# Patient Record
Sex: FEMALE | Race: WHITE | Employment: OTHER | URBAN - METROPOLITAN AREA
[De-identification: names, ages, dates, MRNs, and addresses within clinical notes are randomized per-mention and may not be internally consistent; named-entity substitution may affect disease eponyms.]

---

## 2017-04-21 ENCOUNTER — HOSPITAL ENCOUNTER (INPATIENT)
Age: 77
LOS: 2 days | Discharge: HOME | DRG: 390 | End: 2017-04-23
Attending: STUDENT IN AN ORGANIZED HEALTH CARE EDUCATION/TRAINING PROGRAM | Admitting: COLON & RECTAL SURGERY
Payer: MEDICARE

## 2017-04-21 ENCOUNTER — APPOINTMENT (OUTPATIENT)
Dept: CT IMAGING | Age: 77
DRG: 390 | End: 2017-04-21
Attending: STUDENT IN AN ORGANIZED HEALTH CARE EDUCATION/TRAINING PROGRAM
Payer: MEDICARE

## 2017-04-21 DIAGNOSIS — K56.609 SBO (SMALL BOWEL OBSTRUCTION) (HCC): Primary | ICD-10-CM

## 2017-04-21 LAB
ALBUMIN SERPL BCP-MCNC: 3.7 G/DL (ref 3.5–5)
ALBUMIN/GLOB SERPL: 1 {RATIO} (ref 1.1–2.2)
ALP SERPL-CCNC: 64 U/L (ref 45–117)
ALT SERPL-CCNC: 22 U/L (ref 12–78)
ANION GAP BLD CALC-SCNC: 11 MMOL/L (ref 5–15)
AST SERPL W P-5'-P-CCNC: 12 U/L (ref 15–37)
BASOPHILS # BLD AUTO: 0 K/UL (ref 0–0.1)
BASOPHILS # BLD: 0 % (ref 0–1)
BILIRUB SERPL-MCNC: 0.9 MG/DL (ref 0.2–1)
BUN SERPL-MCNC: 32 MG/DL (ref 6–20)
BUN/CREAT SERPL: 22 (ref 12–20)
CALCIUM SERPL-MCNC: 10.2 MG/DL (ref 8.5–10.1)
CHLORIDE SERPL-SCNC: 106 MMOL/L (ref 97–108)
CO2 SERPL-SCNC: 26 MMOL/L (ref 21–32)
CREAT SERPL-MCNC: 1.43 MG/DL (ref 0.55–1.02)
DIFFERENTIAL METHOD BLD: ABNORMAL
EOSINOPHIL # BLD: 0 K/UL (ref 0–0.4)
EOSINOPHIL NFR BLD: 0 % (ref 0–7)
ERYTHROCYTE [DISTWIDTH] IN BLOOD BY AUTOMATED COUNT: 13.3 % (ref 11.5–14.5)
GLOBULIN SER CALC-MCNC: 3.8 G/DL (ref 2–4)
GLUCOSE SERPL-MCNC: 135 MG/DL (ref 65–100)
HCT VFR BLD AUTO: 42.9 % (ref 35–47)
HGB BLD-MCNC: 14.1 G/DL (ref 11.5–16)
LACTATE SERPL-SCNC: 1.1 MMOL/L (ref 0.4–2)
LIPASE SERPL-CCNC: 129 U/L (ref 73–393)
LYMPHOCYTES # BLD AUTO: 8 % (ref 12–49)
LYMPHOCYTES # BLD: 0.7 K/UL (ref 0.8–3.5)
MCH RBC QN AUTO: 33.7 PG (ref 26–34)
MCHC RBC AUTO-ENTMCNC: 32.9 G/DL (ref 30–36.5)
MCV RBC AUTO: 102.6 FL (ref 80–99)
MONOCYTES # BLD: 1.1 K/UL (ref 0–1)
MONOCYTES NFR BLD AUTO: 12 % (ref 5–13)
NEUTS BAND NFR BLD MANUAL: 6 %
NEUTS SEG # BLD: 7.2 K/UL (ref 1.8–8)
NEUTS SEG NFR BLD AUTO: 74 % (ref 32–75)
PLATELET # BLD AUTO: 173 K/UL (ref 150–400)
PLATELET COMMENTS,PCOM: ABNORMAL
POTASSIUM SERPL-SCNC: 4.4 MMOL/L (ref 3.5–5.1)
PROT SERPL-MCNC: 7.5 G/DL (ref 6.4–8.2)
RBC # BLD AUTO: 4.18 M/UL (ref 3.8–5.2)
RBC MORPH BLD: ABNORMAL
RBC MORPH BLD: ABNORMAL
SODIUM SERPL-SCNC: 143 MMOL/L (ref 136–145)
WBC # BLD AUTO: 9 K/UL (ref 3.6–11)

## 2017-04-21 PROCEDURE — 65270000032 HC RM SEMIPRIVATE

## 2017-04-21 PROCEDURE — 36415 COLL VENOUS BLD VENIPUNCTURE: CPT | Performed by: EMERGENCY MEDICINE

## 2017-04-21 PROCEDURE — 96375 TX/PRO/DX INJ NEW DRUG ADDON: CPT

## 2017-04-21 PROCEDURE — 85025 COMPLETE CBC W/AUTO DIFF WBC: CPT | Performed by: EMERGENCY MEDICINE

## 2017-04-21 PROCEDURE — 74011250636 HC RX REV CODE- 250/636: Performed by: STUDENT IN AN ORGANIZED HEALTH CARE EDUCATION/TRAINING PROGRAM

## 2017-04-21 PROCEDURE — 96374 THER/PROPH/DIAG INJ IV PUSH: CPT

## 2017-04-21 PROCEDURE — 80053 COMPREHEN METABOLIC PANEL: CPT | Performed by: EMERGENCY MEDICINE

## 2017-04-21 PROCEDURE — 74176 CT ABD & PELVIS W/O CONTRAST: CPT

## 2017-04-21 PROCEDURE — 83605 ASSAY OF LACTIC ACID: CPT | Performed by: STUDENT IN AN ORGANIZED HEALTH CARE EDUCATION/TRAINING PROGRAM

## 2017-04-21 PROCEDURE — 74011250636 HC RX REV CODE- 250/636: Performed by: COLON & RECTAL SURGERY

## 2017-04-21 PROCEDURE — 96376 TX/PRO/DX INJ SAME DRUG ADON: CPT

## 2017-04-21 PROCEDURE — 83690 ASSAY OF LIPASE: CPT | Performed by: STUDENT IN AN ORGANIZED HEALTH CARE EDUCATION/TRAINING PROGRAM

## 2017-04-21 PROCEDURE — 74011636320 HC RX REV CODE- 636/320: Performed by: STUDENT IN AN ORGANIZED HEALTH CARE EDUCATION/TRAINING PROGRAM

## 2017-04-21 PROCEDURE — 99285 EMERGENCY DEPT VISIT HI MDM: CPT

## 2017-04-21 RX ORDER — ONDANSETRON 2 MG/ML
4 INJECTION INTRAMUSCULAR; INTRAVENOUS
Status: COMPLETED | OUTPATIENT
Start: 2017-04-21 | End: 2017-04-21

## 2017-04-21 RX ORDER — ONDANSETRON 2 MG/ML
4 INJECTION INTRAMUSCULAR; INTRAVENOUS
Status: DISCONTINUED | OUTPATIENT
Start: 2017-04-21 | End: 2017-04-23 | Stop reason: HOSPADM

## 2017-04-21 RX ORDER — SODIUM CHLORIDE, SODIUM LACTATE, POTASSIUM CHLORIDE, CALCIUM CHLORIDE 600; 310; 30; 20 MG/100ML; MG/100ML; MG/100ML; MG/100ML
150 INJECTION, SOLUTION INTRAVENOUS CONTINUOUS
Status: DISCONTINUED | OUTPATIENT
Start: 2017-04-21 | End: 2017-04-22

## 2017-04-21 RX ORDER — ACETAMINOPHEN 325 MG/1
650 TABLET ORAL
Status: DISCONTINUED | OUTPATIENT
Start: 2017-04-21 | End: 2017-04-23 | Stop reason: HOSPADM

## 2017-04-21 RX ORDER — ENOXAPARIN SODIUM 100 MG/ML
40 INJECTION SUBCUTANEOUS EVERY 24 HOURS
Status: DISCONTINUED | OUTPATIENT
Start: 2017-04-21 | End: 2017-04-23 | Stop reason: HOSPADM

## 2017-04-21 RX ORDER — MORPHINE SULFATE 2 MG/ML
4 INJECTION, SOLUTION INTRAMUSCULAR; INTRAVENOUS ONCE
Status: COMPLETED | OUTPATIENT
Start: 2017-04-21 | End: 2017-04-21

## 2017-04-21 RX ORDER — CALCIUM POLYCARBOPHIL 625 MG
1875 TABLET ORAL DAILY
COMMUNITY
End: 2018-01-25

## 2017-04-21 RX ORDER — SODIUM CHLORIDE 0.9 % (FLUSH) 0.9 %
5-10 SYRINGE (ML) INJECTION EVERY 8 HOURS
Status: DISCONTINUED | OUTPATIENT
Start: 2017-04-21 | End: 2017-04-23 | Stop reason: HOSPADM

## 2017-04-21 RX ORDER — CYANOCOBALAMIN 1000 UG/ML
1000 INJECTION, SOLUTION INTRAMUSCULAR; SUBCUTANEOUS
COMMUNITY
End: 2017-04-24 | Stop reason: SDUPTHER

## 2017-04-21 RX ORDER — SODIUM CHLORIDE 0.9 % (FLUSH) 0.9 %
5-10 SYRINGE (ML) INJECTION AS NEEDED
Status: DISCONTINUED | OUTPATIENT
Start: 2017-04-21 | End: 2017-04-23 | Stop reason: HOSPADM

## 2017-04-21 RX ORDER — VERAPAMIL HYDROCHLORIDE 240 MG/1
240 TABLET, FILM COATED, EXTENDED RELEASE ORAL
Status: DISCONTINUED | OUTPATIENT
Start: 2017-04-22 | End: 2017-04-23 | Stop reason: HOSPADM

## 2017-04-21 RX ORDER — MORPHINE SULFATE 4 MG/ML
3 INJECTION, SOLUTION INTRAMUSCULAR; INTRAVENOUS
Status: DISCONTINUED | OUTPATIENT
Start: 2017-04-21 | End: 2017-04-23 | Stop reason: HOSPADM

## 2017-04-21 RX ORDER — HYDROMORPHONE HYDROCHLORIDE 1 MG/ML
0.2 INJECTION, SOLUTION INTRAMUSCULAR; INTRAVENOUS; SUBCUTANEOUS
Status: DISCONTINUED | OUTPATIENT
Start: 2017-04-21 | End: 2017-04-21

## 2017-04-21 RX ADMIN — ONDANSETRON 4 MG: 2 INJECTION INTRAMUSCULAR; INTRAVENOUS at 08:33

## 2017-04-21 RX ADMIN — IOHEXOL 50 ML: 240 INJECTION, SOLUTION INTRATHECAL; INTRAVASCULAR; INTRAVENOUS; ORAL at 10:47

## 2017-04-21 RX ADMIN — Medication 4 MG: at 13:55

## 2017-04-21 RX ADMIN — ONDANSETRON 4 MG: 2 INJECTION INTRAMUSCULAR; INTRAVENOUS at 13:55

## 2017-04-21 RX ADMIN — Medication 4 MG: at 08:33

## 2017-04-21 RX ADMIN — ENOXAPARIN SODIUM 40 MG: 40 INJECTION SUBCUTANEOUS at 18:13

## 2017-04-21 RX ADMIN — Medication 10 ML: at 22:00

## 2017-04-21 RX ADMIN — SODIUM CHLORIDE 1000 ML: 900 INJECTION, SOLUTION INTRAVENOUS at 08:33

## 2017-04-21 RX ADMIN — SODIUM CHLORIDE, SODIUM LACTATE, POTASSIUM CHLORIDE, AND CALCIUM CHLORIDE 150 ML/HR: 600; 310; 30; 20 INJECTION, SOLUTION INTRAVENOUS at 18:12

## 2017-04-21 NOTE — IP AVS SNAPSHOT
Current Discharge Medication List  
  
CONTINUE these medications which have NOT CHANGED Dose & Instructions Dispensing Information Comments Morning Noon Evening Bedtime  
 allopurinol 100 mg tablet Commonly known as:  Ritta Popper Your last dose was: Your next dose is: TAKE 1 TABLET EVERY DAY Quantity:  90 Tab Refills:  3 BENADRYL ALLERGY 25 mg tablet Generic drug:  diphenhydrAMINE Your last dose was: Your next dose is:    
   
   
 Dose:  25 mg Take 25 mg by mouth nightly as needed for Sleep. Refills:  0  
     
   
   
   
  
 calcium polycarbophil 625 mg tablet Commonly known as:  Gerline Oka Your last dose was: Your next dose is:    
   
   
 Dose:  1875 mg Take 1,875 mg by mouth daily. Refills:  0  
     
   
   
   
  
 calcium-vitamin D 500 mg(1,250mg) -200 unit per tablet Commonly known as:  OYSTER SHELL Your last dose was: Your next dose is:    
   
   
 Dose:  1 Tab Take 1 Tab by mouth daily. Refills:  0 CENTRUM SILVER WOMEN 8 mg iron-400 mcg-300 mcg Tab Generic drug:  multivit-min-iron-FA-lutein Your last dose was: Your next dose is:    
   
   
 Dose:  1 Tab Take 1 Tab by mouth daily. Refills:  0  
     
   
   
   
  
 FISH OIL 1,000 mg Cap Generic drug:  omega-3 fatty acids-vitamin e Your last dose was: Your next dose is:    
   
   
 Dose:  1 Cap Take 1 Cap by mouth daily. Refills:  0  
     
   
   
   
  
 lovastatin 20 mg tablet Commonly known as:  MEVACOR Your last dose was: Your next dose is: TAKE 1 TABLET AT BEDTIME Quantity:  90 Tab Refills:  3  
     
   
   
   
  
 verapamil  mg ER capsule Commonly known as:  Leidonavan Enzo Your last dose was: Your next dose is:    
   
   
 Dose:  240 mg Take 1 Cap by mouth daily. Quantity:  90 Cap Refills:  3 VITAMIN B-12 1,000 mcg/mL injection Generic drug:  cyanocobalamin Your last dose was: Your next dose is:    
   
   
 Dose:  1000 mcg  
1,000 mcg by IntraMUSCular route every three (3) months. Refills:  0  
     
   
   
   
  
 VITAMIN D3 1,000 unit tablet Generic drug:  cholecalciferol Your last dose was: Your next dose is:    
   
   
 Dose:  1000 Units Take 1,000 Units by mouth daily. Refills:  0

## 2017-04-21 NOTE — H&P
[facility]on [facility] Rectal [facility] H and P      Date of Admission:4/21/2017    Subjective:     Chief Complaint  abd pain    History of Present Illness  63 yo F w h/o multiple abd surg including subtotal colectomy for GI bleed - presents with 2 day h/o abd pain. Pt has h/o previous SBOs and states that pain feels similar. No Bowel movements in last 2 days - normally has 4-5 per day. Has had difficulty keeping any liquids or solids down.      Past Medical History:   Diagnosis Date    Abscess 3/22/2014    Lumbar area - MRSA    Arthritis     Gout     Hypercholesterolemia     Hypertension     LBP (low back pain)     Other ill-defined conditions     LEFT SHOULDER PAINFUL, NEEDS REPLACEMENT    Thromboembolus ([facility] [address] 12/26/13    left leg     Past Surgical History:   Procedure Laterality Date    ABDOMEN SURGERY PROC UNLISTED      hernia repair    BREAST SURGERY PROCEDURE UNLISTED      EXCISION OF BREAST CYST    ENDOSCOPY, COLON, DIAGNOSTIC      ostomy reversal    HX BREAST BIOPSY Right 1970    Benign    HX COLOSTOMY      HX GI      COLOSTOMY REVERSAL    HX HEMORRHOIDECTOMY      HX ORTHOPAEDIC      right knee  bilateral knee replacement    HX ORTHOPAEDIC      ORIF RIGHT ANKLE    HX ORTHOPAEDIC      KNEE ARTHROSCOPY     Allergies   Allergen Reactions    Shellfish Derived Angioedema     Family History   Problem Relation Age of Onset    Hypertension Father     Anesth Problems Neg Hx      Social History     Social History    Marital status:      Spouse name: N/A    Number of children: N/A    Years of education: N/A     Social History Main Topics    Smoking status: Former Smoker     Packs/day: 1.00     Years: 20.00     Quit date: 5/1/1972    Smokeless tobacco: Never Used    Alcohol use 1.0 oz/week     2 Shots of liquor per week      Comment: 2 DAY    Drug use: No    Sexual activity: Not Asked     Other Topics Concern    None     Social History Narrative       Review of Systems 10 point ROS negative except as stated in the HPI    Physical Exam   Visit Vitals    /73    Pulse 88    Temp 98.8 °F (37.1 °C)    Resp 18    Ht 5' 7\" (1.702 m)    Wt 77.1 kg (170 lb)    SpO2 95%    BMI 26.63 kg/m2       General Appearance - alert and oriented x 3, in no acute distress  Lungs - clear to auscultation b/l   Cardiovascular - regular rate and rhythm  Abdomen - abd soft, ND, NT - multiple surgical scars  Extremities - nml, atraumatic, no edema     Radiology Results  Pertinent images reviewed    Assessment/Plan  65 yo F w SBO - likely secondary to adhesions from previous surgery     PLAN  - npo   - ivf  - conservative management of SBO - NGT decompression if she has further episodes of vomiting    Kolby Barnes MD  [facility]  [phone]    [address] [facility], [address] [facility], [address]    [address] [facility], [facility], [facility] [address] [address]    [address] [facility].   [place], [address] [place] [address]

## 2017-04-21 NOTE — ED NOTES
TRANSFER - OUT REPORT:    Verbal report given to Kolby Castellanos RN(name) on [employer]  being transferred to (unit) for routine progression of care       Report consisted of patients Situation, Background, Assessment and   Recommendations(SBAR). Information from the following report(s) SBAR, ED Summary, [facility] and Recent Results was reviewed with the receiving nurse. Lines:   Peripheral IV 04/21/17 Right Antecubital (Active)   Site Assessment Clean, dry, & intact 4/21/2017  8:08 AM   Phlebitis Assessment 0 4/21/2017  8:08 AM   Infiltration Assessment 0 4/21/2017  8:08 AM   Dressing Status Clean, dry, & intact 4/21/2017  8:08 AM        Opportunity for questions and clarification was provided.

## 2017-04-21 NOTE — IP AVS SNAPSHOT
[address] 
[phone] Patient: Berta Peguero MRN: [id number] MWR:6/8/1833 You are allergic to the following Allergen Reactions Shellfish Derived Angioedema Recent Documentation Height Weight BMI OB Status Smoking Status 1.702 m 77.1 kg 26.63 kg/m2 Postmenopausal Former Smoker Emergency Contacts Name Discharge Info Relation Home Work Mobile Matthew Corrigan DISCHARGE CAREGIVER [3] Spouse [3] [phone] About your hospitalization You were admitted on:  April 21, 2017 You last received care in the:  [facility] [id number] You were discharged on:  April 23, 2017 Unit phone number:  [phone] Why you were hospitalized Your primary diagnosis was:  Not on File Your diagnoses also included:  Obstruction Of Bowel (Hcc), Small Bowel Obstruction (Hcc) Providers Seen During Your Hospitalizations Provider Role Specialty Primary office phone Hanane Ren MD Attending Provider Emergency Medicine [phone] Ivonne Bahena MD Attending Provider Colon and Rectal Surgery [phone] Your Primary Care Physician (PCP) Primary Care Physician Office Phone Office Fax Chasity Dipesh [phone] Follow-up Information Follow up With Details Comments [place]   [address] [address] 
[phone] Current Discharge Medication List  
  
CONTINUE these medications which have NOT CHANGED Dose & Instructions Dispensing Information Comments Morning Noon Evening Bedtime  
 allopurinol 100 mg tablet Commonly known as:  Americo Boyd Your last dose was: Your next dose is: TAKE 1 TABLET EVERY DAY Quantity:  90 Tab Refills:  3 BENADRYL ALLERGY 25 mg tablet Generic drug:  diphenhydrAMINE Your last dose was: Your next dose is:    
   
   
 Dose:  25 mg Take 25 mg by mouth nightly as needed for Sleep. Refills:  0  
     
   
   
   
  
 calcium polycarbophil 625 mg tablet Commonly known as:  Richrd Fu Your last dose was: Your next dose is:    
   
   
 Dose:  1875 mg Take 1,875 mg by mouth daily. Refills:  0  
     
   
   
   
  
 calcium-vitamin D 500 mg(1,250mg) -200 unit per tablet Commonly known as:  OYSTER SHELL Your last dose was: Your next dose is:    
   
   
 Dose:  1 Tab Take 1 Tab by mouth daily. Refills:  0 CENTRUM SILVER WOMEN 8 mg iron-400 mcg-300 mcg Tab Generic drug:  multivit-min-iron-FA-lutein Your last dose was: Your next dose is:    
   
   
 Dose:  1 Tab Take 1 Tab by mouth daily. Refills:  0  
     
   
   
   
  
 FISH OIL 1,000 mg Cap Generic drug:  omega-3 fatty acids-vitamin e Your last dose was: Your next dose is:    
   
   
 Dose:  1 Cap Take 1 Cap by mouth daily. Refills:  0  
     
   
   
   
  
 lovastatin 20 mg tablet Commonly known as:  MEVACOR Your last dose was: Your next dose is: TAKE 1 TABLET AT BEDTIME Quantity:  90 Tab Refills:  3  
     
   
   
   
  
 verapamil  mg ER capsule Commonly known as:  Ladell Slider Your last dose was: Your next dose is:    
   
   
 Dose:  240 mg Take 1 Cap by mouth daily. Quantity:  90 Cap Refills:  3 VITAMIN B-12 1,000 mcg/mL injection Generic drug:  cyanocobalamin Your last dose was: Your next dose is:    
   
   
 Dose:  1000 mcg  
1,000 mcg by IntraMUSCular route every three (3) months. Refills:  0  
     
   
   
   
  
 VITAMIN D3 1,000 unit tablet Generic drug:  cholecalciferol Your last dose was: Your next dose is:    
   
   
 Dose:  1000 Units Take 1,000 Units by mouth daily. Refills:  0 Discharge Instructions None Discharge Orders None Introducing hospitals & HEALTH SERVICES! Dear Florence Cristel: Thank you for requesting a [employer] account. Our records indicate that you already have an active [employer] account. You can access your account anytime at [email or web address] Did you know that you can access your hospital and ER discharge instructions at any time in [employer]? You can also review all of your test results from your hospital stay or ER visit. Additional Information If you have questions, please visit the Frequently Asked Questions section of the [employer] website at [email or web address] Remember, [employer] is NOT to be used for urgent needs. For medical emergencies, dial 911. Now available from your iPhone and Android! General Information Please provide this summary of care documentation to your next provider. Patient Signature:  ____________________________________________________________ Date:  ____________________________________________________________  
  
Juanito Justice Provider Signature:  ____________________________________________________________ Date:  ____________________________________________________________

## 2017-04-21 NOTE — Clinical Note
Status[de-identified] Inpatient [101] Type of Bed: Surgical [18] Inpatient Hospitalization Certified Necessary for the Following Reasons: 3. Patient receiving treatment that can only be provided in an inpatient setting (further clarification in H&P documentation) Admitting Diagnosis: Obstruction of bowel ([facility] [address] [[id number]] Admitting Physician: Raulito Back [[id number]] Attending Physician: Raulito Back [[id number]] Estimated Length of Stay: > or = to 2 Midnights Discharge Plan[de-identified] Other (Specify)

## 2017-04-21 NOTE — ED PROVIDER NOTES
HPI Comments: 64 y.o. female with past medical history significant for hypercholesterolemia, hypertension, DVT, arthritis, gout, lower back pain, abscess, hemorrhoidectomy and colostomy who presents from home with chief complaint of abdominal pain. Patient has a history of multiple SBOs for which she has undergone a colectomy done by Dr. Sandoval Orellana. She arrives today complaining of similar diffuse abdominal pain, nausea and vomiting that she has had with past SBOs. She reports onset of abdominal pain at 1800 yesterday (13.5 hours PTA). She states her pain is constant and occasionally becomes severe. She adds that since 1800 yesterday she has been vomiting approximately once her hour and states that whenever she tries to drink water it feels like it comes back out. Patient reports passing gas. There are no other acute medical concerns at this time. PCP: Gela Starr MD  GI: Jailene Lira MD  Note written by sony May, as dictated by Kanu Hays MD 8:41 AM      The history is provided by the patient.         Past Medical History:   Diagnosis Date    Abscess 3/22/2014    Lumbar area - MRSA    Arthritis     Gout     Hypercholesterolemia     Hypertension     LBP (low back pain)     Other ill-defined conditions     LEFT SHOULDER PAINFUL, NEEDS REPLACEMENT    Thromboembolus ([address] 12/26/13    left leg       Past Surgical History:   Procedure Laterality Date    ABDOMEN SURGERY PROC UNLISTED      hernia repair    BREAST SURGERY PROCEDURE UNLISTED      EXCISION OF BREAST CYST    ENDOSCOPY, COLON, DIAGNOSTIC      ostomy reversal    HX BREAST BIOPSY Right 1970    Benign    HX COLOSTOMY      HX GI      COLOSTOMY REVERSAL    HX HEMORRHOIDECTOMY      HX ORTHOPAEDIC      right knee  bilateral knee replacement    HX ORTHOPAEDIC      ORIF RIGHT ANKLE    HX ORTHOPAEDIC      KNEE ARTHROSCOPY         Family History:   Problem Relation Age of Onset    Hypertension Father     Anesth Problems Neg Hx        Social History     Social History    Marital status:      Spouse name: N/A    Number of children: N/A    Years of education: N/A     Occupational History    Not on file. Social History Main Topics    Smoking status: Former Smoker     Packs/day: 1.00     Years: 20.00     Quit date: 5/1/1972    Smokeless tobacco: Never Used    Alcohol use 1.0 oz/week     2 Shots of liquor per week      Comment: 2 DAY    Drug use: No    Sexual activity: Not on file     Other Topics Concern    Not on file     Social History Narrative         ALLERGIES: Shellfish derived    Review of Systems   Gastrointestinal: Positive for abdominal pain, constipation, nausea and vomiting. All other systems reviewed and are negative. Vitals:    04/21/17 0755   BP: (!) 70/51   Pulse: 68   Resp: 16   Temp: 98.8 °F (37.1 °C)   SpO2: 99%   Weight: 77.1 kg (170 lb)   Height: 5' 7\" (1.702 m)            Physical Exam   Constitutional: She is oriented to person, place, and time. She appears well-developed and well-nourished. HENT:   Head: Normocephalic and atraumatic. Eyes: Conjunctivae and EOM are normal. Pupils are equal, round, and reactive to light. Neck: Normal range of motion. Neck supple. Cardiovascular: Normal rate, regular rhythm and normal heart sounds. No murmur heard. Pulmonary/Chest: Effort normal and breath sounds normal. No respiratory distress. Abdominal: Soft. Bowel sounds are normal. She exhibits no distension. There is tenderness. There is no rebound. Mild diffuse tenderness to palpation   Musculoskeletal: Normal range of motion. She exhibits no edema. Neurological: She is alert and oriented to person, place, and time. No cranial nerve deficit. She exhibits normal muscle tone. Coordination normal.   Skin: Skin is warm and dry. No rash noted. Psychiatric: She has a normal mood and affect. Her behavior is normal.   Nursing note and vitals reviewed.   Note written by sony Sanchez, as dictated by Harriet Sherwood MD 8:41 AM      [facility]  ED Course       Procedures  CONSULT NOTE:  12:51 PM Harriet Sherwood MD spoke with Dr. Nick Hartman, Consult for Colorectal surgery. Discussed available diagnostic tests and clinical findings. He agrees to admit the patient.

## 2017-04-21 NOTE — PROGRESS NOTES
Admission Medication Reconciliation:    Information obtained from: patient     Significant PMH/Disease States:   Past Medical History:   Diagnosis Date    Abscess 3/22/2014    Lumbar area - MRSA    Arthritis     Gout     Hypercholesterolemia     Hypertension     LBP (low back pain)     Other ill-defined conditions     LEFT SHOULDER PAINFUL, NEEDS REPLACEMENT    Thromboembolus ([address] 13    left leg       Chief Complaint for this Admission:  SBO     Allergies:  Shellfish derived    Prior to Admission Medications:   Prior to Admission Medications   Prescriptions Last Dose Informant Patient Reported? Taking?   allopurinol (ZYLOPRIM) 100 mg tablet 2017 at am  No Yes   Sig: TAKE 1 TABLET EVERY DAY   calcium polycarbophil (FIBERCON) 625 mg tablet 2017 at am  Yes Yes   Sig: Take 1,875 mg by mouth daily. calcium-vitamin D (OYSTER SHELL) 500 mg(1,250mg) -200 unit per tablet 2017 at am  Yes Yes   Sig: Take 1 Tab by mouth daily. cholecalciferol (VITAMIN D3) 1,000 unit tablet 2017 at am  Yes Yes   Sig: Take 1,000 Units by mouth daily. cyanocobalamin (VITAMIN B-12) 1,000 mcg/mL injection   Yes Yes   Si,000 mcg by IntraMUSCular route every three (3) months. diphenhydrAMINE (BENADRYL ALLERGY) 25 mg tablet   Yes Yes   Sig: Take 25 mg by mouth nightly as needed for Sleep.   lovastatin (MEVACOR) 20 mg tablet 2017 at hs  No Yes   Sig: TAKE 1 TABLET AT BEDTIME   multivit-min-iron-FA-lutein (CENTRUM SILVER WOMEN) 8 mg iron-400 mcg-300 mcg tab 2017 at am  Yes Yes   Sig: Take 1 Tab by mouth daily. omega-3 fatty acids-vitamin e (FISH OIL) 1,000 mg cap 2017 at am  Yes Yes   Sig: Take 1 Cap by mouth daily. verapamil ER (VERELAN) 240 mg ER capsule 2017 at am  No Yes   Sig: Take 1 Cap by mouth daily. Facility-Administered Medications: None         Comments/Recommendations: This medication history was obtained from the patient; (s)he appears to be an accurate historian. An RX Query is available. She does not know when her last dose of cyanocobalamin IM was but thinks \"it's just about due soon. \"  Inpatient orders were reviewed and no changes are needed. Medications added: none  Medications deleted: niacin, moxifloxacin and difluprednate eye drops (taken post-cataract surgery)    Thank you for allowing me to participate in the care of this patient. Please contact the pharmacy () or the medication reconciliation pharmacy () with any questions. Mick Seymour, Pharm. D., BCPS, BCPPS

## 2017-04-21 NOTE — ED TRIAGE NOTES
Pt report she feels like she has a bowel blockage. Pt states she ha had one in the past.  Pt does not have a colon but states she has normal frequent bowel movements. Pt also repots severe abd pain an weakness. BP in triage 70/51.

## 2017-04-22 PROCEDURE — 74011250637 HC RX REV CODE- 250/637: Performed by: COLON & RECTAL SURGERY

## 2017-04-22 PROCEDURE — 65270000032 HC RM SEMIPRIVATE

## 2017-04-22 PROCEDURE — 74011250636 HC RX REV CODE- 250/636: Performed by: COLON & RECTAL SURGERY

## 2017-04-22 RX ORDER — DIPHENHYDRAMINE HCL 25 MG
25 CAPSULE ORAL
Status: DISCONTINUED | OUTPATIENT
Start: 2017-04-22 | End: 2017-04-23 | Stop reason: HOSPADM

## 2017-04-22 RX ADMIN — ENOXAPARIN SODIUM 40 MG: 40 INJECTION SUBCUTANEOUS at 17:17

## 2017-04-22 RX ADMIN — DIPHENHYDRAMINE HYDROCHLORIDE 25 MG: 25 CAPSULE ORAL at 03:26

## 2017-04-22 RX ADMIN — Medication 10 ML: at 06:16

## 2017-04-22 NOTE — PROGRESS NOTES
TRANSFER - IN REPORT:    Verbal report received from LISSETTE Hutchins(name) on [employer] [facility]  being received from ED (unit) for routine progression of care      Report consisted of patients Situation, Background, Assessment and   Recommendations(SBAR). Information from the following report(s) SBAR, Kardex, ED Summary, Intake/Output, MAR and Recent Results was reviewed with the receiving nurse. Opportunity for questions and clarification was provided. Assessment completed upon patients arrival to unit and care assumed.

## 2017-04-22 NOTE — PROGRESS NOTES
Patient reports to this nurse that she takes Benedryl 25mg at home at bedtime to help her sleep. MD on call has been paged x2. No returned called at this time. Will page again.     On call MD Gardenia Perez gave orders for [facility]

## 2017-04-22 NOTE — REHAB NOTE
Pt starting to pass stool now  No major issues  Abd: soft  + BS active  Labs ok  A/p will heplock iv and increase diet  Home soon

## 2017-04-22 NOTE — PROGRESS NOTES
Bedside shift change report given to Nery MCLAUGHLIN (oncoming nurse) by Ezequiel Guy RN (offgoing nurse). Report included the following information SBAR, Kardex, Intake/Output, MAR and Accordion.

## 2017-04-23 VITALS
SYSTOLIC BLOOD PRESSURE: 129 MMHG | DIASTOLIC BLOOD PRESSURE: 72 MMHG | WEIGHT: 170 LBS | TEMPERATURE: 98.9 F | OXYGEN SATURATION: 94 % | BODY MASS INDEX: 26.68 KG/M2 | HEART RATE: 53 BPM | RESPIRATION RATE: 16 BRPM | HEIGHT: 67 IN

## 2017-04-23 PROCEDURE — 74011250637 HC RX REV CODE- 250/637: Performed by: COLON & RECTAL SURGERY

## 2017-04-23 RX ADMIN — DIPHENHYDRAMINE HYDROCHLORIDE 25 MG: 25 CAPSULE ORAL at 00:41

## 2017-04-23 RX ADMIN — VERAPAMIL HYDROCHLORIDE 240 MG: 240 TABLET, FILM COATED, EXTENDED RELEASE ORAL at 07:03

## 2017-04-23 RX ADMIN — Medication 10 ML: at 07:03

## 2017-04-23 NOTE — PROGRESS NOTES
Bedside and Verbal shift change report given to LISSETTE Darden (oncoming nurse) by Becky Pappas RN (offgoing nurse). Report included the following information SBAR, Kardex, Intake/Output, MAR, Accordion, Recent Results and Med Rec Status.

## 2017-04-23 NOTE — ROUTINE PROCESS
Bedside shift change report given to [address] [place] [address] (oncoming nurse) by Caleb MCLAUGHLIN (offgoing nurse). Report included the following information SBAR, Kardex, MAR and Accordion.

## 2017-05-02 PROBLEM — I47.1 PAROXYSMAL SVT (SUPRAVENTRICULAR TACHYCARDIA) (HCC): Status: ACTIVE | Noted: 2017-05-02

## 2017-05-25 NOTE — DISCHARGE SUMMARY
Physician Discharge Summary     Patient ID:  Darcy Mcmahan  [id number]  47 y.o.  1940    Admit Date: 4/21/2017    Discharge Date: 5/24/2017    * Admission Diagnoses: Obstruction of bowel ([facility]  Small bowel obstruction [facility])    * Discharge Diagnoses:    Hospital Problems as of 4/23/2017  Date Reviewed: 12/27/2016          Codes Class Noted - Resolved POA    Obstruction of bowel ([facility] ICD-10-CM: K56.60  ICD-9-CM: 560.9  4/21/2017 - Present Unknown        [facility] ([facility] ICD-10-CM: K56.69  ICD-9-CM: 560.9  4/21/2017 - Present Unknown               Admission Condition: Stable    * Discharge Condition: improved    * Procedures: * No surgery found [facility] Course:   Admitted for small bowel obstruction - resolved with fluids and bowel rest.    Consults: None    Significant Diagnostic Studies: none    * Disposition: Home    Discharge Medications:   Discharge Medication List as of 4/23/2017 12:56 PM      CONTINUE these medications which have NOT CHANGED    Details   calcium polycarbophil (FIBERCON) 625 mg tablet Take 1,875 mg by mouth daily. , Historical Med      cyanocobalamin (VITAMIN B-12) 1,000 mcg/mL injection 1,000 mcg by IntraMUSCular route every three (3) months., Historical Med      verapamil ER (VERELAN) 240 mg ER capsule Take 1 Cap by mouth daily. , Normal, Disp-90 Cap, R-3      lovastatin (MEVACOR) 20 mg tablet TAKE 1 TABLET AT BEDTIME, Normal, Disp-90 Tab, R-3      allopurinol (ZYLOPRIM) 100 mg tablet TAKE 1 TABLET EVERY DAY, Normal, Disp-90 Tab, R-3      calcium-vitamin D (OYSTER SHELL) 500 mg(1,250mg) -200 unit per tablet Take 1 Tab by mouth daily. , Historical Med      multivit-min-iron-FA-lutein (CENTRUM SILVER WOMEN) 8 mg iron-400 mcg-300 mcg tab Take 1 Tab by mouth daily. , Historical Med      cholecalciferol (VITAMIN D3) 1,000 unit tablet Take 1,000 Units by mouth daily. , Historical Med      omega-3 fatty acids-vitamin e (FISH OIL) 1,000 mg cap Take 1 Cap by mouth daily. , Historical Med      diphenhydrAMINE (BENADRYL ALLERGY) 25 mg tablet Take 25 mg by mouth nightly as needed for Sleep., Historical Med             * Follow-up Care/Patient Instructions:   Activity: Activity as tolerated  Diet: Regular Diet  Wound Care: Keep wound clean and dry    Follow-up Information     Follow up With Details Comments [address]  [phone]          Follow-up tests/labs none    Signed:  Dixie Higgins MD  5/24/2017  8:58 PM

## 2017-12-12 ENCOUNTER — HOSPITAL ENCOUNTER (OUTPATIENT)
Dept: MAMMOGRAPHY | Age: 77
Discharge: HOME | End: 2017-12-12
Attending: FAMILY MEDICINE
Payer: MEDICARE

## 2017-12-12 DIAGNOSIS — Z12.31 VISIT FOR SCREENING MAMMOGRAM: ICD-10-CM

## 2017-12-12 PROCEDURE — 77067 SCR MAMMO BI INCL CAD: CPT

## 2018-01-25 ENCOUNTER — HOSPITAL ENCOUNTER (OUTPATIENT)
Age: 78
Setting detail: OBSERVATION
Discharge: HOME | End: 2018-01-26
Attending: EMERGENCY MEDICINE | Admitting: FAMILY MEDICINE
Payer: MEDICARE

## 2018-01-25 ENCOUNTER — APPOINTMENT (OUTPATIENT)
Dept: GENERAL RADIOLOGY | Age: 78
End: 2018-01-25
Attending: EMERGENCY MEDICINE
Payer: MEDICARE

## 2018-01-25 DIAGNOSIS — R06.02 SOB (SHORTNESS OF BREATH): Primary | ICD-10-CM

## 2018-01-25 DIAGNOSIS — E86.0 DEHYDRATION: ICD-10-CM

## 2018-01-25 DIAGNOSIS — R79.89 AZOTEMIA: ICD-10-CM

## 2018-01-25 DIAGNOSIS — R06.2 WHEEZING: ICD-10-CM

## 2018-01-25 PROBLEM — N17.9 ACUTE RENAL FAILURE SUPERIMPOSED ON STAGE 4 CHRONIC KIDNEY DISEASE (HCC): Status: ACTIVE | Noted: 2018-01-25

## 2018-01-25 PROBLEM — N18.4 ACUTE RENAL FAILURE SUPERIMPOSED ON STAGE 4 CHRONIC KIDNEY DISEASE (HCC): Status: ACTIVE | Noted: 2018-01-25

## 2018-01-25 LAB
ALBUMIN SERPL-MCNC: 3.3 G/DL (ref 3.5–5)
ALBUMIN/GLOB SERPL: 0.9 {RATIO} (ref 1.1–2.2)
ALP SERPL-CCNC: 58 U/L (ref 45–117)
ALT SERPL-CCNC: 21 U/L (ref 12–78)
ANION GAP SERPL CALC-SCNC: 8 MMOL/L (ref 5–15)
AST SERPL-CCNC: 21 U/L (ref 15–37)
BASOPHILS # BLD: 0 K/UL (ref 0–0.1)
BASOPHILS NFR BLD: 0 % (ref 0–1)
BILIRUB SERPL-MCNC: 0.2 MG/DL (ref 0.2–1)
BUN SERPL-MCNC: 60 MG/DL (ref 6–20)
BUN/CREAT SERPL: 31 (ref 12–20)
CALCIUM SERPL-MCNC: 9.2 MG/DL (ref 8.5–10.1)
CHLORIDE SERPL-SCNC: 108 MMOL/L (ref 97–108)
CK MB CFR SERPL CALC: 3.1 % (ref 0–2.5)
CK MB SERPL-MCNC: 1.6 NG/ML (ref 5–25)
CK SERPL-CCNC: 51 U/L (ref 26–192)
CO2 SERPL-SCNC: 24 MMOL/L (ref 21–32)
CREAT SERPL-MCNC: 1.93 MG/DL (ref 0.55–1.02)
DIFFERENTIAL METHOD BLD: ABNORMAL
EOSINOPHIL # BLD: 0.1 K/UL (ref 0–0.4)
EOSINOPHIL NFR BLD: 1 % (ref 0–7)
ERYTHROCYTE [DISTWIDTH] IN BLOOD BY AUTOMATED COUNT: 13.2 % (ref 11.5–14.5)
GLOBULIN SER CALC-MCNC: 3.8 G/DL (ref 2–4)
GLUCOSE SERPL-MCNC: 93 MG/DL (ref 65–100)
HCT VFR BLD AUTO: 39.6 % (ref 35–47)
HGB BLD-MCNC: 13.1 G/DL (ref 11.5–16)
IMM GRANULOCYTES # BLD: 0 K/UL (ref 0–0.04)
IMM GRANULOCYTES NFR BLD AUTO: 1 % (ref 0–0.5)
LYMPHOCYTES # BLD: 1.4 K/UL (ref 0.8–3.5)
LYMPHOCYTES NFR BLD: 33 % (ref 12–49)
MCH RBC QN AUTO: 35 PG (ref 26–34)
MCHC RBC AUTO-ENTMCNC: 33.1 G/DL (ref 30–36.5)
MCV RBC AUTO: 105.9 FL (ref 80–99)
MONOCYTES # BLD: 0.4 K/UL (ref 0–1)
MONOCYTES NFR BLD: 10 % (ref 5–13)
NEUTS SEG # BLD: 2.4 K/UL (ref 1.8–8)
NEUTS SEG NFR BLD: 55 % (ref 32–75)
NRBC # BLD: 0 K/UL (ref 0–0.01)
NRBC BLD-RTO: 0 PER 100 WBC
PLATELET # BLD AUTO: 107 K/UL (ref 150–400)
PMV BLD AUTO: 12.5 FL (ref 8.9–12.9)
POTASSIUM SERPL-SCNC: 4.5 MMOL/L (ref 3.5–5.1)
PROT SERPL-MCNC: 7.1 G/DL (ref 6.4–8.2)
RBC # BLD AUTO: 3.74 M/UL (ref 3.8–5.2)
SODIUM SERPL-SCNC: 140 MMOL/L (ref 136–145)
TROPONIN I SERPL-MCNC: <0.04 NG/ML
WBC # BLD AUTO: 4.4 K/UL (ref 3.6–11)

## 2018-01-25 PROCEDURE — 74011636637 HC RX REV CODE- 636/637: Performed by: EMERGENCY MEDICINE

## 2018-01-25 PROCEDURE — 80053 COMPREHEN METABOLIC PANEL: CPT | Performed by: EMERGENCY MEDICINE

## 2018-01-25 PROCEDURE — 96366 THER/PROPH/DIAG IV INF ADDON: CPT

## 2018-01-25 PROCEDURE — 65270000029 HC RM PRIVATE

## 2018-01-25 PROCEDURE — 65390000012 HC CONDITION CODE 44 OBSERVATION

## 2018-01-25 PROCEDURE — 85025 COMPLETE CBC W/AUTO DIFF WBC: CPT | Performed by: EMERGENCY MEDICINE

## 2018-01-25 PROCEDURE — 96365 THER/PROPH/DIAG IV INF INIT: CPT

## 2018-01-25 PROCEDURE — 77030029684 HC NEB SM VOL KT MONA -A

## 2018-01-25 PROCEDURE — 74011000250 HC RX REV CODE- 250: Performed by: EMERGENCY MEDICINE

## 2018-01-25 PROCEDURE — 96361 HYDRATE IV INFUSION ADD-ON: CPT

## 2018-01-25 PROCEDURE — 99283 EMERGENCY DEPT VISIT LOW MDM: CPT

## 2018-01-25 PROCEDURE — 84484 ASSAY OF TROPONIN QUANT: CPT | Performed by: INTERNAL MEDICINE

## 2018-01-25 PROCEDURE — 36415 COLL VENOUS BLD VENIPUNCTURE: CPT | Performed by: INTERNAL MEDICINE

## 2018-01-25 PROCEDURE — 96360 HYDRATION IV INFUSION INIT: CPT

## 2018-01-25 PROCEDURE — 82550 ASSAY OF CK (CPK): CPT | Performed by: INTERNAL MEDICINE

## 2018-01-25 PROCEDURE — 74011250636 HC RX REV CODE- 250/636: Performed by: EMERGENCY MEDICINE

## 2018-01-25 PROCEDURE — 94640 AIRWAY INHALATION TREATMENT: CPT

## 2018-01-25 PROCEDURE — 71046 X-RAY EXAM CHEST 2 VIEWS: CPT

## 2018-01-25 RX ORDER — PREDNISONE 20 MG/1
20 TABLET ORAL ONCE
Status: COMPLETED | OUTPATIENT
Start: 2018-01-25 | End: 2018-01-25

## 2018-01-25 RX ORDER — ALLOPURINOL 100 MG/1
100 TABLET ORAL DAILY
Status: DISCONTINUED | OUTPATIENT
Start: 2018-01-26 | End: 2018-01-26 | Stop reason: HOSPADM

## 2018-01-25 RX ORDER — FERROUS SULFATE, DRIED 160(50) MG
1 TABLET, EXTENDED RELEASE ORAL DAILY
Status: DISCONTINUED | OUTPATIENT
Start: 2018-01-26 | End: 2018-01-26 | Stop reason: HOSPADM

## 2018-01-25 RX ORDER — CYANOCOBALAMIN 1000 UG/ML
1000 INJECTION, SOLUTION INTRAMUSCULAR; SUBCUTANEOUS
Status: DISCONTINUED | OUTPATIENT
Start: 2018-01-26 | End: 2018-01-26 | Stop reason: CLARIF

## 2018-01-25 RX ORDER — DOCUSATE SODIUM 100 MG/1
100 CAPSULE, LIQUID FILLED ORAL 2 TIMES DAILY
Status: DISCONTINUED | OUTPATIENT
Start: 2018-01-26 | End: 2018-01-26

## 2018-01-25 RX ORDER — CEFUROXIME AXETIL 250 MG/1
250 TABLET ORAL DAILY
Status: DISCONTINUED | OUTPATIENT
Start: 2018-01-26 | End: 2018-01-26 | Stop reason: HOSPADM

## 2018-01-25 RX ORDER — HEPARIN SODIUM 5000 [USP'U]/ML
5000 INJECTION, SOLUTION INTRAVENOUS; SUBCUTANEOUS EVERY 8 HOURS
Status: DISCONTINUED | OUTPATIENT
Start: 2018-01-26 | End: 2018-01-26 | Stop reason: HOSPADM

## 2018-01-25 RX ORDER — SODIUM CHLORIDE 0.9 % (FLUSH) 0.9 %
5-10 SYRINGE (ML) INJECTION AS NEEDED
Status: DISCONTINUED | OUTPATIENT
Start: 2018-01-25 | End: 2018-01-26 | Stop reason: HOSPADM

## 2018-01-25 RX ORDER — DEXAMETHASONE 0.5 MG/5ML
ELIXIR ORAL 4 TIMES DAILY
COMMUNITY
End: 2019-12-14

## 2018-01-25 RX ORDER — MELATONIN
1000 DAILY
Status: DISCONTINUED | OUTPATIENT
Start: 2018-01-26 | End: 2018-01-26 | Stop reason: HOSPADM

## 2018-01-25 RX ORDER — LOVASTATIN 20 MG/1
20 TABLET ORAL
Status: DISCONTINUED | OUTPATIENT
Start: 2018-01-26 | End: 2018-01-26 | Stop reason: HOSPADM

## 2018-01-25 RX ORDER — DEXAMETHASONE 0.5 MG/5ML
0.5 SOLUTION ORAL 4 TIMES DAILY
Status: DISCONTINUED | OUTPATIENT
Start: 2018-01-26 | End: 2018-01-26 | Stop reason: HOSPADM

## 2018-01-25 RX ORDER — SODIUM CHLORIDE 9 MG/ML
75 INJECTION, SOLUTION INTRAVENOUS CONTINUOUS
Status: DISCONTINUED | OUTPATIENT
Start: 2018-01-26 | End: 2018-01-26 | Stop reason: HOSPADM

## 2018-01-25 RX ORDER — DIPHENHYDRAMINE HCL 25 MG
25 CAPSULE ORAL
Status: DISCONTINUED | OUTPATIENT
Start: 2018-01-26 | End: 2018-01-26 | Stop reason: HOSPADM

## 2018-01-25 RX ORDER — ALBUTEROL SULFATE 0.83 MG/ML
2.5 SOLUTION RESPIRATORY (INHALATION)
Status: COMPLETED | OUTPATIENT
Start: 2018-01-25 | End: 2018-01-25

## 2018-01-25 RX ORDER — ONDANSETRON 2 MG/ML
4 INJECTION INTRAMUSCULAR; INTRAVENOUS
Status: DISCONTINUED | OUTPATIENT
Start: 2018-01-25 | End: 2018-01-26 | Stop reason: HOSPADM

## 2018-01-25 RX ORDER — GUAIFENESIN/DEXTROMETHORPHAN 100-10MG/5
5 SYRUP ORAL EVERY 4 HOURS
Status: DISCONTINUED | OUTPATIENT
Start: 2018-01-26 | End: 2018-01-26 | Stop reason: HOSPADM

## 2018-01-25 RX ORDER — VERAPAMIL HYDROCHLORIDE 240 MG/1
240 TABLET, FILM COATED, EXTENDED RELEASE ORAL DAILY
Status: DISCONTINUED | OUTPATIENT
Start: 2018-01-26 | End: 2018-01-26 | Stop reason: HOSPADM

## 2018-01-25 RX ORDER — SODIUM CHLORIDE 0.9 % (FLUSH) 0.9 %
5-10 SYRINGE (ML) INJECTION EVERY 8 HOURS
Status: DISCONTINUED | OUTPATIENT
Start: 2018-01-26 | End: 2018-01-26 | Stop reason: HOSPADM

## 2018-01-25 RX ORDER — IPRATROPIUM BROMIDE AND ALBUTEROL SULFATE 2.5; .5 MG/3ML; MG/3ML
3 SOLUTION RESPIRATORY (INHALATION)
Status: DISCONTINUED | OUTPATIENT
Start: 2018-01-25 | End: 2018-01-26 | Stop reason: HOSPADM

## 2018-01-25 RX ORDER — ACETAMINOPHEN 325 MG/1
650 TABLET ORAL
Status: DISCONTINUED | OUTPATIENT
Start: 2018-01-25 | End: 2018-01-26 | Stop reason: HOSPADM

## 2018-01-25 RX ORDER — THERA TABS 400 MCG
1 TAB ORAL DAILY
Status: DISCONTINUED | OUTPATIENT
Start: 2018-01-26 | End: 2018-01-26 | Stop reason: HOSPADM

## 2018-01-25 RX ADMIN — SODIUM CHLORIDE 1000 ML: 900 INJECTION, SOLUTION INTRAVENOUS at 17:57

## 2018-01-25 RX ADMIN — PREDNISONE 20 MG: 20 TABLET ORAL at 17:57

## 2018-01-25 RX ADMIN — ALBUTEROL SULFATE 2.5 MG: 2.5 SOLUTION RESPIRATORY (INHALATION) at 17:57

## 2018-01-25 NOTE — ED PROVIDER NOTES
HPI Comments: 76 y.o. female with past medical history significant for hypercholesterolemia, HTN, thromboembolus, arthritis, gout, LBP, abscess and paroxysmal SVT who presents from home with chief complaint of cough. Per pt, she has been experiencing an ongoing productive cough since onset on Monday (1/22/2018). The pt reports that she was seen by her PCP on Tuesday for evaluation where she informed that her cough was likely due to bronchiolitis. She was started on 250 mg Ceftin following the visit, however since starting the medication has not noticed improvement in her cough. Since onset of her cough, the pt reports that she has also felt increasingly weak especially while ambulating. Per pt, she has felt mildly SOB while ambulating, however associates this with her ongoing cough. The pt notes she is unsure if her weakness is related to dehydration as Dr. Presley Saavedra informed her that this may be the case. Despite her moderate LE weakness, the pt makes it known that she has not experienced any recent GLF. In addition to the Ceftin, the pt notes that she is also taking Dexamethasone elixir for an ulcer to her tongue. Per pt, she is not currently on prednisone. The pt states that she has hx of wheezing in the past, however does not appear to wheeze per usual. Currently, the pt is noted to be ambulating via a quad cane which she has been using for the past 4-5 years. The pt denies fever, chills, N/V/D, CP, SOB, abd pain, dizziness, headache and urinary symptoms. There are no other acute medical concerns at this time. Social hx: Former smoker, Current ETOH consumption    PCP: Kolby Birmingham MD    Note written by Bill Britton, as dictated by Betti Ganser, MD 5:39 PM           The history is provided by the patient. No  was used.         Past Medical History:   Diagnosis Date    Abscess 3/22/2014    Lumbar area - MRSA    Arthritis     Gout     Hypercholesterolemia     Hypertension     LBP (low back pain)     Other ill-defined conditions(799.89)     LEFT SHOULDER PAINFUL, NEEDS REPLACEMENT    Paroxysmal SVT (supraventricular tachycardia) ([facility] 5/2/2017    Thromboembolus ([address] 12/26/13    left leg       Past Surgical History:   Procedure Laterality Date    ABDOMEN SURGERY PROC UNLISTED      hernia repair    BREAST SURGERY PROCEDURE UNLISTED      EXCISION OF BREAST CYST    ENDOSCOPY, COLON, DIAGNOSTIC      ostomy reversal    HX BREAST BIOPSY Right 1970    Benign    HX COLOSTOMY      HX GI      COLOSTOMY REVERSAL    HX HEMORRHOIDECTOMY      HX ORTHOPAEDIC      right knee  bilateral knee replacement    HX ORTHOPAEDIC      ORIF RIGHT ANKLE    HX ORTHOPAEDIC      KNEE ARTHROSCOPY         Family History:   Problem Relation Age of Onset    Hypertension Father     Anesth Problems Neg Hx        Social History     Social History    Marital status:      Spouse name: N/A    Number of children: N/A    Years of education: N/A     Occupational History    Not on file. Social History Main Topics    Smoking status: Former Smoker     Packs/day: 1.00     Years: 20.00     Quit date: 5/1/1972    Smokeless tobacco: Never Used    Alcohol use 1.0 oz/week     2 Shots of liquor per week      Comment: 2 DAY    Drug use: No    Sexual activity: Not on file     Other Topics Concern    Not on file     Social History Narrative         ALLERGIES: Shellfish derived    Review of Systems   Constitutional: Negative for appetite change, chills and fever. HENT: Negative for rhinorrhea, sore throat and trouble swallowing. Eyes: Negative for photophobia. Respiratory: Positive for cough and wheezing. Negative for shortness of breath. Cardiovascular: Negative for chest pain and palpitations. Gastrointestinal: Negative for abdominal pain, nausea and vomiting. Genitourinary: Negative for dysuria, frequency and hematuria. Musculoskeletal: Negative for arthralgias.    Neurological: Positive for weakness. Negative for dizziness and syncope. Psychiatric/Behavioral: Negative for behavioral problems. The patient is not nervous/anxious. All other systems reviewed and are negative. Vitals:    01/25/18 1704   BP: 113/87   Pulse: 100   Resp: 20   Temp: 98.6 °F (37 °C)   SpO2: 91%   Weight: 81.3 kg (179 lb 3.7 oz)   Height: 5' 6\" (1.676 m)            Physical Exam   Constitutional: She appears well-developed and well-nourished. HENT:   Head: Normocephalic and atraumatic. Mouth/Throat: Oropharynx is clear and moist.   Eyes: EOM are normal. Pupils are equal, round, and reactive to light. Neck: Normal range of motion. Neck supple. Cardiovascular: Normal rate, regular rhythm, normal heart sounds and intact distal pulses. Exam reveals no gallop and no friction rub. No murmur heard. Pulmonary/Chest: Effort normal. No respiratory distress. She has no wheezes. She has no rales. Abdominal: Soft. There is no tenderness. There is no rebound. Musculoskeletal: Normal range of motion. She exhibits no tenderness. Neurological: She is alert. No cranial nerve deficit. Motor; symmetric   Skin: No erythema. Psychiatric: She has a normal mood and affect. Her behavior is normal.   Nursing note and vitals reviewed. [facility]  ED Course       Procedures    CONSULT NOTE:  8:05 PM Cristiano Lucero MD spoke with Dr. Mckay Marx, Consult for Hospitalist.  Discussed available diagnostic tests and clinical findings. He is in agreement with care plans as outlined. Dr. Mckay Marx will see and admit the pt.

## 2018-01-25 NOTE — IP AVS SNAPSHOT
[address] [address] 
[phone] Patient: Tilford Cogan MRN: [id number] SHELL:[birth year] You are allergic to the following Allergen Reactions Shellfish Derived Angioedema Recent Documentation Height Weight BMI OB Status Smoking Status 1.676 m 81.3 kg 28.93 kg/m2 Postmenopausal Former Smoker Unresulted Labs-Please follow up with your PCP about these lab tests Order Current Status CT CHEST WO CONT In process CULTURE, MRSA In process DUPLEX CAROTID BILATERAL Preliminary result Emergency Contacts  (Rel.) Home Phone Work Phone Mobile Phone Matthew Corrigan [phone] -- [phone] About your hospitalization You were admitted on:  January 25, 2018 You last received care in the:  [facility] You were discharged on:  January 26, 2018 Why you were hospitalized Your primary diagnosis was:  Acute Renal Failure Superimposed On Stage 4 Chronic Kidney Disease (Hcc) Providers Seen During Your Hospitalization Provider Specialty Primary office phone Ryan Fernandes MD Emergency Medicine [phone] Connie Castro MD Internal Medicine [phone] Theresa Mays MD Family Practice [phone] Your Primary Care Physician (PCP) Primary Care Physician Office Phone Office Fax Adriana Valdovinos [phone] Follow-up Information Follow up With Details Comments Contact Info Theresa Mays MD   [address] [address] 
[phone] My Medications TAKE these medications as instructed Instructions Each Dose to Equal  
 Morning Noon Evening Bedtime  
 albuterol 90 mcg/actuation inhaler Commonly known as:  PROVENTIL HFA, VENTOLIN HFA, PROAIR HFA Your last dose was: Your next dose is: Take 2 Puffs by inhalation every four (4) hours as needed for Wheezing. 2 Puff  
    
   
   
   
  
 allopurinol 100 mg tablet Commonly known as:  Zoey Alvarenga Your last dose was: Your next dose is: TAKE 1 TABLET EVERY DAY  
     
   
   
   
  
 BD LUER-JOSE SYRINGE 3 mL 21 gauge x 1\" Syrg Generic drug:  Syringe with Needle (Disp) Your last dose was: Your next dose is:    
   
   
 inject EVERY 2 MONTHS BENADRYL ALLERGY 25 mg tablet Generic drug:  diphenhydrAMINE Your last dose was: Your next dose is: Take 25 mg by mouth nightly as needed for Sleep. 25 mg  
    
   
   
   
  
 calcium-vitamin D 500 mg(1,250mg) -200 unit per tablet Commonly known as:  OYSTER SHELL Your last dose was: Your next dose is: Take 1 Tab by mouth daily. 1 Tab  
    
   
   
   
  
 cefUROXime 250 mg tablet Commonly known as:  CEFTIN Your last dose was: Your next dose is: Take 1 Tab by mouth two (2) times a day. 250 mg CENTRUM SILVER WOMEN 8 mg iron-400 mcg-300 mcg Tab Generic drug:  multivit-min-iron-FA-lutein Your last dose was: Your next dose is: Take 1 Tab by mouth daily. 1 Tab  
    
   
   
   
  
 cyanocobalamin 1,000 mcg/mL injection Commonly known as:  VITAMIN B-12 Your last dose was: Your next dose is:    
   
   
 1 mL by IntraMUSCular route every three (3) months. 1000 mcg  
    
   
   
   
  
 dexamethasone 0.5 mg/5 mL elixir Commonly known as:  DECADRON Your last dose was: Your next dose is: Take  by mouth four (4) times daily. Swish and spit 4 times a day for ulcer  
     
   
   
   
  
 dextromethorphan-guaiFENesin  mg/5 mL Liqd Your last dose was: Your next dose is: Take 5 mL by mouth six (6) times daily. 5 mL FISH OIL 1,000 mg Cap Generic drug:  omega-3 fatty acids-vitamin e Your last dose was: Your next dose is: Take 1 Cap by mouth daily. 1 Cap  
    
   
   
   
  
 lovastatin 20 mg tablet Commonly known as:  MEVACOR Your last dose was: Your next dose is: TAKE 1 TABLET AT BEDTIME  
     
   
   
   
  
 RISAQUAD 8 billion cell Cap cap Generic drug:  L. acidoph & paracasei- S therm- Bifido Your last dose was: Your next dose is: Take 1 Cap by mouth daily. 1 Cap  
    
   
   
   
  
 verapamil  mg ER capsule Commonly known as:  Leonjessie Amna Your last dose was: Your next dose is: TAKE 1 CAPSULE EVERY DAY  
     
   
   
   
  
 VITAMIN D3 1,000 unit tablet Generic drug:  cholecalciferol Your last dose was: Your next dose is: Take 1,000 Units by mouth daily. 1000 Units Where to Get Your Medications These medications were sent to [address] [place] [id number] Phone:  [phone]  
  albuterol 90 mcg/actuation inhaler Discharge Instructions Dehydration: Care Instructions Your Care Instructions Dehydration happens when your body loses too much fluid. This might happen when you do not drink enough water or you lose large amounts of fluids from your body because of diarrhea, vomiting, or sweating. Severe dehydration can be life-threatening. Water and minerals called electrolytes help put your body fluids back in balance. Learn the early signs of fluid loss, and drink more fluids to prevent dehydration. Follow-up care is a key part of your treatment and safety. Be sure to make and go to all appointments, and call your doctor if you are having problems.  It's also a good idea to know your test results and keep a list of the medicines you take. How can you care for yourself at home? · To prevent dehydration, drink plenty of fluids, enough so that your urine is light yellow or clear like water. Choose water and other caffeine-free clear liquids until you feel better. If you have kidney, heart, or liver disease and have to limit fluids, talk with your doctor before you increase the amount of fluids you drink. · If you do not feel like eating or drinking, try taking small sips of water, sports drinks, or other rehydration drinks. · Get plenty of rest. 
To prevent dehydration · Add more fluids to your diet and daily routine, unless your doctor has told you not to. · During hot weather, drink more fluids. Drink even more fluids if you exercise a lot. Stay away from drinks with alcohol or caffeine. · Watch for the symptoms of dehydration. These include: ¨ A dry, sticky mouth. ¨ Dark yellow urine, and not much of it. ¨ Dry and sunken eyes. ¨ Feeling very tired. · Learn what problems can lead to dehydration. These include: ¨ Diarrhea, fever, and vomiting. ¨ Any illness with a fever, such as pneumonia or the flu. ¨ Activities that cause heavy sweating, such as endurance races and heavy outdoor work in hot or humid weather. ¨ Alcohol or drug abuse or withdrawal. 
¨ Certain medicines, such as cold and allergy pills (antihistamines), diet pills (diuretics), and laxatives. ¨ Certain diseases, such as diabetes, cancer, and heart or kidney disease. When should you call for help? Call 911 anytime you think you may need emergency care. For example, call if: 
? · You passed out (lost consciousness). ?Call your doctor now or seek immediate medical care if: 
? · You are confused and cannot think clearly. ? · You are dizzy or lightheaded, or you feel like you may faint. ? · You have signs of needing more fluids. You have sunken eyes and a dry mouth, and you pass only a little dark urine. ? · You cannot keep fluids down. ?Watch closely for changes in your health, and be sure to contact your doctor if: 
? · You are not making tears. ? · Your skin is very dry and sags slowly back into place after you pinch it. ? · Your mouth and eyes are very dry. Where can you learn more? Go to http://stevo-anthony.info/. Enter F957 in the search box to learn more about \"Dehydration: Care Instructions. \" Current as of: 2017 Content Version: 11.4 © 3785-3747 [employer]. Care instructions adapted under license by [employer] (which disclaims liability or warranty for this information). If you have questions about a medical condition or this instruction, always ask your healthcare professional. [address] any warranty or liability for your use of this information. [employer] Activation Thank you for requesting access to [employer]. Please follow the instructions below to securely access and download your online medical record. [employer] allows you to send messages to your doctor, view your test results, renew your prescriptions, schedule appointments, and more. How Do I Sign Up? 1. In your internet browser, go to [email or web address].[employer] 
2. Click on the First Time User? Click Here link in the Sign In box. You will be redirect to the New Member Sign Up page. 3. Enter your [employer] Access Code exactly as it appears below. You will not need to use this code after youve completed the sign-up process. If you do not sign up before the expiration date, you must request a new code. [employer] Access Code: Activation code not generated Current [employer] Status: Active (This is the date your [employer] access code will ) 4. Enter the last four digits of your Social Security Number (xxxx) and Date of Birth (mm/dd/yyyy) as indicated and click Submit. You will be taken to the next sign-up page. 5. Create a [employer] ID.  This will be your [employer] login ID and cannot be changed, so think of one that is secure and easy to remember. 6. Create a [employer] password. You can change your password at any time. 7. Enter your Password Reset Question and Answer. This can be used at a later time if you forget your password. 8. Enter your e-mail address. You will receive e-mail notification when new information is available in [address] 9. Click Sign Up. You can now view and download portions of your medical record. 10. Click the Download Summary menu link to download a portable copy of your medical information. Additional Information If you have questions, please visit the Frequently Asked Questions section of the [employer] website at [email or web address] Remember, [employer] is NOT to be used for urgent needs. For medical emergencies, dial 911. I have reviewed discharge instructions with the {PATIENT PARENT GUARDIAN:69839}. The {PATIENT PARENT GUARDIAN:89049} verbalized understanding. Discharge Orders None [facility]! Dear José Winters: Thank you for requesting a We[employer] account. Our records indicate that you already have an active We[employer] account. You can access your account anytime at [email or web address] Did you know that you can access your hospital and ER discharge instructions at any time in [employer]? You can also review all of your test results from your hospital stay or ER visit. Additional Information If you have questions, please visit the Frequently Asked Questions section of the [employer] website at [email or web address] Remember, [employer] is NOT to be used for urgent needs. For medical emergencies, dial 911. Now available from your iPhone and Android! General Information Please provide this summary of care documentation to your next provider. Patient Signature:  ____________________________________________________________ Date:  ____________________________________________________________  
  
[facility] Provider Signature:  ____________________________________________________________ Date:  ____________________________________________________________

## 2018-01-25 NOTE — ED TRIAGE NOTES
Pt arrives from home c/o productive cough that started Monday. Pt reports she was seen by here PCP on Tuesday and was given Ceftin 250mg. Pt reports since that time her cough has continued and she's feeling increasingly weak.

## 2018-01-26 ENCOUNTER — APPOINTMENT (OUTPATIENT)
Dept: CT IMAGING | Age: 78
End: 2018-01-26
Attending: INTERNAL MEDICINE
Payer: MEDICARE

## 2018-01-26 VITALS
DIASTOLIC BLOOD PRESSURE: 84 MMHG | BODY MASS INDEX: 28.81 KG/M2 | TEMPERATURE: 99.2 F | HEART RATE: 75 BPM | SYSTOLIC BLOOD PRESSURE: 137 MMHG | WEIGHT: 179.23 LBS | OXYGEN SATURATION: 92 % | RESPIRATION RATE: 16 BRPM | HEIGHT: 66 IN

## 2018-01-26 LAB
ALBUMIN SERPL-MCNC: 2.9 G/DL (ref 3.5–5)
ALBUMIN/GLOB SERPL: 0.9 {RATIO} (ref 1.1–2.2)
ALP SERPL-CCNC: 51 U/L (ref 45–117)
ALT SERPL-CCNC: 17 U/L (ref 12–78)
ANION GAP SERPL CALC-SCNC: 10 MMOL/L (ref 5–15)
APPEARANCE UR: CLEAR
AST SERPL-CCNC: 18 U/L (ref 15–37)
ATRIAL RATE: 87 BPM
BACTERIA URNS QL MICRO: NEGATIVE /HPF
BASOPHILS # BLD: 0 K/UL (ref 0–0.1)
BASOPHILS NFR BLD: 0 % (ref 0–1)
BILIRUB SERPL-MCNC: 0.2 MG/DL (ref 0.2–1)
BILIRUB UR QL: NEGATIVE
BUN SERPL-MCNC: 44 MG/DL (ref 6–20)
BUN/CREAT SERPL: 32 (ref 12–20)
CALCIUM SERPL-MCNC: 8.4 MG/DL (ref 8.5–10.1)
CALCULATED P AXIS, ECG09: 60 DEGREES
CALCULATED R AXIS, ECG10: 11 DEGREES
CALCULATED T AXIS, ECG11: 41 DEGREES
CHLORIDE SERPL-SCNC: 113 MMOL/L (ref 97–108)
CHOLEST SERPL-MCNC: 161 MG/DL
CK MB CFR SERPL CALC: 2.3 % (ref 0–2.5)
CK MB SERPL-MCNC: 1.2 NG/ML (ref 5–25)
CK SERPL-CCNC: 53 U/L (ref 26–192)
CO2 SERPL-SCNC: 21 MMOL/L (ref 21–32)
COLOR UR: ABNORMAL
CREAT SERPL-MCNC: 1.37 MG/DL (ref 0.55–1.02)
DIAGNOSIS, 93000: NORMAL
DIFFERENTIAL METHOD BLD: ABNORMAL
EOSINOPHIL # BLD: 0 K/UL (ref 0–0.4)
EOSINOPHIL NFR BLD: 0 % (ref 0–7)
EPITH CASTS URNS QL MICRO: ABNORMAL /LPF
ERYTHROCYTE [DISTWIDTH] IN BLOOD BY AUTOMATED COUNT: 13.2 % (ref 11.5–14.5)
GLOBULIN SER CALC-MCNC: 3.2 G/DL (ref 2–4)
GLUCOSE SERPL-MCNC: 118 MG/DL (ref 65–100)
GLUCOSE UR STRIP.AUTO-MCNC: NEGATIVE MG/DL
HCT VFR BLD AUTO: 35.2 % (ref 35–47)
HDLC SERPL-MCNC: 69 MG/DL
HDLC SERPL: 2.3 {RATIO} (ref 0–5)
HGB BLD-MCNC: 11.5 G/DL (ref 11.5–16)
HGB UR QL STRIP: NEGATIVE
HYALINE CASTS URNS QL MICRO: ABNORMAL /LPF (ref 0–5)
IMM GRANULOCYTES # BLD: 0 K/UL (ref 0–0.04)
IMM GRANULOCYTES NFR BLD AUTO: 0 % (ref 0–0.5)
KETONES UR QL STRIP.AUTO: NEGATIVE MG/DL
LDLC SERPL CALC-MCNC: 75.2 MG/DL (ref 0–100)
LEUKOCYTE ESTERASE UR QL STRIP.AUTO: NEGATIVE
LIPID PROFILE,FLP: NORMAL
LYMPHOCYTES # BLD: 0.6 K/UL (ref 0.8–3.5)
LYMPHOCYTES NFR BLD: 24 % (ref 12–49)
MAGNESIUM SERPL-MCNC: 1.5 MG/DL (ref 1.6–2.4)
MCH RBC QN AUTO: 34.3 PG (ref 26–34)
MCHC RBC AUTO-ENTMCNC: 32.7 G/DL (ref 30–36.5)
MCV RBC AUTO: 105.1 FL (ref 80–99)
MONOCYTES # BLD: 0.1 K/UL (ref 0–1)
MONOCYTES NFR BLD: 6 % (ref 5–13)
NEUTS SEG # BLD: 1.7 K/UL (ref 1.8–8)
NEUTS SEG NFR BLD: 70 % (ref 32–75)
NITRITE UR QL STRIP.AUTO: NEGATIVE
NRBC # BLD: 0 K/UL (ref 0–0.01)
NRBC BLD-RTO: 0 PER 100 WBC
P-R INTERVAL, ECG05: 192 MS
PH UR STRIP: 5.5 [PH] (ref 5–8)
PHOSPHATE SERPL-MCNC: 3.3 MG/DL (ref 2.6–4.7)
PLATELET # BLD AUTO: 91 K/UL (ref 150–400)
PMV BLD AUTO: 12.2 FL (ref 8.9–12.9)
POTASSIUM SERPL-SCNC: 4.4 MMOL/L (ref 3.5–5.1)
PROT SERPL-MCNC: 6.1 G/DL (ref 6.4–8.2)
PROT UR STRIP-MCNC: 30 MG/DL
Q-T INTERVAL, ECG07: 348 MS
QRS DURATION, ECG06: 94 MS
QTC CALCULATION (BEZET), ECG08: 418 MS
RBC # BLD AUTO: 3.35 M/UL (ref 3.8–5.2)
RBC #/AREA URNS HPF: ABNORMAL /HPF (ref 0–5)
SODIUM SERPL-SCNC: 144 MMOL/L (ref 136–145)
SP GR UR REFRACTOMETRY: 1.02 (ref 1–1.03)
TRIGL SERPL-MCNC: 84 MG/DL (ref ?–150)
TROPONIN I SERPL-MCNC: <0.04 NG/ML
TSH SERPL DL<=0.05 MIU/L-ACNC: 0.91 UIU/ML (ref 0.36–3.74)
UROBILINOGEN UR QL STRIP.AUTO: 0.2 EU/DL (ref 0.2–1)
VENTRICULAR RATE, ECG03: 87 BPM
VLDLC SERPL CALC-MCNC: 16.8 MG/DL
WBC # BLD AUTO: 2.5 K/UL (ref 3.6–11)
WBC URNS QL MICRO: ABNORMAL /HPF (ref 0–4)

## 2018-01-26 PROCEDURE — 94640 AIRWAY INHALATION TREATMENT: CPT

## 2018-01-26 PROCEDURE — 80061 LIPID PANEL: CPT | Performed by: INTERNAL MEDICINE

## 2018-01-26 PROCEDURE — 84484 ASSAY OF TROPONIN QUANT: CPT | Performed by: INTERNAL MEDICINE

## 2018-01-26 PROCEDURE — 80053 COMPREHEN METABOLIC PANEL: CPT | Performed by: INTERNAL MEDICINE

## 2018-01-26 PROCEDURE — 71250 CT THORAX DX C-: CPT

## 2018-01-26 PROCEDURE — 96361 HYDRATE IV INFUSION ADD-ON: CPT

## 2018-01-26 PROCEDURE — 65390000012 HC CONDITION CODE 44 OBSERVATION

## 2018-01-26 PROCEDURE — 82550 ASSAY OF CK (CPK): CPT | Performed by: INTERNAL MEDICINE

## 2018-01-26 PROCEDURE — 84443 ASSAY THYROID STIM HORMONE: CPT | Performed by: INTERNAL MEDICINE

## 2018-01-26 PROCEDURE — 99218 HC RM OBSERVATION: CPT

## 2018-01-26 PROCEDURE — 74011250637 HC RX REV CODE- 250/637: Performed by: INTERNAL MEDICINE

## 2018-01-26 PROCEDURE — 93005 ELECTROCARDIOGRAM TRACING: CPT

## 2018-01-26 PROCEDURE — 83735 ASSAY OF MAGNESIUM: CPT | Performed by: INTERNAL MEDICINE

## 2018-01-26 PROCEDURE — 96372 THER/PROPH/DIAG INJ SC/IM: CPT

## 2018-01-26 PROCEDURE — 84100 ASSAY OF PHOSPHORUS: CPT | Performed by: INTERNAL MEDICINE

## 2018-01-26 PROCEDURE — 93880 EXTRACRANIAL BILAT STUDY: CPT

## 2018-01-26 PROCEDURE — 81001 URINALYSIS AUTO W/SCOPE: CPT | Performed by: INTERNAL MEDICINE

## 2018-01-26 PROCEDURE — 74011250636 HC RX REV CODE- 250/636: Performed by: INTERNAL MEDICINE

## 2018-01-26 PROCEDURE — 85025 COMPLETE CBC W/AUTO DIFF WBC: CPT | Performed by: INTERNAL MEDICINE

## 2018-01-26 PROCEDURE — 74011000250 HC RX REV CODE- 250: Performed by: INTERNAL MEDICINE

## 2018-01-26 PROCEDURE — 36415 COLL VENOUS BLD VENIPUNCTURE: CPT | Performed by: INTERNAL MEDICINE

## 2018-01-26 RX ORDER — CYANOCOBALAMIN 1000 UG/ML
1000 INJECTION, SOLUTION INTRAMUSCULAR; SUBCUTANEOUS
Status: DISCONTINUED | OUTPATIENT
Start: 2018-01-26 | End: 2018-01-26 | Stop reason: HOSPADM

## 2018-01-26 RX ORDER — ALBUTEROL SULFATE 90 UG/1
2 AEROSOL, METERED RESPIRATORY (INHALATION)
Qty: 1 INHALER | Refills: 2 | Status: SHIPPED | OUTPATIENT
Start: 2018-01-26

## 2018-01-26 RX ORDER — ALBUTEROL SULFATE 0.83 MG/ML
2.5 SOLUTION RESPIRATORY (INHALATION)
Status: DISCONTINUED | OUTPATIENT
Start: 2018-01-26 | End: 2018-01-26 | Stop reason: HOSPADM

## 2018-01-26 RX ORDER — ALBUTEROL SULFATE 90 UG/1
2 AEROSOL, METERED RESPIRATORY (INHALATION)
Status: DISCONTINUED | OUTPATIENT
Start: 2018-01-26 | End: 2018-01-26

## 2018-01-26 RX ADMIN — HEPARIN SODIUM 5000 UNITS: 5000 INJECTION, SOLUTION INTRAVENOUS; SUBCUTANEOUS at 00:46

## 2018-01-26 RX ADMIN — HEPARIN SODIUM 5000 UNITS: 5000 INJECTION, SOLUTION INTRAVENOUS; SUBCUTANEOUS at 07:17

## 2018-01-26 RX ADMIN — SODIUM CHLORIDE 125 ML/HR: 900 INJECTION, SOLUTION INTRAVENOUS at 09:39

## 2018-01-26 RX ADMIN — GUAIFENESIN AND DEXTROMETHORPHAN 5 ML: 100; 10 SYRUP ORAL at 13:15

## 2018-01-26 RX ADMIN — LOVASTATIN 20 MG: 20 TABLET ORAL at 00:46

## 2018-01-26 RX ADMIN — DEXAMETHASONE 0.5 MG: 0.5 SOLUTION ORAL at 13:16

## 2018-01-26 RX ADMIN — Medication 10 ML: at 07:18

## 2018-01-26 RX ADMIN — GUAIFENESIN AND DEXTROMETHORPHAN 5 ML: 100; 10 SYRUP ORAL at 07:17

## 2018-01-26 RX ADMIN — SODIUM CHLORIDE 125 ML/HR: 900 INJECTION, SOLUTION INTRAVENOUS at 00:48

## 2018-01-26 RX ADMIN — ALLOPURINOL 100 MG: 100 TABLET ORAL at 09:55

## 2018-01-26 RX ADMIN — CALCIUM CARBONATE-VITAMIN D TAB 500 MG-200 UNIT 1 TABLET: 500-200 TAB at 09:55

## 2018-01-26 RX ADMIN — Medication 1 CAPSULE: at 09:55

## 2018-01-26 RX ADMIN — THERA TABS 1 TABLET: TAB at 09:55

## 2018-01-26 RX ADMIN — VERAPAMIL HYDROCHLORIDE 240 MG: 240 TABLET, FILM COATED, EXTENDED RELEASE ORAL at 10:05

## 2018-01-26 RX ADMIN — CYANOCOBALAMIN 1000 MCG: 1000 INJECTION, SOLUTION INTRAMUSCULAR at 09:56

## 2018-01-26 RX ADMIN — Medication 10 ML: at 00:48

## 2018-01-26 RX ADMIN — IPRATROPIUM BROMIDE AND ALBUTEROL SULFATE 3 ML: .5; 2.5 SOLUTION RESPIRATORY (INHALATION) at 10:21

## 2018-01-26 RX ADMIN — GUAIFENESIN AND DEXTROMETHORPHAN 5 ML: 100; 10 SYRUP ORAL at 04:23

## 2018-01-26 RX ADMIN — CEFUROXIME AXETIL 250 MG: 250 TABLET ORAL at 10:05

## 2018-01-26 RX ADMIN — VITAMIN D, TAB 1000IU (100/BT) 1000 UNITS: 25 TAB at 09:55

## 2018-01-26 RX ADMIN — DEXAMETHASONE 0.5 MG: 0.5 SOLUTION ORAL at 09:54

## 2018-01-26 NOTE — PROGRESS NOTES
Primary Nurse Licha Martinez RN and Clarence Funez RN performed a dual skin assessment on this patient No impairment noted  Domingo score is 23    Healed bilateral knee scars  Healed midline incision to back.

## 2018-01-26 NOTE — CONSULTS
NEPHROLOGY CONSULT NOTE     Patient: De Mac MRN: [id number]  PCP: Alondra Cartwright MD   :     1940  Age:   76 y.o. Sex:  female      Referring physician: Alondra Cartwright MD  Reason for consultation: 76 y.o. female with Acute renal failure superimposed on stage 4 chronic kidney disease ([facility]  Acute renal failure superimposed on stage 4 chronic kidney disease ([facility] complicated by JOSE   Admission Date: 2018  5:15 PM  LOS: 1 day      ASSESSMENT and PLAN :   JOSE:  - likely from volume depletion  - improved on IVF  - no further w/u  - Cr appears at baseline  - we can see her in the office in 4 weeks post d/c to establish nephrology care    CKD III:  - from chronic HTN  - Cr at baseline    Hypovolemia:  - improving  - off IVF    HTN:  - BP stable    Anemia:  - hgb stable    Gout:  - on allopurinol    Bronchitis:  - per hospitalist     Active Problems / Assessment AAActive  :   Principal Problem:    Acute renal failure superimposed on stage 4 chronic kidney disease ([facility] (2018)         Subjective:   HPI: De Mac is a 76 y.o.  female who has been admitted to the hospital for cough. She has a hx of HTN, HLD, prior DVT, PSVT who was admitted with the above complaints. CXR neg for PNA. She had baseline CKD with Cr around 1.3 to 1.4. She does not follow with nephrology. She denied nausea, vomiting, diarrhea. Her po intake was less over the past few days. Her Cr on admission was 1.9 and she has received IVF. Her Cr is down to 1.37 today. She feels better, no cp, sob, n/v/d reported.       Past Medical Hx:   Past Medical History:   Diagnosis Date    Abscess 3/22/2014    Lumbar area - MRSA    Arthritis     Gout     Hypercholesterolemia     Hypertension     LBP (low back pain)     Other ill-defined conditions(799.89)     LEFT SHOULDER PAINFUL, NEEDS REPLACEMENT    Paroxysmal SVT (supraventricular tachycardia) (HCC) 2017    Thromboembolus ([facility] 13    left leg Past Surgical Hx:     Past Surgical History:   Procedure Laterality Date    ABDOMEN SURGERY PROC UNLISTED      hernia repair    BREAST SURGERY PROCEDURE UNLISTED      EXCISION OF BREAST CYST    ENDOSCOPY, COLON, DIAGNOSTIC      ostomy reversal    HX BREAST BIOPSY Right 1970    Benign    HX COLOSTOMY      HX GI      COLOSTOMY REVERSAL    HX HEMORRHOIDECTOMY      HX ORTHOPAEDIC      right knee  bilateral knee replacement    HX ORTHOPAEDIC      ORIF RIGHT ANKLE    HX ORTHOPAEDIC      KNEE ARTHROSCOPY       Medications:  Prior to Admission medications    Medication Sig Start Date End Date Taking? Authorizing Provider   albuterol (PROVENTIL HFA, VENTOLIN HFA, PROAIR HFA) 90 mcg/actuation inhaler Take 2 Puffs by inhalation every four (4) hours as needed for Wheezing. 1/26/18  Yes Jana Cook MD   dexamethasone (DECADRON) 0.5 mg/5 mL elixir Take  by mouth four (4) times daily. Swish and spit 4 times a day for ulcer   Yes Historical Provider   GLENNY acidoph & paracaseiTami S therm- Bifido (RISAQUAD) 8 billion cell cap cap Take 1 Cap by mouth daily. Yes Historical Provider   dextromethorphan-guaiFENesin  mg/5 mL liqd Take 5 mL by mouth six (6) times daily. Yes Historical Provider   cefUROXime (CEFTIN) 250 mg tablet Take 1 Tab by mouth two (2) times a day. 1/22/18  Yes Jana Cook MD   verapamil ER (VERELAN) 240 mg ER capsule TAKE 1 CAPSULE EVERY DAY 9/25/17  Yes Jana Cook MD   allopurinol (ZYLOPRIM) 100 mg tablet TAKE 1 TABLET EVERY DAY 7/7/17  Yes Jana Cook MD   lovastatin (MEVACOR) 20 mg tablet TAKE 1 TABLET AT BEDTIME 6/12/17  Yes Jana Cook MD   cyanocobalamin (VITAMIN B-12) 1,000 mcg/mL injection 1 mL by IntraMUSCular route every three (3) months. 4/24/17  Yes Jana Cook MD   calcium-vitamin D (OYSTER SHELL) 500 mg(1,250mg) -200 unit per tablet Take 1 Tab by mouth daily.    Yes Historical Provider   multivit-min-iron-FA-lutein (CENTRUM SILVER WOMEN) 8 mg iron-400 mcg-300 mcg tab Take 1 Tab by mouth daily. Yes Historical Provider   cholecalciferol (VITAMIN D3) 1,000 unit tablet Take 1,000 Units by mouth daily. Yes Historical Provider   omega-3 fatty acids-vitamin e (FISH OIL) 1,000 mg cap Take 1 Cap by mouth daily. Yes Historical Provider   diphenhydrAMINE (BENADRYL ALLERGY) 25 mg tablet Take 25 mg by mouth nightly as needed for Sleep. Yes Historical Provider   BD LUER-JOSE SYRINGE 3 mL 21 gauge x 1\" syrg inject EVERY 2 MONTHS 9/28/17   Kerwin Du MD       Allergies   Allergen Reactions    Shellfish Derived Angioedema       Social Hx:  reports that she quit smoking about 45 years ago. She has a 20.00 pack-year smoking history. She has never used smokeless tobacco. She reports that she drinks about 1.0 oz of alcohol per week  She reports that she does not use illicit drugs. Family History   Problem Relation Age of Onset    Hypertension Father     Anesth Problems Neg Hx        Review of Systems:  A twelve point review of system was performed today. Pertinent positives and negatives are mentioned in the HPI. The reminder of the ROS is negative and noncontributory. Objective:    Vitals:    Vitals:    01/25/18 2300 01/25/18 2351 01/26/18 0424 01/26/18 1527   BP: 126/70 128/74 133/76 137/84   Pulse: 68 94 89 75   Resp: 16 18 18 16   Temp: 98.5 °F (36.9 °C) 98.3 °F (36.8 °C) 97.5 °F (36.4 °C) 99.2 °F (37.3 °C)   SpO2: 91% 93% 92% 92%   Weight:       Height:         I&O's:     Visit Vitals    /84 (BP 1 Location: Right arm, BP Patient Position: At rest)    Pulse 75    Temp 99.2 °F (37.3 °C)    Resp 16    Ht 5' 6\" (1.676 m)    Wt 81.3 kg (179 lb 3.7 oz)    SpO2 92%    BMI 28.93 kg/m2       Physical Exam:  General:Alert, No distress,   HEENT: Eyes are PERRL. Conjunctiva without pallor ,erythema. The sclerae without icterus.  .   Neck:Supple,no mass palpable  Lungs : Clears to auscultation Bilaterally, Normal respiratory effort  CVS: RRR, S1 S2 normal, No rub, trace LE edema  Abdomen: Soft, Non tender, No hepatosplenomegaly, bowel sounds present  Extremities: No cyanosis, No clubbing  Skin: No rash or lesions. Lymph nodes: No palpable nodes  MS: No joint swelling, erythema, warmth  Neurologic: non focal, AAO x 3  Psych: normal affect    Laboratory Results:    Lab Results   Component Value Date    BUN 44 (H) 01/26/2018     01/26/2018    K 4.4 01/26/2018     (H) 01/26/2018    CO2 21 01/26/2018       Lab Results   Component Value Date    BUN 44 (H) 01/26/2018    BUN 60 (H) 01/25/2018    BUN 32 (H) 04/21/2017    BUN 22 (H) 08/19/2016    BUN 32 (H) 08/18/2016    K 4.4 01/26/2018    K 4.5 01/25/2018    K 4.4 04/21/2017    K 3.8 08/19/2016    K 4.3 08/18/2016       Lab Results   Component Value Date    WBC 2.5 (L) 01/26/2018    RBC 3.35 (L) 01/26/2018    HGB 11.5 01/26/2018    HCT 35.2 01/26/2018    .1 (H) 01/26/2018    MCH 34.3 (H) 01/26/2018    RDW 13.2 01/26/2018    PLT 91 (L) 01/26/2018       Lab Results   Component Value Date    PHOS 3.3 01/26/2018       Urine dipstick:   Lab Results   Component Value Date/Time    Color YELLOW/STRAW 01/26/2018 07:22 AM    Appearance CLEAR 01/26/2018 07:22 AM    Specific gravity 1.018 01/26/2018 07:22 AM    pH (UA) 5.5 01/26/2018 07:22 AM    Protein 30 01/26/2018 07:22 AM    Glucose NEGATIVE  01/26/2018 07:22 AM    Ketone NEGATIVE  01/26/2018 07:22 AM    Bilirubin NEGATIVE  01/26/2018 07:22 AM    Urobilinogen 0.2 01/26/2018 07:22 AM    Nitrites NEGATIVE  01/26/2018 07:22 AM    Leukocyte Esterase NEGATIVE  01/26/2018 07:22 AM    Epithelial cells FEW 01/26/2018 07:22 AM    Bacteria NEGATIVE  01/26/2018 07:22 AM    WBC 0-4 01/26/2018 07:22 AM    RBC 0-5 01/26/2018 07:22 AM       I have reviewed the following: All pertinent labs, microbiology data, radiology imaging for my assessment         Thank you for allowing us to participate in the care of this patient. We will follow patient.  Please dont hesitate to call with any questions    Daniel Wright MD  1/26/2018    [facility]

## 2018-01-26 NOTE — H&P
[address]  ACUTE CARE HISTORY AND PHYSICAL    Vicky SUMMERS  MR#: [id number]  : 1940  ACCOUNT #: [de-identified]   DATE OF SERVICE: 2018    PRIMARY CARE PHYSICIAN:  Benny Zarate M.D.    SOURCE OF INFORMATION:  The patient. CHIEF COMPLAINT:  Cough. HISTORY OF PRESENT ILLNESS:  This is a 75-year-old woman with a past medical history significant for hypertension, dyslipidemia, thromboembolism, gout, paroxysmal SVT was in her usual state of health until this past Monday when the patient developed a cough. The cough is progressive, nonproductive, not associated with fever, rigor, or chills. The patient was seen by her primary care physician the following day. She was started on Ceftin for suspected bronchitis. Despite being on antibiotics, the patient's cough did not improve, became associated with shortness of breath. The shortness of breath is with little or no activity. The shortness of breath is interfering with the patient's activities of daily living, not associated with chest pain. Patient was also recently started on oral solution of prednisone for a tongue ulcer. She was advised to come to the emergency room today by her primary care physician because lab work showed evidence of dehydration. When the patient arrived in the emergency room, chest x-ray was obtained. The chest x-ray did not show any evidence of pneumonia. Lab work shows a BUN of 60, creatinine of 1.93, compared with the lab work done in 2017. The creatinine at that time was 1.43. Patient was referred to the hospitalist service for evaluation for admission for dehydration and also for shortness of breath. She was last admitted here from 2017 to 2017. The patient was admitted to the surgical service. She was treated conservatively for small bowel obstruction. PAST MEDICAL HISTORY:  Hypertension, dyslipidemia, gout, thromboembolism.     ALLERGIES:  PATIENT IS ALLERGIC TO SHELLFISH. MEDICATIONS:  Allopurinol 100 mg daily, Ceftin 250 mg twice daily, Decadron 5 mL swish and spit 4 times daily, Benadryl 25 mg daily at bedtime as needed for sleep, Mevacor 20 mg daily, Verapamil 40 mg daily. FAMILY HISTORY:  This was reviewed. Father had hypertension. PAST SURGICAL HISTORY:  This is significant for abdominal hernia repair, hemorrhoidectomy, colostomy reversal, ORIF of the right ankle. SOCIAL HISTORY:  The patient is a former smoker, quit tobacco abuse in 05/1972. Admits to social consumption of alcohol. REVIEW OF SYSTEMS:  HEENT:  No headaches, no dizziness, no blurring of vision. No photophobia. RESPIRATORY:  Positive for shortness of breath and cough. No hemoptysis. CARDIOVASCULAR:  No chest pain, orthopnea. No palpitations. GASTROINTESTINAL:  No nausea and vomiting, no diarrhea, no constipation. GENITOURINARY:  No dysuria. MUSCULOSKELETAL:  No urgency and no frequency. All other systems are reviewed and they are negative. PHYSICAL EXAMINATION:  GENERAL:  Patient appeared ill, in moderate distress. VITAL SIGNS:  On arrival to the emergency room, temperature 98.6, pulse 100, respiratory rate 20, blood pressure 113/87, oxygen saturation of 91% on room air. HEAD:  Normocephalic, atraumatic. EYES:  Normal eye movement. No redness, no drainage, no discharge. EARS:  Normal external ears with no obvious drainage. NOSE:  No deformity and no drainage. MOUTH AND THROAT:  Ulcer left lateral side of the tongue noted. Dry oral mucosa. NECK:  Supple, no JVD, no thyromegaly. CHEST:  Few expiratory wheezing, no crackles. HEART:  Normal S1 and S2, regular. No clinically appreciable murmur. ABDOMEN:  Soft, nontender, normal bowel sounds. CENTRAL NERVOUS SYSTEM:  Alert, oriented x3, no gross focal neurological deficit. EXTREMITIES:  Edema, pulses 2+ bilaterally. MUSCULOSKELETAL:  No obvious joint deformity or swelling.   SKIN:  Bilateral lower extremity folliculitis noted present on admission. PSYCHIATRIC:  Normal mood and affect. LYMPHATIC SYSTEM:  No cervical lymphadenopathy. DIAGNOSTIC DATA:  Chest x-ray, no acute pathology. LABORATORY DATA:  Hematology:  WBC 4.4, hemoglobin 13.1, hematocrit 39.6, platelet 281. Chemistry:  Sodium 140, potassium 4.5, chloride 108, CO2 of 24, glucose 93, BUN 60, serum creatinine 1.93, calcium 9.2, bilirubin total 0.2, ALT 21, AST 21, alkaline phosphatase 58, total protein 7.1, albumin level 3.3, globulin at 3.8. ASSESSMENT:  1. Acute on chronic kidney disease. 2.  Tongue ulcer. 3.  Hypertension. 4.  Dyslipidemia. 5.  Suspected bronchitis. 6.  Thrombocytopenia. 7.  Leg swelling. PLAN:  1. Acute on chronic kidney disease, stage IV. We will admit the patient for further evaluation and treatment. Will carry out hydration with normal saline. Nephrology consult will be requested to assist in evaluation and treatment. This is most likely due to volume depletion. We will monitor renal function while awaiting further recommendation from the nephrologist.  2.  Tongue ulcer. We will continue with prednisone oral solution. ENT consult will be requested for further evaluation. The patient may require a biopsy of the tongue ulcer. 3.  Hypertension. We will resume her home medication. Monitor the patient's blood pressure closely. 4.  Dyslipidemia. We will continue with preadmission medication. 5.  Suspected bronchitis. We will continue with Ceftin. Will obtain a CT scan of the chest to evaluate the patient for pneumonia and also for mass lesion. We will check cardiac markers to evaluate the patient for other possible causes of shortness of breath. If the patient's shortness of breath did not improve after treatment for the bronchitis, the patient may require further evaluation for thromboembolism by obtaining a VQ scan. 6.  Thrombocytopenia. This is mild.   We will monitor the patient's platelet count. The patient is asymptomatic. 7.  Leg swelling. We will check ultrasound of the lower extremity for DVT, especially in this patient who has a history of thromboemboli. OTHER ISSUES:    CODE STATUS:  The patient is a FULL CODE. We will place the patient on heparin for DVT prophylaxis. If there is further drop in the patient's platelet count, would discontinue heparin and request SCD for DVT prophylaxis.       Quin Price MD       RE / PATTI  D: 01/25/2018 23:12     T: 01/26/2018 04:23  JOB #: [id number]  CC: Rosi Alvarado MD

## 2018-01-26 NOTE — PROGRESS NOTES
Care Management Note    **Received call from , patient downgraded from inpatient to observation, code 40, [address] letter, and state observation letter completed, placed into hard chart for scanning**    Vanesa Garcia is a 76 y.o. female who presents from home with chief complaint of cough. Per pt, she has been experiencing an ongoing productive cough. Hospitalist consulted, Dr. Ira France in to see patient this am.  Verified face sheet and demographics.     PCP: Theresa Mays MD    Payor:  [facility] [employer]    Care Management Interventions  PCP Verified by CM: Yes (Dr. Ira France)  Transition of Care Consult (CM Consult): Discharge Planning (Discharging to home with follow up with PCP one week)  Current Support Network: Lives with Spouse (Lives with spouse independent, alert and oriented.  )  Plan discussed with Pt/Family/Caregiver: Yes (Met with patient, she is agreeable to discharge plan.)  Discharge Location  Discharge Placement: Home    Discharging to home.     Angi Coronel, CRM

## 2018-01-26 NOTE — PROGRESS NOTES
Admission Medication Reconciliation:    Information obtained from: pt/ Rx Query    Significant PMH/Disease States:   Past Medical History:   Diagnosis Date    Abscess 3/22/2014    Lumbar area - MRSA    Arthritis     Gout     Hypercholesterolemia     Hypertension     LBP (low back pain)     Other ill-defined conditions(799.89)     LEFT SHOULDER PAINFUL, NEEDS REPLACEMENT    Paroxysmal SVT (supraventricular tachycardia) ([facility] 2017    Thromboembolus ([facility] 13    left leg       Chief Complaint for this Admission:  cough    Allergies:  Shellfish derived    Prior to Admission Medications:   Prior to Admission Medications   Prescriptions Last Dose Informant Patient Reported? Taking? BD LUER-JOSE SYRINGE 3 mL 21 gauge x 1\" syrg   No No   Sig: inject EVERY 2 MONTHS   L. acidoph & paracasei- S therm- Bifido (RISAQUAD) 8 billion cell cap cap 2018 at am  Yes Yes   Sig: Take 1 Cap by mouth daily. allopurinol (ZYLOPRIM) 100 mg tablet 2018 at am  No Yes   Sig: TAKE 1 TABLET EVERY DAY   calcium-vitamin D (OYSTER SHELL) 500 mg(1,250mg) -200 unit per tablet 2018 at am  Yes Yes   Sig: Take 1 Tab by mouth daily. cefUROXime (CEFTIN) 250 mg tablet 2018 at am  No Yes   Sig: Take 1 Tab by mouth two (2) times a day. cholecalciferol (VITAMIN D3) 1,000 unit tablet 2018 at am  Yes Yes   Sig: Take 1,000 Units by mouth daily. cyanocobalamin (VITAMIN B-12) 1,000 mcg/mL injection   No Yes   Si mL by IntraMUSCular route every three (3) months. dexamethasone (DECADRON) 0.5 mg/5 mL elixir 2018 at am  Yes Yes   Sig: Take  by mouth four (4) times daily. Swish and spit 4 times a day for ulcer   dextromethorphan-guaiFENesin  mg/5 mL liqd 2018 at am  Yes Yes   Sig: Take 5 mL by mouth six (6) times daily.    diphenhydrAMINE (BENADRYL ALLERGY) 25 mg tablet 2018 at pm  Yes Yes   Sig: Take 25 mg by mouth nightly as needed for Sleep.   lovastatin (MEVACOR) 20 mg tablet 2018 at pm  No Yes   Sig: TAKE 1 TABLET AT BEDTIME   multivit-min-iron-FA-lutein (CENTRUM SILVER WOMEN) 8 mg iron-400 mcg-300 mcg tab 1/25/2018 at am  Yes Yes   Sig: Take 1 Tab by mouth daily. omega-3 fatty acids-vitamin e (FISH OIL) 1,000 mg cap 1/25/2018 at am  Yes Yes   Sig: Take 1 Cap by mouth daily. verapamil ER (VERELAN) 240 mg ER capsule 1/25/2018 at am  No Yes   Sig: TAKE 1 CAPSULE EVERY DAY      Facility-Administered Medications: None         Comments/Recommendations: Reviewed medications w/ RX query and pt who presented medication bottles from home. (1) Add: dexamethasone elixir, dextromethorphan-guaifenesin, probiotics  (2) Remove: calcium polycarbophil, bactrim  (3) change: n/a    Pt states that she has taken all morning meds today. Took probitocs with cefuroxime. Last dose of vitamin B12 injection was around 6 weeks ago and expects to receive the next dose in 2-3 weeks. Pt is using a steroid mouthwash 4 times day for her ulcer, last dose was this afternoon. Pt is taking generic Mucinex DM recently 6 times a day as directed by the instruction to relive cough. Was concerned about any potential drug interactions between the OTC and her prescriptions.      Doreen Cronin PharmD candidate

## 2018-01-26 NOTE — PROGRESS NOTES
Bedside shift change report given to Linda Orellana RN (oncoming nurse) by Herminio Rogel RN (offgoing nurse). Report included the following information SBAR.

## 2018-01-26 NOTE — DISCHARGE INSTRUCTIONS
Dehydration: Care Instructions  Your Care Instructions  Dehydration happens when your body loses too much fluid. This might happen when you do not drink enough water or you lose large amounts of fluids from your body because of diarrhea, vomiting, or sweating. Severe dehydration can be life-threatening. Water and minerals called electrolytes help put your body fluids back in balance. Learn the early signs of fluid loss, and drink more fluids to prevent dehydration. Follow-up care is a key part of your treatment and safety. Be sure to make and go to all appointments, and call your doctor if you are having problems. It's also a good idea to know your test results and keep a list of the medicines you take. How can you care for yourself at home? · To prevent dehydration, drink plenty of fluids, enough so that your urine is light yellow or clear like water. Choose water and other caffeine-free clear liquids until you feel better. If you have kidney, heart, or liver disease and have to limit fluids, talk with your doctor before you increase the amount of fluids you drink. · If you do not feel like eating or drinking, try taking small sips of water, sports drinks, or other rehydration drinks. · Get plenty of rest.  To prevent dehydration  · Add more fluids to your diet and daily routine, unless your doctor has told you not to. · During hot weather, drink more fluids. Drink even more fluids if you exercise a lot. Stay away from drinks with alcohol or caffeine. · Watch for the symptoms of dehydration. These include:  ¨ A dry, sticky mouth. ¨ Dark yellow urine, and not much of it. ¨ Dry and sunken eyes. ¨ Feeling very tired. · Learn what problems can lead to dehydration. These include:  ¨ Diarrhea, fever, and vomiting. ¨ Any illness with a fever, such as pneumonia or the flu. ¨ Activities that cause heavy sweating, such as endurance races and heavy outdoor work in hot or humid weather.   ¨ Alcohol or drug abuse or withdrawal.  ¨ Certain medicines, such as cold and allergy pills (antihistamines), diet pills (diuretics), and laxatives. ¨ Certain diseases, such as diabetes, cancer, and heart or kidney disease. When should you call for help? Call 911 anytime you think you may need emergency care. For example, call if:  ? · You passed out (lost consciousness). ?Call your doctor now or seek immediate medical care if:  ? · You are confused and cannot think clearly. ? · You are dizzy or lightheaded, or you feel like you may faint. ? · You have signs of needing more fluids. You have sunken eyes and a dry mouth, and you pass only a little dark urine. ? · You cannot keep fluids down. ? Watch closely for changes in your health, and be sure to contact your doctor if:  ? · You are not making tears. ? · Your skin is very dry and sags slowly back into place after you pinch it. ? · Your mouth and eyes are very dry. Where can you learn more? Go to http://stevo-anthony.info/. Enter W512 in the search box to learn more about \"Dehydration: Care Instructions. \"  Current as of: March 20, 2017  Content Version: 11.4  © 3115-2840 [employer]. Care instructions adapted under license by [employer] (which disclaims liability or warranty for this information). If you have questions about a medical condition or this instruction, always ask your healthcare professional. [place] [address] any warranty or liability for your use of this information. [employer] Activation    Thank you for requesting access to [employer]. Please follow the instructions below to securely access and download your online medical record. [employer] allows you to send messages to your doctor, view your test results, renew your prescriptions, schedule appointments, and more. How Do I Sign Up? 1. In your internet browser, go to [email or web address]  2. Click on the First Time User?  Click Here link in the Sign In box. You will be redirect to the New Member Sign Up page. 3. Enter your PlaceFirstt Access Code exactly as it appears below. You will not need to use this code after youve completed the sign-up process. If you do not sign up before the expiration date, you must request a new code. [employer] MedSystemshart Access Code: Activation code not generated  Current [employer] Status: Active (This is the date your PlaceFirstt access code will )    4. Enter the last four digits of your Social Security Number (xxxx) and Date of Birth (mm/dd/yyyy) as indicated and click Submit. You will be taken to the next sign-up page. 5. Create a PlaceFirstt ID. This will be your Zooppa login ID and cannot be changed, so think of one that is secure and easy to remember. 6. Create a PlaceFirstt password. You can change your password at any time. 7. Enter your Password Reset Question and Answer. This can be used at a later time if you forget your password. 8. Enter your e-mail address. You will receive e-mail notification when new information is available in [address] 9. Click Sign Up. You can now view and download portions of your medical record. 10. Click the Download Summary menu link to download a portable copy of your medical information. Additional Information    If you have questions, please visit the Frequently Asked Questions section of the Zooppa website at [email or web address] Remember, Zooppa is NOT to be used for urgent needs. For medical emergencies, dial 911. I have reviewed discharge instructions with the patient. The patient verbalized understanding.

## 2018-01-26 NOTE — ROUTINE PROCESS
TRANSFER - OUT REPORT:    Verbal report given to LISSETTE Briceño (name) on Danae Vaz  being transferred to [address] ([facility]) for routine progression of care       Report consisted of patients Situation, Background, Assessment and   Recommendations(SBAR). Information from the following report(s) SBAR, ED Summary, [facility] and Recent Results was reviewed with the receiving nurse. Lines:   Peripheral IV 01/25/18 Left Antecubital (Active)   Site Assessment Clean, dry, & intact 1/25/2018  5:58 PM   Phlebitis Assessment 0 1/25/2018  5:58 PM   Infiltration Assessment 0 1/25/2018  5:58 PM   Dressing Status Clean, dry, & intact 1/25/2018  5:58 PM   Hub Color/Line Status Pink 1/25/2018  5:58 PM        Opportunity for questions and clarification was provided.       Patient transported with:   Transport

## 2018-01-26 NOTE — ED NOTES
Verbal order received from Dr. Leti Moore for patient to take her personal antibiotic (Ceftin) and probiotic.

## 2018-01-26 NOTE — PROCEDURES
[facility]  *** FINAL REPORT ***    Name: Kat Conte  MRN: [id number]    Inpatient  : 04 Sep 1940  HIS Order #: [id number]  [address] [facility] Visit #: [id number]  Date: 2018    TYPE OF TEST: Cerebrovascular Duplex    REASON FOR TEST  Transient ischemic attacks    Right Carotid:-             Proximal               Mid                 Distal  cm/s  Systolic  Diastolic  Systolic  Diastolic  Systolic  Diastolic  CCA:     86.6      17.0                            61.0      17.0  Bulb:  ECA:     71.0  ICA:     58.0      18.0                            54.0      21.0  ICA/CCA:  1.0       1.1    ICA Stenosis: Unknown    Right Vertebral:-  Finding: Antegrade  Sys:       40.0  Rosa:    Right Subclavian:    Left Carotid:-            Proximal                Mid                 Distal  cm/s  Systolic  Diastolic  Systolic  Diastolic  Systolic  Diastolic  CCA:     42.7      17.0                            77.0      19.0  Bulb:  ECA:    121.0  ICA:     72.0      20.0                            68.0      17.0  ICA/CCA:  0.9       1.1    ICA Stenosis:    Left Vertebral:-  Finding: Antegrade  Sys:       63.0  Rosa:    Left Subclavian:    INTERPRETATION/FINDINGS  PROCEDURE:  Color duplex ultrasound imaging of extracranial  cerebrovascular arteries. FINDINGS:       Right:  Internal carotid velocity is within normal limits. There   is narrowing of the internal carotid flow channel on color Doppler  imaging and calcific plaque on B-mode imaging, consistent with less  than 50 percent stenosis. The common and external carotid arteries  are patent and without evidence of hemodynamically significant  stenosis. Left:  Internal carotid velocity is within normal limits. There  is narrowing of the internal carotid flow channel on color Doppler  imaging and calcific plaque on B-mode imaging, consistent with less  than 50 percent stenosis.   The common and external carotid arteries  are patent and without evidence of hemodynamically significant  stenosis. IMPRESSION:  Consistent with less than 50% stenosis of the internal  carotids. Vertebrals are patent with antegrade flow. ADDITIONAL COMMENTS    I have personally reviewed the data relevant to the interpretation of  this  study.     TECHNOLOGIST: Ricardo Hall RVT  Signed: 01/26/2018 09:36 AM    PHYSICIAN: Romeo Vallejo MD  Signed: 01/27/2018 07:57 AM

## 2018-01-27 LAB
BACTERIA SPEC CULT: NORMAL
BACTERIA SPEC CULT: NORMAL
SERVICE CMNT-IMP: NORMAL

## 2018-01-29 PROBLEM — J40 BRONCHITIS: Status: ACTIVE | Noted: 2018-01-29

## 2018-01-29 PROBLEM — E86.0 DEHYDRATION: Status: ACTIVE | Noted: 2018-01-29

## 2019-02-11 ENCOUNTER — HOSPITAL ENCOUNTER (OUTPATIENT)
Dept: MAMMOGRAPHY | Age: 79
Discharge: HOME | End: 2019-02-11
Attending: FAMILY MEDICINE
Payer: MEDICARE

## 2019-02-11 DIAGNOSIS — Z12.39 SCREENING BREAST EXAMINATION: ICD-10-CM

## 2019-02-11 PROCEDURE — 77067 SCR MAMMO BI INCL CAD: CPT

## 2019-12-13 ENCOUNTER — HOSPITAL ENCOUNTER (EMERGENCY)
Age: 79
Discharge: HOME | DRG: 417 | End: 2019-12-13
Attending: STUDENT IN AN ORGANIZED HEALTH CARE EDUCATION/TRAINING PROGRAM | Admitting: STUDENT IN AN ORGANIZED HEALTH CARE EDUCATION/TRAINING PROGRAM
Payer: MEDICARE

## 2019-12-13 ENCOUNTER — APPOINTMENT (OUTPATIENT)
Dept: CT IMAGING | Age: 79
DRG: 417 | End: 2019-12-13
Attending: STUDENT IN AN ORGANIZED HEALTH CARE EDUCATION/TRAINING PROGRAM
Payer: MEDICARE

## 2019-12-13 VITALS
HEIGHT: 66 IN | WEIGHT: 180 LBS | SYSTOLIC BLOOD PRESSURE: 100 MMHG | BODY MASS INDEX: 28.93 KG/M2 | HEART RATE: 60 BPM | OXYGEN SATURATION: 95 % | RESPIRATION RATE: 16 BRPM | TEMPERATURE: 98.5 F | DIASTOLIC BLOOD PRESSURE: 71 MMHG

## 2019-12-13 DIAGNOSIS — K80.81 BILIARY CALCULUS OF OTHER SITE WITH OBSTRUCTION: Primary | ICD-10-CM

## 2019-12-13 DIAGNOSIS — R10.84 ABDOMINAL PAIN, GENERALIZED: ICD-10-CM

## 2019-12-13 LAB
ALBUMIN SERPL-MCNC: 3.9 G/DL (ref 3.5–5)
ALBUMIN/GLOB SERPL: 1.1 {RATIO} (ref 1.1–2.2)
ALP SERPL-CCNC: 64 U/L (ref 45–117)
ALT SERPL-CCNC: 21 U/L (ref 12–78)
ANION GAP SERPL CALC-SCNC: 3 MMOL/L (ref 5–15)
APPEARANCE UR: ABNORMAL
AST SERPL-CCNC: 13 U/L (ref 15–37)
BACTERIA URNS QL MICRO: ABNORMAL /HPF
BASOPHILS # BLD: 0 K/UL (ref 0–0.1)
BASOPHILS NFR BLD: 0 % (ref 0–1)
BILIRUB SERPL-MCNC: 0.7 MG/DL (ref 0.2–1)
BILIRUB UR QL CFM: NEGATIVE
BUN SERPL-MCNC: 27 MG/DL (ref 6–20)
BUN/CREAT SERPL: 18 (ref 12–20)
CALCIUM SERPL-MCNC: 10.5 MG/DL (ref 8.5–10.1)
CHLORIDE SERPL-SCNC: 105 MMOL/L (ref 97–108)
CO2 SERPL-SCNC: 32 MMOL/L (ref 21–32)
COLOR UR: ABNORMAL
COMMENT, HOLDF: NORMAL
CREAT SERPL-MCNC: 1.52 MG/DL (ref 0.55–1.02)
DIFFERENTIAL METHOD BLD: ABNORMAL
EOSINOPHIL # BLD: 0 K/UL (ref 0–0.4)
EOSINOPHIL NFR BLD: 0 % (ref 0–7)
EPITH CASTS URNS QL MICRO: ABNORMAL /LPF
ERYTHROCYTE [DISTWIDTH] IN BLOOD BY AUTOMATED COUNT: 13.1 % (ref 11.5–14.5)
GLOBULIN SER CALC-MCNC: 3.4 G/DL (ref 2–4)
GLUCOSE SERPL-MCNC: 141 MG/DL (ref 65–100)
GLUCOSE UR STRIP.AUTO-MCNC: NEGATIVE MG/DL
GRAN CASTS URNS QL MICRO: ABNORMAL /LPF
HCT VFR BLD AUTO: 43.6 % (ref 35–47)
HGB BLD-MCNC: 13.9 G/DL (ref 11.5–16)
HGB UR QL STRIP: NEGATIVE
IMM GRANULOCYTES # BLD AUTO: 0 K/UL (ref 0–0.04)
IMM GRANULOCYTES NFR BLD AUTO: 0 % (ref 0–0.5)
KETONES UR QL STRIP.AUTO: ABNORMAL MG/DL
LACTATE SERPL-SCNC: 1.4 MMOL/L (ref 0.4–2)
LEUKOCYTE ESTERASE UR QL STRIP.AUTO: NEGATIVE
LIPASE SERPL-CCNC: 161 U/L (ref 73–393)
LYMPHOCYTES # BLD: 0.9 K/UL (ref 0.8–3.5)
LYMPHOCYTES NFR BLD: 7 % (ref 12–49)
MCH RBC QN AUTO: 33.8 PG (ref 26–34)
MCHC RBC AUTO-ENTMCNC: 31.9 G/DL (ref 30–36.5)
MCV RBC AUTO: 106.1 FL (ref 80–99)
MONOCYTES # BLD: 0.7 K/UL (ref 0–1)
MONOCYTES NFR BLD: 6 % (ref 5–13)
MUCOUS THREADS URNS QL MICRO: ABNORMAL /LPF
NEUTS SEG # BLD: 10.6 K/UL (ref 1.8–8)
NEUTS SEG NFR BLD: 87 % (ref 32–75)
NITRITE UR QL STRIP.AUTO: NEGATIVE
NRBC # BLD: 0 K/UL (ref 0–0.01)
NRBC BLD-RTO: 0 PER 100 WBC
PH UR STRIP: 5.5 [PH] (ref 5–8)
PLATELET # BLD AUTO: 157 K/UL (ref 150–400)
PLATELET COMMENTS,PCOM: ABNORMAL
PMV BLD AUTO: 11.9 FL (ref 8.9–12.9)
POTASSIUM SERPL-SCNC: 4.1 MMOL/L (ref 3.5–5.1)
PROT SERPL-MCNC: 7.3 G/DL (ref 6.4–8.2)
PROT UR STRIP-MCNC: 300 MG/DL
RBC # BLD AUTO: 4.11 M/UL (ref 3.8–5.2)
RBC #/AREA URNS HPF: ABNORMAL /HPF (ref 0–5)
RBC MORPH BLD: ABNORMAL
SAMPLES BEING HELD,HOLD: NORMAL
SODIUM SERPL-SCNC: 140 MMOL/L (ref 136–145)
SP GR UR REFRACTOMETRY: 1.02 (ref 1–1.03)
UR CULT HOLD, URHOLD: NORMAL
UROBILINOGEN UR QL STRIP.AUTO: 0.2 EU/DL (ref 0.2–1)
WBC # BLD AUTO: 12.2 K/UL (ref 3.6–11)
WBC URNS QL MICRO: ABNORMAL /HPF (ref 0–4)

## 2019-12-13 PROCEDURE — 74176 CT ABD & PELVIS W/O CONTRAST: CPT

## 2019-12-13 PROCEDURE — 74011250636 HC RX REV CODE- 250/636: Performed by: STUDENT IN AN ORGANIZED HEALTH CARE EDUCATION/TRAINING PROGRAM

## 2019-12-13 PROCEDURE — 36415 COLL VENOUS BLD VENIPUNCTURE: CPT

## 2019-12-13 PROCEDURE — 74011250637 HC RX REV CODE- 250/637: Performed by: STUDENT IN AN ORGANIZED HEALTH CARE EDUCATION/TRAINING PROGRAM

## 2019-12-13 PROCEDURE — 96374 THER/PROPH/DIAG INJ IV PUSH: CPT

## 2019-12-13 PROCEDURE — 85025 COMPLETE CBC W/AUTO DIFF WBC: CPT

## 2019-12-13 PROCEDURE — 83690 ASSAY OF LIPASE: CPT

## 2019-12-13 PROCEDURE — 80053 COMPREHEN METABOLIC PANEL: CPT

## 2019-12-13 PROCEDURE — 93005 ELECTROCARDIOGRAM TRACING: CPT

## 2019-12-13 PROCEDURE — 99283 EMERGENCY DEPT VISIT LOW MDM: CPT

## 2019-12-13 PROCEDURE — 83605 ASSAY OF LACTIC ACID: CPT

## 2019-12-13 PROCEDURE — 81001 URINALYSIS AUTO W/SCOPE: CPT

## 2019-12-13 RX ORDER — FAMOTIDINE 20 MG/1
20 TABLET, FILM COATED ORAL
Status: COMPLETED | OUTPATIENT
Start: 2019-12-13 | End: 2019-12-13

## 2019-12-13 RX ORDER — ONDANSETRON 4 MG/1
4 TABLET, ORALLY DISINTEGRATING ORAL
Qty: 9 TAB | Refills: 0 | Status: SHIPPED | OUTPATIENT
Start: 2019-12-13 | End: 2020-01-15

## 2019-12-13 RX ORDER — DICYCLOMINE HYDROCHLORIDE 20 MG/1
20 TABLET ORAL EVERY 6 HOURS
Qty: 12 TAB | Refills: 0 | Status: SHIPPED | OUTPATIENT
Start: 2019-12-13 | End: 2019-12-16

## 2019-12-13 RX ORDER — ONDANSETRON 2 MG/ML
4 INJECTION INTRAMUSCULAR; INTRAVENOUS
Status: COMPLETED | OUTPATIENT
Start: 2019-12-13 | End: 2019-12-13

## 2019-12-13 RX ADMIN — SODIUM CHLORIDE 1000 ML: 900 INJECTION, SOLUTION INTRAVENOUS at 08:14

## 2019-12-13 RX ADMIN — ONDANSETRON 4 MG: 2 INJECTION INTRAMUSCULAR; INTRAVENOUS at 08:14

## 2019-12-13 RX ADMIN — FAMOTIDINE 20 MG: 20 TABLET ORAL at 09:06

## 2019-12-13 NOTE — ED NOTES
Patient has received discharge instructions from ER MD, verbalizes understanding. Discharged via wheelchair with .

## 2019-12-13 NOTE — ED TRIAGE NOTES
Pt c/o onset of abdominal pain since 4:30 PM x 1 day ago with brown emesis x 5 episodes last night. Denies constipation, diarrhea, or urinary symptoms.

## 2019-12-13 NOTE — ED NOTES
Pt given discharge instructions, patient education, prescriptions, and follow up information by Dr. Sandi Patel. Pt states understanding. All questions answered. Pt discharged to home in private vehicle, ambulatory. Pt A/Ox4, RA, pain controlled.

## 2019-12-13 NOTE — ED PROVIDER NOTES
78 y.o. female with past medical history significant for HLD, HTN, thromboembolus, arthritis, gout, and SVT who presents via POV with chief complaint of vomiting. Pt c/o central, non radiating abdominal pain that began yesterday at 1630 and intermittently worsens, accompanied by vomiting x5 episodes last night that she describes as acidic. Pt also vomited this AM and has not been able to eat or drink anything without vomiting. She endorses some diarrhea as well, but has hx of colon resection due to diverticulitis, so that is not abnormal for her. She denies urinary sxs, and chest pain. Her  has recently blair sick with a cold so she has been exposed to sick contacts. She has not taken any medication yet to treat her pain. She took her regular medications last night, but were likely not absorbed due to vomiting, and she did not take her medications this AM including bp medication and gout medication. She denies hx of cholecystectomy. There are no other acute medical concerns at this time. Social hx: former tobacco smoker, endorse EtOH use, denies illicit drug use    PCP: Gabby Masters MD      Note written by Bill Wooten, as dictated by Leslie Ardon MD 8:17 AM      The history is provided by the patient and the spouse. No  was used.         Past Medical History:   Diagnosis Date    Abscess 3/22/2014    Lumbar area - MRSA    Arthritis     Gout     Hypercholesterolemia     Hypertension     LBP (low back pain)     Other ill-defined conditions(799.89)     LEFT SHOULDER PAINFUL, NEEDS REPLACEMENT    Paroxysmal SVT (supraventricular tachycardia) (HCC) 5/2/2017    Thromboembolus ([facility] [address] 12/26/13    left leg       Past Surgical History:   Procedure Laterality Date    ABDOMEN SURGERY PROC UNLISTED      hernia repair    BREAST SURGERY PROCEDURE UNLISTED      EXCISION OF BREAST CYST    ENDOSCOPY, COLON, DIAGNOSTIC      ostomy reversal    HX BREAST BIOPSY Right 1970 Benign    HX COLOSTOMY      HX GI      COLOSTOMY REVERSAL    HX HEMORRHOIDECTOMY      HX ORTHOPAEDIC      right knee  bilateral knee replacement    HX ORTHOPAEDIC      ORIF RIGHT ANKLE    HX ORTHOPAEDIC      KNEE ARTHROSCOPY         Family History:   Problem Relation Age of Onset    Hypertension Father    Aundria Dadds Anesth Problems Neg Hx        Social History     Socioeconomic History    Marital status:      Spouse name: Not on file    Number of children: Not on file    Years of education: Not on file    Highest education level: Not on file   Occupational History    Not on file   Social Needs    Financial resource strain: Not on file    Food insecurity:     Worry: Not on file     Inability: Not on file    Transportation needs:     Medical: Not on file     Non-medical: Not on file   Tobacco Use    Smoking status: Former Smoker     Packs/day: 1.00     Years: 20.00     Pack years: 20.00     Last attempt to quit: 1972     Years since quittin.6    Smokeless tobacco: Never Used   Substance and Sexual Activity    Alcohol use:  Yes     Alcohol/week: 1.7 standard drinks     Types: 2 Shots of liquor per week     Comment: 2 DAY    Drug use: No    Sexual activity: Not on file   Lifestyle    Physical activity:     Days per week: Not on file     Minutes per session: Not on file    Stress: Not on file   Relationships    Social connections:     Talks on phone: Not on file     Gets together: Not on file     Attends Holiness service: Not on file     Active member of club or organization: Not on file     Attends meetings of clubs or organizations: Not on file     Relationship status: Not on file    Intimate partner violence:     Fear of current or ex partner: Not on file     Emotionally abused: Not on file     Physically abused: Not on file     Forced sexual activity: Not on file   Other Topics Concern    Not on file   Social History Narrative    Not on file         ALLERGIES: Shellfish derived    Review of Systems   Constitutional: Negative for chills and fever. Respiratory: Negative for cough and shortness of breath. Cardiovascular: Negative for chest pain. Gastrointestinal: Positive for abdominal pain, diarrhea, nausea and vomiting. Genitourinary: Negative for difficulty urinating, dysuria and hematuria. Musculoskeletal: Negative for back pain. Skin: Negative for rash. Neurological: Negative for syncope and headaches. All other systems reviewed and are negative. Vitals:    12/13/19 0758   BP: 100/71   Pulse: 60   Resp: 16   Temp: 98.5 °F (36.9 °C)   SpO2: 95%   Weight: 81.6 kg (180 lb)   Height: 5' 6\" (1.676 m)            Physical Exam  Vitals signs and nursing note reviewed. Constitutional:       General: She is not in acute distress. Appearance: She is well-developed. HENT:      Head: Normocephalic and atraumatic. Eyes:      Conjunctiva/sclera: Conjunctivae normal.   Neck:      Musculoskeletal: Normal range of motion and neck supple. Cardiovascular:      Rate and Rhythm: Normal rate and regular rhythm. Heart sounds: Normal heart sounds. Pulmonary:      Effort: Pulmonary effort is normal. No respiratory distress. Breath sounds: Normal breath sounds. Abdominal:      Palpations: Abdomen is soft. Tenderness: There is generalized tenderness (minimal). There is no right CVA tenderness, left CVA tenderness or guarding. Musculoskeletal: Normal range of motion. Skin:     General: Skin is warm and dry. Neurological:      Mental Status: She is alert and oriented to person, place, and time. Motor: No abnormal muscle tone. Note written by Bill Kelsey, as dictated by Tommy Roy MD 8:30 AM      [facility]       Procedures        EKG interpretation: 8:19  Rhythm: sinus rhythm with occ PACs; and regular .  Rate (approx.): 96; Axis: normal; Intervals: normal ; ST/T wave: normal, no STEMI; EKG documented and interpreted by Minerva Mitchell Gui Craig MD, ED MD.    Assessment and plan: This 47-year-old female with 16 hours of colicky abdominal pain, associated with vomiting. Wide differential diagnosis at this point. She will require emergent labs, urinalysis, and CT imaging to rule out an [facility]. Disposition depends on results and clinical course. 10:24 AM  The patient is resting comfortably and feels better, is alert and in no distress. The repeat examination is unremarkable and benign; in particular, there is no pulsatile mass. The history, exam, diagnostic testing, and current condition do not suggest acute appendicitis, bowel obstruction, acute cholecystitis, bowel perforation, major gastrointestinal bleeding, severe diverticulitis, abdominal aortic aneurysm, mesenteric ischemia, volvulus, sepsis, or other significant pathology to warrant further testing, continued ED treatment, admission, or surgical evaluation at this point. The vital signs have been stable. The patient does not have uncontrollable pain, intractable vomiting, or other significant symptoms. The patient's condition is stable and appropriate for discharge from the emergency department. The patient will pursue further outpatient evaluation with the primary care physician or other designated or consulting physician as indicated in the discharge instructions.

## 2019-12-13 NOTE — DISCHARGE INSTRUCTIONS
Patient Education        Abdominal Pain: Care Instructions  Your Care Instructions    Abdominal pain has many possible causes. Some aren't serious and get better on their own in a few days. Others need more testing and treatment. If your pain continues or gets worse, you need to be rechecked and may need more tests to find out what is wrong. You may need surgery to correct the problem. Don't ignore new symptoms, such as fever, nausea and vomiting, urination problems, pain that gets worse, and dizziness. These may be signs of a more serious problem. Your doctor may have recommended a follow-up visit in the next 8 to 12 hours. If you are not getting better, you may need more tests or treatment. The doctor has checked you carefully, but problems can develop later. If you notice any problems or new symptoms, get medical treatment right away. Follow-up care is a key part of your treatment and safety. Be sure to make and go to all appointments, and call your doctor if you are having problems. It's also a good idea to know your test results and keep a list of the medicines you take. How can you care for yourself at home? · Rest until you feel better. · To prevent dehydration, drink plenty of fluids, enough so that your urine is light yellow or clear like water. Choose water and other caffeine-free clear liquids until you feel better. If you have kidney, heart, or liver disease and have to limit fluids, talk with your doctor before you increase the amount of fluids you drink. · If your stomach is upset, eat mild foods, such as rice, dry toast or crackers, bananas, and applesauce. Try eating several small meals instead of two or three large ones. · Wait until 48 hours after all symptoms have gone away before you have spicy foods, alcohol, and drinks that contain caffeine. · Do not eat foods that are high in fat. · Avoid anti-inflammatory medicines such as aspirin, ibuprofen (Advil, Motrin), and naproxen (Aleve). These can cause stomach upset. Talk to your doctor if you take daily aspirin for another health problem. When should you call for help? Call 911 anytime you think you may need emergency care. For example, call if:    · You passed out (lost consciousness).     · You pass maroon or very bloody stools.     · You vomit blood or what looks like coffee grounds.     · You have new, severe belly pain.    Call your doctor now or seek immediate medical care if:    · Your pain gets worse, especially if it becomes focused in one area of your belly.     · You have a new or higher fever.     · Your stools are black and look like tar, or they have streaks of blood.     · You have unexpected vaginal bleeding.     · You have symptoms of a urinary tract infection. These may include:  ? Pain when you urinate. ? Urinating more often than usual.  ? Blood in your urine.     · You are dizzy or lightheaded, or you feel like you may faint.    Watch closely for changes in your health, and be sure to contact your doctor if:    · You are not getting better after 1 day (24 hours). Where can you learn more? Go to http://stevoLDR Holdinganthony.info/. Enter K819 in the search box to learn more about \"Abdominal Pain: Care Instructions. \"  Current as of: June 26, 2019  Content Version: 12.2  © 2254-1303 [employer]. Care instructions adapted under license by [employer] (which disclaims liability or warranty for this information). If you have questions about a medical condition or this instruction, always ask your healthcare professional. [place] [address] any warranty or liability for your use of this information. Patient Education        Learning About Gallstones  What are gallstones? Gallstones are stones that form in the gallbladder. The gallbladder is a small sac located just under the liver. It stores bile released by the liver. Bile helps you digest fats.  Gallstones can be smaller than a grain of sand or as large as a golf ball. Gallstones form when cholesterol and other things found in bile make stones. They can also form if the gallbladder doesn't empty as it should. Gallstones can also form in the common bile duct or cystic duct. These tubes carry bile from the gallbladder and the liver to the small intestine. What happens when you have gallstones? Gallstones can cause many different problems, such as:  · A blockage in the common bile duct. · Inflammation or infection of the gallbladder (acute cholecystitis). This can happen when a gallstone blocks the cystic duct. · Inflammation or infection of the common bile duct (cholangitis). This can happen when gallstones get stuck in the common bile duct. In rare cases, this can damage the liver or spread infection. · Pancreatitis, an inflammation of the pancreas. What are the symptoms? · Most people who have gallstones don't have symptoms. · When symptoms occur, they can include:  ? Pain in the pit of your stomach or in the upper right part of your belly. It may spread to your right upper back or shoulder blade area. ? Pain that may come and go or be steady. It may get worse when you eat. ? Fever and chills, if a gallstone is blocking a bile duct and causing an infection. ? Yellowing of your skin and the whites of your eyes. How can you prevent gallstones? There is no sure way to prevent gallstones. But you can reduce your risk of forming gallstones that can cause symptoms. · Stay at a healthy weight. If you need to lose weight, do so slowly and sensibly. · Eat regular, balanced meals. · Be active, and exercise regularly. How are gallstones treated? · If you don't have symptoms, you probably don't need treatment. · For mild symptoms, your doctor may have you take pain medicine and wait to see if the pain goes away.   · For severe pain or infection, or if you have more than one gallstone attack, your doctor may suggest surgery to have your gallbladder removed. The body works fine without a gallbladder. Follow-up care is a key part of your treatment and safety. Be sure to make and go to all appointments, and call your doctor if you are having problems. It's also a good idea to know your test results and keep a list of the medicines you take. Where can you learn more? Go to http://stevo-anthony.info/. Enter  in the search box to learn more about \"Learning About Gallstones. \"  Current as of: November 7, 2018  Content Version: 12.2  © 5353-8764 [employer]. Care instructions adapted under license by [employer] (which disclaims liability or warranty for this information). If you have questions about a medical condition or this instruction, always ask your healthcare professional. [address] any warranty or liability for your use of this information.

## 2019-12-14 ENCOUNTER — HOSPITAL ENCOUNTER (INPATIENT)
Age: 79
LOS: 8 days | Discharge: HOME | DRG: 417 | End: 2019-12-22
Attending: EMERGENCY MEDICINE | Admitting: INTERNAL MEDICINE
Payer: MEDICARE

## 2019-12-14 ENCOUNTER — APPOINTMENT (OUTPATIENT)
Dept: ULTRASOUND IMAGING | Age: 79
DRG: 417 | End: 2019-12-14
Attending: EMERGENCY MEDICINE
Payer: MEDICARE

## 2019-12-14 ENCOUNTER — APPOINTMENT (OUTPATIENT)
Dept: GENERAL RADIOLOGY | Age: 79
DRG: 417 | End: 2019-12-14
Attending: HOSPITALIST
Payer: MEDICARE

## 2019-12-14 ENCOUNTER — APPOINTMENT (OUTPATIENT)
Dept: MRI IMAGING | Age: 79
DRG: 417 | End: 2019-12-14
Attending: EMERGENCY MEDICINE
Payer: MEDICARE

## 2019-12-14 DIAGNOSIS — N17.9 AKI (ACUTE KIDNEY INJURY) (HCC): ICD-10-CM

## 2019-12-14 DIAGNOSIS — R11.2 NAUSEA AND VOMITING, INTRACTABILITY OF VOMITING NOT SPECIFIED, UNSPECIFIED VOMITING TYPE: ICD-10-CM

## 2019-12-14 DIAGNOSIS — K83.1 COMMON BILE DUCT (CBD) OBSTRUCTION: Primary | ICD-10-CM

## 2019-12-14 PROBLEM — R10.9 ABDOMINAL PAIN: Status: ACTIVE | Noted: 2019-12-14

## 2019-12-14 PROBLEM — K80.50 COMMON BILE DUCT CALCULUS: Status: ACTIVE | Noted: 2019-12-14

## 2019-12-14 PROBLEM — K80.20 GALLSTONE: Status: ACTIVE | Noted: 2019-12-14

## 2019-12-14 LAB
ALBUMIN SERPL-MCNC: 3.8 G/DL (ref 3.5–5)
ALBUMIN/GLOB SERPL: 1 {RATIO} (ref 1.1–2.2)
ALP SERPL-CCNC: 54 U/L (ref 45–117)
ALT SERPL-CCNC: 19 U/L (ref 12–78)
ANION GAP SERPL CALC-SCNC: 6 MMOL/L (ref 5–15)
AST SERPL-CCNC: 14 U/L (ref 15–37)
ATRIAL RATE: 96 BPM
BASOPHILS # BLD: 0 K/UL (ref 0–0.1)
BASOPHILS NFR BLD: 0 % (ref 0–1)
BILIRUB SERPL-MCNC: 1 MG/DL (ref 0.2–1)
BUN SERPL-MCNC: 37 MG/DL (ref 6–20)
BUN/CREAT SERPL: 16 (ref 12–20)
CALCIUM SERPL-MCNC: 10.9 MG/DL (ref 8.5–10.1)
CALCULATED P AXIS, ECG09: 30 DEGREES
CALCULATED R AXIS, ECG10: -4 DEGREES
CALCULATED T AXIS, ECG11: 38 DEGREES
CHLORIDE SERPL-SCNC: 100 MMOL/L (ref 97–108)
CO2 SERPL-SCNC: 32 MMOL/L (ref 21–32)
COMMENT, HOLDF: NORMAL
CREAT SERPL-MCNC: 2.31 MG/DL (ref 0.55–1.02)
DIAGNOSIS, 93000: NORMAL
DIFFERENTIAL METHOD BLD: ABNORMAL
EOSINOPHIL # BLD: 0.1 K/UL (ref 0–0.4)
EOSINOPHIL NFR BLD: 2 % (ref 0–7)
ERYTHROCYTE [DISTWIDTH] IN BLOOD BY AUTOMATED COUNT: 13.3 % (ref 11.5–14.5)
GLOBULIN SER CALC-MCNC: 3.8 G/DL (ref 2–4)
GLUCOSE SERPL-MCNC: 121 MG/DL (ref 65–100)
HCT VFR BLD AUTO: 43.4 % (ref 35–47)
HGB BLD-MCNC: 13.5 G/DL (ref 11.5–16)
IMM GRANULOCYTES # BLD AUTO: 0 K/UL
IMM GRANULOCYTES NFR BLD AUTO: 0 %
LIPASE SERPL-CCNC: 73 U/L (ref 73–393)
LYMPHOCYTES # BLD: 1.5 K/UL (ref 0.8–3.5)
LYMPHOCYTES NFR BLD: 24 % (ref 12–49)
MCH RBC QN AUTO: 33.3 PG (ref 26–34)
MCHC RBC AUTO-ENTMCNC: 31.1 G/DL (ref 30–36.5)
MCV RBC AUTO: 106.9 FL (ref 80–99)
MONOCYTES # BLD: 0.8 K/UL (ref 0–1)
MONOCYTES NFR BLD: 13 % (ref 5–13)
MYELOCYTES NFR BLD MANUAL: 1 %
NEUTS BAND NFR BLD MANUAL: 13 % (ref 0–6)
NEUTS SEG # BLD: 3.8 K/UL (ref 1.8–8)
NEUTS SEG NFR BLD: 47 % (ref 32–75)
NRBC # BLD: 0 K/UL (ref 0–0.01)
NRBC BLD-RTO: 0 PER 100 WBC
P-R INTERVAL, ECG05: 152 MS
PLATELET # BLD AUTO: 164 K/UL (ref 150–400)
PLATELET COMMENTS,PCOM: ABNORMAL
PMV BLD AUTO: 11.8 FL (ref 8.9–12.9)
POTASSIUM SERPL-SCNC: 4.3 MMOL/L (ref 3.5–5.1)
PROT SERPL-MCNC: 7.6 G/DL (ref 6.4–8.2)
Q-T INTERVAL, ECG07: 338 MS
QRS DURATION, ECG06: 84 MS
QTC CALCULATION (BEZET), ECG08: 427 MS
RBC # BLD AUTO: 4.06 M/UL (ref 3.8–5.2)
RBC MORPH BLD: ABNORMAL
SAMPLES BEING HELD,HOLD: NORMAL
SODIUM SERPL-SCNC: 138 MMOL/L (ref 136–145)
VENTRICULAR RATE, ECG03: 96 BPM
WBC # BLD AUTO: 6.4 K/UL (ref 3.6–11)
WBC MORPH BLD: ABNORMAL

## 2019-12-14 PROCEDURE — 74011000258 HC RX REV CODE- 258: Performed by: INTERNAL MEDICINE

## 2019-12-14 PROCEDURE — 96375 TX/PRO/DX INJ NEW DRUG ADDON: CPT

## 2019-12-14 PROCEDURE — 74011000250 HC RX REV CODE- 250: Performed by: EMERGENCY MEDICINE

## 2019-12-14 PROCEDURE — 76705 ECHO EXAM OF ABDOMEN: CPT

## 2019-12-14 PROCEDURE — 74181 MRI ABDOMEN W/O CONTRAST: CPT

## 2019-12-14 PROCEDURE — 74019 RADEX ABDOMEN 2 VIEWS: CPT

## 2019-12-14 PROCEDURE — 85025 COMPLETE CBC W/AUTO DIFF WBC: CPT

## 2019-12-14 PROCEDURE — 83690 ASSAY OF LIPASE: CPT

## 2019-12-14 PROCEDURE — 99285 EMERGENCY DEPT VISIT HI MDM: CPT

## 2019-12-14 PROCEDURE — 80053 COMPREHEN METABOLIC PANEL: CPT

## 2019-12-14 PROCEDURE — 96374 THER/PROPH/DIAG INJ IV PUSH: CPT

## 2019-12-14 PROCEDURE — 74011250636 HC RX REV CODE- 250/636: Performed by: EMERGENCY MEDICINE

## 2019-12-14 PROCEDURE — 36415 COLL VENOUS BLD VENIPUNCTURE: CPT

## 2019-12-14 PROCEDURE — 65660000000 HC RM CCU STEPDOWN

## 2019-12-14 PROCEDURE — 74011250637 HC RX REV CODE- 250/637: Performed by: INTERNAL MEDICINE

## 2019-12-14 PROCEDURE — 74011250636 HC RX REV CODE- 250/636: Performed by: INTERNAL MEDICINE

## 2019-12-14 RX ORDER — ONDANSETRON 2 MG/ML
4 INJECTION INTRAMUSCULAR; INTRAVENOUS
Status: DISCONTINUED | OUTPATIENT
Start: 2019-12-14 | End: 2019-12-22 | Stop reason: HOSPADM

## 2019-12-14 RX ORDER — VERAPAMIL HYDROCHLORIDE 120 MG/1
240 TABLET, FILM COATED, EXTENDED RELEASE ORAL
Status: DISCONTINUED | OUTPATIENT
Start: 2019-12-15 | End: 2019-12-14

## 2019-12-14 RX ORDER — DILTIAZEM HYDROCHLORIDE 240 MG/1
240 CAPSULE, EXTENDED RELEASE ORAL DAILY
COMMUNITY
End: 2020-01-24

## 2019-12-14 RX ORDER — LOVASTATIN 20 MG/1
20 TABLET ORAL
Status: DISCONTINUED | OUTPATIENT
Start: 2019-12-14 | End: 2019-12-22 | Stop reason: HOSPADM

## 2019-12-14 RX ORDER — ONDANSETRON 2 MG/ML
4 INJECTION INTRAMUSCULAR; INTRAVENOUS ONCE
Status: COMPLETED | OUTPATIENT
Start: 2019-12-14 | End: 2019-12-14

## 2019-12-14 RX ORDER — ALBUTEROL SULFATE 90 UG/1
2 AEROSOL, METERED RESPIRATORY (INHALATION)
Status: DISCONTINUED | OUTPATIENT
Start: 2019-12-14 | End: 2019-12-14 | Stop reason: CLARIF

## 2019-12-14 RX ORDER — SODIUM CHLORIDE 0.9 % (FLUSH) 0.9 %
5-40 SYRINGE (ML) INJECTION AS NEEDED
Status: DISCONTINUED | OUTPATIENT
Start: 2019-12-14 | End: 2019-12-22 | Stop reason: HOSPADM

## 2019-12-14 RX ORDER — SODIUM CHLORIDE 0.9 % (FLUSH) 0.9 %
5-40 SYRINGE (ML) INJECTION EVERY 8 HOURS
Status: DISCONTINUED | OUTPATIENT
Start: 2019-12-14 | End: 2019-12-22 | Stop reason: HOSPADM

## 2019-12-14 RX ORDER — SODIUM CHLORIDE 9 MG/ML
50 INJECTION, SOLUTION INTRAVENOUS CONTINUOUS
Status: DISCONTINUED | OUTPATIENT
Start: 2019-12-14 | End: 2019-12-18

## 2019-12-14 RX ORDER — ACETAMINOPHEN 325 MG/1
650 TABLET ORAL
Status: DISCONTINUED | OUTPATIENT
Start: 2019-12-14 | End: 2019-12-22 | Stop reason: HOSPADM

## 2019-12-14 RX ORDER — DILTIAZEM HYDROCHLORIDE 240 MG/1
240 CAPSULE, COATED, EXTENDED RELEASE ORAL DAILY
Status: DISCONTINUED | OUTPATIENT
Start: 2019-12-15 | End: 2019-12-22 | Stop reason: HOSPADM

## 2019-12-14 RX ORDER — MORPHINE SULFATE 2 MG/ML
2 INJECTION, SOLUTION INTRAMUSCULAR; INTRAVENOUS
Status: DISCONTINUED | OUTPATIENT
Start: 2019-12-14 | End: 2019-12-22 | Stop reason: HOSPADM

## 2019-12-14 RX ORDER — ALBUTEROL SULFATE 0.83 MG/ML
2.5 SOLUTION RESPIRATORY (INHALATION)
Status: DISCONTINUED | OUTPATIENT
Start: 2019-12-14 | End: 2019-12-22 | Stop reason: HOSPADM

## 2019-12-14 RX ORDER — ALLOPURINOL 100 MG/1
100 TABLET ORAL DAILY
Status: DISCONTINUED | OUTPATIENT
Start: 2019-12-15 | End: 2019-12-22 | Stop reason: HOSPADM

## 2019-12-14 RX ADMIN — Medication 10 ML: at 21:34

## 2019-12-14 RX ADMIN — SODIUM CHLORIDE 1000 ML: 900 INJECTION, SOLUTION INTRAVENOUS at 13:34

## 2019-12-14 RX ADMIN — ONDANSETRON 4 MG: 2 INJECTION INTRAMUSCULAR; INTRAVENOUS at 13:34

## 2019-12-14 RX ADMIN — FAMOTIDINE 20 MG: 10 INJECTION, SOLUTION INTRAVENOUS at 13:34

## 2019-12-14 RX ADMIN — PIPERACILLIN AND TAZOBACTAM 3.38 G: 3; .375 INJECTION, POWDER, FOR SOLUTION INTRAVENOUS at 19:24

## 2019-12-14 RX ADMIN — LOVASTATIN 20 MG: 20 TABLET ORAL at 21:32

## 2019-12-14 RX ADMIN — SODIUM CHLORIDE 125 ML/HR: 900 INJECTION, SOLUTION INTRAVENOUS at 18:15

## 2019-12-14 NOTE — H&P
H and P  Cross cover for Dr. Jarod Guerrero     1. Suspected Biliary infection with 2 d upper abd pain , bilious /dark emesis , Gallstones and CBD stone. \"Bandemia\" on CBC    2. ARF on CKD 4 w/ dehydration 2/2 to #1 above     3. HTN    4. Lipid d/o     5. PSVT in past .     P: full inpt admission, remote tele, IVF. Urinalysis,  F/u labs   IV Zosyn. GI c/s .      I spoke w/ Dr. Nazario Shaikh and pt 's

## 2019-12-14 NOTE — ED NOTES
1145 Bedside shift change report given to Sofia (oncoming nurse) by Gregorio Almanza (offgoing nurse). Report included the following information SBAR.     1218 Ultrasound at bedside. 1345 POX 90% RA. Placed [atient on 2L NC 97%. Hospitalist at [facility]. 1512 Escorted to [facility].    1600 Arrived from MRI. [address] patient on bedpan    1640 Removed patient from bed pan. Total bed change. BM green with brown formed stool per diagnosis. [id number] patient on bed pan.

## 2019-12-14 NOTE — ED NOTES
Patient left department with transportation for transportation to inpatient bed. Patient's VS at the time of transfer were /79, 99% on RA, denies pain at this time, 97.9 orally, HR 85 rhythm on the monitor. Patient was alert and oriented x 4 and denies further needs at time of transfer. TRANSFER - OUT REPORT:    Verbal report given to Yun Valdovinos RN(name) on [employer]  being transferred to [facility](unit) for routine progression of care       Report consisted of patients Situation, Background, Assessment and   Recommendations(SBAR). Information from the following report(s) SBAR, Kardex, ED Summary, [facility] and Recent Results was reviewed with the receiving nurse. Opportunity for questions and clarification was provided.

## 2019-12-14 NOTE — ED TRIAGE NOTES
Patient arrives c/o abdominal pain with nausea and vomiting. Patient was seen here yesterday and was diagnosed with gallstones. Patient states she is just feeling worse.

## 2019-12-14 NOTE — PROGRESS NOTES
Admission Medication Reconciliation:    Information obtained from:  Patient  RxQuery data available¹:  YES    Comments/Recommendations: Updated PTA meds/reviewed patient's allergies. 1)  Patient reports that her verapamil has been changed to diltiazem recently. Hospitalist updated. Patient denies any current antibiotics taken at home. 2)  Medication changes (since last review): Added  - Diltiazem 240mg ER 1cap po daily    Removed  - Verapamil 240mg ER 1cap daily  - Valacyclovir 500mg 1tab po TID  - Bactrim DS 1tab po BID  - Cefuroxime 250mg 1tab po BID  - Dexamethasone 0.5mg/5mL po 4 times daily swish and spit for ulcer  - Dextromethorphan-guaifenesin 20-400mg/5mL po 6 times daily  - Risaquad 1cap po daily     ¹RxQuery pharmacy benefit data reflects medications filled and processed through the patient's insurance, however   this data does NOT capture whether the medication was picked up or is currently being taken by the patient. Allergies:  Shellfish derived    Significant PMH/Disease States:   Past Medical History:   Diagnosis Date    Abscess 3/22/2014    Lumbar area - MRSA    Arthritis     Common bile duct calculus 12/14/2019    Gout     Hypercholesterolemia     Hypertension     LBP (low back pain)     Other ill-defined conditions(799.89)     LEFT SHOULDER PAINFUL, NEEDS REPLACEMENT    Paroxysmal SVT (supraventricular tachycardia) ([facility] 5/2/2017    Thromboembolus ([facility] 12/26/13    left leg     Chief Complaint for this Admission:    Chief Complaint   Patient presents with    Vomiting     Prior to Admission Medications:   Prior to Admission Medications   Prescriptions Last Dose Informant Patient Reported? Taking? albuterol (PROVENTIL HFA, VENTOLIN HFA, PROAIR HFA) 90 mcg/actuation inhaler   No Yes   Sig: Take 2 Puffs by inhalation every four (4) hours as needed for Wheezing.    allopurinol (ZYLOPRIM) 100 mg tablet   No Yes   Sig: TAKE 1 TABLET BY MOUTH EVERY DAY   calcium-vitamin D (OYSTER SHELL) 500 mg(1,250mg) -200 unit per tablet   Yes Yes   Sig: Take 1 Tab by mouth daily. cholecalciferol (VITAMIN D3) 1,000 unit tablet   Yes Yes   Sig: Take 1,000 Units by mouth daily. cyanocobalamin (VITAMIN B12) 1,000 mcg/mL injection   No Yes   Sig: inject 1 milliliter subcutaneously EVERY 2 MONTHS   dicyclomine (BENTYL) 20 mg tablet   No Yes   Sig: Take 1 Tab by mouth every six (6) hours for 12 doses. dilTIAZem ER (CARDIZEM LA) 240 mg Tb24 tablet   Yes Yes   Sig: Take 240 mg by mouth daily. diphenhydrAMINE (BENADRYL ALLERGY) 25 mg tablet   Yes Yes   Sig: Take 25 mg by mouth nightly as needed for Sleep.   lovastatin (MEVACOR) 20 mg tablet   No Yes   Sig: TAKE 1 TABLET BY MOUTH AT BEDTIME   multivit-min-iron-FA-lutein (CENTRUM SILVER WOMEN) 8 mg iron-400 mcg-300 mcg tab   Yes Yes   Sig: Take 1 Tab by mouth daily. omega-3 fatty acids-vitamin e (FISH OIL) 1,000 mg cap   Yes Yes   Sig: Take 1 Cap by mouth daily. ondansetron (ZOFRAN ODT) 4 mg disintegrating tablet   No Yes   Sig: Take 1 Tab by mouth every eight (8) hours as needed for Nausea. Facility-Administered Medications: None       Please contact the main inpatient pharmacy with any questions or concerns at [phone] and we will direct you to the clinical pharmacist covering this patient's care while in-house.    Madelyn Alcocer, AMYD

## 2019-12-14 NOTE — ED PROVIDER NOTES
58-year-old female who presents from home via private vehicle with a chief complaint of abdominal pain and vomiting. Past medical history is significant for gout, hypertension, paroxysmal SVT, thromboembolism, colectomy. Patient was seen here yesterday with complaints of abdominal pain and vomiting. She had blood work that was largely unremarkable and a CT scan that showed gallstones but no other acute findings. Patient was discharged with prescription for nausea medicine and pain medicine. Patient continued to have vomiting and abdominal pain last night. She reported little improvement with her medications. She is not had anything to eat or drink in the past couple of days. She denies any fever, chest pain, shortness of breath. She has chronic loose stool and reports no change in her stool output. She denies any urinary symptoms.            Past Medical History:   Diagnosis Date    Abscess 3/22/2014    Lumbar area - MRSA    Arthritis     Gout     Hypercholesterolemia     Hypertension     LBP (low back pain)     Other ill-defined conditions(799.89)     LEFT SHOULDER PAINFUL, NEEDS REPLACEMENT    Paroxysmal [facility] 5/2/2017    Thromboembolus ([address] 12/26/13    left leg       Past Surgical History:   Procedure Laterality Date    ABDOMEN SURGERY PROC UNLISTED      hernia repair    BREAST SURGERY PROCEDURE UNLISTED      EXCISION OF BREAST CYST    ENDOSCOPY, COLON, DIAGNOSTIC      ostomy reversal    HX BREAST BIOPSY Right 1970    Benign    HX COLOSTOMY      HX GI      COLOSTOMY REVERSAL    HX HEMORRHOIDECTOMY      HX ORTHOPAEDIC      right knee  bilateral knee replacement    HX ORTHOPAEDIC      ORIF RIGHT ANKLE    HX ORTHOPAEDIC      KNEE ARTHROSCOPY         Family History:   Problem Relation Age of Onset    Hypertension Father     Anesth Problems Neg Hx        Social History     Socioeconomic History    Marital status:      Spouse name: Not on file  Number of children: Not on file    Years of education: Not on file    Highest education level: Not on file   Occupational History    Not on file   Social Needs    Financial resource strain: Not on file    Food insecurity:     Worry: Not on file     Inability: Not on file    Transportation needs:     Medical: Not on file     Non-medical: Not on file   Tobacco Use    Smoking status: Former Smoker     Packs/day: 1.00     Years: 20.00     Pack years: 20.00     Last attempt to quit: 1972     Years since quittin.6    Smokeless tobacco: Never Used   Substance and Sexual Activity    Alcohol use: Yes     Alcohol/week: 1.7 standard drinks     Types: 2 Shots of liquor per week     Comment: 2 DAY    Drug use: No    Sexual activity: Not on file   Lifestyle    Physical activity:     Days per week: Not on file     Minutes per session: Not on file    Stress: Not on file   Relationships    Social connections:     Talks on phone: Not on file     Gets together: Not on file     Attends Taoism service: Not on file     Active member of club or organization: Not on file     Attends meetings of clubs or organizations: Not on file     Relationship status: Not on file    Intimate partner violence:     Fear of current or ex partner: Not on file     Emotionally abused: Not on file     Physically abused: Not on file     Forced sexual activity: Not on file   Other Topics Concern    Not on file   Social History Narrative    Not on file         ALLERGIES: Shellfish derived    Review of Systems   Constitutional: Negative for fever. HENT: Negative for facial swelling. Eyes: Negative for visual disturbance. Respiratory: Negative for chest tightness. Cardiovascular: Negative for chest pain. Gastrointestinal: Positive for abdominal pain and vomiting. Genitourinary: Negative for difficulty urinating and dysuria. Musculoskeletal: Negative for arthralgias. Skin: Negative for rash.    Neurological: Negative for headaches. Hematological: Negative for adenopathy. Psychiatric/Behavioral: Negative for suicidal ideas. Vitals:    12/14/19 1112   Resp: 16   Temp: 98.6 °F (37 °C)   Weight: 81.6 kg (180 lb)   Height: 5' 6\" (1.676 m)            Physical Exam  Vitals signs and nursing note reviewed. Constitutional:       General: She is not in acute distress. Appearance: She is well-developed. She is not diaphoretic. HENT:      Head: Normocephalic and atraumatic. Eyes:      General: No scleral icterus. Pupils: Pupils are equal, round, and reactive to light. Neck:      Musculoskeletal: Normal range of motion and neck supple. Thyroid: No thyromegaly. Cardiovascular:      Rate and Rhythm: Normal rate and regular rhythm. Heart sounds: Normal heart sounds. No murmur. Pulmonary:      Effort: Pulmonary effort is normal. No respiratory distress. Breath sounds: Normal breath sounds. Abdominal:      General: Bowel sounds are normal. There is no distension. Palpations: Abdomen is soft. Tenderness: There is no tenderness. Musculoskeletal: Normal range of motion. Skin:     General: Skin is warm and dry. Findings: No rash. Neurological:      Mental Status: She is alert and oriented to person, place, and time. MDM  Number of Diagnoses or Management Options  JOSE (acute kidney injury) [facility]):   Common bile duct (CBD) obstruction:   Nausea and vomiting, intractability of vomiting not specified, unspecified vomiting type:        Hospitalist Perfect Serve for Admission  1:33 PM    ED Room Number: ER15/15  Patient Name and age:  Renan Chavis 78 y.o.  female  Working Diagnosis:   1. Common bile duct (CBD) obstruction    2. Nausea and vomiting, intractability of vomiting not specified, unspecified vomiting type    3.  JOSE (acute kidney injury) ([facility] [address]      Readmission: no  Isolation Requirements:  no  Recommended Level of Care:  med/surg  Code Status:  Full Code  Department:[facility] Adult ED - [phone]  Other:  Obstructive biliary stone. Increased creatinine. GI consulted.     Procedures

## 2019-12-14 NOTE — CONSULTS
[address], [address] [place] [address]       GASTROENTEROLOGY CONSULTATION NOTE        NAME:  Darren Brown   :   1940   MRN:   [id number]           Consult Date: 2019 3:20 PM      History of Present Illness:  Patient is a 78 y.o. who is seen in consultation at the request of Dr. Jere Haddad for abdominal pain. She c/o for days of epigastric and RUQ pain with nausea and vomiting, came to ER yesterday, CT scan without contrast= gallstones, came back today for worsening pain, US= gallstone with dilated CBD, LFT normal, elevated WBC. She c/o nausea and pain, no fever, admitted by Dr. Nicola Hardin, seen in the past by Dr. Dee Gallagher, h/o colectomy in the past for diverticular bleed. PMH:  Past Medical History:   Diagnosis Date    Abscess 3/22/2014    Lumbar area - MRSA    Arthritis     Common bile duct calculus 2019    Gout     Hypercholesterolemia     Hypertension     LBP (low back pain)     Other ill-defined conditions(799.89)     LEFT SHOULDER PAINFUL, NEEDS REPLACEMENT    Paroxysmal SVT (supraventricular tachycardia) ([address] 2017    Thromboembolus ([address] 13    left leg       PSH:  Past Surgical History:   Procedure Laterality Date    ABDOMEN SURGERY PROC UNLISTED      hernia repair    BREAST SURGERY PROCEDURE UNLISTED      EXCISION OF BREAST CYST    ENDOSCOPY, COLON, DIAGNOSTIC      ostomy reversal    HX BREAST BIOPSY Right 1970    Benign    HX COLOSTOMY      HX GI      COLOSTOMY REVERSAL    HX HEMORRHOIDECTOMY      HX ORTHOPAEDIC      right knee  bilateral knee replacement    HX ORTHOPAEDIC      ORIF RIGHT ANKLE    HX ORTHOPAEDIC      KNEE ARTHROSCOPY       Allergies: Allergies   Allergen Reactions    Shellfish Derived Angioedema       Home Medications:  Prior to Admission Medications   Prescriptions Last Dose Informant Patient Reported? Taking?    BD LUER-JOSE SYRINGE 3 mL 21 gauge x 1\" syrg   No No   Sig: inject EVERY 2 MONTHS   L. acidoph & paracasei- S therm- Bifido (RISAQUAD) 8 billion cell cap cap   Yes No   Sig: Take 1 Cap by mouth daily. albuterol (PROVENTIL HFA, VENTOLIN HFA, PROAIR HFA) 90 mcg/actuation inhaler   No No   Sig: Take 2 Puffs by inhalation every four (4) hours as needed for Wheezing. allopurinol (ZYLOPRIM) 100 mg tablet   No No   Sig: TAKE 1 TABLET BY MOUTH EVERY DAY   calcium-vitamin D (OYSTER SHELL) 500 mg(1,250mg) -200 unit per tablet   Yes No   Sig: Take 1 Tab by mouth daily. cefUROXime (CEFTIN) 250 mg tablet   No No   Sig: Take 1 Tab by mouth two (2) times a day. cholecalciferol (VITAMIN D3) 1,000 unit tablet   Yes No   Sig: Take 1,000 Units by mouth daily. cyanocobalamin (VITAMIN B12) 1,000 mcg/mL injection   No No   Sig: inject 1 milliliter subcutaneously EVERY 2 MONTHS   dexamethasone (DECADRON) 0.5 mg/5 mL elixir   Yes No   Sig: Take  by mouth four (4) times daily. Swish and spit 4 times a day for ulcer   dextromethorphan-guaiFENesin  mg/5 mL liqd   Yes No   Sig: Take 5 mL by mouth six (6) times daily. dicyclomine (BENTYL) 20 mg tablet   No No   Sig: Take 1 Tab by mouth every six (6) hours for 12 doses. diphenhydrAMINE (BENADRYL ALLERGY) 25 mg tablet   Yes No   Sig: Take 25 mg by mouth nightly as needed for Sleep.   lovastatin (MEVACOR) 20 mg tablet   No No   Sig: TAKE 1 TABLET BY MOUTH AT BEDTIME   multivit-min-iron-FA-lutein (CENTRUM SILVER WOMEN) 8 mg iron-400 mcg-300 mcg tab   Yes No   Sig: Take 1 Tab by mouth daily. omega-3 fatty acids-vitamin e (FISH OIL) 1,000 mg cap   Yes No   Sig: Take 1 Cap by mouth daily. ondansetron (ZOFRAN ODT) 4 mg disintegrating tablet   No No   Sig: Take 1 Tab by mouth every eight (8) hours as needed for Nausea. trimethoprim-sulfamethoxazole (BACTRIM DS, SEPTRA DS) 160-800 mg per tablet   No No   Sig: Take 1 Tab by mouth two (2) times a day. valACYclovir (VALTREX) 500 mg tablet   No No   Sig: Take 1 Tab by mouth three (3) times daily.    verapamil ER (VERELAN) 240 mg ER capsule   No No   Sig: TAKE 1 CAPSULE EVERY DAY      Facility-Administered Medications: None       Hospital Medications:  No current facility-administered medications for this encounter. Current Outpatient Medications   Medication Sig    ondansetron (ZOFRAN ODT) 4 mg disintegrating tablet Take 1 Tab by mouth every eight (8) hours as needed for Nausea.  dicyclomine (BENTYL) 20 mg tablet Take 1 Tab by mouth every six (6) hours for 12 doses.  lovastatin (MEVACOR) 20 mg tablet TAKE 1 TABLET BY MOUTH AT BEDTIME    cyanocobalamin (VITAMIN B12) 1,000 mcg/mL injection inject 1 milliliter subcutaneously EVERY 2 MONTHS    allopurinol (ZYLOPRIM) 100 mg tablet TAKE 1 TABLET BY MOUTH EVERY DAY    BD LUER-JOSE SYRINGE 3 mL 21 gauge x 1\" syrg inject EVERY 2 MONTHS    valACYclovir (VALTREX) 500 mg tablet Take 1 Tab by mouth three (3) times daily.  verapamil ER (VERELAN) 240 mg ER capsule TAKE 1 CAPSULE EVERY DAY    trimethoprim-sulfamethoxazole (BACTRIM DS, SEPTRA DS) 160-800 mg per tablet Take 1 Tab by mouth two (2) times a day.  albuterol (PROVENTIL HFA, VENTOLIN HFA, PROAIR HFA) 90 mcg/actuation inhaler Take 2 Puffs by inhalation every four (4) hours as needed for Wheezing.  dexamethasone (DECADRON) 0.5 mg/5 mL elixir Take  by mouth four (4) times daily. Swish and spit 4 times a day for ulcer    L. acidoph & paracasei- S therm- Bifido (RISAQUAD) 8 billion cell cap cap Take 1 Cap by mouth daily.  dextromethorphan-guaiFENesin  mg/5 mL liqd Take 5 mL by mouth six (6) times daily.  cefUROXime (CEFTIN) 250 mg tablet Take 1 Tab by mouth two (2) times a day.  calcium-vitamin D (OYSTER SHELL) 500 mg(1,250mg) -200 unit per tablet Take 1 Tab by mouth daily.  multivit-min-iron-FA-lutein (CENTRUM SILVER WOMEN) 8 mg iron-400 mcg-300 mcg tab Take 1 Tab by mouth daily.  cholecalciferol (VITAMIN D3) 1,000 unit tablet Take 1,000 Units by mouth daily.     omega-3 fatty acids-vitamin e (FISH OIL) 1,000 mg cap Take 1 Cap by mouth daily.  diphenhydrAMINE (BENADRYL ALLERGY) 25 mg tablet Take 25 mg by mouth nightly as needed for Sleep. Social History:  Social History     Tobacco Use    Smoking status: Former Smoker     Packs/day: 1.00     Years: 20.00     Pack years: 20.00     Last attempt to quit: 1972     Years since quittin.6    Smokeless tobacco: Never Used   Substance Use Topics    Alcohol use: Yes     Alcohol/week: 1.7 standard drinks     Types: 2 Shots of liquor per week     Comment: 2 DAY       Family History:  Family History   Problem Relation Age of Onset    Hypertension Father     Anesth Problems Neg Hx        Review of Systems:  Constitutional: negative fever, negative chills, negative weight loss  Eyes:   negative visual changes  ENT:   negative sore throat, tongue or lip swelling  Respiratory:  negative cough, negative dyspnea  Cards:  negative for chest pain, palpitations, lower extremity edema  GI:   See HPI  :  negative for frequency, dysuria  Integument:  negative for rash and pruritus  Heme:  negative for easy bruising and gum/nose bleeding  Musculoskel: negative for myalgias,  back pain and muscle weakness  Neuro: negative for headaches, dizziness, vertigo  Psych:  negative for feelings of anxiety, depression     Objective:     Patient Vitals for the past 8 hrs:   BP Temp Pulse Resp SpO2 Height Weight   19 1400 157/70  83 18 94 %     19 1343 128/79  93 17 94 %     19 1300 128/79  90 11 94 %     19 1200 116/68 98.2 °F (36.8 °C) 95 14 94 %     19 1142 94/62         19 1112  98.6 °F (37 °C)  16  5' 6\" (1.676 m) 81.6 kg (180 lb)     No intake/output data recorded. No intake/output data recorded.     EXAM:     NEURO-a&o   HEENT-wnl   LUNGS-clear    COR-regular rate and rhythym     ABD-soft , epigastric  tenderness, no rebound, good bs     EXT-no edema     Data Review     Recent Labs 12/14/19  1128 12/13/19  0812   WBC 6.4 12.2*   HGB 13.5 13.9   HCT 43.4 43.6    157     Recent Labs     12/14/19  1128 12/13/19  0812    140   K 4.3 4.1    105   CO2 32 32   BUN 37* 27*   CREA 2.31* 1.52*   * 141*   CA 10.9* 10.5*     Recent Labs     12/14/19  1128 12/13/19  0812   SGOT 14* 13*   AP 54 64   TP 7.6 7.3   ALB 3.8 3.9   GLOB 3.8 3.4   LPSE 73 161     No results for input(s): INR, PTP, APTT, INREXT in the last 72 hours.        Assessment:   · Epigastric/RUQ pain, CT and US showed gallstones with CBD dilatation  · Nausea and vomiting     Patient Active Problem List   Diagnosis Code    Hypertension I10    Hypercholesterolemia E78.00    Wound dehiscence T81.30XA    Cerebral thrombosis with cerebral infarction (HCC) I63.30    Abscess L02.91    Anemia D64.9    GI bleed K92.2    Tachycardia R00.0    Acute post-hemorrhagic anemia D62    Postoperative hypovolemic shock T81.19XA    SBO (small bowel obstruction) (HCC) K56.609    Obstruction of bowel (HCC) K56.609    Small bowel obstruction (HCC) K56.609    Paroxysmal SVT (supraventricular tachycardia) (HCC) I47.1    Acute renal failure superimposed on stage 4 chronic kidney disease (HCC) N17.9, N18.4    Dehydration E86.0    Bronchitis J40    Gallstone K80.20    Common bile duct calculus K80.50    Abdominal pain R10.9     Plan:   · Agree with admission  · IV zosyn  · Monitor LFT and CBC  · MRCP to look for CBD stones and if positive then needs ERCP  · Surgical consult in am for gallstones  · Discussed status with pt's , Dr. Kesha Garnett and Dr. Drake Britt  · Will follow     Signed By: Georgina Byrne MD     12/14/2019  3:20 PM

## 2019-12-14 NOTE — PROGRESS NOTES
Renal Dosing/Monitoring  Medication: ZOSYN   Current regimen:  2.25 every 12 hr (OVER 30 MINTES)  Recent Labs     12/14/19  1128 12/13/19  0812   CREA 2.31* 1.52*   BUN 37* 27*     Estimated CrCl:  15-25 ml/min  Plan: Change to ZOSYN 3.375 GRAMS IV Q 8 HOURS (OVER 240 MINUTES) PER P&T RENAL DOSE ADJUSTMENT PROTOCOL      Thank you,  Delmy Cortes, PHARMD

## 2019-12-15 LAB
ALBUMIN SERPL-MCNC: 2.8 G/DL (ref 3.5–5)
ALBUMIN/GLOB SERPL: 0.8 {RATIO} (ref 1.1–2.2)
ALP SERPL-CCNC: 42 U/L (ref 45–117)
ALT SERPL-CCNC: 20 U/L (ref 12–78)
ANION GAP SERPL CALC-SCNC: 6 MMOL/L (ref 5–15)
APPEARANCE UR: ABNORMAL
AST SERPL-CCNC: 28 U/L (ref 15–37)
BACTERIA URNS QL MICRO: NEGATIVE /HPF
BASOPHILS # BLD: 0 K/UL (ref 0–0.1)
BASOPHILS NFR BLD: 0 % (ref 0–1)
BILIRUB SERPL-MCNC: 0.6 MG/DL (ref 0.2–1)
BILIRUB UR QL CFM: NEGATIVE
BUN SERPL-MCNC: 40 MG/DL (ref 6–20)
BUN/CREAT SERPL: 18 (ref 12–20)
CALCIUM SERPL-MCNC: 9.1 MG/DL (ref 8.5–10.1)
CHLORIDE SERPL-SCNC: 109 MMOL/L (ref 97–108)
CO2 SERPL-SCNC: 25 MMOL/L (ref 21–32)
COLOR UR: ABNORMAL
COMMENT, HOLDF: NORMAL
CREAT SERPL-MCNC: 2.18 MG/DL (ref 0.55–1.02)
DIFFERENTIAL METHOD BLD: ABNORMAL
EOSINOPHIL # BLD: 0.1 K/UL (ref 0–0.4)
EOSINOPHIL NFR BLD: 2 % (ref 0–7)
EPITH CASTS URNS QL MICRO: ABNORMAL /LPF
ERYTHROCYTE [DISTWIDTH] IN BLOOD BY AUTOMATED COUNT: 13.3 % (ref 11.5–14.5)
GLOBULIN SER CALC-MCNC: 3.3 G/DL (ref 2–4)
GLUCOSE SERPL-MCNC: 75 MG/DL (ref 65–100)
GLUCOSE UR STRIP.AUTO-MCNC: NEGATIVE MG/DL
HCT VFR BLD AUTO: 36.8 % (ref 35–47)
HGB BLD-MCNC: 11.5 G/DL (ref 11.5–16)
HGB UR QL STRIP: NEGATIVE
IMM GRANULOCYTES # BLD AUTO: 0 K/UL
IMM GRANULOCYTES NFR BLD AUTO: 0 %
KETONES UR QL STRIP.AUTO: NEGATIVE MG/DL
LEUKOCYTE ESTERASE UR QL STRIP.AUTO: ABNORMAL
LYMPHOCYTES # BLD: 1.4 K/UL (ref 0.8–3.5)
LYMPHOCYTES NFR BLD: 24 % (ref 12–49)
MCH RBC QN AUTO: 34 PG (ref 26–34)
MCHC RBC AUTO-ENTMCNC: 31.3 G/DL (ref 30–36.5)
MCV RBC AUTO: 108.9 FL (ref 80–99)
MONOCYTES # BLD: 0.8 K/UL (ref 0–1)
MONOCYTES NFR BLD: 14 % (ref 5–13)
MUCOUS THREADS URNS QL MICRO: ABNORMAL /LPF
NEUTS BAND NFR BLD MANUAL: 14 % (ref 0–6)
NEUTS SEG # BLD: 3.5 K/UL (ref 1.8–8)
NEUTS SEG NFR BLD: 46 % (ref 32–75)
NITRITE UR QL STRIP.AUTO: NEGATIVE
NRBC # BLD: 0.02 K/UL (ref 0–0.01)
NRBC BLD-RTO: 0.3 PER 100 WBC
PH UR STRIP: 5.5 [PH] (ref 5–8)
PLATELET # BLD AUTO: 113 K/UL (ref 150–400)
PMV BLD AUTO: 12.7 FL (ref 8.9–12.9)
POTASSIUM SERPL-SCNC: 4.7 MMOL/L (ref 3.5–5.1)
PROT SERPL-MCNC: 6.1 G/DL (ref 6.4–8.2)
PROT UR STRIP-MCNC: 100 MG/DL
RBC # BLD AUTO: 3.38 M/UL (ref 3.8–5.2)
RBC #/AREA URNS HPF: ABNORMAL /HPF (ref 0–5)
RBC MORPH BLD: ABNORMAL
SAMPLES BEING HELD,HOLD: NORMAL
SODIUM SERPL-SCNC: 140 MMOL/L (ref 136–145)
SP GR UR REFRACTOMETRY: 1.01 (ref 1–1.03)
UROBILINOGEN UR QL STRIP.AUTO: 0.2 EU/DL (ref 0.2–1)
WBC # BLD AUTO: 5.8 K/UL (ref 3.6–11)
WBC URNS QL MICRO: ABNORMAL /HPF (ref 0–4)

## 2019-12-15 PROCEDURE — 80053 COMPREHEN METABOLIC PANEL: CPT

## 2019-12-15 PROCEDURE — 74011000258 HC RX REV CODE- 258: Performed by: INTERNAL MEDICINE

## 2019-12-15 PROCEDURE — 36415 COLL VENOUS BLD VENIPUNCTURE: CPT

## 2019-12-15 PROCEDURE — 65660000000 HC RM CCU STEPDOWN

## 2019-12-15 PROCEDURE — 85025 COMPLETE CBC W/AUTO DIFF WBC: CPT

## 2019-12-15 PROCEDURE — 81001 URINALYSIS AUTO W/SCOPE: CPT

## 2019-12-15 PROCEDURE — 74011250637 HC RX REV CODE- 250/637: Performed by: INTERNAL MEDICINE

## 2019-12-15 PROCEDURE — 74011250636 HC RX REV CODE- 250/636: Performed by: INTERNAL MEDICINE

## 2019-12-15 RX ADMIN — SODIUM CHLORIDE 125 ML/HR: 900 INJECTION, SOLUTION INTRAVENOUS at 10:16

## 2019-12-15 RX ADMIN — PSYLLIUM HUSK 1 PACKET: 3.4 POWDER ORAL at 13:26

## 2019-12-15 RX ADMIN — SODIUM CHLORIDE 125 ML/HR: 900 INJECTION, SOLUTION INTRAVENOUS at 04:27

## 2019-12-15 RX ADMIN — PIPERACILLIN AND TAZOBACTAM 3.38 G: 3; .375 INJECTION, POWDER, FOR SOLUTION INTRAVENOUS at 10:06

## 2019-12-15 RX ADMIN — Medication 10 ML: at 04:26

## 2019-12-15 RX ADMIN — Medication 10 ML: at 21:51

## 2019-12-15 RX ADMIN — LOVASTATIN 20 MG: 20 TABLET ORAL at 21:51

## 2019-12-15 RX ADMIN — PSYLLIUM HUSK 1 PACKET: 3.4 POWDER ORAL at 18:05

## 2019-12-15 RX ADMIN — Medication 1 CAPSULE: at 10:06

## 2019-12-15 RX ADMIN — PIPERACILLIN AND TAZOBACTAM 3.38 G: 3; .375 INJECTION, POWDER, FOR SOLUTION INTRAVENOUS at 04:26

## 2019-12-15 RX ADMIN — ALLOPURINOL 100 MG: 100 TABLET ORAL at 10:06

## 2019-12-15 RX ADMIN — PIPERACILLIN AND TAZOBACTAM 3.38 G: 3; .375 INJECTION, POWDER, FOR SOLUTION INTRAVENOUS at 18:01

## 2019-12-15 RX ADMIN — DILTIAZEM HYDROCHLORIDE 240 MG: 240 CAPSULE, COATED, EXTENDED RELEASE ORAL at 10:09

## 2019-12-15 NOTE — H&P
[address]   HISTORY AND PHYSICAL    Name:  Zee Malik  MR#:  [id number]  :  1940  ACCOUNT #:  [de-identified]  ADMIT DATE:  2019      CHIEF COMPLAINT:  An 78-year-old white female admitted with abdominal pain and bilious emesis. HISTORY OF PRESENT ILLNESS:  Two days ago she began with upper abdominal discomfort and repeated episodes of dark emesis. She came to the ER yesterday where CT scan revealed gallstones. She was given IV fluids and made improvement, was sent home on dicyclomine p.r.n. and Zofran ODT p.r.n. Subsequently, however, she has had repeated episodes of bilious dark emesis and abdominal pain, and came back to the ER. She is admitted now for further evaluation and treatment. PAST MEDICAL HISTORY:   left leg DVT, PSVT, left shoulder arthritis, low back pain, hypertension, hypercholesterolemia, gout, lumbar area abscess. , CKD stage 4    PAST SURGICAL HISTORY:  Colostomy formation and ultimately colostomy reversal by Dr. Saba May, partial colectomy, hemorrhoidectomy, bilateral knee replacement, right ankle ORIF, knee arthroscopy, herniorrhaphy, excision of breast cysts. ADVERSE DRUG REACTIONS:  SHELLFISH (ANGIOEDEMA). MEDS AT HOME:  B12 1000 mcg every other month, Mevacor 20 mg at bedtime, Zofran ODT 4 mg dissolve on tongue q.8 h. p.r.n. nausea, allopurinol 100 mg daily, Diltiazem  mg daily, albuterol inhaler 2 puffs q.4 h. p.r.n. wheezing, multivitamin one daily, fish oil 1000 mg daily, Benadryl 25 mg at bedtime p.r.n. insomnia, vitamin D3 1000 units daily, calcium 500 and  Vitamin D 200 units 1 tablet daily. FAMILY HISTORY:  Mother , father , father had hypertension. SOCIAL HISTORY:  . Quit smoking 40+ years ago, 2 vodka drinks  nightly, requests full code status. REVIEW OF SYSTEMS:  Denies headache, feels weak diffusely with dizziness.   Denies syncope, chest pain, shortness of breath, last bowel movement unclear, may have been last night and was dark according to her . The patient has chronically loose bowel status post colon surgery. She denies  disease. Last urinated approximately 5 hours ago. Denies acute focal or neurologic complaints. PHYSICAL EXAMINATION:  VITAL SIGNS:  Temperature 98.6, pulse 95, normal sinus rhythm, BP 94/62, RR 16, room air sat 94%. GENERAL:  Elderly, white female, pale-appearing, mildly ill. HEENT:  Sclerae anicteric. Throat clear. No neck nodes palpable. LUNGS:  Clear. HEART:  Regular without murmur, gallop or rub. ABDOMEN:  BS positive, soft epigastrium, left upper quadrant, slight tenderness. RECTAL:  Brown stool. No rectal mass felt, not impacted, stool ordered for Hemoccult testing by the lab. EXTREMITIES:  Trace pedal edema. 2+ DP pulses. She is awake, alert and oriented to Doren Busman and to person. NEUROLOGIC:  Otherwise, neuro exam is nonfocal.    IMAGING:  Ultrasound abdomen, common bile dilatation, mass or noncalcified stone suspected in the distal duct. Right kidney, questionable lower pole calculi. Additional testing results, WBC 6,400, 47 neutrophils, elevated band forms 13%, 24% lymphocytes, 13% monocytes, 1% myelocytes. Hemoglobin 13.5,  Lipase normal 73, glucose 121, BUN 37, creatinine 2.31 and calcium 10.9, AST low 14. Remainder of LFT is normal, calcium elevated 10.9, albumin 3.8. EKG yesterday, NSR, PACs. PACs were new compared to 01/26/2018 EKG. Urinalysis is pending. The patient produced some dark green emesis here in the ER. IMPRESSION:  1. Suspected biliary infection/Cholangitis  with her history of upper abdominal pain, bilious emesis, gallstones, common bile duct stones, and bandemia and CBD stone vs mass. Plan : full inpt admission,IV fluids, IV Zosyn, GI consult. 2.  Acute renal failure superimposed on chronic kidney disease. 3.  She has dehydration due to suspected biliary infection. 4.  Hypertension controlled.   5. Lipid disorder under treatment. 6.  Paroxysmal supraventricular tachycardia in the past.    PLAN:  Full inpatient admission, remote telemetry, IV FLUIDS. Urinalysis, follow up labs, GI consult has been discussed with Dr. Yaya Etienne who will see the patient, IV Zosyn. The patient is admitted for Dr. Eliane Erickson for whom I am on-call.  I spoke w/ her , here      Lexie Bush MD      [id number]  D:  12/14/2019 14:59  T:  12/14/2019 21:07  JOB #:  [id number]

## 2019-12-15 NOTE — PROGRESS NOTES
Medical Progress Note      NAME: Coco Suresh   :  1940  MRM:  [id number]    Date/Time: 12/15/2019           Problem List:     Active Problems:    Hypertension ()      SBO (small bowel obstruction) ([facility] (2016)      Acute renal failure superimposed on stage 4 chronic kidney disease ([facility] (2018)      Gallstone (2019)      Common bile duct calculus (2019)      Abdominal pain (2019)             Subjective:     Denies abd pain or nausea. MRCP no CBD stone or mass. Had bm yesterday. Past Medical History:   Diagnosis Date    Abscess 3/22/2014    Lumbar area - MRSA    Arthritis     Common bile duct calculus 2019    Gout     Hypercholesterolemia     Hypertension     LBP (low back pain)     Other ill-defined conditions(799.89)     LEFT SHOULDER PAINFUL, NEEDS REPLACEMENT    Paroxysmal SVT (supraventricular tachycardia) ([facility] 2017    Thromboembolus ([facility] 13    left leg            Objective:         Vitals:      Last 24hrs VS reviewed since prior progress note. Most recent are:    Visit Vitals  /66   Pulse 86   Temp 98.6 °F (37 °C)   Resp 18   Ht 5' 6\" (1.676 m)   Wt 180 lb (81.6 kg)   SpO2 90%   BMI 29.05 kg/m²     SpO2 Readings from Last 6 Encounters:   12/15/19 90%   19 95%   18 92%   17 94%   16 100%   09/10/15 93%    O2 Flow Rate (L/min): 2 l/min   No intake or output data in the 24 hours ending 12/15/19 1020               Exam:      General:  Alert, cooperative, no distress, appears stated age. Lungs:   Clear to auscultation bilaterally. Heart:  Regular rate and rhythm, S1, S2 normal, no murmur, click, rub or gallop. Abdomen:   Soft, non-tender. Bowel sounds normal. No masses,  No organomegaly. Extremities: No pedal  edema.      Lab Data Reviewed: (see below)  Recent Results (from the past 24 hour(s))   SAMPLES BEING HELD    Collection Time: 19 11:28 AM   Result Value Ref Range    SAMPLES BEING HELD 1BLUE 1RED     COMMENT        Add-on orders for these samples will be processed based on acceptable specimen integrity and analyte stability, which may vary by analyte. CBC WITH AUTOMATED DIFF    Collection Time: 12/14/19 11:28 AM   Result Value Ref Range    WBC 6.4 3.6 - 11.0 K/uL    RBC 4.06 3.80 - 5.20 M/uL    HGB 13.5 11.5 - 16.0 g/dL    HCT 43.4 35.0 - 47.0 %    .9 (H) 80.0 - 99.0 FL    MCH 33.3 26.0 - 34.0 PG    MCHC 31.1 30.0 - 36.5 g/dL    RDW 13.3 11.5 - 14.5 %    PLATELET 272 429 - 439 K/uL    MPV 11.8 8.9 - 12.9 FL    NRBC 0.0 0  WBC    ABSOLUTE NRBC 0.00 0.00 - 0.01 K/uL    NEUTROPHILS 47 32 - 75 %    BAND NEUTROPHILS 13 (H) 0 - 6 %    LYMPHOCYTES 24 12 - 49 %    MONOCYTES 13 5 - 13 %    EOSINOPHILS 2 0 - 7 %    BASOPHILS 0 0 - 1 %    MYELOCYTES 1 (H) 0 %    IMMATURE GRANULOCYTES 0 %    ABS. NEUTROPHILS 3.8 1.8 - 8.0 K/UL    ABS. LYMPHOCYTES 1.5 0.8 - 3.5 K/UL    ABS. MONOCYTES 0.8 0.0 - 1.0 K/UL    ABS. EOSINOPHILS 0.1 0.0 - 0.4 K/UL    ABS. BASOPHILS 0.0 0.0 - 0.1 K/UL    ABS. IMM. GRANS. 0.0 K/UL    DF MANUAL      PLATELET COMMENTS Large Platelets      RBC COMMENTS MACROCYTOSIS  1+        WBC COMMENTS REACTIVE LYMPHS     METABOLIC PANEL, COMPREHENSIVE    Collection Time: 12/14/19 11:28 AM   Result Value Ref Range    Sodium 138 136 - 145 mmol/L    Potassium 4.3 3.5 - 5.1 mmol/L    Chloride 100 97 - 108 mmol/L    CO2 32 21 - 32 mmol/L    Anion gap 6 5 - 15 mmol/L    Glucose 121 (H) 65 - 100 mg/dL    BUN 37 (H) 6 - 20 MG/DL    Creatinine 2.31 (H) 0.55 - 1.02 MG/DL    BUN/Creatinine ratio 16 12 - 20      GFR est AA 25 (L) >60 ml/min/1.73m2    GFR est non-AA 20 (L) >60 ml/min/1.73m2    Calcium 10.9 (H) 8.5 - 10.1 MG/DL    Bilirubin, total 1.0 0.2 - 1.0 MG/DL    ALT (SGPT) 19 12 - 78 U/L    AST (SGOT) 14 (L) 15 - 37 U/L    Alk.  phosphatase 54 45 - 117 U/L    Protein, total 7.6 6.4 - 8.2 g/dL    Albumin 3.8 3.5 - 5.0 g/dL    Globulin 3.8 2.0 - 4.0 g/dL    A-G Ratio 1.0 (L) 1.1 - 2.2     LIPASE Collection Time: 12/14/19 11:28 AM   Result Value Ref Range    Lipase 73 73 - 393 U/L   URINALYSIS W/MICROSCOPIC    Collection Time: 12/15/19  4:45 AM   Result Value Ref Range    Color YELLOW/STRAW      Appearance CLOUDY (A) CLEAR      Specific gravity 1.012 1.003 - 1.030      pH (UA) 5.5 5.0 - 8.0      Protein 100 (A) NEG mg/dL    Glucose NEGATIVE  NEG mg/dL    Ketone NEGATIVE  NEG mg/dL    Blood NEGATIVE  NEG      Urobilinogen 0.2 0.2 - 1.0 EU/dL    Nitrites NEGATIVE  NEG      Leukocyte Esterase TRACE (A) NEG      WBC 0-4 0 - 4 /hpf    RBC 0-5 0 - 5 /hpf    Epithelial cells FEW FEW /lpf    Bacteria NEGATIVE  NEG /hpf    Mucus TRACE (A) NEG /lpf   METABOLIC PANEL, COMPREHENSIVE    Collection Time: 12/15/19  4:45 AM   Result Value Ref Range    Sodium 140 136 - 145 mmol/L    Potassium 4.7 3.5 - 5.1 mmol/L    Chloride 109 (H) 97 - 108 mmol/L    CO2 25 21 - 32 mmol/L    Anion gap 6 5 - 15 mmol/L    Glucose 75 65 - 100 mg/dL    BUN 40 (H) 6 - 20 MG/DL    Creatinine 2.18 (H) 0.55 - 1.02 MG/DL    BUN/Creatinine ratio 18 12 - 20      GFR est AA 26 (L) >60 ml/min/1.73m2    GFR est non-AA 22 (L) >60 ml/min/1.73m2    Calcium 9.1 8.5 - 10.1 MG/DL    Bilirubin, total 0.6 0.2 - 1.0 MG/DL    ALT (SGPT) 20 12 - 78 U/L    AST (SGOT) 28 15 - 37 U/L    Alk.  phosphatase 42 (L) 45 - 117 U/L    Protein, total 6.1 (L) 6.4 - 8.2 g/dL    Albumin 2.8 (L) 3.5 - 5.0 g/dL    Globulin 3.3 2.0 - 4.0 g/dL    A-G Ratio 0.8 (L) 1.1 - 2.2     CBC WITH AUTOMATED DIFF    Collection Time: 12/15/19  4:45 AM   Result Value Ref Range    WBC 5.8 3.6 - 11.0 K/uL    RBC 3.38 (L) 3.80 - 5.20 M/uL    HGB 11.5 11.5 - 16.0 g/dL    HCT 36.8 35.0 - 47.0 %    .9 (H) 80.0 - 99.0 FL    MCH 34.0 26.0 - 34.0 PG    MCHC 31.3 30.0 - 36.5 g/dL    RDW 13.3 11.5 - 14.5 %    PLATELET 968 (L) 978 - 400 K/uL    MPV 12.7 8.9 - 12.9 FL    NRBC 0.3 (H) 0  WBC    ABSOLUTE NRBC 0.02 (H) 0.00 - 0.01 K/uL    NEUTROPHILS 46 32 - 75 %    BAND NEUTROPHILS 14 (H) 0 - 6 % LYMPHOCYTES 24 12 - 49 %    MONOCYTES 14 (H) 5 - 13 %    EOSINOPHILS 2 0 - 7 %    BASOPHILS 0 0 - 1 %    IMMATURE GRANULOCYTES 0 %    ABS. NEUTROPHILS 3.5 1.8 - 8.0 K/UL    ABS. LYMPHOCYTES 1.4 0.8 - 3.5 K/UL    ABS. MONOCYTES 0.8 0.0 - 1.0 K/UL    ABS. EOSINOPHILS 0.1 0.0 - 0.4 K/UL    ABS. BASOPHILS 0.0 0.0 - 0.1 K/UL    ABS. IMM. GRANS. 0.0 K/UL    DF MANUAL      RBC COMMENTS MACROCYTOSIS  1+       BILIRUBIN, CONFIRM    Collection Time: 12/15/19  4:45 AM   Result Value Ref Range    Bilirubin UA, confirm NEGATIVE  NEG     SAMPLES BEING HELD    Collection Time: 12/15/19  4:45 AM   Result Value Ref Range    SAMPLES BEING HELD UC HOLD     COMMENT        Add-on orders for these samples will be processed based on acceptable specimen integrity and analyte stability, which may vary by analyte.        Medications Reviewed: (see below)    ______________________________________________________________________    Medications:     Current Facility-Administered Medications   Medication Dose Route Frequency    psyllium husk-aspartame (METAMUCIL FIBER) packet 1 Packet  1 Packet Oral BID    allopurinol (ZYLOPRIM) tablet 100 mg  100 mg Oral DAILY    lovastatin (MEVACOR) tablet 20 mg  20 mg Oral QHS    sodium chloride (NS) flush 5-40 mL  5-40 mL IntraVENous Q8H    sodium chloride (NS) flush 5-40 mL  5-40 mL IntraVENous PRN    0.9% sodium chloride infusion  125 mL/hr IntraVENous CONTINUOUS    acetaminophen (TYLENOL) tablet 650 mg  650 mg Oral Q4H PRN    morphine injection 2 mg  2 mg IntraVENous Q4H PRN    ondansetron (ZOFRAN) injection 4 mg  4 mg IntraVENous Q4H PRN    piperacillin-tazobactam (ZOSYN) 3.375 g in 0.9% sodium chloride (MBP/ADV) 100 mL  3.375 g IntraVENous Q8H    albuterol (PROVENTIL VENTOLIN) nebulizer solution 2.5 mg  2.5 mg Nebulization Q4H PRN    dilTIAZem CD (CARDIZEM CD) capsule 240 mg  240 mg Oral DAILY    lactobac ac& pc-s.therm-b.anim (JOSETTE Q/RISAQUAD)  1 Cap Oral DAILY Assessment:     Suspect Acute Cholecystitis POA with dehydration, now clinically improved  P: Cl liq diet; Gen sgy c/s . Dr. Pavithra Francisco and I spoke earlier this am.     JOSE on CKD ,likely 2/2 to dehydration; improving. HTN controlled off medication in the setting of Dehydration. Hold Diltiazem for hypotension.        Patient Active Problem List   Diagnosis Code    Hypertension I10    Hypercholesterolemia E78.00    Wound dehiscence T81.30XA    Cerebral thrombosis with cerebral infarction (HCC) I63.30    Abscess L02.91    Anemia D64.9    GI bleed K92.2    Tachycardia R00.0    Acute post-hemorrhagic anemia D62    Postoperative hypovolemic shock T81.19XA    SBO (small bowel obstruction) ([facility]) K56.609    Obstruction of bowel (HCC) K56.609    Small bowel obstruction (HCC) K56.609    Paroxysmal SVT (supraventricular tachycardia) ([facility]) I47.1    Acute renal failure superimposed on stage 4 chronic kidney disease (HCC) N17.9, N18.4    Dehydration E86.0    Bronchitis J40    Gallstone K80.20    Common bile duct calculus K80.50    Abdominal pain R10.9          Plan:     F/u labs in am.                                                        ___________________________________________________      Attending Physician: Gricelda Puga MD

## 2019-12-15 NOTE — PROGRESS NOTES
[address]  [address] [facility], [address] [place] [address]    GI PROGRESS NOTE    NAME: Trell Graves   :  1940   MRN:  [id number]       Subjective:     Feels better  Review of Systems    Constitutional: negative fever, negative chills, negative weight loss  Eyes:   negative visual changes  ENT:   negative sore throat, tongue or lip swelling  Respiratory:  negative cough, negative dyspnea  Cards:  negative for chest pain, palpitations, lower extremity edema  GI:   See HPI  :  negative for frequency, dysuria  Integument:  negative for rash and pruritus  Heme:  negative for easy bruising and gum/nose bleeding  Musculoskel: negative for myalgias,  back pain and muscle weakness  Neuro: negative for headaches, dizziness, vertigo  Psych:  negative for feelings of anxiety, depression         Objective:     VITALS:   Last 24hrs VS reviewed since prior progress note. Most recent are:  Visit Vitals  /66   Pulse 86   Temp 98.6 °F (37 °C)   Resp 18   Ht 5' 6\" (1.676 m)   Wt 81.6 kg (180 lb)   SpO2 90%   BMI 29.05 kg/m²     No intake or output data in the 24 hours ending 12/15/19 1339  PHYSICAL EXAM:  General: WD, WN. Alert, cooperative, no acute distress    HEENT: NC, Atraumatic. PERRLA, EOMI. Anicteric sclerae. Lungs:  CTA Bilaterally. No Wheezing/Rhonchi/Rales. Heart:  Regular  rhythm,  No murmur (), No Rubs, No Gallops  Abdomen: Soft, Non distended, Non tender.  +Bowel sounds, no HSM  Extremities: No c/c/e  Neurologic:  CN 2-12 gi, Alert and oriented X 3. No acute neurological distress   Psych:   Good insight. Not anxious nor agitated.     Lab Data Reviewed:   Recent Labs     12/15/19  0445 19  1128   WBC 5.8 6.4   HGB 11.5 13.5   HCT 36.8 43.4   * 164     Recent Labs     12/15/19  0445 19  1128    138   K 4.7 4.3   * 100   CO2 25 32   BUN 40* 37*   CREA 2.18* 2.31*   GLU 75 121*   CA 9.1 10.9*     Recent Labs     12/15/19  0445 19  1128 19  5723 SGOT 28 14* 13*   AP 42* 54 64   TP 6.1* 7.6 7.3   ALB 2.8* 3.8 3.9   GLOB 3.3 3.8 3.4   LPSE  --  73 161       ________________________________________________________________________       Assessment:   · Epigastric /RUQ pain with elevated WBC, suspect from gallstones/cholecystitis, better on IV abx, MRCP was negative for CBD stones and normal LFT     Patient Active Problem List   Diagnosis Code    Hypertension I10    Hypercholesterolemia E78.00    Wound dehiscence T81.30XA    Cerebral thrombosis with cerebral infarction (HCC) I63.30    Abscess L02.91    Anemia D64.9    GI bleed K92.2    Tachycardia R00.0    Acute post-hemorrhagic anemia D62    Postoperative hypovolemic shock T81.19XA    SBO (small bowel obstruction) (HCC) K56.609    Obstruction of bowel (HCC) K56.609    Small bowel obstruction (HCC) K56.609    Paroxysmal SVT (supraventricular tachycardia) (HCC) I47.1    Acute renal failure superimposed on stage 4 chronic kidney disease (HCC) N17.9, N18.4    Dehydration E86.0    Bronchitis J40    Gallstone K80.20    Common bile duct calculus K80.50    Abdominal pain R10.9     Plan:   · CONTINUE ON IV ABX  · Awaiting surgical consult for lap chol  · Discussed with Dr. Elpidio Modi  · Dr. Reymundo Recinos will follow     Signed By: Jhonatan Brush MD     12/15/2019  1:39 PM

## 2019-12-15 NOTE — PROGRESS NOTES
Bedside and Verbal shift change report given to Liana Rain RN (oncoming nurse) by [facility], RN (offgoing nurse). Report included the following information SBAR, Kardex, ED Summary, Intake/Output, MAR and Recent Results.

## 2019-12-16 LAB
ALBUMIN SERPL-MCNC: 3 G/DL (ref 3.5–5)
ALBUMIN/GLOB SERPL: 1 {RATIO} (ref 1.1–2.2)
ALP SERPL-CCNC: 44 U/L (ref 45–117)
ALT SERPL-CCNC: 18 U/L (ref 12–78)
ANION GAP SERPL CALC-SCNC: 5 MMOL/L (ref 5–15)
AST SERPL-CCNC: 11 U/L (ref 15–37)
BACTERIA SPEC CULT: NORMAL
BACTERIA SPEC CULT: NORMAL
BASOPHILS # BLD: 0 K/UL (ref 0–0.1)
BASOPHILS NFR BLD: 0 % (ref 0–1)
BILIRUB SERPL-MCNC: 0.4 MG/DL (ref 0.2–1)
BUN SERPL-MCNC: 32 MG/DL (ref 6–20)
BUN/CREAT SERPL: 18 (ref 12–20)
CALCIUM SERPL-MCNC: 8.8 MG/DL (ref 8.5–10.1)
CHLORIDE SERPL-SCNC: 112 MMOL/L (ref 97–108)
CO2 SERPL-SCNC: 25 MMOL/L (ref 21–32)
CREAT SERPL-MCNC: 1.79 MG/DL (ref 0.55–1.02)
DIFFERENTIAL METHOD BLD: ABNORMAL
EOSINOPHIL # BLD: 0.2 K/UL (ref 0–0.4)
EOSINOPHIL NFR BLD: 4 % (ref 0–7)
ERYTHROCYTE [DISTWIDTH] IN BLOOD BY AUTOMATED COUNT: 13 % (ref 11.5–14.5)
GLOBULIN SER CALC-MCNC: 3.1 G/DL (ref 2–4)
GLUCOSE SERPL-MCNC: 95 MG/DL (ref 65–100)
HCT VFR BLD AUTO: 36.8 % (ref 35–47)
HGB BLD-MCNC: 11.4 G/DL (ref 11.5–16)
IMM GRANULOCYTES # BLD AUTO: 0.1 K/UL (ref 0–0.04)
IMM GRANULOCYTES NFR BLD AUTO: 1 % (ref 0–0.5)
LYMPHOCYTES # BLD: 1.8 K/UL (ref 0.8–3.5)
LYMPHOCYTES NFR BLD: 30 % (ref 12–49)
MCH RBC QN AUTO: 33.7 PG (ref 26–34)
MCHC RBC AUTO-ENTMCNC: 31 G/DL (ref 30–36.5)
MCV RBC AUTO: 108.9 FL (ref 80–99)
MONOCYTES # BLD: 0.8 K/UL (ref 0–1)
MONOCYTES NFR BLD: 14 % (ref 5–13)
NEUTS SEG # BLD: 3.1 K/UL (ref 1.8–8)
NEUTS SEG NFR BLD: 51 % (ref 32–75)
NRBC # BLD: 0 K/UL (ref 0–0.01)
NRBC BLD-RTO: 0 PER 100 WBC
PLATELET # BLD AUTO: 118 K/UL (ref 150–400)
PMV BLD AUTO: 12.5 FL (ref 8.9–12.9)
POTASSIUM SERPL-SCNC: 3.7 MMOL/L (ref 3.5–5.1)
PROT SERPL-MCNC: 6.1 G/DL (ref 6.4–8.2)
RBC # BLD AUTO: 3.38 M/UL (ref 3.8–5.2)
RBC MORPH BLD: ABNORMAL
SERVICE CMNT-IMP: NORMAL
SODIUM SERPL-SCNC: 142 MMOL/L (ref 136–145)
WBC # BLD AUTO: 6 K/UL (ref 3.6–11)

## 2019-12-16 PROCEDURE — 74011250636 HC RX REV CODE- 250/636: Performed by: INTERNAL MEDICINE

## 2019-12-16 PROCEDURE — 74011250637 HC RX REV CODE- 250/637: Performed by: INTERNAL MEDICINE

## 2019-12-16 PROCEDURE — 80053 COMPREHEN METABOLIC PANEL: CPT

## 2019-12-16 PROCEDURE — 65660000000 HC RM CCU STEPDOWN

## 2019-12-16 PROCEDURE — 74011250636 HC RX REV CODE- 250/636: Performed by: NURSE PRACTITIONER

## 2019-12-16 PROCEDURE — 85025 COMPLETE CBC W/AUTO DIFF WBC: CPT

## 2019-12-16 PROCEDURE — 74011000258 HC RX REV CODE- 258: Performed by: INTERNAL MEDICINE

## 2019-12-16 PROCEDURE — 36415 COLL VENOUS BLD VENIPUNCTURE: CPT

## 2019-12-16 RX ADMIN — Medication 10 ML: at 22:03

## 2019-12-16 RX ADMIN — DILTIAZEM HYDROCHLORIDE 240 MG: 240 CAPSULE, COATED, EXTENDED RELEASE ORAL at 11:01

## 2019-12-16 RX ADMIN — PSYLLIUM HUSK 1 PACKET: 3.4 POWDER ORAL at 17:35

## 2019-12-16 RX ADMIN — SODIUM CHLORIDE 100 ML/HR: 900 INJECTION, SOLUTION INTRAVENOUS at 20:36

## 2019-12-16 RX ADMIN — PIPERACILLIN AND TAZOBACTAM 3.38 G: 3; .375 INJECTION, POWDER, FOR SOLUTION INTRAVENOUS at 20:32

## 2019-12-16 RX ADMIN — PIPERACILLIN AND TAZOBACTAM 3.38 G: 3; .375 INJECTION, POWDER, FOR SOLUTION INTRAVENOUS at 11:08

## 2019-12-16 RX ADMIN — Medication 1 CAPSULE: at 11:01

## 2019-12-16 RX ADMIN — PSYLLIUM HUSK 1 PACKET: 3.4 POWDER ORAL at 11:19

## 2019-12-16 RX ADMIN — Medication 10 ML: at 04:17

## 2019-12-16 RX ADMIN — PIPERACILLIN AND TAZOBACTAM 3.38 G: 3; .375 INJECTION, POWDER, FOR SOLUTION INTRAVENOUS at 04:17

## 2019-12-16 RX ADMIN — LOVASTATIN 20 MG: 20 TABLET ORAL at 22:03

## 2019-12-16 RX ADMIN — ALLOPURINOL 100 MG: 100 TABLET ORAL at 11:01

## 2019-12-16 RX ADMIN — SODIUM CHLORIDE 125 ML/HR: 900 INJECTION, SOLUTION INTRAVENOUS at 11:12

## 2019-12-16 NOTE — PROGRESS NOTES
Bedside and Verbal shift change report given to Adithya Khan RN (oncoming nurse) by Alejandro Lucas RN (offgoing nurse). Report included the following information SBAR, Kardex, ED Summary, Intake/Output, MAR and Recent Results.

## 2019-12-16 NOTE — PROGRESS NOTES
[address] [place] [address], [address] [place] [address]    GI PROGRESS NOTE    NAME: Ronny Warren   :  1940   MRN:  [id number]       Subjective:     Feels better, mild epigastric tenderness. Objective:     VITALS:   Last 24hrs VS reviewed since prior progress note. Most recent are:  Visit Vitals  /86   Pulse 83   Temp 98.3 °F (36.8 °C)   Resp 16   Ht 5' 6\" (1.676 m)   Wt 81.6 kg (180 lb)   SpO2 95%   BMI 29.05 kg/m²       Intake/Output Summary (Last 24 hours) at 2019 1221  Last data filed at 12/15/2019 1230  Gross per 24 hour   Intake 120 ml   Output    Net 120 ml     PHYSICAL EXAM:  General: WD, WN. Alert, cooperative, no acute distress    Lungs:  CTA Bilaterally. No Wheezing/Rhonchi/Rales. Heart:  Regular  rhythm,  No murmur (), No Rubs, No Gallops  Abdomen: Soft, Non distended, mild epigastric tender.  +Bowel sounds, no HSM  Psych:   Good insight. Not anxious nor agitated.     Lab Data Reviewed:   Recent Labs     19  0035 12/15/19  0445   WBC 6.0 5.8   HGB 11.4* 11.5   HCT 36.8 36.8   * 113*     Recent Labs     19  0035 12/15/19  0445    140   K 3.7 4.7   * 109*   CO2 25 25   BUN 32* 40*   CREA 1.79* 2.18*   GLU 95 75   CA 8.8 9.1     Recent Labs     19  0035 12/15/19  0445 19  1128   SGOT 11* 28 14*   AP 44* 42* 54   TP 6.1* 6.1* 7.6   ALB 3.0* 2.8* 3.8   GLOB 3.1 3.3 3.8   LPSE  --   --  73       ________________________________________________________________________       Assessment:   · Epigastric /RUQ pain with elevated WBC, suspect from gallstones/cholecystitis, better on IV abx, MRCP was negative for CBD stones and normal LFT     Patient Active Problem List   Diagnosis Code    Hypertension I10    Hypercholesterolemia E78.00    Wound dehiscence T81.30XA    Cerebral thrombosis with cerebral infarction ([facility] I63.30    Abscess L02.91    Anemia D64.9    GI bleed K92.2    Tachycardia R00.0    Acute post-hemorrhagic anemia D62    Postoperative hypovolemic shock T81.19XA    SBO (small bowel obstruction) (HCC) K56.609    Obstruction of bowel (HCC) K56.609    Small bowel obstruction (HCC) K56.609    Paroxysmal SVT (supraventricular tachycardia) (HCC) I47.1    Acute renal failure superimposed on stage 4 chronic kidney disease (HCC) N17.9, N18.4    Dehydration E86.0    Bronchitis J40    Gallstone K80.20    Common bile duct calculus K80.50    Abdominal pain R10.9     Plan:   · CONTINUE ON IV ABX  · Plan for CCY with IOC.  Dr. Jonathan Huitron discussed with Dr. Mary De Los Santos  · Dr. Zan Celis to follow     Signed By: Rosina Rojas MD     12/16/2019  1:39 PM

## 2019-12-16 NOTE — PROGRESS NOTES
[facility]      Subjective     No acute events overnight. Abdominal pain improved but still with some mild discomfort of right mid abdomen. No n/v. No new complaints. Afebrile. Objective     Patient Vitals for the past 24 hrs:   Temp Pulse Resp BP SpO2   12/16/19 1101  83  142/86    12/16/19 0805 98.3 °F (36.8 °C) 64 16 160/78 95 %   12/16/19 0536 98.3 °F (36.8 °C) 72 16 159/68 91 %   12/16/19 0009 98.3 °F (36.8 °C) 70 16 157/79 92 %   12/15/19 2106 98.3 °F (36.8 °C) 63 16 120/68 93 %   12/15/19 1539 98.6 °F (37 °C) 84 18 108/66 98 %       Date 12/15/19 0700 - 12/16/19 0659 12/16/19 0700 - 12/17/19 0659   Shift 4010-4818 4949-5924 24 Hour Total 7492-1028 6127-7628 24 Hour Total   INTAKE   P.O. 360  360        P. O. 360  360      Shift Total(mL/kg) 360(4.4)  360(4.4)      OUTPUT   Urine(mL/kg/hr) 450(0.5)  450(0.2)        Urine Voided 450  450        Urine Occurrence(s) 7 x  7 x      Stool           Stool Occurrence(s) 2 x  2 x      Shift Total(mL/kg) 450(5.5)  450(5.5)      NET -90  -90      Weight (kg) 81.6 81.6 81.6 81.6 81.6 81.6       PE  GEN - Awake, alert, communicating appropriately. NAD  Abd - soft, NT, ND. No palpable masses. Ext - warm, well perfused.       Labs  Recent Results (from the past 24 hour(s))   CBC WITH AUTOMATED DIFF    Collection Time: 12/16/19 12:35 AM   Result Value Ref Range    WBC 6.0 3.6 - 11.0 K/uL    RBC 3.38 (L) 3.80 - 5.20 M/uL    HGB 11.4 (L) 11.5 - 16.0 g/dL    HCT 36.8 35.0 - 47.0 %    .9 (H) 80.0 - 99.0 FL    MCH 33.7 26.0 - 34.0 PG    MCHC 31.0 30.0 - 36.5 g/dL    RDW 13.0 11.5 - 14.5 %    PLATELET 288 (L) 888 - 400 K/uL    MPV 12.5 8.9 - 12.9 FL    NRBC 0.0 0  WBC    ABSOLUTE NRBC 0.00 0.00 - 0.01 K/uL    NEUTROPHILS 51 32 - 75 %    LYMPHOCYTES 30 12 - 49 %    MONOCYTES 14 (H) 5 - 13 %    EOSINOPHILS 4 0 - 7 %    BASOPHILS 0 0 - 1 %    IMMATURE GRANULOCYTES 1 (H) 0.0 - 0.5 % ABS. NEUTROPHILS 3.1 1.8 - 8.0 K/UL    ABS. LYMPHOCYTES 1.8 0.8 - 3.5 K/UL    ABS. MONOCYTES 0.8 0.0 - 1.0 K/UL    ABS. EOSINOPHILS 0.2 0.0 - 0.4 K/UL    ABS. BASOPHILS 0.0 0.0 - 0.1 K/UL    ABS. IMM. GRANS. 0.1 (H) 0.00 - 0.04 K/UL    DF SMEAR SCANNED      RBC COMMENTS MACROCYTOSIS  1+       METABOLIC PANEL, COMPREHENSIVE    Collection Time: 12/16/19 12:35 AM   Result Value Ref Range    Sodium 142 136 - 145 mmol/L    Potassium 3.7 3.5 - 5.1 mmol/L    Chloride 112 (H) 97 - 108 mmol/L    CO2 25 21 - 32 mmol/L    Anion gap 5 5 - 15 mmol/L    Glucose 95 65 - 100 mg/dL    BUN 32 (H) 6 - 20 MG/DL    Creatinine 1.79 (H) 0.55 - 1.02 MG/DL    BUN/Creatinine ratio 18 12 - 20      GFR est AA 33 (L) >60 ml/min/1.73m2    GFR est non-AA 27 (L) >60 ml/min/1.73m2    Calcium 8.8 8.5 - 10.1 MG/DL    Bilirubin, total 0.4 0.2 - 1.0 MG/DL    ALT (SGPT) 18 12 - 78 U/L    AST (SGOT) 11 (L) 15 - 37 U/L    Alk. phosphatase 44 (L) 45 - 117 U/L    Protein, total 6.1 (L) 6.4 - 8.2 g/dL    Albumin 3.0 (L) 3.5 - 5.0 g/dL    Globulin 3.1 2.0 - 4.0 g/dL    A-G Ratio 1.0 (L) 1.1 - 2.2         Assessment     Aimee Harman is a 78 y. o.yr old female with symptomatic cholelithiasis, possible choledocholithiasis. Chronic jejunal obstruction. She is not symptomatic for an obstruction. Plan     Her LFT's have been normal and MRCP does not clearly show a stone but there is some dilation of her ducts which appears chronic. Spoke with Dr. Rin Lee who plans for laparoscopic cholecystectomy with cholangiogram tomorrow. NPO after midnight.       Nazia Baez MD  12/16/2019  11:54 AM

## 2019-12-16 NOTE — PHYSICIAN ADVISORY
[facility] Physician Advisor Recommendation The information in this document is a recommendation to be used for utilization review and utilization management purposes only. This recommendation is not an order. The recommendation is made based on the information reviewed at the time of the referral, is pursuant to the [facility] Conditions of Participation (42 CFR Part 482), and is neither a judgment nor an assessment with regard to the appropriateness or quality of clinical care. Nothing in this document may be used to limit, alter, or affect clinical services provided to the patient named below. The provider of services is ultimately responsible for the submission of a claim that has met all requirements for correct coding, billing, and reimbursement. Letter of Status Determination: Current Status INPATIENT is Appropriate Pt Name:  Linda Castanon MR#  [id number] [facility]#   [id number] Room and 6 So [place]  @ [facility]  
Hospitalization date  12/14/2019 11:28 AM  
Current Attending Physician  Ashley Narvaez MD  
Principal diagnosis  <principal problem not specified> Clinicals  78 y.o. female hospitalized with severe abdominal pain and bilious vomiting IMPRESSION: 
1. Suspected biliary infection/Cholangitis  with her history of upper abdominal pain, bilious emesis, gallstones, common bile duct stones, and bandemia and CBD stone vs mass. Plan : full inpt admission,IV fluids, IV Zosyn, GI consult. 2.  Acute renal failure superimposed on chronic kidney disease. 3.  She has dehydration due to suspected biliary infection. 4.  Hypertension controlled. 5.  Lipid disorder under treatment. 6.  Paroxysmal supraventricular tachycardia in the past. 
  
PLAN:  Full inpatient admission, remote telemetry, IV FLUIDS.   Urinalysis, follow up labs, GI consult has been discussed with Dr. Jovita Mo who will see the patient, IV Zosyn. 
  
  
   
 STATUS DETERMINATION  On the basis of clinical data, available documentation, we believe that the current status of this patient as INPATIENT is Appropriate On the basis of above clinical data, this patient's care in acute hospital care setting is expected to exceed two midnights. Additional comments Insurance  Payor: [place] MEDICARE / Plan: [place] MEDICARE PART A & B / Product Type: Medicare /   
 
No current facility-administered medications on file prior to encounter. Current Outpatient Medications on File Prior to Encounter Medication Sig Dispense Refill  dilTIAZem XR (DILACOR XR) 240 mg XR capsule Take 240 mg by mouth daily.  ondansetron (ZOFRAN ODT) 4 mg disintegrating tablet Take 1 Tab by mouth every eight (8) hours as needed for Nausea. 9 Tab 0  
 dicyclomine (BENTYL) 20 mg tablet Take 1 Tab by mouth every six (6) hours for 12 doses. 12 Tab 0  
 lovastatin (MEVACOR) 20 mg tablet TAKE 1 TABLET BY MOUTH AT BEDTIME 90 Tab 3  cyanocobalamin (VITAMIN B12) 1,000 mcg/mL injection inject 1 milliliter subcutaneously EVERY 2 MONTHS 10 mL 5  
 allopurinol (ZYLOPRIM) 100 mg tablet TAKE 1 TABLET BY MOUTH EVERY DAY 90 Tab 0  
 albuterol (PROVENTIL HFA, VENTOLIN HFA, PROAIR HFA) 90 mcg/actuation inhaler Take 2 Puffs by inhalation every four (4) hours as needed for Wheezing. 1 Inhaler 2  
 calcium-vitamin D (OYSTER SHELL) 500 mg(1,250mg) -200 unit per tablet Take 1 Tab by mouth daily.  multivit-min-iron-FA-lutein (CENTRUM SILVER WOMEN) 8 mg iron-400 mcg-300 mcg tab Take 1 Tab by mouth daily.  cholecalciferol (VITAMIN D3) 1,000 unit tablet Take 1,000 Units by mouth daily.  omega-3 fatty acids-vitamin e (FISH OIL) 1,000 mg cap Take 1 Cap by mouth daily.  diphenhydrAMINE (BENADRYL ALLERGY) 25 mg tablet Take 25 mg by mouth nightly as needed for Sleep.    
  
7:30 PM 12/15/2019 Dr. Nicholas Fofana SIMBA Physician [address] [place] [address] [place] [address] [address] C: [phone] 
[email or web address]

## 2019-12-16 NOTE — CONSULTS
Chief Complaint:  Cholelithiasis    HPI:  77 yo woman with hx DVT, PSVT consulted for cholelithiasis. Pt was admitted for nausea, vomiting and bilious emesis. Pt had RUQ US and CT scan performed which showed cholelithiasis and possible distal CBD calculus. MRCP was performed which confirmed distal CBD calculus. She does not have elevated transaminases however. Pt is feeling better since admission. No nausea or vomiting. Tolerating clears. No leukocytosis. Past Medical History:   Diagnosis Date    Abscess 3/22/2014    Lumbar area - MRSA    Arthritis     Common bile duct calculus 12/14/2019    Gout     Hypercholesterolemia     Hypertension     LBP (low back pain)     Other ill-defined conditions(799.89)     LEFT SHOULDER PAINFUL, NEEDS REPLACEMENT    Paroxysmal SVT (supraventricular tachycardia) ([address] 5/2/2017    Thromboembolus ([facility] [address] 12/26/13    left leg       Past Surgical History:   Procedure Laterality Date    ABDOMEN SURGERY PROC UNLISTED      hernia repair    BREAST SURGERY PROCEDURE UNLISTED      EXCISION OF BREAST CYST    ENDOSCOPY, COLON, DIAGNOSTIC      ostomy reversal    HX BREAST BIOPSY Right 1970    Benign    HX COLOSTOMY      HX GI      COLOSTOMY REVERSAL    HX HEMORRHOIDECTOMY      HX ORTHOPAEDIC      right knee  bilateral knee replacement    HX ORTHOPAEDIC      ORIF RIGHT ANKLE    HX ORTHOPAEDIC      KNEE ARTHROSCOPY       No current facility-administered medications on file prior to encounter. Current Outpatient Medications on File Prior to Encounter   Medication Sig Dispense Refill    dilTIAZem XR (DILACOR XR) 240 mg XR capsule Take 240 mg by mouth daily.  ondansetron (ZOFRAN ODT) 4 mg disintegrating tablet Take 1 Tab by mouth every eight (8) hours as needed for Nausea. 9 Tab 0    dicyclomine (BENTYL) 20 mg tablet Take 1 Tab by mouth every six (6) hours for 12 doses.  12 Tab 0    lovastatin (MEVACOR) 20 mg tablet TAKE 1 TABLET BY MOUTH AT BEDTIME 90 Tab 3  cyanocobalamin (VITAMIN B12) 1,000 mcg/mL injection inject 1 milliliter subcutaneously EVERY 2 MONTHS 10 mL 5    allopurinol (ZYLOPRIM) 100 mg tablet TAKE 1 TABLET BY MOUTH EVERY DAY 90 Tab 0    albuterol (PROVENTIL HFA, VENTOLIN HFA, PROAIR HFA) 90 mcg/actuation inhaler Take 2 Puffs by inhalation every four (4) hours as needed for Wheezing. 1 Inhaler 2    calcium-vitamin D (OYSTER SHELL) 500 mg(1,250mg) -200 unit per tablet Take 1 Tab by mouth daily.  multivit-min-iron-FA-lutein (CENTRUM SILVER WOMEN) 8 mg iron-400 mcg-300 mcg tab Take 1 Tab by mouth daily.  cholecalciferol (VITAMIN D3) 1,000 unit tablet Take 1,000 Units by mouth daily.  omega-3 fatty acids-vitamin e (FISH OIL) 1,000 mg cap Take 1 Cap by mouth daily.  diphenhydrAMINE (BENADRYL ALLERGY) 25 mg tablet Take 25 mg by mouth nightly as needed for Sleep. Allergies   Allergen Reactions    Shellfish Derived Angioedema       Review of Systems - General ROS: negative  Psychological ROS: negative  Respiratory ROS: negative  Cardiovascular ROS: negative  Gastrointestinal ROS: positive for - abdominal pain and nausea/vomiting    Visit Vitals  /66   Pulse 84   Temp 98.6 °F (37 °C)   Resp 18   Ht 5' 6\" (1.676 m)   Wt 180 lb (81.6 kg)   SpO2 98%   BMI 29.05 kg/m²         Physical Exam:    Gen:  NAD  Pulm:  Unlabored  Abd:  S/ND/mild TTP at umbilicus.   No tenderness in epigastric or RUQ    Recent Results (from the past 24 hour(s))   CULTURE, MRSA    Collection Time: 12/14/19  8:35 PM   Result Value Ref Range    Special Requests: NO SPECIAL REQUESTS      Culture result: MRSA NOT PRESENT AT 18 HOURS     URINALYSIS W/MICROSCOPIC    Collection Time: 12/15/19  4:45 AM   Result Value Ref Range    Color YELLOW/STRAW      Appearance CLOUDY (A) CLEAR      Specific gravity 1.012 1.003 - 1.030      pH (UA) 5.5 5.0 - 8.0      Protein 100 (A) NEG mg/dL    Glucose NEGATIVE  NEG mg/dL    Ketone NEGATIVE  NEG mg/dL    Blood NEGATIVE  NEG Urobilinogen 0.2 0.2 - 1.0 EU/dL    Nitrites NEGATIVE  NEG      Leukocyte Esterase TRACE (A) NEG      WBC 0-4 0 - 4 /hpf    RBC 0-5 0 - 5 /hpf    Epithelial cells FEW FEW /lpf    Bacteria NEGATIVE  NEG /hpf    Mucus TRACE (A) NEG /lpf   METABOLIC PANEL, COMPREHENSIVE    Collection Time: 12/15/19  4:45 AM   Result Value Ref [place]    Sodium 140 136 - 145 mmol/L    Potassium 4.7 3.5 - 5.1 mmol/L    Chloride 109 (H) 97 - 108 mmol/L    CO2 25 21 - 32 mmol/L    Anion gap 6 5 - 15 mmol/L    Glucose 75 65 - 100 mg/dL    BUN 40 (H) 6 - 20 MG/DL    Creatinine 2.18 (H) 0.55 - 1.02 MG/DL    BUN/Creatinine ratio 18 12 - 20      GFR est AA 26 (L) >60 ml/min/1.73m2    GFR est non-AA 22 (L) >60 ml/min/1.73m2    Calcium 9.1 8.5 - 10.1 MG/DL    Bilirubin, total 0.6 0.2 - 1.0 MG/DL    ALT (SGPT) 20 12 - 78 U/L    AST (SGOT) 28 15 - 37 U/L    Alk. phosphatase 42 (L) 45 - 117 U/L    Protein, total 6.1 (L) 6.4 - 8.2 g/dL    Albumin 2.8 (L) 3.5 - 5.0 g/dL    Globulin 3.3 2.0 - 4.0 g/dL    A-G Ratio 0.8 (L) 1.1 - 2.2     CBC WITH AUTOMATED DIFF    Collection Time: 12/15/19  4:45 AM   Result Value Ref [place]    WBC 5.8 3.6 - 11.0 K/uL    RBC 3.38 (L) 3.80 - 5.20 M/uL    HGB 11.5 11.5 - 16.0 g/dL    HCT 36.8 35.0 - 47.0 %    .9 (H) 80.0 - 99.0 FL    MCH 34.0 26.0 - 34.0 PG    MCHC 31.3 30.0 - 36.5 g/dL    RDW 13.3 11.5 - 14.5 %    PLATELET 640 (L) 091 - 400 K/uL    MPV 12.7 8.9 - 12.9 FL    NRBC 0.3 (H) 0  WBC    ABSOLUTE NRBC 0.02 (H) 0.00 - 0.01 K/uL    NEUTROPHILS 46 32 - 75 %    BAND NEUTROPHILS 14 (H) 0 - 6 %    LYMPHOCYTES 24 12 - 49 %    MONOCYTES 14 (H) 5 - 13 %    EOSINOPHILS 2 0 - 7 %    BASOPHILS 0 0 - 1 %    IMMATURE GRANULOCYTES 0 %    ABS. NEUTROPHILS 3.5 1.8 - 8.0 K/UL    ABS. LYMPHOCYTES 1.4 0.8 - 3.5 K/UL    ABS. MONOCYTES 0.8 0.0 - 1.0 K/UL    ABS. EOSINOPHILS 0.1 0.0 - 0.4 K/UL    ABS. BASOPHILS 0.0 0.0 - 0.1 K/UL    ABS. IMM.  GRANS. 0.0 K/UL    DF MANUAL      RBC COMMENTS MACROCYTOSIS  1+       BILIRUBIN, CONFIRM    Collection Time: 12/15/19  4:45 AM   Result Value Ref [place]    Bilirubin UA, confirm NEGATIVE  NEG     SAMPLES BEING HELD    Collection Time: 12/15/19  4:45 AM   Result Value Ref Range    SAMPLES BEING HELD UC HOLD     COMMENT        Add-on orders for these samples will be processed based on acceptable specimen integrity and analyte stability, which may vary by analyte. AP:  79 yo woman consulted for cholelithiasis    - Cholelithiasis:  No acute indication for cholecystectomy. MRCP was reviewed which suggested a distal CBD stone.   Would recommend reassessing CBD stone and ERCP prior to cholecystectomy  - Continue antibiotic therapy  - Plan laparoscopic cholecystectomy prior to discharge

## 2019-12-16 NOTE — CDMP QUERY
Documentation of Acute Renal Jinancy Wheeler is noted in the progress notes. Currently the patient does not meet RIFLE criteria (BSV approved) to support this diagnosis. If you are using another criteria to support this diagnosis, please document this in your progress note. Otherwise, please document in the progress notes the clinical indicators that support this diagnosis or state that the diagnosis has been ruled out. RIFLE  (BSV Approved) RISK:  Increased SCr x 1.5 or GFR decrease > 25% (within 7 days) INJURY:  Increased SCr x 2.0 or GFR decreased > 50% FAILURE:  Increased SCr x 3.0 or GFR decrease > 75% or SCr >4.0 mg/dL or acute increase >0.5 mg/dL LOSS:  Persistent acute renal failure = complete loss of kidney function > 4 weeks END STAGE:  End stage of kidney disease > 3 months AKIN 
 
STAGE  1:  Increase in SCr >/= 0.3 mg/dL or >/= 150% to 200% (1.5 to 2-fold) from baseline (within 48 hours) STAGE  2:  Increase in SCr to more than 200% to 300% (>2-3 fold) from baseline STAGE  3:  Increase in SCr to more than 300% (>3-fold) from baseline or SCr >/= 4.0 mg/dL with an acute increase of at least 0.5 mg/dL or initiation of renal replacement therapy KDIGO 
 
STAGE  1:  Increase in SCr by >/= 0.3 mg/dL within 48 hours or increase in SCr 1.5 to 1.9 times baseline which is known or presumed to have occurred within the prior 7 days STAGE  2:  Increase in SCr to 2.0 to 2.9 times baseline STAGE  3:  Increase in SCr to 3.0 times baseline or increase in SCr to >/= baseline or increase in SCr to >/= 4.0 mg/dL or initiation of renal replacement therapy Please clarify and document your clinical opinion in the progress notes and discharge summary including the definitive and/or presumptive diagnosis, (suspected or probable), related to the above clinical findings. Please include clinical findings supporting your diagnosis. Thank you, Mandy FRIEDMAN, RN 
CDI  
[phone]

## 2019-12-16 NOTE — PROGRESS NOTES
Samantha Lucio, Nikos Payne, and Laura Wade Date: 12/14/2019      Subjective:     Patient feeling OK today. Occasional diarrhea. ..       Current Facility-Administered Medications   Medication Dose Route Frequency    psyllium husk-aspartame (METAMUCIL FIBER) packet 1 Packet  1 Packet Oral BID    allopurinol (ZYLOPRIM) tablet 100 mg  100 mg Oral DAILY    lovastatin (MEVACOR) tablet 20 mg  20 mg Oral QHS    sodium chloride (NS) flush 5-40 mL  5-40 mL IntraVENous Q8H    sodium chloride (NS) flush 5-40 mL  5-40 mL IntraVENous PRN    0.9% sodium chloride infusion  125 mL/hr IntraVENous CONTINUOUS    acetaminophen (TYLENOL) tablet 650 mg  650 mg Oral Q4H PRN    morphine injection 2 mg  2 mg IntraVENous Q4H PRN    ondansetron (ZOFRAN) injection 4 mg  4 mg IntraVENous Q4H PRN    piperacillin-tazobactam (ZOSYN) 3.375 g in 0.9% sodium chloride (MBP/ADV) 100 mL  3.375 g IntraVENous Q8H    albuterol (PROVENTIL VENTOLIN) nebulizer solution 2.5 mg  2.5 mg Nebulization Q4H PRN    dilTIAZem CD (CARDIZEM CD) capsule 240 mg  240 mg Oral DAILY    lactobac ac& pc-s.therm-b.anim (OJSETTE Q/RISAQUAD)  1 Cap Oral DAILY          Objective:     Patient Vitals for the past 8 hrs:   BP Temp Pulse Resp SpO2   12/16/19 0536 159/68 98.3 °F (36.8 °C) 72 16 91 %   12/16/19 0009 157/79 98.3 °F (36.8 °C) 70 16 92 %     No intake/output data recorded. 12/14 1901 - 12/16 0700  In: 360 [P.O.:360]  Out: 450 [Urine:450]    Physical Exam: Lungs: clear to auscultation bilaterally  Heart: regular rate and rhythm, S1, S2 normal, no murmur, click, rub or gallop  Abdomen: soft, non-tender.  Bowel sounds normal. No masses,  no organomegaly        Data Review   Recent Results (from the past 24 hour(s))   CBC WITH AUTOMATED DIFF    Collection Time: 12/16/19 12:35 AM   Result Value Ref Range    WBC 6.0 3.6 - 11.0 K/uL    RBC 3.38 (L) 3.80 - 5.20 M/uL    HGB 11.4 (L) 11.5 - 16.0 g/dL    HCT 36.8 35.0 - 47.0 %    .9 (H) 80.0 - 99.0 FL    MCH 33.7 26.0 - 34.0 PG    MCHC 31.0 30.0 - 36.5 g/dL    RDW 13.0 11.5 - 14.5 %    PLATELET 416 (L) 323 - 400 K/uL    MPV 12.5 8.9 - 12.9 FL    NRBC 0.0 0  WBC    ABSOLUTE NRBC 0.00 0.00 - 0.01 K/uL    NEUTROPHILS 51 32 - 75 %    LYMPHOCYTES 30 12 - 49 %    MONOCYTES 14 (H) 5 - 13 %    EOSINOPHILS 4 0 - 7 %    BASOPHILS 0 0 - 1 %    IMMATURE GRANULOCYTES 1 (H) 0.0 - 0.5 %    ABS. NEUTROPHILS 3.1 1.8 - 8.0 K/UL    ABS. LYMPHOCYTES 1.8 0.8 - 3.5 K/UL    ABS. MONOCYTES 0.8 0.0 - 1.0 K/UL    ABS. EOSINOPHILS 0.2 0.0 - 0.4 K/UL    ABS. BASOPHILS 0.0 0.0 - 0.1 K/UL    ABS. IMM. GRANS. 0.1 (H) 0.00 - 0.04 K/UL    DF SMEAR SCANNED      RBC COMMENTS MACROCYTOSIS  1+       METABOLIC PANEL, COMPREHENSIVE    Collection Time: 12/16/19 12:35 AM   Result Value Ref Range    Sodium 142 136 - 145 mmol/L    Potassium 3.7 3.5 - 5.1 mmol/L    Chloride 112 (H) 97 - 108 mmol/L    CO2 25 21 - 32 mmol/L    Anion gap 5 5 - 15 mmol/L    Glucose 95 65 - 100 mg/dL    BUN 32 (H) 6 - 20 MG/DL    Creatinine 1.79 (H) 0.55 - 1.02 MG/DL    BUN/Creatinine ratio 18 12 - 20      GFR est AA 33 (L) >60 ml/min/1.73m2    GFR est non-AA 27 (L) >60 ml/min/1.73m2    Calcium 8.8 8.5 - 10.1 MG/DL    Bilirubin, total 0.4 0.2 - 1.0 MG/DL    ALT (SGPT) 18 12 - 78 U/L    AST (SGOT) 11 (L) 15 - 37 U/L    Alk. phosphatase 44 (L) 45 - 117 U/L    Protein, total 6.1 (L) 6.4 - 8.2 g/dL    Albumin 3.0 (L) 3.5 - 5.0 g/dL    Globulin 3.1 2.0 - 4.0 g/dL    A-G Ratio 1.0 (L) 1.1 - 2.2             Assessment:     Active Problems:    Hypertension ()      SBO (small bowel obstruction) ([facility] [address] (8/18/2016)      Acute renal failure superimposed on stage 4 chronic kidney disease ([facility] [address] (1/25/2018)      Gallstone (12/14/2019)      Common bile duct calculus (12/14/2019)      Abdominal pain (12/14/2019)        Plan:     1) ?  Distal CBD stone  2) cont Abx

## 2019-12-17 ENCOUNTER — ANESTHESIA (OUTPATIENT)
Dept: SURGERY | Age: 79
DRG: 417 | End: 2019-12-17
Payer: MEDICARE

## 2019-12-17 ENCOUNTER — APPOINTMENT (OUTPATIENT)
Dept: GENERAL RADIOLOGY | Age: 79
DRG: 417 | End: 2019-12-17
Attending: SURGERY
Payer: MEDICARE

## 2019-12-17 ENCOUNTER — ANESTHESIA EVENT (OUTPATIENT)
Dept: SURGERY | Age: 79
DRG: 417 | End: 2019-12-17
Payer: MEDICARE

## 2019-12-17 PROCEDURE — 74011000250 HC RX REV CODE- 250: Performed by: NURSE ANESTHETIST, CERTIFIED REGISTERED

## 2019-12-17 PROCEDURE — 77030010513 HC APPL CLP LIG J&J -C: Performed by: SURGERY

## 2019-12-17 PROCEDURE — 77030002996 HC SUT SLK J&J -A: Performed by: SURGERY

## 2019-12-17 PROCEDURE — 74011250636 HC RX REV CODE- 250/636: Performed by: INTERNAL MEDICINE

## 2019-12-17 PROCEDURE — 74011250636 HC RX REV CODE- 250/636: Performed by: NURSE ANESTHETIST, CERTIFIED REGISTERED

## 2019-12-17 PROCEDURE — 74011250636 HC RX REV CODE- 250/636: Performed by: SURGERY

## 2019-12-17 PROCEDURE — 74011000258 HC RX REV CODE- 258: Performed by: NURSE ANESTHETIST, CERTIFIED REGISTERED

## 2019-12-17 PROCEDURE — 74011250636 HC RX REV CODE- 250/636: Performed by: ANESTHESIOLOGY

## 2019-12-17 PROCEDURE — 74011636320 HC RX REV CODE- 636/320: Performed by: SURGERY

## 2019-12-17 PROCEDURE — 77030002933 HC SUT MCRYL J&J -A: Performed by: SURGERY

## 2019-12-17 PROCEDURE — 74011000258 HC RX REV CODE- 258: Performed by: INTERNAL MEDICINE

## 2019-12-17 PROCEDURE — 76060000037 HC ANESTHESIA 3 TO 3.5 HR: Performed by: SURGERY

## 2019-12-17 PROCEDURE — 0DN80ZZ RELEASE SMALL INTESTINE, OPEN APPROACH: ICD-10-PCS | Performed by: SURGERY

## 2019-12-17 PROCEDURE — 77030020263 HC SOL INJ SOD CL0.9% LFCR 1000ML: Performed by: SURGERY

## 2019-12-17 PROCEDURE — 74011000272 HC RX REV CODE- 272: Performed by: SURGERY

## 2019-12-17 PROCEDURE — 77030020747 HC TU INSUF ENDOSC TELE -A: Performed by: SURGERY

## 2019-12-17 PROCEDURE — 77030008608 HC TRCR ENDOSC SMTH AMR -B: Performed by: SURGERY

## 2019-12-17 PROCEDURE — 77030007955 HC PCH ENDOSC SPEC J&J -B: Performed by: SURGERY

## 2019-12-17 PROCEDURE — 77030009403 HC ELECTRD ENDO MEGA -B: Performed by: SURGERY

## 2019-12-17 PROCEDURE — 77030018315 HC SEAL FBRN TISSL10ML BAXT -F: Performed by: SURGERY

## 2019-12-17 PROCEDURE — 74011250637 HC RX REV CODE- 250/637: Performed by: INTERNAL MEDICINE

## 2019-12-17 PROCEDURE — 77030008756 HC TU IRR SUC STRY -B: Performed by: SURGERY

## 2019-12-17 PROCEDURE — 77030020829: Performed by: SURGERY

## 2019-12-17 PROCEDURE — BF131ZZ FLUOROSCOPY OF GALLBLADDER AND BILE DUCTS USING LOW OSMOLAR CONTRAST: ICD-10-PCS | Performed by: SURGERY

## 2019-12-17 PROCEDURE — 74011000250 HC RX REV CODE- 250: Performed by: SURGERY

## 2019-12-17 PROCEDURE — 76010000132 HC OR TIME 2.5 TO 3 HR: Performed by: SURGERY

## 2019-12-17 PROCEDURE — 88304 TISSUE EXAM BY PATHOLOGIST: CPT

## 2019-12-17 PROCEDURE — 77030040506 HC DRN WND MDII -A: Performed by: SURGERY

## 2019-12-17 PROCEDURE — 65270000029 HC RM PRIVATE

## 2019-12-17 PROCEDURE — 77030004813 HC CATH CHOLGM TELE -B: Performed by: SURGERY

## 2019-12-17 PROCEDURE — 77030040361 HC SLV COMPR DVT MDII -B: Performed by: SURGERY

## 2019-12-17 PROCEDURE — 74300 X-RAY BILE DUCTS/PANCREAS: CPT

## 2019-12-17 PROCEDURE — 77030031139 HC SUT VCRL2 J&J -A: Performed by: SURGERY

## 2019-12-17 PROCEDURE — 77030008684 HC TU ET CUF COVD -B: Performed by: ANESTHESIOLOGY

## 2019-12-17 PROCEDURE — 77030026438 HC STYL ET INTUB CARD -A: Performed by: ANESTHESIOLOGY

## 2019-12-17 PROCEDURE — 77030013533 HC SEAL FBRN TISSL RDYTUSE 10ML BAXT -E: Performed by: SURGERY

## 2019-12-17 PROCEDURE — 77030010507 HC ADH SKN DERMBND J&J -B: Performed by: SURGERY

## 2019-12-17 PROCEDURE — 74011250637 HC RX REV CODE- 250/637: Performed by: SURGERY

## 2019-12-17 PROCEDURE — 76210000063 HC OR PH I REC FIRST 0.5 HR: Performed by: SURGERY

## 2019-12-17 PROCEDURE — 77030017006 HC DISECTR CRV1 J&J -B: Performed by: SURGERY

## 2019-12-17 PROCEDURE — 0FT44ZZ RESECTION OF GALLBLADDER, PERCUTANEOUS ENDOSCOPIC APPROACH: ICD-10-PCS | Performed by: SURGERY

## 2019-12-17 PROCEDURE — 77030002966 HC SUT PDS J&J -A: Performed by: SURGERY

## 2019-12-17 PROCEDURE — 77030003580 HC NDL INSUF VERES J&J -B: Performed by: SURGERY

## 2019-12-17 PROCEDURE — 74011000258 HC RX REV CODE- 258: Performed by: SURGERY

## 2019-12-17 PROCEDURE — 77030018836 HC SOL IRR NACL ICUM -A: Performed by: SURGERY

## 2019-12-17 PROCEDURE — 77030015933 HC FIBRIJET DUPLO BAXT -B: Performed by: SURGERY

## 2019-12-17 PROCEDURE — 77030011640 HC PAD GRND REM COVD -A: Performed by: SURGERY

## 2019-12-17 PROCEDURE — 77030019908 HC STETH ESOPH SIMS -A: Performed by: ANESTHESIOLOGY

## 2019-12-17 PROCEDURE — 77030012770 HC TRCR OPT FX AMR -B: Performed by: SURGERY

## 2019-12-17 PROCEDURE — 77030040922 HC BLNKT HYPOTHRM STRY -A

## 2019-12-17 RX ORDER — ONDANSETRON 2 MG/ML
4 INJECTION INTRAMUSCULAR; INTRAVENOUS AS NEEDED
Status: DISCONTINUED | OUTPATIENT
Start: 2019-12-17 | End: 2019-12-17 | Stop reason: HOSPADM

## 2019-12-17 RX ORDER — FENTANYL CITRATE 50 UG/ML
INJECTION, SOLUTION INTRAMUSCULAR; INTRAVENOUS AS NEEDED
Status: DISCONTINUED | OUTPATIENT
Start: 2019-12-17 | End: 2019-12-17 | Stop reason: HOSPADM

## 2019-12-17 RX ORDER — ONDANSETRON 2 MG/ML
INJECTION INTRAMUSCULAR; INTRAVENOUS AS NEEDED
Status: DISCONTINUED | OUTPATIENT
Start: 2019-12-17 | End: 2019-12-17 | Stop reason: HOSPADM

## 2019-12-17 RX ORDER — SODIUM CHLORIDE 0.9 % (FLUSH) 0.9 %
5-40 SYRINGE (ML) INJECTION AS NEEDED
Status: DISCONTINUED | OUTPATIENT
Start: 2019-12-17 | End: 2019-12-17 | Stop reason: HOSPADM

## 2019-12-17 RX ORDER — PHENYLEPHRINE HCL IN 0.9% NACL 0.4MG/10ML
SYRINGE (ML) INTRAVENOUS AS NEEDED
Status: DISCONTINUED | OUTPATIENT
Start: 2019-12-17 | End: 2019-12-17 | Stop reason: HOSPADM

## 2019-12-17 RX ORDER — MORPHINE SULFATE 10 MG/ML
2 INJECTION, SOLUTION INTRAMUSCULAR; INTRAVENOUS
Status: DISCONTINUED | OUTPATIENT
Start: 2019-12-17 | End: 2019-12-17 | Stop reason: HOSPADM

## 2019-12-17 RX ORDER — SODIUM CHLORIDE 0.9 % (FLUSH) 0.9 %
5-40 SYRINGE (ML) INJECTION EVERY 8 HOURS
Status: DISCONTINUED | OUTPATIENT
Start: 2019-12-17 | End: 2019-12-17 | Stop reason: HOSPADM

## 2019-12-17 RX ORDER — BUPIVACAINE HYDROCHLORIDE 2.5 MG/ML
INJECTION, SOLUTION EPIDURAL; INFILTRATION; INTRACAUDAL AS NEEDED
Status: DISCONTINUED | OUTPATIENT
Start: 2019-12-17 | End: 2019-12-17 | Stop reason: HOSPADM

## 2019-12-17 RX ORDER — DIPHENHYDRAMINE HYDROCHLORIDE 50 MG/ML
12.5 INJECTION, SOLUTION INTRAMUSCULAR; INTRAVENOUS AS NEEDED
Status: DISCONTINUED | OUTPATIENT
Start: 2019-12-17 | End: 2019-12-17 | Stop reason: HOSPADM

## 2019-12-17 RX ORDER — MIDAZOLAM HYDROCHLORIDE 1 MG/ML
1 INJECTION, SOLUTION INTRAMUSCULAR; INTRAVENOUS AS NEEDED
Status: DISCONTINUED | OUTPATIENT
Start: 2019-12-17 | End: 2019-12-17 | Stop reason: HOSPADM

## 2019-12-17 RX ORDER — DEXTROSE, SODIUM CHLORIDE, SODIUM LACTATE, POTASSIUM CHLORIDE, AND CALCIUM CHLORIDE 5; .6; .31; .03; .02 G/100ML; G/100ML; G/100ML; G/100ML; G/100ML
125 INJECTION, SOLUTION INTRAVENOUS CONTINUOUS
Status: DISCONTINUED | OUTPATIENT
Start: 2019-12-17 | End: 2019-12-17 | Stop reason: HOSPADM

## 2019-12-17 RX ORDER — FENTANYL CITRATE 50 UG/ML
25 INJECTION, SOLUTION INTRAMUSCULAR; INTRAVENOUS
Status: DISCONTINUED | OUTPATIENT
Start: 2019-12-17 | End: 2019-12-17 | Stop reason: HOSPADM

## 2019-12-17 RX ORDER — ROCURONIUM BROMIDE 10 MG/ML
INJECTION, SOLUTION INTRAVENOUS AS NEEDED
Status: DISCONTINUED | OUTPATIENT
Start: 2019-12-17 | End: 2019-12-17 | Stop reason: HOSPADM

## 2019-12-17 RX ORDER — SODIUM CHLORIDE, SODIUM LACTATE, POTASSIUM CHLORIDE, CALCIUM CHLORIDE 600; 310; 30; 20 MG/100ML; MG/100ML; MG/100ML; MG/100ML
125 INJECTION, SOLUTION INTRAVENOUS CONTINUOUS
Status: DISCONTINUED | OUTPATIENT
Start: 2019-12-17 | End: 2019-12-17 | Stop reason: HOSPADM

## 2019-12-17 RX ORDER — LIDOCAINE HYDROCHLORIDE 10 MG/ML
0.5 INJECTION, SOLUTION EPIDURAL; INFILTRATION; INTRACAUDAL; PERINEURAL AS NEEDED
Status: DISCONTINUED | OUTPATIENT
Start: 2019-12-17 | End: 2019-12-17 | Stop reason: HOSPADM

## 2019-12-17 RX ORDER — BUPIVACAINE HYDROCHLORIDE 2.5 MG/ML
50 INJECTION, SOLUTION EPIDURAL; INFILTRATION; INTRACAUDAL ONCE
Status: DISCONTINUED | OUTPATIENT
Start: 2019-12-18 | End: 2019-12-17 | Stop reason: HOSPADM

## 2019-12-17 RX ORDER — SODIUM CHLORIDE, SODIUM LACTATE, POTASSIUM CHLORIDE, CALCIUM CHLORIDE 600; 310; 30; 20 MG/100ML; MG/100ML; MG/100ML; MG/100ML
INJECTION, SOLUTION INTRAVENOUS
Status: DISCONTINUED | OUTPATIENT
Start: 2019-12-17 | End: 2019-12-17 | Stop reason: HOSPADM

## 2019-12-17 RX ORDER — ACETAMINOPHEN 325 MG/1
650 TABLET ORAL ONCE
Status: DISCONTINUED | OUTPATIENT
Start: 2019-12-17 | End: 2019-12-17 | Stop reason: HOSPADM

## 2019-12-17 RX ORDER — SODIUM CHLORIDE, SODIUM LACTATE, POTASSIUM CHLORIDE, CALCIUM CHLORIDE 600; 310; 30; 20 MG/100ML; MG/100ML; MG/100ML; MG/100ML
INJECTION, SOLUTION INTRAVENOUS
Status: DISCONTINUED | OUTPATIENT
Start: 2019-12-17 | End: 2019-12-17

## 2019-12-17 RX ORDER — FENTANYL CITRATE 50 UG/ML
50 INJECTION, SOLUTION INTRAMUSCULAR; INTRAVENOUS AS NEEDED
Status: DISCONTINUED | OUTPATIENT
Start: 2019-12-17 | End: 2019-12-17 | Stop reason: HOSPADM

## 2019-12-17 RX ORDER — SODIUM CHLORIDE 9 MG/ML
INJECTION, SOLUTION INTRAVENOUS
Status: DISCONTINUED | OUTPATIENT
Start: 2019-12-17 | End: 2019-12-17 | Stop reason: HOSPADM

## 2019-12-17 RX ORDER — LIDOCAINE HYDROCHLORIDE 20 MG/ML
INJECTION, SOLUTION EPIDURAL; INFILTRATION; INTRACAUDAL; PERINEURAL AS NEEDED
Status: DISCONTINUED | OUTPATIENT
Start: 2019-12-17 | End: 2019-12-17 | Stop reason: HOSPADM

## 2019-12-17 RX ORDER — OXYCODONE HYDROCHLORIDE 5 MG/1
5 TABLET ORAL AS NEEDED
Status: DISCONTINUED | OUTPATIENT
Start: 2019-12-17 | End: 2019-12-17 | Stop reason: HOSPADM

## 2019-12-17 RX ORDER — MIDAZOLAM HYDROCHLORIDE 1 MG/ML
1 INJECTION, SOLUTION INTRAMUSCULAR; INTRAVENOUS
Status: DISCONTINUED | OUTPATIENT
Start: 2019-12-17 | End: 2019-12-17 | Stop reason: HOSPADM

## 2019-12-17 RX ORDER — PROPOFOL 10 MG/ML
INJECTION, EMULSION INTRAVENOUS AS NEEDED
Status: DISCONTINUED | OUTPATIENT
Start: 2019-12-17 | End: 2019-12-17 | Stop reason: HOSPADM

## 2019-12-17 RX ADMIN — SODIUM CHLORIDE 50 ML/HR: 900 INJECTION, SOLUTION INTRAVENOUS at 19:52

## 2019-12-17 RX ADMIN — Medication 100 MCG: at 14:58

## 2019-12-17 RX ADMIN — Medication 10 ML: at 19:55

## 2019-12-17 RX ADMIN — ONDANSETRON HYDROCHLORIDE 4 MG: 2 INJECTION, SOLUTION INTRAMUSCULAR; INTRAVENOUS at 14:45

## 2019-12-17 RX ADMIN — SODIUM CHLORIDE, POTASSIUM CHLORIDE, SODIUM LACTATE AND CALCIUM CHLORIDE: 600; 310; 30; 20 INJECTION, SOLUTION INTRAVENOUS at 15:18

## 2019-12-17 RX ADMIN — PSYLLIUM HUSK 1 PACKET: 3.4 POWDER ORAL at 09:22

## 2019-12-17 RX ADMIN — MORPHINE SULFATE 2 MG: 2 INJECTION, SOLUTION INTRAMUSCULAR; INTRAVENOUS at 21:11

## 2019-12-17 RX ADMIN — LIDOCAINE HYDROCHLORIDE 60 MG: 20 INJECTION, SOLUTION EPIDURAL; INFILTRATION; INTRACAUDAL; PERINEURAL at 14:20

## 2019-12-17 RX ADMIN — Medication 80 MCG: at 14:55

## 2019-12-17 RX ADMIN — PIPERACILLIN AND TAZOBACTAM 3.38 G: 3; .375 INJECTION, POWDER, FOR SOLUTION INTRAVENOUS at 03:17

## 2019-12-17 RX ADMIN — Medication 10 ML: at 21:12

## 2019-12-17 RX ADMIN — DEXMEDETOMIDINE HYDROCHLORIDE 5 MCG: 100 INJECTION, SOLUTION, CONCENTRATE INTRAVENOUS at 14:10

## 2019-12-17 RX ADMIN — PIPERACILLIN AND TAZOBACTAM 3.38 G: 3; .375 INJECTION, POWDER, FOR SOLUTION INTRAVENOUS at 19:52

## 2019-12-17 RX ADMIN — PROPOFOL 20 MG: 10 INJECTION, EMULSION INTRAVENOUS at 14:10

## 2019-12-17 RX ADMIN — ROCURONIUM BROMIDE 30 MG: 10 SOLUTION INTRAVENOUS at 14:20

## 2019-12-17 RX ADMIN — Medication 80 MCG: at 16:11

## 2019-12-17 RX ADMIN — ALLOPURINOL 100 MG: 100 TABLET ORAL at 09:21

## 2019-12-17 RX ADMIN — FENTANYL CITRATE 25 MCG: 50 INJECTION, SOLUTION INTRAMUSCULAR; INTRAVENOUS at 17:34

## 2019-12-17 RX ADMIN — FENTANYL CITRATE 100 MCG: 50 INJECTION, SOLUTION INTRAMUSCULAR; INTRAVENOUS at 14:42

## 2019-12-17 RX ADMIN — SODIUM CHLORIDE: 900 INJECTION, SOLUTION INTRAVENOUS at 13:30

## 2019-12-17 RX ADMIN — LOVASTATIN 20 MG: 20 TABLET ORAL at 23:50

## 2019-12-17 RX ADMIN — Medication 1 CAPSULE: at 09:21

## 2019-12-17 RX ADMIN — PROPOFOL 100 MG: 10 INJECTION, EMULSION INTRAVENOUS at 14:20

## 2019-12-17 RX ADMIN — DILTIAZEM HYDROCHLORIDE 240 MG: 240 CAPSULE, COATED, EXTENDED RELEASE ORAL at 09:21

## 2019-12-17 RX ADMIN — Medication 10 ML: at 06:00

## 2019-12-17 RX ADMIN — PIPERACILLIN AND TAZOBACTAM 3.38 G: 3; .375 INJECTION, POWDER, FOR SOLUTION INTRAVENOUS at 12:13

## 2019-12-17 RX ADMIN — Medication 100 MCG: at 14:57

## 2019-12-17 NOTE — PROGRESS NOTES
[address]  [address] [facility], [address]       GI PROGRESS NOTE  Juan Carlos Jolley, [facility] office  [phone] NP in-hospital cell phone M-F until 4:30  After 5pm or on weekends, please call  for physician on call      NAME: Gwendolyn Schultz   :  1940   MRN:  [id number]       Subjective:   She is feeling about the same. No complaints. Objective:     VITALS:   Last 24hrs VS reviewed since prior progress note. Most recent are:  Visit Vitals  /88   Pulse 81   Temp 98.6 °F (37 °C)   Resp 16   Ht 5' 6\" (1.676 m)   Wt 81.6 kg (180 lb)   SpO2 95%   BMI 29.05 kg/m²       PHYSICAL EXAM:  General: Cooperative, no acute distress    Neurologic:  Alert and oriented X 3. HEENT: EOMI, no scleral icterus   Lungs:  CTA bilaterally. No wheezing  Heart:  S1 S2   Abdomen: Soft, non-distended, no tenderness. +Bowel sounds  Extremities: No edema  Psych:   Good insight. Not anxious or agitated. Lab Data Reviewed:     No results found for this or any previous visit (from the past 24 hour(s)).          Assessment:   · Symptomatic cholelithiasis: MRCP was negative for CBD stones and normal LFT     Patient Active Problem List   Diagnosis Code    Hypertension I10    Hypercholesterolemia E78.00    Wound dehiscence T81.30XA    Cerebral thrombosis with cerebral infarction (HCC) I63.30    Abscess L02.91    Anemia D64.9    GI bleed K92.2    Tachycardia R00.0    Acute post-hemorrhagic anemia D62    Postoperative hypovolemic shock T81.19XA    SBO (small bowel obstruction) (HCC) K56.609    Obstruction of bowel (HCC) K56.609    Small bowel obstruction (HCC) K56.609    Paroxysmal SVT (supraventricular tachycardia) (HCC) I47.1    Acute renal failure superimposed on stage 4 chronic kidney disease (HCC) N17.9, N18.4    Dehydration E86.0    Bronchitis J40    Gallstone K80.20    Common bile duct calculus K80.50    Abdominal pain R10.9     Plan:   · Antibiotics  · Plans for mignon sommer with [facility] today per jim     Signed By: Stone Staley NP     12/17/2019  10:37 AM       This patient was seen and examined by me in a face-to-face visit today. I reviewed the medical record including lab work, imaging and other provider notes. I confirmed the interval history as described above. I spoke to the patient, discussing our findings and plans. I discussed this case in detail with Song Hyatt NP. I formulated an updated  assessment of this patient and guided our treatment plan. I agree with the above progress note. I agree with the history, exam and assessment and plan as outlined in the note. I would like to add the following:Ms Chon Reza is set for mignon sommer later today. Her LFT's are normal and CBD is normal on MRCP.  Little to add from our standpoint, we will see again on request, thanks

## 2019-12-17 NOTE — PROGRESS NOTES
Samantha Leon, Dhara Ambrocio, and Sylvester    Admit Date: 12/14/2019      Subjective:     Doing OK this AM. AF. Dav Means Current Facility-Administered Medications   Medication Dose Route Frequency    psyllium husk-aspartame (METAMUCIL FIBER) packet 1 Packet  1 Packet Oral BID    allopurinol (ZYLOPRIM) tablet 100 mg  100 mg Oral DAILY    lovastatin (MEVACOR) tablet 20 mg  20 mg Oral QHS    sodium chloride (NS) flush 5-40 mL  5-40 mL IntraVENous Q8H    sodium chloride (NS) flush 5-40 mL  5-40 mL IntraVENous PRN    0.9% sodium chloride infusion  50 mL/hr IntraVENous CONTINUOUS    acetaminophen (TYLENOL) tablet 650 mg  650 mg Oral Q4H PRN    morphine injection 2 mg  2 mg IntraVENous Q4H PRN    ondansetron (ZOFRAN) injection 4 mg  4 mg IntraVENous Q4H PRN    piperacillin-tazobactam (ZOSYN) 3.375 g in 0.9% sodium chloride (MBP/ADV) 100 mL  3.375 g IntraVENous Q8H    albuterol (PROVENTIL VENTOLIN) nebulizer solution 2.5 mg  2.5 mg Nebulization Q4H PRN    dilTIAZem CD (CARDIZEM CD) capsule 240 mg  240 mg Oral DAILY    lactobac ac& pc-s.therm-b.anim (JOSETTE Q/RISAQUAD)  1 Cap Oral DAILY          Objective:     Patient Vitals for the past 8 hrs:   BP Temp Pulse Resp SpO2   12/17/19 0544 (!) 162/102 98.7 °F (37.1 °C)  16 95 %   12/17/19 0320 182/88 98.8 °F (37.1 °C) 78 14 93 %     No intake/output data recorded. 12/15 1901 - 12/17 0700  In: 1900 [P.O.:300; I.V.:1600]  Out: -     Physical Exam: Lungs: clear to auscultation bilaterally  Heart: regular rate and rhythm, S1, S2 normal, no murmur, click, rub or gallop  Abdomen: soft, non-tender. Bowel sounds normal. No masses,  no organomegaly        Data Review No results found for this or any previous visit (from the past 24 hour(s)).         Assessment:     Active Problems:    Hypertension ()      SBO (small bowel obstruction) ([address] (8/18/2016)      Acute renal failure superimposed on stage 4 chronic kidney disease ([facility] [address] (1/25/2018)      Gallstone (12/14/2019)      Common bile duct calculus (12/14/2019)      Abdominal pain (12/14/2019)        Plan:     1) For cholecystectomy this AM

## 2019-12-17 NOTE — ANESTHESIA PREPROCEDURE EVALUATION
Anesthetic History   No history of anesthetic complications            Review of Systems / Medical History  Patient summary reviewed, nursing notes reviewed and pertinent labs reviewed    Pulmonary                Comments: cough   Neuro/Psych         TIA     Cardiovascular    Hypertension              Exercise tolerance: <4 METS     GI/Hepatic/Renal  Within defined limits              Endo/Other        Arthritis and anemia     Other Findings              Physical Exam    Airway  Mallampati: II  TM Distance: > 6 cm  Neck ROM: normal range of motion   Mouth opening: Normal     Cardiovascular  Regular rate and rhythm,  S1 and S2 normal,  no murmur, click, rub, or gallop             Dental  No notable dental hx       Pulmonary  Breath sounds clear to auscultation               Abdominal  GI exam deferred       Other Findings            Anesthetic Plan    ASA: 3  Anesthesia type: general          Induction: Intravenous  Anesthetic plan and risks discussed with: Patient

## 2019-12-17 NOTE — PROGRESS NOTES
Progress Note    Patient: Gwendolyn Schultz MRN: [id number]  SSN: [id number]    YOB: 1940  Age: 78 y.o. Sex: female      Admit Date: 2019    Day of Surgery    Procedure:  Procedure(s):  LAPAROSCOPIC CHOLECYSTECTOMY WITH GRAMS POSSIBLE OPEN    Subjective:     Pt seen and examined. Mild RUQ pain. Objective:     Visit Vitals  /88   Pulse 81   Temp 98.6 °F (37 °C)   Resp 16   Ht 5' 6\" (1.676 m)   Wt 180 lb (81.6 kg)   SpO2 95%   BMI 29.05 kg/m²       Temp (24hrs), Av.7 °F (37.1 °C), Min:98.4 °F (36.9 °C), Max:98.8 °F (37.1 °C)        Physical Exam:    Gen:  NAD  Pulm:  Unlabored  Abd:  S/ND/mild TTP in RUQ    No results found for this or any previous visit (from the past 24 hour(s)). Assessment:     Hospital Problems  Date Reviewed: 2019          Codes Class Noted POA    Gallstone ICD-10-CM: K80.20  ICD-9-CM: 574.20  2019 Yes        Common bile duct calculus ICD-10-CM: K80.50  ICD-9-CM: 574.50  2019 Yes        Abdominal pain ICD-10-CM: R10.9  ICD-9-CM: 789.00  2019 Unknown        Acute renal failure superimposed on stage 4 chronic kidney disease (HCC) ICD-10-CM: N17.9, N18.4  ICD-9-CM: 584.9, 585.4  2018 Yes        SBO (small bowel obstruction) ([facility] [address] ICD-10-CM: U59.299  ICD-9-CM: 560.9  2016 Unknown        Hypertension ICD-10-CM: I10  ICD-9-CM: 401.9  Unknown Yes              Plan/Recommendations/Medical Decision Making:     - Cholelithiasis: To OR for laparoscopic cholecystectomy with intra-operative cholangiogram, possible open  - Risks and benefits explained.   Pt wish to pursue with operation  - Zosyn antibiotic

## 2019-12-17 NOTE — PERIOP NOTES
TRANSFER - IN REPORT:    Verbal report received from [place] RN(name) on [employer]  being received from 5E(unit) for ordered procedure      Report consisted of patients Situation, Background, Assessment and   Recommendations(SBAR). Information from the following report(s) SBAR was reviewed with the receiving nurse. Opportunity for questions and clarification was provided. Assessment completed upon patients arrival to unit and care assumed.

## 2019-12-17 NOTE — PROGRESS NOTES
Bedside shift change report given to Rodrigo Carrillo (oncoming nurse) by Victorino Funez (offgoing nurse). Report included the following information SBAR, Kardex and MAR.

## 2019-12-17 NOTE — BRIEF OP NOTE
BRIEF OPERATIVE NOTE    Date of Procedure: 12/17/2019   Preoperative Diagnosis: GALLSTONES  Postoperative Diagnosis: GALLSTONES, ADHESIONS    Procedure(s):  LAPAROSCOPIC CHOLECYSTECTOMY WITH CHOLANGIOGRAM WITH MINI LAPAROTOMY WITH LYSIS OF ADHESIONS  Surgeon(s) and Role:     * Sagrario Sutherland MD - Primary         Surgical Assistant: Nasir Juarez    Surgical Staff:  Circ-1: Tony Weiner RN  Radiology Technician: Rajwinder Vo RT, R  Scrub Tech-1: Fortunato Lee  Scrub Tech-Relief: Oneal Linea  Surg Asst-1: Brain Mart  Event Time In Time Out   Incision Start 12/17/2019 1443    Incision Close 12/17/2019 1657      Anesthesia: General   Estimated Blood Loss: Minimal  Specimens:   ID Type Source Tests Collected by Time Destination   1 : gallbladder and content Fresh Gallbladder  Deangelo Dalton MD 12/17/2019 1622 Pathology      Findings: The gallbladder was chronically inflamed. There were gallstones in the cystic duct. The cholangiogram showed a dilated common bile duct with an abrupt cutoff distally and no contrast going into duodenum consistent with biliary obstruction. There was dense adhesions in the abdomen. There was a serosal tear at one of the trocar sited which was repaired with 3-0 silk. A mini-laparotomy was made in the midline for lysis of adhesions and to evaluate for occult bowel injury. Complications: None  Implants:   Implant Name Type Inv.  Item Serial No.  Lot No. LRB No. Used Action   FIBRIN SEALANT TISSEEL   [id number] RIBERA [id number] N/A 1 Implanted

## 2019-12-17 NOTE — ANESTHESIA POSTPROCEDURE EVALUATION
Post-Anesthesia Evaluation and Assessment    Patient: Olga Deshpande MRN: [id number]  SSN: [id number]    YOB: 1940  Age: 78 y.o. Sex: female      I have evaluated the patient and they are stable and ready for discharge from the PACU. Cardiovascular Function/Vital Signs  Visit Vitals  BP (!) 156/94   Pulse 85   Temp 36.7 °C (98 °F)   Resp 19   Ht 5' 6\" (1.676 m)   Wt 81.6 kg (180 lb)   SpO2 99%   BMI 29.05 kg/m²       Patient is status post General anesthesia for Procedure(s):  LAPAROSCOPIC CHOLECYSTECTOMY WITH CHOLANGIOGRAM WITH MINI LAPAROTOMY WITH LYSIS OF ADHESIONS. Nausea/Vomiting: None    Postoperative hydration reviewed and adequate. Pain:  Pain Scale 1: Visual (12/17/19 1710)  Pain Intensity 1: 0 (12/17/19 1710)   Managed    Neurological Status:   Neuro (WDL): Within Defined Limits (12/17/19 1710)  Neuro  LUE Motor Response: Purposeful (12/17/19 1710)  LLE Motor Response: Purposeful (12/17/19 1710)  RUE Motor Response: Purposeful (12/17/19 1710)  RLE Motor Response: Purposeful (12/17/19 1710)   At baseline    Mental Status, Level of Consciousness: Alert and  oriented to person, place, and time    Pulmonary Status:   O2 Device: Nasal cannula (12/17/19 1710)   Adequate oxygenation and airway patent    Complications related to anesthesia: None    Post-anesthesia assessment completed. No concerns    Signed By: López Mantilla MD     December 17, 2019              Procedure(s):  LAPAROSCOPIC CHOLECYSTECTOMY WITH CHOLANGIOGRAM WITH MINI LAPAROTOMY WITH LYSIS OF ADHESIONS.     general    Anesthesia Post Evaluation        Patient location during evaluation: PACU  Patient participation: complete - patient participated  Level of consciousness: awake and alert  Pain management: adequate  Airway patency: patent  Anesthetic complications: no  Cardiovascular status: acceptable  Respiratory status: acceptable  Hydration status: acceptable  Comments: I have seen and evaluated the patient and is ready for discharge. Jin Berg MD    Post anesthesia nausea and vomiting:  none      Vitals Value Taken Time   /92 12/17/2019  5:20 PM   Temp 36.7 °C (98 °F) 12/17/2019  5:10 PM   Pulse 81 12/17/2019  5:25 PM   Resp 18 12/17/2019  5:25 PM   SpO2 100 % 12/17/2019  5:25 PM   Vitals shown include unvalidated device data.

## 2019-12-18 ENCOUNTER — APPOINTMENT (OUTPATIENT)
Dept: GENERAL RADIOLOGY | Age: 79
DRG: 417 | End: 2019-12-18
Attending: INTERNAL MEDICINE
Payer: MEDICARE

## 2019-12-18 ENCOUNTER — ANESTHESIA EVENT (OUTPATIENT)
Dept: ENDOSCOPY | Age: 79
DRG: 417 | End: 2019-12-18
Payer: MEDICARE

## 2019-12-18 ENCOUNTER — ANESTHESIA (OUTPATIENT)
Dept: ENDOSCOPY | Age: 79
DRG: 417 | End: 2019-12-18
Payer: MEDICARE

## 2019-12-18 LAB
ALBUMIN SERPL-MCNC: 2.7 G/DL (ref 3.5–5)
ALBUMIN/GLOB SERPL: 0.9 {RATIO} (ref 1.1–2.2)
ALP SERPL-CCNC: 56 U/L (ref 45–117)
ALT SERPL-CCNC: 43 U/L (ref 12–78)
ANION GAP SERPL CALC-SCNC: 3 MMOL/L (ref 5–15)
AST SERPL-CCNC: 40 U/L (ref 15–37)
BILIRUB SERPL-MCNC: 0.5 MG/DL (ref 0.2–1)
BUN SERPL-MCNC: 12 MG/DL (ref 6–20)
BUN/CREAT SERPL: 10 (ref 12–20)
CALCIUM SERPL-MCNC: 8.5 MG/DL (ref 8.5–10.1)
CHLORIDE SERPL-SCNC: 115 MMOL/L (ref 97–108)
CO2 SERPL-SCNC: 26 MMOL/L (ref 21–32)
CREAT SERPL-MCNC: 1.2 MG/DL (ref 0.55–1.02)
ERYTHROCYTE [DISTWIDTH] IN BLOOD BY AUTOMATED COUNT: 12.7 % (ref 11.5–14.5)
GLOBULIN SER CALC-MCNC: 3 G/DL (ref 2–4)
GLUCOSE SERPL-MCNC: 106 MG/DL (ref 65–100)
HCT VFR BLD AUTO: 35.3 % (ref 35–47)
HGB BLD-MCNC: 11.3 G/DL (ref 11.5–16)
LIPASE SERPL-CCNC: 126 U/L (ref 73–393)
MCH RBC QN AUTO: 34.2 PG (ref 26–34)
MCHC RBC AUTO-ENTMCNC: 32 G/DL (ref 30–36.5)
MCV RBC AUTO: 107 FL (ref 80–99)
NRBC # BLD: 0 K/UL (ref 0–0.01)
NRBC BLD-RTO: 0 PER 100 WBC
PLATELET # BLD AUTO: 107 K/UL (ref 150–400)
PMV BLD AUTO: 12 FL (ref 8.9–12.9)
POTASSIUM SERPL-SCNC: 3.7 MMOL/L (ref 3.5–5.1)
PROT SERPL-MCNC: 5.7 G/DL (ref 6.4–8.2)
RBC # BLD AUTO: 3.3 M/UL (ref 3.8–5.2)
SODIUM SERPL-SCNC: 144 MMOL/L (ref 136–145)
WBC # BLD AUTO: 8.8 K/UL (ref 3.6–11)

## 2019-12-18 PROCEDURE — 77030040361 HC SLV COMPR DVT MDII -B: Performed by: INTERNAL MEDICINE

## 2019-12-18 PROCEDURE — 77030026438 HC STYL ET INTUB CARD -A: Performed by: ANESTHESIOLOGY

## 2019-12-18 PROCEDURE — 77030007288 HC DEV LOK BILI BSC -A: Performed by: INTERNAL MEDICINE

## 2019-12-18 PROCEDURE — 36415 COLL VENOUS BLD VENIPUNCTURE: CPT

## 2019-12-18 PROCEDURE — 85027 COMPLETE CBC AUTOMATED: CPT

## 2019-12-18 PROCEDURE — 74011250637 HC RX REV CODE- 250/637: Performed by: SURGERY

## 2019-12-18 PROCEDURE — 76060000033 HC ANESTHESIA 1 TO 1.5 HR: Performed by: INTERNAL MEDICINE

## 2019-12-18 PROCEDURE — 74011250637 HC RX REV CODE- 250/637: Performed by: FAMILY MEDICINE

## 2019-12-18 PROCEDURE — 65270000029 HC RM PRIVATE

## 2019-12-18 PROCEDURE — 74011250636 HC RX REV CODE- 250/636: Performed by: SURGERY

## 2019-12-18 PROCEDURE — C1874 STENT, COATED/COV W/DEL SYS: HCPCS | Performed by: INTERNAL MEDICINE

## 2019-12-18 PROCEDURE — 77030041276 HC SPHNTOM BILI BSC -F: Performed by: INTERNAL MEDICINE

## 2019-12-18 PROCEDURE — 0FC98ZZ EXTIRPATION OF MATTER FROM COMMON BILE DUCT, VIA NATURAL OR ARTIFICIAL OPENING ENDOSCOPIC: ICD-10-PCS | Performed by: INTERNAL MEDICINE

## 2019-12-18 PROCEDURE — 77030009038 HC CATH BILI STN RTVR BSC -C: Performed by: INTERNAL MEDICINE

## 2019-12-18 PROCEDURE — 74011250636 HC RX REV CODE- 250/636: Performed by: INTERNAL MEDICINE

## 2019-12-18 PROCEDURE — 0F798DZ DILATION OF COMMON BILE DUCT WITH INTRALUMINAL DEVICE, VIA NATURAL OR ARTIFICIAL OPENING ENDOSCOPIC: ICD-10-PCS | Performed by: INTERNAL MEDICINE

## 2019-12-18 PROCEDURE — 76040000008: Performed by: INTERNAL MEDICINE

## 2019-12-18 PROCEDURE — 77030008684 HC TU ET CUF COVD -B: Performed by: ANESTHESIOLOGY

## 2019-12-18 PROCEDURE — 74011000258 HC RX REV CODE- 258: Performed by: SURGERY

## 2019-12-18 PROCEDURE — 80053 COMPREHEN METABOLIC PANEL: CPT

## 2019-12-18 PROCEDURE — BF131ZZ FLUOROSCOPY OF GALLBLADDER AND BILE DUCTS USING LOW OSMOLAR CONTRAST: ICD-10-PCS | Performed by: INTERNAL MEDICINE

## 2019-12-18 PROCEDURE — 74011250636 HC RX REV CODE- 250/636: Performed by: NURSE ANESTHETIST, CERTIFIED REGISTERED

## 2019-12-18 PROCEDURE — 74011636320 HC RX REV CODE- 636/320: Performed by: INTERNAL MEDICINE

## 2019-12-18 PROCEDURE — 74011000250 HC RX REV CODE- 250: Performed by: NURSE ANESTHETIST, CERTIFIED REGISTERED

## 2019-12-18 PROCEDURE — 83690 ASSAY OF LIPASE: CPT

## 2019-12-18 PROCEDURE — 74011250637 HC RX REV CODE- 250/637: Performed by: INTERNAL MEDICINE

## 2019-12-18 DEVICE — STENT SYSTEM RMV
Type: IMPLANTABLE DEVICE | Site: BILE DUCT | Status: FUNCTIONAL
Brand: WALLFLEX BILIARY

## 2019-12-18 RX ORDER — FLUMAZENIL 0.1 MG/ML
0.2 INJECTION INTRAVENOUS
Status: DISCONTINUED | OUTPATIENT
Start: 2019-12-18 | End: 2019-12-18 | Stop reason: HOSPADM

## 2019-12-18 RX ORDER — SUCCINYLCHOLINE CHLORIDE 20 MG/ML
INJECTION INTRAMUSCULAR; INTRAVENOUS AS NEEDED
Status: DISCONTINUED | OUTPATIENT
Start: 2019-12-18 | End: 2019-12-18 | Stop reason: HOSPADM

## 2019-12-18 RX ORDER — ONDANSETRON 2 MG/ML
INJECTION INTRAMUSCULAR; INTRAVENOUS AS NEEDED
Status: DISCONTINUED | OUTPATIENT
Start: 2019-12-18 | End: 2019-12-18 | Stop reason: HOSPADM

## 2019-12-18 RX ORDER — LOSARTAN POTASSIUM 50 MG/1
50 TABLET ORAL DAILY
Status: DISCONTINUED | OUTPATIENT
Start: 2019-12-18 | End: 2019-12-18

## 2019-12-18 RX ORDER — SODIUM CHLORIDE 9 MG/ML
INJECTION, SOLUTION INTRAVENOUS
Status: DISCONTINUED | OUTPATIENT
Start: 2019-12-18 | End: 2019-12-18 | Stop reason: HOSPADM

## 2019-12-18 RX ORDER — SODIUM CHLORIDE 0.9 % (FLUSH) 0.9 %
5-40 SYRINGE (ML) INJECTION AS NEEDED
Status: DISCONTINUED | OUTPATIENT
Start: 2019-12-18 | End: 2019-12-22 | Stop reason: HOSPADM

## 2019-12-18 RX ORDER — PROPOFOL 10 MG/ML
INJECTION, EMULSION INTRAVENOUS AS NEEDED
Status: DISCONTINUED | OUTPATIENT
Start: 2019-12-18 | End: 2019-12-18 | Stop reason: HOSPADM

## 2019-12-18 RX ORDER — NALOXONE HYDROCHLORIDE 0.4 MG/ML
0.4 INJECTION, SOLUTION INTRAMUSCULAR; INTRAVENOUS; SUBCUTANEOUS
Status: DISCONTINUED | OUTPATIENT
Start: 2019-12-18 | End: 2019-12-18 | Stop reason: HOSPADM

## 2019-12-18 RX ORDER — FENTANYL CITRATE 50 UG/ML
25-200 INJECTION, SOLUTION INTRAMUSCULAR; INTRAVENOUS
Status: DISCONTINUED | OUTPATIENT
Start: 2019-12-18 | End: 2019-12-18 | Stop reason: HOSPADM

## 2019-12-18 RX ORDER — EPINEPHRINE 0.1 MG/ML
1 INJECTION INTRACARDIAC; INTRAVENOUS
Status: DISCONTINUED | OUTPATIENT
Start: 2019-12-18 | End: 2019-12-18 | Stop reason: HOSPADM

## 2019-12-18 RX ORDER — SODIUM CHLORIDE 9 MG/ML
100 INJECTION, SOLUTION INTRAVENOUS CONTINUOUS
Status: DISPENSED | OUTPATIENT
Start: 2019-12-18 | End: 2019-12-18

## 2019-12-18 RX ORDER — MIDAZOLAM HYDROCHLORIDE 1 MG/ML
.25-1 INJECTION, SOLUTION INTRAMUSCULAR; INTRAVENOUS
Status: DISCONTINUED | OUTPATIENT
Start: 2019-12-18 | End: 2019-12-18 | Stop reason: HOSPADM

## 2019-12-18 RX ORDER — DEXAMETHASONE SODIUM PHOSPHATE 4 MG/ML
INJECTION, SOLUTION INTRA-ARTICULAR; INTRALESIONAL; INTRAMUSCULAR; INTRAVENOUS; SOFT TISSUE AS NEEDED
Status: DISCONTINUED | OUTPATIENT
Start: 2019-12-18 | End: 2019-12-18 | Stop reason: HOSPADM

## 2019-12-18 RX ORDER — DOXAZOSIN 2 MG/1
2 TABLET ORAL DAILY
Status: DISCONTINUED | OUTPATIENT
Start: 2019-12-18 | End: 2019-12-22 | Stop reason: HOSPADM

## 2019-12-18 RX ORDER — LIDOCAINE HYDROCHLORIDE 20 MG/ML
INJECTION, SOLUTION EPIDURAL; INFILTRATION; INTRACAUDAL; PERINEURAL AS NEEDED
Status: DISCONTINUED | OUTPATIENT
Start: 2019-12-18 | End: 2019-12-18 | Stop reason: HOSPADM

## 2019-12-18 RX ORDER — DEXTROMETHORPHAN/PSEUDOEPHED 2.5-7.5/.8
1.2 DROPS ORAL
Status: DISCONTINUED | OUTPATIENT
Start: 2019-12-18 | End: 2019-12-18 | Stop reason: HOSPADM

## 2019-12-18 RX ORDER — PHENYLEPHRINE HCL IN 0.9% NACL 0.4MG/10ML
SYRINGE (ML) INTRAVENOUS AS NEEDED
Status: DISCONTINUED | OUTPATIENT
Start: 2019-12-18 | End: 2019-12-18 | Stop reason: HOSPADM

## 2019-12-18 RX ORDER — SODIUM CHLORIDE 0.9 % (FLUSH) 0.9 %
5-40 SYRINGE (ML) INJECTION EVERY 8 HOURS
Status: DISCONTINUED | OUTPATIENT
Start: 2019-12-18 | End: 2019-12-22 | Stop reason: HOSPADM

## 2019-12-18 RX ORDER — LEVOFLOXACIN 5 MG/ML
500 INJECTION, SOLUTION INTRAVENOUS ONCE
Status: COMPLETED | OUTPATIENT
Start: 2019-12-18 | End: 2019-12-18

## 2019-12-18 RX ORDER — ATROPINE SULFATE 0.1 MG/ML
0.5 INJECTION INTRAVENOUS
Status: DISCONTINUED | OUTPATIENT
Start: 2019-12-18 | End: 2019-12-18 | Stop reason: HOSPADM

## 2019-12-18 RX ORDER — ROCURONIUM BROMIDE 10 MG/ML
INJECTION, SOLUTION INTRAVENOUS AS NEEDED
Status: DISCONTINUED | OUTPATIENT
Start: 2019-12-18 | End: 2019-12-18 | Stop reason: HOSPADM

## 2019-12-18 RX ORDER — SODIUM CHLORIDE, SODIUM LACTATE, POTASSIUM CHLORIDE, CALCIUM CHLORIDE 600; 310; 30; 20 MG/100ML; MG/100ML; MG/100ML; MG/100ML
75 INJECTION, SOLUTION INTRAVENOUS CONTINUOUS
Status: DISCONTINUED | OUTPATIENT
Start: 2019-12-18 | End: 2019-12-22 | Stop reason: HOSPADM

## 2019-12-18 RX ADMIN — LEVOFLOXACIN 500 MG: 5 INJECTION, SOLUTION INTRAVENOUS at 17:41

## 2019-12-18 RX ADMIN — PIPERACILLIN AND TAZOBACTAM 3.38 G: 3; .375 INJECTION, POWDER, FOR SOLUTION INTRAVENOUS at 03:17

## 2019-12-18 RX ADMIN — DILTIAZEM HYDROCHLORIDE 240 MG: 240 CAPSULE, COATED, EXTENDED RELEASE ORAL at 09:42

## 2019-12-18 RX ADMIN — LIDOCAINE HYDROCHLORIDE 60 MG: 20 INJECTION, SOLUTION EPIDURAL; INFILTRATION; INTRACAUDAL; PERINEURAL at 16:46

## 2019-12-18 RX ADMIN — SODIUM CHLORIDE: 900 INJECTION, SOLUTION INTRAVENOUS at 17:41

## 2019-12-18 RX ADMIN — ALLOPURINOL 100 MG: 100 TABLET ORAL at 09:42

## 2019-12-18 RX ADMIN — ROCURONIUM BROMIDE 5 MG: 10 SOLUTION INTRAVENOUS at 16:46

## 2019-12-18 RX ADMIN — DOXAZOSIN 2 MG: 2 TABLET ORAL at 09:42

## 2019-12-18 RX ADMIN — PIPERACILLIN AND TAZOBACTAM 3.38 G: 3; .375 INJECTION, POWDER, FOR SOLUTION INTRAVENOUS at 12:18

## 2019-12-18 RX ADMIN — SUCCINYLCHOLINE CHLORIDE 80 MG: 20 INJECTION, SOLUTION INTRAMUSCULAR; INTRAVENOUS; PARENTERAL at 16:46

## 2019-12-18 RX ADMIN — Medication 80 MCG: at 17:05

## 2019-12-18 RX ADMIN — DEXAMETHASONE SODIUM PHOSPHATE 4 MG: 4 INJECTION, SOLUTION INTRAMUSCULAR; INTRAVENOUS at 17:01

## 2019-12-18 RX ADMIN — IOPAMIDOL 20 ML: 612 INJECTION, SOLUTION INTRAVENOUS at 17:43

## 2019-12-18 RX ADMIN — Medication 1 CAPSULE: at 09:42

## 2019-12-18 RX ADMIN — PIPERACILLIN AND TAZOBACTAM 3.38 G: 3; .375 INJECTION, POWDER, FOR SOLUTION INTRAVENOUS at 19:03

## 2019-12-18 RX ADMIN — ONDANSETRON HYDROCHLORIDE 4 MG: 2 INJECTION, SOLUTION INTRAMUSCULAR; INTRAVENOUS at 17:01

## 2019-12-18 RX ADMIN — PROPOFOL 150 MG: 10 INJECTION, EMULSION INTRAVENOUS at 16:46

## 2019-12-18 RX ADMIN — PSYLLIUM HUSK 1 PACKET: 3.4 POWDER ORAL at 09:42

## 2019-12-18 RX ADMIN — PROPOFOL 50 MG: 10 INJECTION, EMULSION INTRAVENOUS at 17:01

## 2019-12-18 RX ADMIN — Medication 80 MCG: at 17:20

## 2019-12-18 RX ADMIN — Medication 80 MCG: at 17:10

## 2019-12-18 RX ADMIN — Medication 10 ML: at 21:17

## 2019-12-18 RX ADMIN — PROPOFOL 50 MG: 10 INJECTION, EMULSION INTRAVENOUS at 16:50

## 2019-12-18 RX ADMIN — SODIUM CHLORIDE: 900 INJECTION, SOLUTION INTRAVENOUS at 16:40

## 2019-12-18 RX ADMIN — Medication 80 MCG: at 17:01

## 2019-12-18 RX ADMIN — LOVASTATIN 20 MG: 20 TABLET ORAL at 21:17

## 2019-12-18 RX ADMIN — SODIUM CHLORIDE, SODIUM LACTATE, POTASSIUM CHLORIDE, AND CALCIUM CHLORIDE 100 ML/HR: 600; 310; 30; 20 INJECTION, SOLUTION INTRAVENOUS at 19:10

## 2019-12-18 RX ADMIN — PSYLLIUM HUSK 1 PACKET: 3.4 POWDER ORAL at 19:02

## 2019-12-18 RX ADMIN — Medication 80 MCG: at 17:15

## 2019-12-18 NOTE — PROGRESS NOTES
Bedside shift change report given to Juhi Sandoval (oncoming nurse) by Marybeth Vargas (offgoing nurse). Report included the following information SBAR.

## 2019-12-18 NOTE — PROGRESS NOTES
Problem: Falls - Risk of  Goal: *Absence of Falls  Description  Document Clide Failing Fall Risk and appropriate interventions in the flowsheet. Outcome: Progressing Towards Goal  Note: Fall Risk Interventions:  Mobility Interventions: Communicate number of staff needed for ambulation/transfer         Medication Interventions: Patient to call before getting OOB    Elimination Interventions: Call light in reach    History of Falls Interventions: Door open when patient unattended         Problem: Patient Education: Go to Patient Education Activity  Goal: Patient/Family Education  Outcome: Progressing Towards Goal     Problem: Risk for Spread of Infection  Goal: Prevent transmission of infectious organism to others  Description  Prevent the transmission of infectious organisms to other patients, staff members, and visitors.   Outcome: Progressing Towards Goal

## 2019-12-18 NOTE — PROGRESS NOTES
Samantha Harmon, Dhara Colin and Otis Lutz    Admit Date: 12/14/2019      Subjective:     Patient feeling OK this AM. Sore. .       Current Facility-Administered Medications   Medication Dose Route Frequency    losartan (COZAAR) tablet 50 mg  50 mg Oral DAILY    psyllium husk-aspartame (METAMUCIL FIBER) packet 1 Packet  1 Packet Oral BID    allopurinol (ZYLOPRIM) tablet 100 mg  100 mg Oral DAILY    lovastatin (MEVACOR) tablet 20 mg  20 mg Oral QHS    sodium chloride (NS) flush 5-40 mL  5-40 mL IntraVENous Q8H    sodium chloride (NS) flush 5-40 mL  5-40 mL IntraVENous PRN    0.9% sodium chloride infusion  50 mL/hr IntraVENous CONTINUOUS    acetaminophen (TYLENOL) tablet 650 mg  650 mg Oral Q4H PRN    morphine injection 2 mg  2 mg IntraVENous Q4H PRN    ondansetron (ZOFRAN) injection 4 mg  4 mg IntraVENous Q4H PRN    piperacillin-tazobactam (ZOSYN) 3.375 g in 0.9% sodium chloride (MBP/ADV) 100 mL  3.375 g IntraVENous Q8H    albuterol (PROVENTIL VENTOLIN) nebulizer solution 2.5 mg  2.5 mg Nebulization Q4H PRN    dilTIAZem CD (CARDIZEM CD) capsule 240 mg  240 mg Oral DAILY    lactobac ac& pc-s.therm-b.anim (JOSETTE Q/RISAQUAD)  1 Cap Oral DAILY          Objective:     Patient Vitals for the past 8 hrs:   BP Temp Pulse Resp SpO2   12/18/19 0349 (!) 142/99 98.6 °F (37 °C) 88 20 93 %   12/18/19 0006 (!) 136/94 99.1 °F (37.3 °C) 82 22 97 %     No intake/output data recorded. 12/16 1901 - 12/18 0700  In: 3050 [P.O.:100; I.V.:2950]  Out: 2175 [Urine:2175]    Physical Exam: Lungs: clear to auscultation bilaterally  Heart: regular rate and rhythm, S1, S2 normal, no murmur, click, rub or gallop  Abdomen: soft, non-tender.  Bowel sounds normal. No masses,  no organomegaly        Data Review   Recent Results (from the past 24 hour(s))   CBC W/O DIFF    Collection Time: 12/18/19  2:38 AM   Result Value Ref Range    WBC 8.8 3.6 - 11.0 K/uL    RBC 3.30 (L) 3.80 - 5.20 M/uL    HGB 11.3 (L) 11.5 - 16.0 g/dL    HCT 35.3 35.0 - 47.0 %    .0 (H) 80.0 - 99.0 FL    MCH 34.2 (H) 26.0 - 34.0 PG    MCHC 32.0 30.0 - 36.5 g/dL    RDW 12.7 11.5 - 14.5 %    PLATELET 066 (L) 256 - 400 K/uL    MPV 12.0 8.9 - 12.9 FL    NRBC 0.0 0  WBC    ABSOLUTE NRBC 0.00 0.00 - 9.80 K/uL   METABOLIC PANEL, COMPREHENSIVE    Collection Time: 12/18/19  2:38 AM   Result Value Ref Range    Sodium 144 136 - 145 mmol/L    Potassium 3.7 3.5 - 5.1 mmol/L    Chloride 115 (H) 97 - 108 mmol/L    CO2 26 21 - 32 mmol/L    Anion gap 3 (L) 5 - 15 mmol/L    Glucose 106 (H) 65 - 100 mg/dL    BUN 12 6 - 20 MG/DL    Creatinine 1.20 (H) 0.55 - 1.02 MG/DL    BUN/Creatinine ratio 10 (L) 12 - 20      GFR est AA 52 (L) >60 ml/min/1.73m2    GFR est non-AA 43 (L) >60 ml/min/1.73m2    Calcium 8.5 8.5 - 10.1 MG/DL    Bilirubin, total 0.5 0.2 - 1.0 MG/DL    ALT (SGPT) 43 12 - 78 U/L    AST (SGOT) 40 (H) 15 - 37 U/L    Alk.  phosphatase 56 45 - 117 U/L    Protein, total 5.7 (L) 6.4 - 8.2 g/dL    Albumin 2.7 (L) 3.5 - 5.0 g/dL    Globulin 3.0 2.0 - 4.0 g/dL    A-G Ratio 0.9 (L) 1.1 - 2.2     LIPASE    Collection Time: 12/18/19  2:38 AM   Result Value Ref Range    Lipase 126 73 - 393 U/L           Assessment:     Active Problems:    Hypertension ()      SBO ([facility] obstruction) ([facility] (8/18/2016)      Acute renal failure superimposed on stage 4 chronic kidney disease ([facility] (1/25/2018)      Gallstone (12/14/2019)      Common bile duct calculus (12/14/2019)      Abdominal pain (12/14/2019)        Plan:     1) Mobilize today  2) Add Doxazosin to meds for elevated BP

## 2019-12-18 NOTE — PROGRESS NOTES
Bedside shift change report given to Mary Diaz RN (oncoming nurse) by Francisco J Meyers RN (offgoing nurse). Report included the following information SBAR, Kardex, Intake/Output and MAR.

## 2019-12-18 NOTE — PROGRESS NOTES
Vanessa Lora  1940  [id number]    Situation:    Scheduled Procedure: Procedure(s):  ENDOSCOPIC RETROGRADE CHOLANGIOPANCREATOGRAPHY (ERCP)  Verbal report received from: Sathya Kenyon RN  Preoperative diagnosis: CBD Obstruction  14- SBO  17th- gallbladder  18 ercp stones    Background:    Procedure: Procedure(s):  ENDOSCOPIC RETROGRADE CHOLANGIOPANCREATOGRAPHY (ERCP)  Physician performing procedure; Dr. Karson Travis RN    NPO Status/Last PO Intake: Since midnight except meds      Pregnancy Test:Not applicable If yes, result: none    Is the patient taking Blood Thinners: NO  Is the patient diabetic:no   Does the patient have a Pacemaker/Defibrillator in place?: no   Does the patient need antibiotics before/during/after procedure: no   Does the patient have SCD in place:no   Is patient on CONTACT precautions:yes        If yes, what kind of CONTACT precautions: MRAB    Assessment:  Are the vital signs stable prior to patient coming to ENDO?  yes  Is the patient alert/oriented and able to sign consent for the procedures:yes  How does the patient's abdomen feel prior to coming to ENDO? round and soft yes   Does the patient have a patient IV in place?  yes     Recommendation:  Family or Friend present yes     Permission to share finding with Family or Friend yes

## 2019-12-18 NOTE — PROGRESS NOTES
[address]  [address] [facility], [address]       GI PROGRESS NOTE  [facility] office  [phone] NP in-hospital cell phone M-F until 4:30  After 5pm or on weekends, please call  for physician on call      NAME: Lilly Arnold   :  1940   MRN:  [id number]       Subjective:   She reports mild incisional soreness. No nausea or pain. Objective:     VITALS:   Last 24hrs VS reviewed since prior progress note. Most recent are:  Visit Vitals  /89 (BP 1 Location: Left arm, BP Patient Position: At rest)   Pulse 87   Temp 99.1 °F (37.3 °C)   Resp 20   Ht 5' 6\" (1.676 m)   Wt 81.6 kg (180 lb)   SpO2 93%   BMI 29.05 kg/m²       PHYSICAL EXAM:  General: Cooperative, no acute distress    Neurologic:  Alert and oriented X 3. HEENT: EOMI, no scleral icterus   Lungs:  CTA bilaterally. No wheezing  Heart:  S1 S2   Abdomen: Soft, non-distended, no tenderness. +Bowel sounds  Extremities: No edema  Psych:   Good insight. Not anxious or agitated.     Lab Data Reviewed:     Recent Results (from the past 24 hour(s))   CBC W/O DIFF    Collection Time: 19  2:38 AM   Result Value Ref Range    WBC 8.8 3.6 - 11.0 K/uL    RBC 3.30 (L) 3.80 - 5.20 M/uL    HGB 11.3 (L) 11.5 - 16.0 g/dL    HCT 35.3 35.0 - 47.0 %    .0 (H) 80.0 - 99.0 FL    MCH 34.2 (H) 26.0 - 34.0 PG    MCHC 32.0 30.0 - 36.5 g/dL    RDW 12.7 11.5 - 14.5 %    PLATELET 732 (L) 691 - 400 K/uL    MPV 12.0 8.9 - 12.9 FL    NRBC 0.0 0  WBC    ABSOLUTE NRBC 0.00 0.00 - 5.35 K/uL   METABOLIC PANEL, COMPREHENSIVE    Collection Time: 19  2:38 AM   Result Value Ref Range    Sodium 144 136 - 145 mmol/L    Potassium 3.7 3.5 - 5.1 mmol/L    Chloride 115 (H) 97 - 108 mmol/L    CO2 26 21 - 32 mmol/L    Anion gap 3 (L) 5 - 15 mmol/L    Glucose 106 (H) 65 - 100 mg/dL    BUN 12 6 - 20 MG/DL    Creatinine 1.20 (H) 0.55 - 1.02 MG/DL    BUN/Creatinine ratio 10 (L) 12 - 20      GFR est AA 52 (L) >60 ml/min/1.73m2    GFR est non-AA 43 (L) >60 ml/min/1.73m2    Calcium 8.5 8.5 - 10.1 MG/DL    Bilirubin, total 0.5 0.2 - 1.0 MG/DL    ALT (SGPT) 43 12 - 78 U/L    AST (SGOT) 40 (H) 15 - 37 U/L    Alk. phosphatase 56 45 - 117 U/L    Protein, total 5.7 (L) 6.4 - 8.2 g/dL    Albumin 2.7 (L) 3.5 - 5.0 g/dL    Globulin 3.0 2.0 - 4.0 g/dL    A-G Ratio 0.9 (L) 1.1 - 2.2     LIPASE    Collection Time: 12/18/19  2:38 AM   Result Value Ref Range    Lipase 126 73 - 393 U/L            Assessment:   · Symptomatic cholelithiasis: MRCP was negative for CBD stones and normal LFT, possible choledocholithiasis - IOC with CBD obstruction      Patient Active Problem List   Diagnosis Code    Hypertension I10    Hypercholesterolemia E78.00    Wound dehiscence T81.30XA    Cerebral thrombosis with cerebral infarction (HCC) I63.30    Abscess L02.91    Anemia D64.9    GI bleed K92.2    Tachycardia R00.0    Acute post-hemorrhagic anemia D62    Postoperative hypovolemic shock T81.19XA    SBO (small bowel obstruction) (HCC) K56.609    Obstruction of bowel (HCC) K56.609    Small bowel obstruction (HCC) K56.609    Paroxysmal SVT (supraventricular tachycardia) (HCC) I47.1    Acute renal failure superimposed on stage 4 chronic kidney disease (HCC) N17.9, N18.4    Dehydration E86.0    Bronchitis J40    Gallstone K80.20    Common bile duct calculus K80.50    Abdominal pain R10.9     Plan:   · Antibiotics  · NPO  · No anticoagulation   · Plan for ERCP this afternoon with Dr. Margie Coates, risks discussed with patient and  and patient is agreeable      Signed By: Marsha Serna NP     12/18/2019  10:37 AM        I have examined the patient. I have reviewed the chart and agree with the documentation recorded by the NP, including the assessment, treatment plan, and disposition.     Patient 's case was discussed yesterday with Dr. Sara Rooney after lpa chol and abnormal IOC who requested ERCP for biliary obstruction  I agree with ERCP, discussed risks and benefits with patient this am, agreeable  Discussed also with Dr. Jeromy Burnett, who was agreeable with ERCP    Santa Goodwin MD

## 2019-12-18 NOTE — PROCEDURES
[address]  [address]       NAME:  Yas James   :   1940   MRN:   [id number]       Procedure Type:   ERCPwith biliary sphincterotomy, biliary stone removal, biliary stent placement     Indications: abnormal CT/MRCP, bile duct obstruction seen on IOC  Pre-operative Diagnosis: see indication above  Post-operative Diagnosis:  See findings below  : Yifan Bridges MD    Surgical Assistant: None    Implants:  stent    Referring Provider:     Misty Harmon MD      Sedation:  General anesthesia, levaquin 500 mg ivx1, indocin 100 mg suppx1    Procedure Details:  After informed consent was obtained with all risks and benefits of procedure explained, the patient was taken to the fluoroscopy suite and placed in the prone position. Upon sequential sedation as per above, the Olympus duodenoscope [id number]   was inserted via the mouthpeice and carefully advanced to the second portion of the duodenum. The quality of visualization was excellent. The duodenoscope was withdrawn into a short position.       Findings:   Esophagus:normal  Stomach: normal   Duodenum/jejunum: normal    Ampulla:-normal , it was flat shape, no mass seen  I attempt to cannulate the CBD over guided wire by using sphincterotome ( jagtome, RX, [employer]), the wire got into the CBD after few attempts but unable initially to advance the sphincterotome beyond papilla, then I performed 10 mm biliary sphincterotomy in standard fashion     Cholangiogram: -there was dilatation of the CBD up to 14 mm with severe papillary stenosis, no bile duct stricture seen  I was able after  sphincterotomy to advance the sphincterotome into to the CBD with some resistance    I dragged down the CBD, a biliary extraction balloon ( 9 to 12 mm), sludge came out, no stones seen, I could not pass 9 mm inflated balloon through the papilla, suggestive of stenosis  Occlusive cholangiogram at end of procedure was still showing the CBD dilatation with papillary stenosis  I placed fully covered metal stent ( wallflex, 10x60 mm, [employer]) in distal CBD and  through the papilla to assure patency of the bile duct and to provide continuous dilatation of the papillary stenosis    Pancreatogram:not performed  I performed all immediate radiologic interpretation during this procedure    Specimen Removed:  None    Complications: None. EBL:  None.       Impression:  See findings    Recommendations:     Clear liquids  LR at 100 ml/h  Labs in am will follow   Repeat ERCP for stent removal in 3 months  Will follow    Signed By: Casey Albert MD     12/18/2019  5:43 PM

## 2019-12-18 NOTE — ANESTHESIA POSTPROCEDURE EVALUATION
Procedure(s):  ENDOSCOPIC RETROGRADE CHOLANGIOPANCREATOGRAPHY (ERCP)  ENDOSCOPIC SPHINCTEROTOMY  ENDOSCOPIC STONE EXTRACTION/BALLOON SWEEP  ENDOSCOPY WITH PROSTHESIS OR STENT PLACEMENT. general    <BSHSIANPOST>    Vitals Value Taken Time   /77 12/18/2019  6:04 PM   Temp 36.7 °C (98 °F) 12/18/2019  5:57 PM   Pulse 81 12/18/2019  6:08 PM   Resp 23 12/18/2019  6:08 PM   SpO2 98 % 12/18/2019  6:08 PM   Vitals shown include unvalidated device data.

## 2019-12-18 NOTE — PROGRESS NOTES
TRANSFER - OUT REPORT:    Verbal report given to Beatris(name) on Brandan Lino  being transferred to (unit) for routine progression of care       Report consisted of patients Situation, Background, Assessment and   Recommendations(SBAR). Information from the following report(s) SBAR, Procedure Summary, Intake/Output and MAR was reviewed with the receiving nurse. Lines:   Peripheral IV 12/17/19 Anterior;Proximal;Right Forearm (Active)   Site Assessment Clean, dry, & intact 12/18/2019  8:06 AM   Phlebitis Assessment 0 12/18/2019  8:06 AM   Infiltration Assessment 0 12/18/2019  8:06 AM   Dressing Status Clean, dry, & intact 12/18/2019  8:06 AM   Dressing Type Transparent 12/18/2019  8:06 AM   Hub Color/Line Status Blue;Capped 12/18/2019  8:06 AM   Action Taken Open ports on tubing capped 12/18/2019  8:06 AM   Alcohol Cap Used Yes 12/18/2019  8:06 AM       Peripheral IV 12/17/19 Right Hand (Active)   Site Assessment Clean, dry, & intact 12/18/2019  8:06 AM   Phlebitis Assessment 0 12/18/2019  8:06 AM   Infiltration Assessment 0 12/18/2019  8:06 AM   Dressing Status Clean, dry, & intact 12/18/2019  8:06 AM   Dressing Type Transparent 12/18/2019  8:06 AM   Hub Color/Line Status Pink; Infusing 12/18/2019  8:06 AM   Action Taken Open ports on tubing capped 12/18/2019  8:06 AM   Alcohol Cap Used Yes 12/18/2019  8:06 AM        Opportunity for questions and clarification was provided.

## 2019-12-18 NOTE — ANESTHESIA PREPROCEDURE EVALUATION
Relevant Problems   No relevant active problems       Anesthetic History   No history of anesthetic complications            Review of Systems / Medical History  Patient summary reviewed, nursing notes reviewed and pertinent labs reviewed    Pulmonary  Within defined limits                 Neuro/Psych   Within defined limits      TIA     Cardiovascular  Within defined limits  Hypertension                   GI/Hepatic/Renal  Within defined limits              Endo/Other  Within defined limits      Arthritis     Other Findings              Physical Exam    Airway  Mallampati: II  TM Distance: > 6 cm  Neck ROM: normal range of motion   Mouth opening: Normal     Cardiovascular  Regular rate and rhythm,  S1 and S2 normal,  no murmur, click, rub, or gallop             Dental  No notable dental hx       Pulmonary  Breath sounds clear to auscultation               Abdominal  GI exam deferred       Other Findings            Anesthetic Plan    ASA: 3  Anesthesia type: general          Induction: Intravenous  Anesthetic plan and risks discussed with: Patient

## 2019-12-19 LAB
ALBUMIN SERPL-MCNC: 2.4 G/DL (ref 3.5–5)
ALBUMIN/GLOB SERPL: 0.7 {RATIO} (ref 1.1–2.2)
ALP SERPL-CCNC: 52 U/L (ref 45–117)
ALT SERPL-CCNC: 36 U/L (ref 12–78)
ANION GAP SERPL CALC-SCNC: 6 MMOL/L (ref 5–15)
AST SERPL-CCNC: 21 U/L (ref 15–37)
BILIRUB SERPL-MCNC: 0.4 MG/DL (ref 0.2–1)
BUN SERPL-MCNC: 12 MG/DL (ref 6–20)
BUN/CREAT SERPL: 9 (ref 12–20)
CALCIUM SERPL-MCNC: 8.6 MG/DL (ref 8.5–10.1)
CHLORIDE SERPL-SCNC: 114 MMOL/L (ref 97–108)
CO2 SERPL-SCNC: 24 MMOL/L (ref 21–32)
CREAT SERPL-MCNC: 1.32 MG/DL (ref 0.55–1.02)
ERYTHROCYTE [DISTWIDTH] IN BLOOD BY AUTOMATED COUNT: 12.3 % (ref 11.5–14.5)
GLOBULIN SER CALC-MCNC: 3.4 G/DL (ref 2–4)
GLUCOSE SERPL-MCNC: 137 MG/DL (ref 65–100)
HCT VFR BLD AUTO: 34.4 % (ref 35–47)
HGB BLD-MCNC: 10.8 G/DL (ref 11.5–16)
LIPASE SERPL-CCNC: >3000 U/L (ref 73–393)
MCH RBC QN AUTO: 33.8 PG (ref 26–34)
MCHC RBC AUTO-ENTMCNC: 31.4 G/DL (ref 30–36.5)
MCV RBC AUTO: 107.5 FL (ref 80–99)
NRBC # BLD: 0 K/UL (ref 0–0.01)
NRBC BLD-RTO: 0 PER 100 WBC
PLATELET # BLD AUTO: 91 K/UL (ref 150–400)
PMV BLD AUTO: 12.3 FL (ref 8.9–12.9)
POTASSIUM SERPL-SCNC: 3.8 MMOL/L (ref 3.5–5.1)
PROT SERPL-MCNC: 5.8 G/DL (ref 6.4–8.2)
RBC # BLD AUTO: 3.2 M/UL (ref 3.8–5.2)
SODIUM SERPL-SCNC: 144 MMOL/L (ref 136–145)
WBC # BLD AUTO: 6.6 K/UL (ref 3.6–11)

## 2019-12-19 PROCEDURE — 36415 COLL VENOUS BLD VENIPUNCTURE: CPT

## 2019-12-19 PROCEDURE — 85027 COMPLETE CBC AUTOMATED: CPT

## 2019-12-19 PROCEDURE — 74011250637 HC RX REV CODE- 250/637: Performed by: SURGERY

## 2019-12-19 PROCEDURE — 83690 ASSAY OF LIPASE: CPT

## 2019-12-19 PROCEDURE — 65270000029 HC RM PRIVATE

## 2019-12-19 PROCEDURE — 80053 COMPREHEN METABOLIC PANEL: CPT

## 2019-12-19 PROCEDURE — 74011250636 HC RX REV CODE- 250/636: Performed by: SURGERY

## 2019-12-19 PROCEDURE — 74011000258 HC RX REV CODE- 258: Performed by: SURGERY

## 2019-12-19 PROCEDURE — 74011250636 HC RX REV CODE- 250/636: Performed by: INTERNAL MEDICINE

## 2019-12-19 PROCEDURE — 74011250637 HC RX REV CODE- 250/637: Performed by: FAMILY MEDICINE

## 2019-12-19 RX ADMIN — PSYLLIUM HUSK 1 PACKET: 3.4 POWDER ORAL at 18:12

## 2019-12-19 RX ADMIN — Medication 1 CAPSULE: at 09:05

## 2019-12-19 RX ADMIN — SODIUM CHLORIDE, SODIUM LACTATE, POTASSIUM CHLORIDE, AND CALCIUM CHLORIDE 100 ML/HR: 600; 310; 30; 20 INJECTION, SOLUTION INTRAVENOUS at 07:01

## 2019-12-19 RX ADMIN — PIPERACILLIN AND TAZOBACTAM 3.38 G: 3; .375 INJECTION, POWDER, FOR SOLUTION INTRAVENOUS at 11:34

## 2019-12-19 RX ADMIN — DILTIAZEM HYDROCHLORIDE 240 MG: 240 CAPSULE, COATED, EXTENDED RELEASE ORAL at 09:05

## 2019-12-19 RX ADMIN — PIPERACILLIN AND TAZOBACTAM 3.38 G: 3; .375 INJECTION, POWDER, FOR SOLUTION INTRAVENOUS at 18:12

## 2019-12-19 RX ADMIN — Medication 10 ML: at 21:06

## 2019-12-19 RX ADMIN — ALLOPURINOL 100 MG: 100 TABLET ORAL at 09:05

## 2019-12-19 RX ADMIN — DOXAZOSIN 2 MG: 2 TABLET ORAL at 09:05

## 2019-12-19 RX ADMIN — LOVASTATIN 20 MG: 20 TABLET ORAL at 21:34

## 2019-12-19 RX ADMIN — PSYLLIUM HUSK 1 PACKET: 3.4 POWDER ORAL at 09:05

## 2019-12-19 RX ADMIN — PIPERACILLIN AND TAZOBACTAM 3.38 G: 3; .375 INJECTION, POWDER, FOR SOLUTION INTRAVENOUS at 03:35

## 2019-12-19 NOTE — PROGRESS NOTES
[facility]  Daily Progress Note    Admit Date: 2019  Post-Operative Day: 2     19: LAPAROSCOPIC CHOLECYSTECTOMY WITH CHOLANGIOGRAM WITH MINI LAPAROTOMY WITH LYSIS OF ADHESIONS   : ENDOSCOPIC RETROGRADE CHOLANGIOPANCREATOGRAPHY (ERCP)  ENDOSCOPIC SPHINCTEROTOMY  ENDOSCOPIC STONE EXTRACTION/BALLOON SWEEP  ENDOSCOPY WITH PROSTHESIS OR STENT PLACEMENT     Subjective:     Last 24 hrs: Overall feeling well. Denies pain. Ambulating around room with walker, + BM. Hungry. WBC 6.6, Hgb 10.8. Lipase >3,000. Diet: GI Lite. Objective:     Blood pressure 137/81, pulse 82, temperature 98.6 °F (37 °C), resp. rate 14, height 5' 6\" (1.676 m), weight 81.6 kg (179 lb 14.3 oz), SpO2 92 %. Temp (24hrs), Av.4 °F (36.9 °C), Min:98 °F (36.7 °C), Max:99 °F (37.2 °C)      _____________________  Physical Exam:     Alert and Oriented, ambulating around room with walker, no acute distress. Cardiovascular: RRR  Lungs:CTAB   Abdomen: soft, NT. Laparoscopy sites healing well. Assessment:   Active Problems:    Hypertension ()      SBO (small bowel obstruction) ([facility] (2016)      Acute renal failure superimposed on stage 4 chronic kidney disease ([facility] (2018)      Gallstone (2019)      Common bile duct calculus (2019)      Abdominal pain (2019)    s/p LAPAROSCOPIC CHOLECYSTECTOMY WITH CHOLANGIOGRAM WITH MINI LAPAROTOMY WITH LYSIS OF ADHESIONS. Found to have biliary obstruction on cholangiogram.  ERCP with stone extraction, stent placement, and sphincterotomy performed the next day. Pancreatitis - surprisingly she does not seem to be having any pain from this at present. Plan:     Doing well.   Continue GI Lite  Labs in am  Following        Stefani Ceballos, ACNP - [address] [facility] [place] [address],  [facility], [address] [place] [address]  [phone]    Data Review:    Recent Labs     19  9947 19  1230 WBC 6.6 8.8   HGB 10.8* 11.3*   HCT 34.4* 35.3   PLT 91* 107*     Recent Labs     12/19/19  0340 12/18/19  0238    144   K 3.8 3.7   * 115*   CO2 24 26   * 106*   BUN 12 12   CREA 1.32* 1.20*   CA 8.6 8.5   ALB 2.4* 2.7*   TBILI 0.4 0.5   SGOT 21 40*   ALT 36 43     Recent Labs     12/19/19  0340 12/18/19  0238   LPSE >3,000* 126           ______________________  Medications:    Current Facility-Administered Medications   Medication Dose Route Frequency    doxazosin (CARDURA) tablet 2 mg  2 mg Oral DAILY    sodium chloride (NS) flush 5-40 mL  5-40 mL IntraVENous Q8H    sodium chloride (NS) flush 5-40 mL  5-40 mL IntraVENous PRN    lactated Ringers infusion  100 mL/hr IntraVENous CONTINUOUS    psyllium husk-aspartame (METAMUCIL FIBER) packet 1 Packet  1 Packet Oral BID    allopurinol (ZYLOPRIM) tablet 100 mg  100 mg Oral DAILY    lovastatin (MEVACOR) tablet 20 mg  20 mg Oral QHS    sodium chloride (NS) flush 5-40 mL  5-40 mL IntraVENous Q8H    sodium chloride (NS) flush 5-40 mL  5-40 mL IntraVENous PRN    acetaminophen (TYLENOL) tablet 650 mg  650 mg Oral Q4H PRN    morphine injection 2 mg  2 mg IntraVENous Q4H PRN    ondansetron (ZOFRAN) injection 4 mg  4 mg IntraVENous Q4H PRN    piperacillin-tazobactam (ZOSYN) 3.375 g in 0.9% sodium chloride (MBP/ADV) 100 mL  3.375 g IntraVENous Q8H    albuterol (PROVENTIL VENTOLIN) nebulizer solution 2.5 mg  2.5 mg Nebulization Q4H PRN    dilTIAZem CD (CARDIZEM CD) capsule 240 mg  240 mg Oral DAILY    lactobac ac& pc-s.therm-b.anim (JOSETTE Q/RISAQUAD)  1 Cap Oral DAILY       ADDENDUM:  Chan Canales MD  Pt seen and examined. Pt is doing well. Denied any abdominal pain just some soreness with movement. Lipase is elevated, likely from ERCP. Will continue to follow. Hopefully DC home when lipase normalizes.

## 2019-12-19 NOTE — PROGRESS NOTES
Samantha James, Dhara Fonseca Che, and Lynne Fang Date: 12/14/2019      Subjective:     Patient feeling well this AM.Had ERCP with stenting. AF, VSS    Current Facility-Administered Medications   Medication Dose Route Frequency    doxazosin (CARDURA) tablet 2 mg  2 mg Oral DAILY    sodium chloride (NS) flush 5-40 mL  5-40 mL IntraVENous Q8H    sodium chloride (NS) flush 5-40 mL  5-40 mL IntraVENous PRN    lactated Ringers infusion  100 mL/hr IntraVENous CONTINUOUS    psyllium husk-aspartame (METAMUCIL FIBER) packet 1 Packet  1 Packet Oral BID    allopurinol (ZYLOPRIM) tablet 100 mg  100 mg Oral DAILY    lovastatin (MEVACOR) tablet 20 mg  20 mg Oral QHS    sodium chloride (NS) flush 5-40 mL  5-40 mL IntraVENous Q8H    sodium chloride (NS) flush 5-40 mL  5-40 mL IntraVENous PRN    acetaminophen (TYLENOL) tablet 650 mg  650 mg Oral Q4H PRN    morphine injection 2 mg  2 mg IntraVENous Q4H PRN    ondansetron (ZOFRAN) injection 4 mg  4 mg IntraVENous Q4H PRN    piperacillin-tazobactam (ZOSYN) 3.375 g in 0.9% sodium chloride (MBP/ADV) 100 mL  3.375 g IntraVENous Q8H    albuterol (PROVENTIL VENTOLIN) nebulizer solution 2.5 mg  2.5 mg Nebulization Q4H PRN    dilTIAZem CD (CARDIZEM CD) capsule 240 mg  240 mg Oral DAILY    lactobac ac& pc-s.therm-b.anim (JOSETTE Q/RISAQUAD)  1 Cap Oral DAILY          Objective:     Patient Vitals for the past 8 hrs:   BP Temp Pulse Resp SpO2   12/19/19 0422 124/77 98.6 °F (37 °C) 80 14 94 %     No intake/output data recorded. 12/17 1901 - 12/19 0700  In: 1800 [P.O.:300; I.V.:1500]  Out: 1525 [Urine:1525]    Physical Exam: Lungs: clear to auscultation bilaterally  Heart: regular rate and rhythm, S1, S2 normal, no murmur, click, rub or gallop  Abdomen: soft, non-tender.  Bowel sounds normal. No masses,  no organomegaly        Data Review   Recent Results (from the past 24 hour(s))   LIPASE    Collection Time: 12/19/19  3:40 AM   Result Value Ref Range    Lipase >3,000 (H) 73 - 566 U/L   METABOLIC PANEL, COMPREHENSIVE    Collection Time: 12/19/19  3:40 AM   Result Value Ref Range    Sodium 144 136 - 145 mmol/L    Potassium 3.8 3.5 - 5.1 mmol/L    Chloride 114 (H) 97 - 108 mmol/L    CO2 24 21 - 32 mmol/L    Anion gap 6 5 - 15 mmol/L    Glucose 137 (H) 65 - 100 mg/dL    BUN 12 6 - 20 MG/DL    Creatinine 1.32 (H) 0.55 - 1.02 MG/DL    BUN/Creatinine ratio 9 (L) 12 - 20      GFR est AA 47 (L) >60 ml/min/1.73m2    GFR est non-AA 39 (L) >60 ml/min/1.73m2    Calcium 8.6 8.5 - 10.1 MG/DL    Bilirubin, total 0.4 0.2 - 1.0 MG/DL    ALT (SGPT) 36 12 - 78 U/L    AST (SGOT) 21 15 - 37 U/L    Alk.  phosphatase 52 45 - 117 U/L    Protein, total 5.8 (L) 6.4 - 8.2 g/dL    Albumin 2.4 (L) 3.5 - 5.0 g/dL    Globulin 3.4 2.0 - 4.0 g/dL    A-G Ratio 0.7 (L) 1.1 - 2.2     CBC W/O DIFF    Collection Time: 12/19/19  3:40 AM   Result Value Ref Range    WBC 6.6 3.6 - 11.0 K/uL    RBC 3.20 (L) 3.80 - 5.20 M/uL    HGB 10.8 (L) 11.5 - 16.0 g/dL    HCT 34.4 (L) 35.0 - 47.0 %    .5 (H) 80.0 - 99.0 FL    MCH 33.8 26.0 - 34.0 PG    MCHC 31.4 30.0 - 36.5 g/dL    RDW 12.3 11.5 - 14.5 %    PLATELET 91 (L) 300 - 400 K/uL    MPV 12.3 8.9 - 12.9 FL    NRBC 0.0 0  WBC    ABSOLUTE NRBC 0.00 0.00 - 0.01 K/uL           Assessment:     Active Problems:    Hypertension ()      SBO (small bowel obstruction) (HCC) (8/18/2016)      Acute renal failure superimposed on stage 4 chronic kidney disease ([facility] [address] (1/25/2018)      Gallstone (12/14/2019)      Common bile duct calculus (12/14/2019)      Abdominal pain (12/14/2019)        Plan:     1) Abx  2) mobilize  3) D/C timing according to surgery

## 2019-12-19 NOTE — PROGRESS NOTES
Bedside shift change report given to Jacqueline Recio RN (oncoming nurse) by Keaton Mccollum RN (offgoing nurse). Report included the following information SBAR, Kardex, Intake/Output, MAR and Recent Results.

## 2019-12-19 NOTE — PROGRESS NOTES
[address]  [address] [facility], [address]       GI PROGRESS NOTE  Will Louis Cobian  [phone] office  [phone] NP/PA in-hospital cell phone M-F until 4:30PM  After 5PM or on weekends, please call  for physician on call      NAME: Trell Graves   :  1940   MRN:  [id number]       Subjective:   Patient denies nausea, vomiting, or abdominal pain.  is at the bedside. ERCP was done yesterday. She is on clear liquids. Objective:     VITALS:   Last 24hrs VS reviewed since prior progress note. Most recent are:  Visit Vitals  /81   Pulse 82   Temp 98.6 °F (37 °C)   Resp 14   Ht 5' 6\" (1.676 m)   Wt 81.6 kg (179 lb 14.3 oz)   SpO2 92%   BMI 29.04 kg/m²       PHYSICAL EXAM:  General: Cooperative, no acute distress    Neurologic:  Alert and oriented  HEENT: EOMI, no scleral icterus   Lungs:  CTA bilaterally anteriorly  Heart:  S1 S2  Abdomen: Soft, non-distended, no tenderness, no guarding, no rebound. +Bowel sounds. Incisions clean, dry, and intact. Extremities: Warm  Psych:   Not anxious or agitated    Lab Data Reviewed:     Recent Results (from the past 24 hour(s))   LIPASE    Collection Time: 19  3:40 AM   Result Value Ref Range    Lipase >3,000 (H) 73 - 147 U/L   METABOLIC PANEL, COMPREHENSIVE    Collection Time: 19  3:40 AM   Result Value Ref Range    Sodium 144 136 - 145 mmol/L    Potassium 3.8 3.5 - 5.1 mmol/L    Chloride 114 (H) 97 - 108 mmol/L    CO2 24 21 - 32 mmol/L    Anion gap 6 5 - 15 mmol/L    Glucose 137 (H) 65 - 100 mg/dL    BUN 12 6 - 20 MG/DL    Creatinine 1.32 (H) 0.55 - 1.02 MG/DL    BUN/Creatinine ratio 9 (L) 12 - 20      GFR est AA 47 (L) >60 ml/min/1.73m2    GFR est non-AA 39 (L) >60 ml/min/1.73m2    Calcium 8.6 8.5 - 10.1 MG/DL    Bilirubin, total 0.4 0.2 - 1.0 MG/DL    ALT (SGPT) 36 12 - 78 U/L    AST (SGOT) 21 15 - 37 U/L    Alk.  phosphatase 52 45 - 117 U/L    Protein, total 5.8 (L) 6.4 - 8.2 g/dL    Albumin 2.4 (L) 3.5 - 5.0 g/dL    Globulin 3.4 2.0 - 4.0 g/dL    A-G Ratio 0.7 (L) 1.1 - 2.2     CBC W/O DIFF    Collection Time: 12/19/19  3:40 AM   Result Value Ref Range    WBC 6.6 3.6 - 11.0 K/uL    RBC 3.20 (L) 3.80 - 5.20 M/uL    HGB 10.8 (L) 11.5 - 16.0 g/dL    HCT 34.4 (L) 35.0 - 47.0 %    .5 (H) 80.0 - 99.0 FL    MCH 33.8 26.0 - 34.0 PG    MCHC 31.4 30.0 - 36.5 g/dL    RDW 12.3 11.5 - 14.5 %    PLATELET 91 (L) 143 - 400 K/uL    MPV 12.3 8.9 - 12.9 FL    NRBC 0.0 0  WBC    ABSOLUTE NRBC 0.00 0.00 - 0.01 K/uL       Assessment:   · Symptomatic cholelithiasis: MRCP was negative for common bile duct stones and normal LFTs. Status post laparoscopic cholecystectomy (12/17/19) with intraoperative cholangiogram demonstrating common bile duct obstruction  · Status post ERCP with biliary sphincterotomy and biliary stent placement (12/18/19): AST: 21, ALT: 36, alkaline phosphatase: 52, total bilirubin: 0.4, lipase <3,000.       Patient Active Problem List   Diagnosis Code    Hypertension I10    Hypercholesterolemia E78.00    Wound dehiscence T81.30XA    Cerebral thrombosis with cerebral infarction ([facility] [address] I63.30    Abscess L02.91    Anemia D64.9    GI bleed K92.2    Tachycardia R00.0    Acute post-hemorrhagic anemia D62    Postoperative hypovolemic shock T81.19XA    SBO (small bowel obstruction) (HCC) K56.609    Obstruction of bowel (HCC) K56.609    Small bowel obstruction (HCC) K56.609    Paroxysmal SVT (supraventricular tachycardia) (HCC) I47.1    Acute renal failure superimposed on stage 4 chronic kidney disease (HCC) N17.9, N18.4    Dehydration E86.0    Bronchitis J40    Gallstone K80.20    Common bile duct calculus K80.50    Abdominal pain R10.9     Plan:   · Advance to GI lite diet  · LR  · Continue supportive measures  · Follow LFTs and lipase  · Postoperative management per surgery  · Plan for repeat ERCP for stent removal in 3 months     Signed By: JASON Stephens     12/19/2019  10:13 AM S/p ERCP with sphincterotomy and stenting for severe papillary stenosis  Elevated lipase but no GI complaints, suspect post ERCP chemical elevation of lipase without clinical implication  D/c per surgery  I would recommend to finish off 2 weeks course of antibiotics, may use levaquin 500 mg PO/d  F/u with me, scheduled for 2/7/2020  Will follow as needed

## 2019-12-19 NOTE — PROGRESS NOTES
Progress Note    Patient: Xiao Goncalves MRN: [id number]  SSN: [id number]    YOB: 1940  Age: 78 y.o. Sex: female      Admit Date: 2019    Day of Surgery    Procedure:  Procedure(s):  ENDOSCOPIC RETROGRADE CHOLANGIOPANCREATOGRAPHY (ERCP)  ENDOSCOPIC SPHINCTEROTOMY  ENDOSCOPIC STONE EXTRACTION/BALLOON SWEEP  ENDOSCOPY WITH PROSTHESIS OR STENT PLACEMENT    Subjective:     No acute surgical issues. Pt reported some abdominal soreness but pain is otherwise under control. Tolerating clears without nausea or vomiting.     Objective:     Visit Vitals  /87 (BP 1 Location: Left arm, BP Patient Position: At rest)   Pulse 84   Temp 98.3 °F (36.8 °C)   Resp 18   Ht 5' 6\" (1.676 m)   Wt 179 lb 14.3 oz (81.6 kg)   SpO2 97%   BMI 29.04 kg/m²       Temp (24hrs), Av.6 °F (37 °C), Min:98 °F (36.7 °C), Max:99.1 °F (37.3 °C)        Physical Exam:    Gen:  NAD  Pulm:  Unlabored  Abd:  S/ND/appropriate TTP  Wound:  C/D/I    Recent Results (from the past 24 hour(s))   CBC W/O DIFF    Collection Time: 19  2:38 AM   Result Value Ref Range    WBC 8.8 3.6 - 11.0 K/uL    RBC 3.30 (L) 3.80 - 5.20 M/uL    HGB 11.3 (L) 11.5 - 16.0 g/dL    HCT 35.3 35.0 - 47.0 %    .0 (H) 80.0 - 99.0 FL    MCH 34.2 (H) 26.0 - 34.0 PG    MCHC 32.0 30.0 - 36.5 g/dL    RDW 12.7 11.5 - 14.5 %    PLATELET 193 (L) 238 - 400 K/uL    MPV 12.0 8.9 - 12.9 FL    NRBC 0.0 0  WBC    ABSOLUTE NRBC 0.00 0.00 - 7.25 K/uL   METABOLIC PANEL, COMPREHENSIVE    Collection Time: 19  2:38 AM   Result Value Ref Range    Sodium 144 136 - 145 mmol/L    Potassium 3.7 3.5 - 5.1 mmol/L    Chloride 115 (H) 97 - 108 mmol/L    CO2 26 21 - 32 mmol/L    Anion gap 3 (L) 5 - 15 mmol/L    Glucose 106 (H) 65 - 100 mg/dL    BUN 12 6 - 20 MG/DL    Creatinine 1.20 (H) 0.55 - 1.02 MG/DL    BUN/Creatinine ratio 10 (L) 12 - 20      GFR est AA 52 (L) >60 ml/min/1.73m2    GFR est non-AA 43 (L) >60 ml/min/1.73m2    Calcium 8.5 8.5 - 10.1 MG/DL Bilirubin, total 0.5 0.2 - 1.0 MG/DL    ALT (SGPT) 43 12 - 78 U/L    AST (SGOT) 40 (H) 15 - 37 U/L    Alk.  phosphatase 56 45 - 117 U/L    Protein, total 5.7 (L) 6.4 - 8.2 g/dL    Albumin 2.7 (L) 3.5 - 5.0 g/dL    Globulin 3.0 2.0 - 4.0 g/dL    A-G Ratio 0.9 (L) 1.1 - 2.2     LIPASE    Collection Time: 12/18/19  2:38 AM   Result Value Ref Range    Lipase 126 73 - 393 U/L         Assessment:     Hospital Problems  Date Reviewed: 12/14/2019          Codes Class Noted POA    Gallstone ICD-10-CM: K80.20  ICD-9-CM: 574.20  12/14/2019 Yes        Common bile duct calculus ICD-10-CM: K80.50  ICD-9-CM: 574.50  12/14/2019 Yes        Abdominal pain ICD-10-CM: R10.9  ICD-9-CM: 789.00  12/14/2019 Unknown        Acute renal failure superimposed on stage 4 chronic kidney disease (HCC) ICD-10-CM: N17.9, N18.4  ICD-9-CM: 584.9, 585.4  1/25/2018 Yes        SBO ([facility] bowel obstruction) ([facility] ICD-10-CM: Y05.964  ICD-9-CM: 560.9  8/18/2016 Unknown        Hypertension ICD-10-CM: I10  ICD-9-CM: 401.9  Unknown Yes              Plan/Recommendations/Medical Decision Making:     - Clears and advance diet as tolerated  - Labs in am  - Pain control  - Continue antibiotic

## 2019-12-19 NOTE — PROGRESS NOTES
Bedside shift change report given to [address] [place] [address] (oncoming nurse) by Epi Holman RN (offgoing nurse). Report included the following information SBAR, Kardex, Intake/Output and MAR.

## 2019-12-19 NOTE — PROGRESS NOTES
Problem: Falls - Risk of  Goal: *Absence of Falls  Description  Document Domitila Hercules Fall Risk and appropriate interventions in the flowsheet. Outcome: Progressing Towards Goal  Note: Fall Risk Interventions:  Mobility Interventions: Patient to call before getting OOB         Medication Interventions: Evaluate medications/consider consulting pharmacy, Patient to call before getting OOB, Teach patient to arise slowly    Elimination Interventions: Call light in reach, Patient to call for help with toileting needs, Stay With Me (per policy), Toileting schedule/hourly rounds    History of Falls Interventions: Door open when patient unattended         Problem: Patient Education: Go to Patient Education Activity  Goal: Patient/Family Education  Outcome: Progressing Towards Goal     Problem: Risk for Spread of Infection  Goal: Prevent transmission of infectious organism to others  Description  Prevent the transmission of infectious organisms to other patients, staff members, and visitors.   Outcome: Progressing Towards Goal     Problem: Patient Education:  Go to Education Activity  Goal: Patient/Family Education  Outcome: Progressing Towards Goal

## 2019-12-20 LAB
ALBUMIN SERPL-MCNC: 2.3 G/DL (ref 3.5–5)
ALBUMIN/GLOB SERPL: 0.7 {RATIO} (ref 1.1–2.2)
ALP SERPL-CCNC: 48 U/L (ref 45–117)
ALT SERPL-CCNC: 26 U/L (ref 12–78)
ANION GAP SERPL CALC-SCNC: 4 MMOL/L (ref 5–15)
AST SERPL-CCNC: 14 U/L (ref 15–37)
BILIRUB SERPL-MCNC: 0.2 MG/DL (ref 0.2–1)
BUN SERPL-MCNC: 19 MG/DL (ref 6–20)
BUN/CREAT SERPL: 14 (ref 12–20)
CALCIUM SERPL-MCNC: 8.4 MG/DL (ref 8.5–10.1)
CHLORIDE SERPL-SCNC: 113 MMOL/L (ref 97–108)
CO2 SERPL-SCNC: 27 MMOL/L (ref 21–32)
CREAT SERPL-MCNC: 1.34 MG/DL (ref 0.55–1.02)
GLOBULIN SER CALC-MCNC: 3.1 G/DL (ref 2–4)
GLUCOSE SERPL-MCNC: 105 MG/DL (ref 65–100)
LIPASE SERPL-CCNC: >3000 U/L (ref 73–393)
POTASSIUM SERPL-SCNC: 3.6 MMOL/L (ref 3.5–5.1)
PROT SERPL-MCNC: 5.4 G/DL (ref 6.4–8.2)
SODIUM SERPL-SCNC: 144 MMOL/L (ref 136–145)

## 2019-12-20 PROCEDURE — 36415 COLL VENOUS BLD VENIPUNCTURE: CPT

## 2019-12-20 PROCEDURE — 80053 COMPREHEN METABOLIC PANEL: CPT

## 2019-12-20 PROCEDURE — 74011250636 HC RX REV CODE- 250/636: Performed by: INTERNAL MEDICINE

## 2019-12-20 PROCEDURE — 83690 ASSAY OF LIPASE: CPT

## 2019-12-20 PROCEDURE — 74011250637 HC RX REV CODE- 250/637: Performed by: FAMILY MEDICINE

## 2019-12-20 PROCEDURE — 74011250637 HC RX REV CODE- 250/637: Performed by: SURGERY

## 2019-12-20 PROCEDURE — 74011000258 HC RX REV CODE- 258: Performed by: SURGERY

## 2019-12-20 PROCEDURE — 74011250636 HC RX REV CODE- 250/636: Performed by: SURGERY

## 2019-12-20 PROCEDURE — 65270000029 HC RM PRIVATE

## 2019-12-20 RX ORDER — DOCUSATE SODIUM 100 MG/1
100 CAPSULE, LIQUID FILLED ORAL
Qty: 30 CAP | Refills: 0 | Status: SHIPPED | OUTPATIENT
Start: 2019-12-20 | End: 2019-12-21

## 2019-12-20 RX ORDER — METRONIDAZOLE 250 MG/1
250 TABLET ORAL EVERY 8 HOURS
Status: DISCONTINUED | OUTPATIENT
Start: 2019-12-20 | End: 2019-12-22 | Stop reason: HOSPADM

## 2019-12-20 RX ORDER — HYDROMORPHONE HYDROCHLORIDE 2 MG/1
2 TABLET ORAL
Qty: 30 TAB | Refills: 0 | Status: SHIPPED | OUTPATIENT
Start: 2019-12-20 | End: 2019-12-27

## 2019-12-20 RX ORDER — HYDROMORPHONE HYDROCHLORIDE 2 MG/1
2 TABLET ORAL
Status: DISCONTINUED | OUTPATIENT
Start: 2019-12-20 | End: 2019-12-22 | Stop reason: HOSPADM

## 2019-12-20 RX ADMIN — METRONIDAZOLE 250 MG: 250 TABLET ORAL at 09:03

## 2019-12-20 RX ADMIN — PIPERACILLIN AND TAZOBACTAM 3.38 G: 3; .375 INJECTION, POWDER, FOR SOLUTION INTRAVENOUS at 18:41

## 2019-12-20 RX ADMIN — DILTIAZEM HYDROCHLORIDE 240 MG: 240 CAPSULE, COATED, EXTENDED RELEASE ORAL at 09:02

## 2019-12-20 RX ADMIN — SODIUM CHLORIDE, SODIUM LACTATE, POTASSIUM CHLORIDE, AND CALCIUM CHLORIDE 100 ML/HR: 600; 310; 30; 20 INJECTION, SOLUTION INTRAVENOUS at 14:24

## 2019-12-20 RX ADMIN — METRONIDAZOLE 250 MG: 250 TABLET ORAL at 14:25

## 2019-12-20 RX ADMIN — ALLOPURINOL 100 MG: 100 TABLET ORAL at 09:02

## 2019-12-20 RX ADMIN — Medication 10 ML: at 21:26

## 2019-12-20 RX ADMIN — DOXAZOSIN 2 MG: 2 TABLET ORAL at 12:11

## 2019-12-20 RX ADMIN — Medication 1 CAPSULE: at 09:02

## 2019-12-20 RX ADMIN — PSYLLIUM HUSK 1 PACKET: 3.4 POWDER ORAL at 09:02

## 2019-12-20 RX ADMIN — PSYLLIUM HUSK 1 PACKET: 3.4 POWDER ORAL at 18:00

## 2019-12-20 RX ADMIN — LOVASTATIN 20 MG: 20 TABLET ORAL at 21:25

## 2019-12-20 RX ADMIN — PIPERACILLIN AND TAZOBACTAM 3.38 G: 3; .375 INJECTION, POWDER, FOR SOLUTION INTRAVENOUS at 12:10

## 2019-12-20 RX ADMIN — PIPERACILLIN AND TAZOBACTAM 3.38 G: 3; .375 INJECTION, POWDER, FOR SOLUTION INTRAVENOUS at 03:29

## 2019-12-20 RX ADMIN — SODIUM CHLORIDE, SODIUM LACTATE, POTASSIUM CHLORIDE, AND CALCIUM CHLORIDE 100 ML/HR: 600; 310; 30; 20 INJECTION, SOLUTION INTRAVENOUS at 03:52

## 2019-12-20 RX ADMIN — METRONIDAZOLE 250 MG: 250 TABLET ORAL at 21:25

## 2019-12-20 RX ADMIN — Medication 10 ML: at 14:24

## 2019-12-20 NOTE — PROGRESS NOTES
0800 - Bedside and Verbal shift change report given to pamela huff (oncoming nurse) by Colton Acevedo (offgoing nurse). Report included the following information SBAR, Kardex, Procedure Summary, Intake/Output, MAR and Recent Results. 1545 - Bedside and Verbal shift change report given to Earlene Kilpatrick (oncoming nurse) by Guillermina Keith (offgoing nurse). Report included the following information SBAR, Kardex, Intake/Output, MAR and Recent Results.

## 2019-12-20 NOTE — PROGRESS NOTES
Bedside shift change report given to [address] [place] [address] (oncoming nurse) by Nidhi Paige RN (offgoing nurse). Report included the following information SBAR, Kardex, Intake/Output and MAR.

## 2019-12-20 NOTE — PROGRESS NOTES
Bedside shift change report given to Wanda Mccray (oncoming nurse) by Miki Diego RN (offgoing nurse). Report included the following information SBAR and Kardex.

## 2019-12-20 NOTE — PROGRESS NOTES
Samantha James, Deidra Thomas Fitting Date: 12/14/2019      Subjective:     Patient feels OK. Lipase still > 3000. .       Current Facility-Administered Medications   Medication Dose Route Frequency    metroNIDAZOLE (FLAGYL) tablet 250 mg  250 mg Oral Q8H    doxazosin (CARDURA) tablet 2 mg  2 mg Oral DAILY    sodium chloride (NS) flush 5-40 mL  5-40 mL IntraVENous Q8H    sodium chloride (NS) flush 5-40 mL  5-40 mL IntraVENous PRN    lactated Ringers infusion  100 mL/hr IntraVENous CONTINUOUS    psyllium husk-aspartame (METAMUCIL FIBER) packet 1 Packet  1 Packet Oral BID    allopurinol (ZYLOPRIM) tablet 100 mg  100 mg Oral DAILY    lovastatin (MEVACOR) tablet 20 mg  20 mg Oral QHS    sodium chloride (NS) flush 5-40 mL  5-40 mL IntraVENous Q8H    sodium chloride (NS) flush 5-40 mL  5-40 mL IntraVENous PRN    acetaminophen (TYLENOL) tablet 650 mg  650 mg Oral Q4H PRN    morphine injection 2 mg  2 mg IntraVENous Q4H PRN    ondansetron (ZOFRAN) injection 4 mg  4 mg IntraVENous Q4H PRN    piperacillin-tazobactam (ZOSYN) 3.375 g in 0.9% sodium chloride (MBP/ADV) 100 mL  3.375 g IntraVENous Q8H    albuterol (PROVENTIL VENTOLIN) nebulizer solution 2.5 mg  2.5 mg Nebulization Q4H PRN    dilTIAZem CD (CARDIZEM CD) capsule 240 mg  240 mg Oral DAILY    lactobac ac& pc-s.therm-b.anim (JOSETTE Q/RISAQUAD)  1 Cap Oral DAILY          Objective:     No data found. No intake/output data recorded. 12/18 1901 - 12/20 0700  In: 1200 [P.O.:1200]  Out: 1050 [Urine:1050]    Physical Exam: Lungs: clear to auscultation bilaterally  Heart: regular rate and rhythm, S1, S2 normal, no murmur, click, rub or gallop  Abdomen: soft, non-tender.  Bowel sounds normal. No masses,  no organomegaly        Data Review   Recent Results (from the past 24 hour(s))   LIPASE    Collection Time: 12/20/19  3:40 AM   Result Value Ref Range    Lipase >3,000 (H) 73 - 346 U/L   METABOLIC PANEL, COMPREHENSIVE    Collection Time: 12/20/19  3:40 AM   Result Value Ref Range    Sodium 144 136 - 145 mmol/L    Potassium 3.6 3.5 - 5.1 mmol/L    Chloride 113 (H) 97 - 108 mmol/L    CO2 27 21 - 32 mmol/L    Anion gap 4 (L) 5 - 15 mmol/L    Glucose 105 (H) 65 - 100 mg/dL    BUN 19 6 - 20 MG/DL    Creatinine 1.34 (H) 0.55 - 1.02 MG/DL    BUN/Creatinine ratio 14 12 - 20      GFR est AA 46 (L) >60 ml/min/1.73m2    GFR est non-AA 38 (L) >60 ml/min/1.73m2    Calcium 8.4 (L) 8.5 - 10.1 MG/DL    Bilirubin, total 0.2 0.2 - 1.0 MG/DL    ALT (SGPT) 26 12 - 78 U/L    AST (SGOT) 14 (L) 15 - 37 U/L    Alk.  phosphatase 48 45 - 117 U/L    Protein, total 5.4 (L) 6.4 - 8.2 g/dL    Albumin 2.3 (L) 3.5 - 5.0 g/dL    Globulin 3.1 2.0 - 4.0 g/dL    A-G Ratio 0.7 (L) 1.1 - 2.2             Assessment:     Active Problems:    Hypertension ()      SBO (small bowel obstruction) ([facility] (8/18/2016)      Acute renal failure superimposed on stage 4 chronic kidney disease ([facility] (1/25/2018)      Gallstone (12/14/2019)      Common bile duct calculus (12/14/2019)      Abdominal pain (12/14/2019)        Plan:     1) Prophylactic Flagyl given H/O C Diff colitis x 2 bouts in past

## 2019-12-20 NOTE — PROGRESS NOTES
General Surgery Daily Progress Note    Admit Date: 2019  Post-Operative Day: 2 Days Post-Op from Procedure(s):  ENDOSCOPIC RETROGRADE CHOLANGIOPANCREATOGRAPHY (ERCP)  ENDOSCOPIC SPHINCTEROTOMY  ENDOSCOPIC STONE EXTRACTION/BALLOON SWEEP  ENDOSCOPY WITH PROSTHESIS OR STENT PLACEMENT     Subjective:     Last 24 hrs: Pt is \"bored\". Tolerating diet and not having any abd pain. Lipase is >3000       Objective:     Blood pressure 136/70, pulse 62, temperature 98.6 °F (37 °C), resp. rate 18, height 5' 6\" (1.676 m), weight 179 lb 14.3 oz (81.6 kg), SpO2 94 %. Temp (24hrs), Av.8 °F (37.1 °C), Min:98.6 °F (37 °C), Max:98.9 °F (37.2 °C)      _____________________  Physical Exam:     Alert and Oriented, x3, in no acute distress.   Cardiovascular: RRR, no peripheral edema  Abdomen: soft, +BS, lap sites intact, NT      Assessment:   Active Problems:    Hypertension ()      SBO (small bowel obstruction) (HCC) (2016)      Acute renal failure superimposed on stage 4 chronic kidney disease ([facility] [address] (2018)      Gallstone (2019)      Common bile duct calculus (2019)      Abdominal pain (2019)            Plan:     Monitor lipase  Cont diet    Data Review:    Recent Labs     19   WBC 6.6 8.8   HGB 10.8* 11.3*   HCT 34.4* 35.3   PLT 91* 107*     Recent Labs     19  03419  0340 19  023    144 144   K 3.6 3.8 3.7   * 114* 115*   CO2 27 24 26   * 137* 106*   BUN    CREA 1.34* 1.32* 1.20*   CA 8.4* 8.6 8.5   ALB 2.3* 2.4* 2.7*   SGOT 14* 21 40*   ALT 26 36 43     Recent Labs     19  0340 19  0340 19  0238   LPSE >3,000* >3,000* 126           ______________________  Medications:    Current Facility-Administered Medications   Medication Dose Route Frequency    metroNIDAZOLE (FLAGYL) tablet 250 mg  250 mg Oral Q8H    HYDROmorphone (DILAUDID) tablet 2 mg  2 mg Oral Q4H PRN    doxazosin (CARDURA) tablet 2 mg  2 mg Patient presented to the office to  medications. Asked if they could be e-scribed in the future. Pended two months of medication for patient. Sent message to Dr. Irwin.    LOV 5/29/2019     has been reviewed and is consistent:    05/29/2019 1 05/29/2019 Dextroamp-Amphet Er 10 Mg Cap   60 30 Na Voi [id number] Asael (1954) 0  Comm Ins [place]   Oral DAILY    sodium chloride (NS) flush 5-40 mL  5-40 mL IntraVENous Q8H    sodium chloride (NS) flush 5-40 mL  5-40 mL IntraVENous PRN    lactated Ringers infusion  100 mL/hr IntraVENous CONTINUOUS    psyllium husk-aspartame (METAMUCIL FIBER) packet 1 Packet  1 Packet Oral BID    allopurinol (ZYLOPRIM) tablet 100 mg  100 mg Oral DAILY    lovastatin (MEVACOR) tablet 20 mg  20 mg Oral QHS    sodium chloride (NS) flush 5-40 mL  5-40 mL IntraVENous Q8H    sodium chloride (NS) flush 5-40 mL  5-40 mL IntraVENous PRN    acetaminophen (TYLENOL) tablet 650 mg  650 mg Oral Q4H PRN    morphine injection 2 mg  2 mg IntraVENous Q4H PRN    ondansetron (ZOFRAN) injection 4 mg  4 mg IntraVENous Q4H PRN    piperacillin-tazobactam (ZOSYN) 3.375 g in 0.9% sodium chloride (MBP/ADV) 100 mL  3.375 g IntraVENous Q8H    albuterol (PROVENTIL VENTOLIN) nebulizer solution 2.5 mg  2.5 mg Nebulization Q4H PRN    dilTIAZem CD (CARDIZEM CD) capsule 240 mg  240 mg Oral DAILY    lactobac ac& pc-s.therm-b.anim (JOSETTE Q/RISAQUAD)  1 Cap Oral DAILY       Saroj Lanier NP  12/20/2019

## 2019-12-20 NOTE — PROGRESS NOTES
NAVEED:    Possible discharge tomorrow. Care Management Interventions  PCP Verified by CM: Yes  Mode of Transport at Discharge: Other (see comment)(Patient's  will take her home.)  Transition of Care Consult (CM Consult): Discharge Planning  MyChart Signup: Yes  Discharge Durable Medical Equipment: No  Physical Therapy Consult: No  Occupational Therapy Consult: No  Speech Therapy Consult: No  Current Support Network: Lives with Spouse, Own Home  Confirm Follow Up Transport: Family  The Plan for Transition of Care is Related to the Following Treatment Goals : Home with family    Reason for Admission:    abdominal pain and bilious emesis               RRAT Score:   26               Resources/supports as identified by patient/family:   Patient lives with . Top Challenges facing patient (as identified by patient/family and CM): Finances/Medication cost?   VA Medicare and [employer]-[place]?  drives              Support system or lack thereof? See above                     Living arrangements? Lives with  in one story house. Self-care/ADLs/Cognition? Patient was independently performing all ADLs prior to this admission. Current Advanced Directive/Advance Care Plan:     Adv. Care plan not on file. Plan for utilizing home health:    No home health orders at this time. Transition of Care Plan:         CM met with patient to introduce CM role, verify demographics and begin discharge planning. Patient lives in a one story house with her . He is very supportive and will drive her home at discharge. He is also available to take patient to follow up appointments and to  prescriptions. Patient has a rollator walker and a cane at home. She gets her prescriptions filled at [facility].    Patient's PCP is Dr. Codie Eastman. She has seen him recently.     Braydon Mode, RN/CRM

## 2019-12-21 PROBLEM — K81.0 ACUTE CHOLECYSTITIS: Status: ACTIVE | Noted: 2019-12-21

## 2019-12-21 LAB
COMMENT, HOLDF: NORMAL
LIPASE SERPL-CCNC: >3000 U/L (ref 73–393)
SAMPLES BEING HELD,HOLD: NORMAL

## 2019-12-21 PROCEDURE — 74011250636 HC RX REV CODE- 250/636: Performed by: INTERNAL MEDICINE

## 2019-12-21 PROCEDURE — 83690 ASSAY OF LIPASE: CPT

## 2019-12-21 PROCEDURE — 36415 COLL VENOUS BLD VENIPUNCTURE: CPT

## 2019-12-21 PROCEDURE — 74011250637 HC RX REV CODE- 250/637: Performed by: SURGERY

## 2019-12-21 PROCEDURE — 74011250636 HC RX REV CODE- 250/636: Performed by: SURGERY

## 2019-12-21 PROCEDURE — 65270000029 HC RM PRIVATE

## 2019-12-21 PROCEDURE — 74011250637 HC RX REV CODE- 250/637: Performed by: FAMILY MEDICINE

## 2019-12-21 PROCEDURE — 74011000258 HC RX REV CODE- 258: Performed by: SURGERY

## 2019-12-21 RX ORDER — DOXAZOSIN 2 MG/1
2 TABLET ORAL
Qty: 30 TAB | Refills: 3 | Status: SHIPPED | OUTPATIENT
Start: 2019-12-21 | End: 2020-01-15

## 2019-12-21 RX ORDER — METRONIDAZOLE 500 MG/1
500 TABLET ORAL 3 TIMES DAILY
Qty: 30 TAB | Refills: 0 | Status: SHIPPED | OUTPATIENT
Start: 2019-12-21 | End: 2019-12-31

## 2019-12-21 RX ORDER — LEVOFLOXACIN 500 MG/1
500 TABLET, FILM COATED ORAL DAILY
Qty: 10 TAB | Refills: 0 | Status: SHIPPED | OUTPATIENT
Start: 2019-12-21 | End: 2019-12-31

## 2019-12-21 RX ADMIN — Medication 1 CAPSULE: at 09:31

## 2019-12-21 RX ADMIN — METRONIDAZOLE 250 MG: 250 TABLET ORAL at 14:43

## 2019-12-21 RX ADMIN — SODIUM CHLORIDE, SODIUM LACTATE, POTASSIUM CHLORIDE, AND CALCIUM CHLORIDE 75 ML/HR: 600; 310; 30; 20 INJECTION, SOLUTION INTRAVENOUS at 18:12

## 2019-12-21 RX ADMIN — PIPERACILLIN AND TAZOBACTAM 3.38 G: 3; .375 INJECTION, POWDER, FOR SOLUTION INTRAVENOUS at 18:12

## 2019-12-21 RX ADMIN — DILTIAZEM HYDROCHLORIDE 240 MG: 240 CAPSULE, COATED, EXTENDED RELEASE ORAL at 09:31

## 2019-12-21 RX ADMIN — ALLOPURINOL 100 MG: 100 TABLET ORAL at 09:31

## 2019-12-21 RX ADMIN — PSYLLIUM HUSK 1 PACKET: 3.4 POWDER ORAL at 18:11

## 2019-12-21 RX ADMIN — METRONIDAZOLE 250 MG: 250 TABLET ORAL at 07:00

## 2019-12-21 RX ADMIN — Medication 10 ML: at 22:27

## 2019-12-21 RX ADMIN — PIPERACILLIN AND TAZOBACTAM 3.38 G: 3; .375 INJECTION, POWDER, FOR SOLUTION INTRAVENOUS at 11:33

## 2019-12-21 RX ADMIN — LOVASTATIN 20 MG: 20 TABLET ORAL at 22:26

## 2019-12-21 RX ADMIN — SODIUM CHLORIDE, SODIUM LACTATE, POTASSIUM CHLORIDE, AND CALCIUM CHLORIDE 100 ML/HR: 600; 310; 30; 20 INJECTION, SOLUTION INTRAVENOUS at 03:32

## 2019-12-21 RX ADMIN — DOXAZOSIN 2 MG: 2 TABLET ORAL at 09:31

## 2019-12-21 RX ADMIN — PIPERACILLIN AND TAZOBACTAM 3.38 G: 3; .375 INJECTION, POWDER, FOR SOLUTION INTRAVENOUS at 03:30

## 2019-12-21 RX ADMIN — METRONIDAZOLE 250 MG: 250 TABLET ORAL at 22:26

## 2019-12-21 RX ADMIN — PSYLLIUM HUSK 1 PACKET: 3.4 POWDER ORAL at 09:31

## 2019-12-21 NOTE — PROGRESS NOTES
0100 RN removed Pt's old IV in the left [facility] d/t leaking and placed another 22G IV in Pt's lower left forearm. RN cameron blood when placing new IV and sent down a lav & green top on hold to lab.

## 2019-12-21 NOTE — PROGRESS NOTES
CM explained IM letter to patient she accepted and signed a copy for the chart.     [facility]

## 2019-12-21 NOTE — PROGRESS NOTES
Problem: Falls - Risk of  Goal: *Absence of Falls  Description  Document Clementine Bermudez Fall Risk and appropriate interventions in the flowsheet.   Outcome: Progressing Towards Goal  Note: Fall Risk Interventions:  Mobility Interventions: Communicate number of staff needed for ambulation/transfer, Patient to call before getting OOB         Medication Interventions: Patient to call before getting OOB, Teach patient to arise slowly    Elimination Interventions: Call light in reach, Patient to call for help with toileting needs, Toileting schedule/hourly rounds    History of Falls Interventions: Consult care management for discharge planning, Door open when patient unattended

## 2019-12-21 NOTE — PROGRESS NOTES
Dhara [facility] Syed    Admit Date: 12/14/2019    Subjective:     No new complaints. Pt tolerating PO. Lipase not ordered. Current Facility-Administered Medications   Medication Dose Route Frequency    metroNIDAZOLE (FLAGYL) tablet 250 mg  250 mg Oral Q8H    HYDROmorphone (DILAUDID) tablet 2 mg  2 mg Oral Q4H PRN    doxazosin (CARDURA) tablet 2 mg  2 mg Oral DAILY    sodium chloride (NS) flush 5-40 mL  5-40 mL IntraVENous Q8H    sodium chloride (NS) flush 5-40 mL  5-40 mL IntraVENous PRN    lactated Ringers infusion  100 mL/hr IntraVENous CONTINUOUS    psyllium husk-aspartame (METAMUCIL FIBER) packet 1 Packet  1 Packet Oral BID    allopurinol (ZYLOPRIM) tablet 100 mg  100 mg Oral DAILY    lovastatin (MEVACOR) tablet 20 mg  20 mg Oral QHS    sodium chloride (NS) flush 5-40 mL  5-40 mL IntraVENous Q8H    sodium chloride (NS) flush 5-40 mL  5-40 mL IntraVENous PRN    acetaminophen (TYLENOL) tablet 650 mg  650 mg Oral Q4H PRN    morphine injection 2 mg  2 mg IntraVENous Q4H PRN    ondansetron (ZOFRAN) injection 4 mg  4 mg IntraVENous Q4H PRN    piperacillin-tazobactam (ZOSYN) 3.375 g in 0.9% sodium chloride (MBP/ADV) 100 mL  3.375 g IntraVENous Q8H    albuterol (PROVENTIL VENTOLIN) nebulizer solution 2.5 mg  2.5 mg Nebulization Q4H PRN    dilTIAZem CD (CARDIZEM CD) capsule 240 mg  240 mg Oral DAILY    lactobac ac& pc-s.therm-b.anim (JOSETTE Q/RISAQUAD)  1 Cap Oral DAILY          Objective:     Patient Vitals for the past 8 hrs:   BP Temp Pulse Resp SpO2   12/21/19 0838 154/83 98.9 °F (37.2 °C) (!) 108 16 92 %     12/21 0701 - 12/21 1900  In: 100 [I.V.:100]  Out: -   12/19 1901 - 12/21 0700  In: 1300 [I.V.:1300]  Out: 350 [Urine:350]    Physical Exam: NAD. A&O. Neck -- Supple. No JVD. Heart -- RRR. Lungs -- CTA. Abd -- Soft. ND.  Mild appropriate tenderness without R/G. BS present. Wound C/D/I. Ext -- No LE edema, b/l. Data Review   Recent Results (from the past 24 hour(s))   SAMPLES BEING HELD    Collection Time: 12/21/19  1:08 AM   Result Value Ref Range    SAMPLES BEING HELD 1LAV,1PST     COMMENT        Add-on orders for these samples will be processed based on acceptable specimen integrity and analyte stability, which may vary by analyte. Assessment:     Principal Problem:    Acute cholecystitis -- s/p lap ors. Active Problems:    Hypertension ()      Common bile duct calculus -- s/p ERCP after lap ros. Plan:     1. Check Lipase -- If trending down, then discharge pt home on PO Levaquin. Also, PO Flagyl per Dr. Pro Benavidez for h/o C.diff.           Deb Gutierrez MD

## 2019-12-21 NOTE — PROGRESS NOTES
Bedside shift change report given to [facility], RN (oncoming nurse) by Lucia Smith RN (offgoing nurse).  Report included the following information SBAR, Kardex, Procedure Summary, Intake/Output, MAR and Recent Results. '

## 2019-12-21 NOTE — PROGRESS NOTES
Progress Note    Patient: Angela oH MRN: [id number]  SSN: [id number]    YOB: 1940  Age: 78 y.o. Sex: female      Admit Date: 2019    3 Days Post-Op    Procedure:  Procedure(s):  ENDOSCOPIC RETROGRADE CHOLANGIOPANCREATOGRAPHY (ERCP)  ENDOSCOPIC SPHINCTEROTOMY  ENDOSCOPIC STONE EXTRACTION/BALLOON SWEEP  ENDOSCOPY WITH PROSTHESIS OR STENT PLACEMENT    Subjective:     Patient without complaints. Only has pain when coughing. She is tolerating a diet. Objective:     Visit Vitals  /83   Pulse (!) 108   Temp 98.9 °F (37.2 °C)   Resp 16   Ht 5' 6\" (1.676 m)   Wt 179 lb 14.3 oz (81.6 kg)   SpO2 92%   BMI 29.04 kg/m²       Temp (24hrs), Av.9 °F (37.2 °C), Min:98.8 °F (37.1 °C), Max:98.9 °F (37.2 °C)      Physical Exam:    gen- ALert in NAD   abd- S/NT/nd    Data Review: images and reports reviewed    Lab Review: All lab results for the last 24 hours reviewed. Recent Results (from the past 24 hour(s))   SAMPLES BEING HELD    Collection Time: 19  1:08 AM   Result Value Ref Range    SAMPLES BEING HELD 1LAV,1PST     COMMENT        Add-on orders for these samples will be processed based on acceptable specimen integrity and analyte stability, which may vary by analyte. LIPASE    Collection Time: 19  1:08 AM   Result Value Ref Range    Lipase >3,000 (H) 73 - 393 U/L       Assessment:     Hospital Problems  Date Reviewed: 2019          Codes Class Noted POA    * (Principal) Acute cholecystitis ICD-10-CM: K81.0  ICD-9-CM: 575.0  2019 Unknown        Common bile duct calculus ICD-10-CM: K80.50  ICD-9-CM: 574.50  2019 Yes        Hypertension ICD-10-CM: I10  ICD-9-CM: 401.9  Unknown Yes              Plan/Recommendations/Medical Decision Making:   Doing well from surgical standpoint. Continue GI lite diet.

## 2019-12-22 VITALS
RESPIRATION RATE: 16 BRPM | HEART RATE: 88 BPM | BODY MASS INDEX: 28.91 KG/M2 | TEMPERATURE: 98.8 F | OXYGEN SATURATION: 91 % | DIASTOLIC BLOOD PRESSURE: 95 MMHG | WEIGHT: 179.9 LBS | SYSTOLIC BLOOD PRESSURE: 140 MMHG | HEIGHT: 66 IN

## 2019-12-22 LAB
ALBUMIN SERPL-MCNC: 2.1 G/DL (ref 3.5–5)
ALBUMIN/GLOB SERPL: 0.6 {RATIO} (ref 1.1–2.2)
ALP SERPL-CCNC: 51 U/L (ref 45–117)
ALT SERPL-CCNC: 25 U/L (ref 12–78)
ANION GAP SERPL CALC-SCNC: 7 MMOL/L (ref 5–15)
AST SERPL-CCNC: 17 U/L (ref 15–37)
BASOPHILS # BLD: 0 K/UL (ref 0–0.1)
BASOPHILS NFR BLD: 0 % (ref 0–1)
BILIRUB SERPL-MCNC: 0.5 MG/DL (ref 0.2–1)
BUN SERPL-MCNC: 10 MG/DL (ref 6–20)
BUN/CREAT SERPL: 9 (ref 12–20)
CALCIUM SERPL-MCNC: 8.5 MG/DL (ref 8.5–10.1)
CHLORIDE SERPL-SCNC: 113 MMOL/L (ref 97–108)
CO2 SERPL-SCNC: 25 MMOL/L (ref 21–32)
CREAT SERPL-MCNC: 1.16 MG/DL (ref 0.55–1.02)
DIFFERENTIAL METHOD BLD: ABNORMAL
EOSINOPHIL # BLD: 0.1 K/UL (ref 0–0.4)
EOSINOPHIL NFR BLD: 2 % (ref 0–7)
ERYTHROCYTE [DISTWIDTH] IN BLOOD BY AUTOMATED COUNT: 12.4 % (ref 11.5–14.5)
GLOBULIN SER CALC-MCNC: 3.4 G/DL (ref 2–4)
GLUCOSE SERPL-MCNC: 98 MG/DL (ref 65–100)
HCT VFR BLD AUTO: 32.1 % (ref 35–47)
HGB BLD-MCNC: 10.2 G/DL (ref 11.5–16)
IMM GRANULOCYTES # BLD AUTO: 0.1 K/UL (ref 0–0.04)
IMM GRANULOCYTES NFR BLD AUTO: 1 % (ref 0–0.5)
LIPASE SERPL-CCNC: >3000 U/L (ref 73–393)
LYMPHOCYTES # BLD: 0.9 K/UL (ref 0.8–3.5)
LYMPHOCYTES NFR BLD: 10 % (ref 12–49)
MCH RBC QN AUTO: 33.2 PG (ref 26–34)
MCHC RBC AUTO-ENTMCNC: 31.8 G/DL (ref 30–36.5)
MCV RBC AUTO: 104.6 FL (ref 80–99)
MONOCYTES # BLD: 0.5 K/UL (ref 0–1)
MONOCYTES NFR BLD: 6 % (ref 5–13)
NEUTS SEG # BLD: 7.2 K/UL (ref 1.8–8)
NEUTS SEG NFR BLD: 81 % (ref 32–75)
NRBC # BLD: 0 K/UL (ref 0–0.01)
NRBC BLD-RTO: 0 PER 100 WBC
PLATELET # BLD AUTO: 98 K/UL (ref 150–400)
PMV BLD AUTO: 12 FL (ref 8.9–12.9)
POTASSIUM SERPL-SCNC: 3.8 MMOL/L (ref 3.5–5.1)
PROT SERPL-MCNC: 5.5 G/DL (ref 6.4–8.2)
RBC # BLD AUTO: 3.07 M/UL (ref 3.8–5.2)
SODIUM SERPL-SCNC: 145 MMOL/L (ref 136–145)
WBC # BLD AUTO: 8.9 K/UL (ref 3.6–11)

## 2019-12-22 PROCEDURE — 85025 COMPLETE CBC W/AUTO DIFF WBC: CPT

## 2019-12-22 PROCEDURE — 74011250636 HC RX REV CODE- 250/636: Performed by: SURGERY

## 2019-12-22 PROCEDURE — 74011250637 HC RX REV CODE- 250/637: Performed by: FAMILY MEDICINE

## 2019-12-22 PROCEDURE — 36415 COLL VENOUS BLD VENIPUNCTURE: CPT

## 2019-12-22 PROCEDURE — 83690 ASSAY OF LIPASE: CPT

## 2019-12-22 PROCEDURE — 80053 COMPREHEN METABOLIC PANEL: CPT

## 2019-12-22 PROCEDURE — 74011250637 HC RX REV CODE- 250/637: Performed by: SURGERY

## 2019-12-22 PROCEDURE — 74011000258 HC RX REV CODE- 258: Performed by: SURGERY

## 2019-12-22 RX ADMIN — DILTIAZEM HYDROCHLORIDE 240 MG: 240 CAPSULE, COATED, EXTENDED RELEASE ORAL at 10:00

## 2019-12-22 RX ADMIN — Medication 1 CAPSULE: at 09:55

## 2019-12-22 RX ADMIN — ALLOPURINOL 100 MG: 100 TABLET ORAL at 09:55

## 2019-12-22 RX ADMIN — PSYLLIUM HUSK 1 PACKET: 3.4 POWDER ORAL at 10:01

## 2019-12-22 RX ADMIN — DOXAZOSIN 2 MG: 2 TABLET ORAL at 09:55

## 2019-12-22 RX ADMIN — PIPERACILLIN AND TAZOBACTAM 3.38 G: 3; .375 INJECTION, POWDER, FOR SOLUTION INTRAVENOUS at 03:47

## 2019-12-22 RX ADMIN — METRONIDAZOLE 250 MG: 250 TABLET ORAL at 05:43

## 2019-12-22 RX ADMIN — Medication 10 ML: at 05:46

## 2019-12-22 NOTE — DISCHARGE INSTRUCTIONS
1.  Do not drive while on pain medication. You should take a stool softener while on pain medication to prevent constipation. 2.  Do not lift anything greater than 10 pounds for 2 weeks. 3.  You may resume your home medications. 4.  You may shower but do not swim or soak in tub for 2 weeks. 5.  Please call [phone] to schedule a follow-up with Dr. Yane Chen within 2 weeks. Patient Education        Cholecystectomy: What to Expect at Home  Your Recovery  After your surgery, it is normal to feel weak and tired for several days after you return home. Your belly may be swollen. If you had laparoscopic surgery, you may also have pain in your shoulder for about 24 hours. You may have gas or need to burp a lot at first, and a few people get diarrhea. The diarrhea usually goes away in 2 to 4 weeks, but it may last longer. How quickly you recover depends on whether you had a laparoscopic or open surgery. · For a laparoscopic surgery, most people can go back to work or their normal routine in 1 to 2 weeks, but it may take longer, depending on the type of work you do. · For an open surgery, it will probably take 4 to 6 weeks before you get back to your normal routine. This care sheet gives you a general idea about how long it will take for you to recover. However, each person recovers at a different pace. Follow the steps below to get better as quickly as possible. How can you care for yourself at home? Activity    · Rest when you feel tired. Getting enough sleep will help you recover.     · Try to walk each day. Start out by walking a little more than you did the day before. Gradually increase the amount you walk. Walking boosts blood flow and helps prevent pneumonia and constipation.     · For about 2 to 4 weeks, avoid lifting anything that would make you strain.  This may include a child, heavy grocery bags and milk containers, a heavy briefcase or backpack, cat litter or dog food bags, or a vacuum .     · Avoid strenuous activities, such as biking, jogging, weightlifting, and aerobic exercise, until your doctor says it is okay.     · You may shower 24 to 48 hours after surgery, if your doctor okays it. Pat the cut (incision) dry. Do not take a bath for the first 2 weeks, or until your doctor tells you it is okay.     · You may drive when you are no longer taking pain medicine and can quickly move your foot from the gas pedal to the brake. You must also be able to sit comfortably for a long period of time, even if you do not plan to go far. You might get caught in traffic.     · For a laparoscopic surgery, most people can go back to work or their normal routine in 1 to 2 weeks, but it may take longer. For an open surgery, it will probably take 4 to 6 weeks before you get back to your normal routine.     · Your doctor will tell you when you can have sex again.    Diet    · Eat smaller meals more often instead of fewer larger meals. You can eat a normal diet, but avoid eating fatty foods for about 1 month. Fatty foods include hamburger, whole milk, cheese, and many snack foods. If your stomach is upset, try bland, low-fat foods like plain rice, broiled chicken, toast, and yogurt.     · Drink plenty of fluids (unless your doctor tells you not to).   · If you have diarrhea, try avoiding spicy foods, dairy products, fatty foods, and alcohol. You can also watch to see if specific foods cause it, and stop eating them. If the diarrhea continues for more than 2 weeks, talk to your doctor.     · You may notice that your bowel movements are not regular right after your surgery. This is common. Try to avoid constipation and straining with bowel movements. You may want to take a fiber supplement every day. If you have not had a bowel movement after a couple of days, ask your doctor about taking a mild laxative. Medicines    · Your doctor will tell you if and when you can restart your medicines.  He or she will also give you instructions about taking any new medicines.     · If you take blood thinners, such as warfarin (Coumadin), clopidogrel (Plavix), or aspirin, be sure to talk to your doctor. He or she will tell you if and when to start taking those medicines again. Make sure that you understand exactly what your doctor wants you to do.     · Take pain medicines exactly as directed. ? If the doctor gave you a prescription medicine for pain, take it as prescribed. ? If you are not taking a prescription pain medicine, take an over-the-counter medicine such as acetaminophen (Tylenol), ibuprofen (Advil, Motrin), or naproxen (Aleve). Read and follow all instructions on the label. ? Do not take two or more pain medicines at the same time unless the doctor told you to. Many pain medicines contain acetaminophen, which is Tylenol. Too much Tylenol can be harmful.     · If you think your pain medicine is making you sick to your stomach:  ? Take your medicine after meals (unless your doctor tells you not to). ? Ask your doctor for a different pain medicine.     · If your doctor prescribed antibiotics, take them as directed. Do not stop taking them just because you feel better. You need to take the full course of antibiotics. Incision care    · If you have strips of tape on the incision, or cut, leave the tape on for a week or until it falls off.     · After 24 to 48 hours, wash the area daily with warm, soapy water, and pat it dry.     · You may have staples to hold the cut together. Keep them dry until your doctor takes them out. This is usually in 7 to 10 days.     · Keep the area clean and dry. You may cover it with a gauze bandage if it weeps or rubs against clothing. Change the bandage every day.    Ice    · To reduce swelling and pain, put ice or a cold pack on your belly for 10 to 20 minutes at a time. Do this every 1 to 2 hours. Put a thin cloth between the ice and your skin.    Follow-up care is a key part of your treatment and safety. Be sure to make and go to all appointments, and call your doctor if you are having problems. It's also a good idea to know your test results and keep a list of the medicines you take. When should you call for help? Call 911 anytime you think you may need emergency care. For example, call if:    · You passed out (lost consciousness).     · You are short of breath. [place] your doctor now or seek immediate medical care if:    · You are sick to your stomach and cannot drink fluids.     · You have pain that does not get better when you take your pain medicine.     · You cannot pass stools or gas.     · You have signs of infection, such as:  ? Increased pain, swelling, warmth, or redness. ? Red streaks leading from the incision. ? Pus draining from the incision. ? A fever.     · Bright red blood has soaked through the bandage over your incision.     · You have loose stitches, or your incision comes open.     · You have signs of a blood clot in your leg (called a deep vein thrombosis), such as:  ? Pain in your calf, back of knee, thigh, or groin. ? Redness and swelling in your leg or groin.    Watch closely for any changes in your health, and be sure to contact your doctor if you have any problems. Where can you learn more? Go to http://stevo-anthony.info/. Enter [phone] [id number] in the search box to learn more about \"Cholecystectomy: What to Expect at Home. \"  Current as of: 2018  Content Version: 12.2  © 8177-8615 [employer]. Care instructions adapted under license by [facility] (which disclaims liability or warranty for this information). If you have questions about a medical condition or this instruction, always ask your healthcare professional. [place] [address] any warranty or liability for your use of this information.                            Patient Discharge Instructions    Ashli Bobby / [id number] : 1940    Admitted 12/14/2019 Discharged: 12/21/2019     Take Home Medications       · It is important that you take the medication exactly as they are prescribed. · Keep your medication in the bottles provided by the pharmacist and keep a list of the medication names, dosages, and times to be taken in your wallet. · Do not take other medications without consulting your doctor. What to do at Home    Recommended diet: Low fat, Low cholesterol. Recommended activity: Activity as tolerated. Follow-up with Dr. Damián Mosley on Friday, 12/27/19. Follow-up with Dr. Enzo Olszewski (gastroenterologist) on 2/7/2020. Information obtained by :  I understand that if any problems occur once I am at home I am to contact my physician. I understand and acknowledge receipt of the instructions indicated above.                                                                                                                                            Physician's or R.N.'s Signature                                                                  Date/Time                                                                                                                                              Patient or Representative Signature                                                          Date/Time

## 2019-12-22 NOTE — PROGRESS NOTES
Bedside verbal shift change report given to oncoming nurse CHELY [facility]. Opportunity for questions and clarifications provided.

## 2019-12-22 NOTE — PROGRESS NOTES
Lipase remains over 3,000, but the rest of her labs (incl LFT's, Alk phos, bili) nl. She says she feels well and is eating well, and she wants to go home. Discharge home today on Levaquin & Flagyl as was planned yesterday. She should f/u with Dr. Sauceda Link this Friday to recheck lipase. I told her if she develops increasing pain, N/V, etc, then she is to come back to the ER. She agrees.

## 2019-12-27 PROBLEM — K85.10 GALLSTONE PANCREATITIS: Status: ACTIVE | Noted: 2019-12-27

## 2019-12-28 NOTE — OP NOTES
[address]   OPERATIVE REPORT    Name:  Anita Tinajero  MR#:  [id number]  :  1940  ACCOUNT #:  [de-identified]  DATE OF SERVICE:  2019      PREOPERATIVE DIAGNOSIS:  Gallstone pancreatitis. POSTOPERATIVE DIAGNOSIS:  Gallstone pancreatitis with biliary obstruction. PROCEDURES PERFORMED:  Laparoscopic cholecystectomy with intraoperative cholangiogram, minilaparotomy with lysis of adhesion. SURGEON:  Mily Shoemaker MD    ASSISTANT:  Surgical assistant, James Ruvalcaba. ANESTHESIA:  General endotracheal.    COMPLICATIONS:  None. SPECIMENS REMOVED:  Gallbladder. IMPLANTS:  Fibrins, Tisseel. ESTIMATED BLOOD LOSS:  Minimal.    DRAINS AND TUBES:  A 19-Surinamese De drain placed within the gallbladder fossa and externalized to the right lower quadrant port incision. FINDINGS:  The gallbladder was noted to be chronically inflamed. There were stones in the cystic duct. The cholangiogram showed a dilated common bile duct with an abrupt cut-off distally concerning for biliary obstruction. There was no contrast going to the duodenum. There were dense adhesions in the abdomen. There was a serosal tear at one of the trocar sites, which was repaired with 3-0 Vicryl and minilaparotomy was made in the midline for lysis of adhesion and to evaluate for bowel injury. INDICATIONS FOR OPERATION:  The patient is an 49-year-old woman with a history of deep venous thrombosis, cholelithiasis, pancreatitis, who presented to the emergency department with abdominal pain. She had a CT scan, which showed cholelithiasis and possible distal common bile duct calculus. An MRCP was then performed, which was confirmed a distal common bile duct stone.   She did not have elevated transaminase; however, the clinical exam was consistent with possible developing gallstone pancreatitis so the decision was made to take her to the operating room for a laparoscopic cholecystectomy with intraoperative cholangiogram.    PROCEDURE IN DETAIL:  After informed consent was obtained from the patient, the patient was taken to the operating room, placed in supine position on the operating table. She underwent general anesthesia with endotracheal intubation without any complication. The abdomen and pelvis were then prepped and draped in the usual sterile surgical fashion. A surgical time-out was then performed. Preoperative antibiotic was given prior to skin incision. After the time-out was performed, a supraumbilical incision was made with an 11 blade scalpel. We attempted to go down through the site over the Veress needle, but we could not achieve insufflation. We then attempted to perform drill down using a 5 mm trocar with an Optiview scope. This was also unsuccessful. We then proceeded to perform a direct drill down using an Optiview scope, 5 mm port going through the right upper quadrant. We successfully entered the abdomen this way. A laparoscopy was performed which showed small bowel adherent to the anterior abdominal wall. We were concerned that one of our supraumbilical trocar sites might have injured the bowel, so a minilaparotomy was performed with extensive lysis of adhesion. There was no evidence of bowel injury. At this point, we placed 3 additional trocars, one in the midline subxiphoid area and another one in the right lateral abdomen. After the trocars were placed, we then tilted to the patient in the reverse Trendelenburg with the left side down. The gallbladder was noted to be chronically inflamed. We grasped the gallbladder at the fundus and retracted superiorly. We also grasped the infundibulum of the gallbladder and retracted laterally. With meticulous dissection, we were able to free up the cystic duct and cystic artery. Once these structures were freed, we then placed 1 clip distally on the cystic duct. We then used Endo kiki to open up the cystic duct.   A cholangiogram using an Arrow catheter was performed through the cystic duct. The cholangiogram showed distal common duct obstruction as there was no contrast flowing the bile duct into the duodenum and the bile duct was noted to be dilated. The cholangiocatheter was removed and 2 clips were placed proximally in both the cystic duct and cystic artery structures respectively. After the clips were placed, we then used hook electrocautery to free up the gallbladder from its peritoneal attachment to the liver. Once this was completed, a laparoscopy was then performed. The patient was noted to have no other abdominal pathology outside of the significant adhesions. There was no evidence of bowel injury. There was a small serosal tear, which was primarily repaired. After this was completed, we then took the trocars under direct laparoscopic visualization. A 19-Portuguese De drain was placed in the gallbladder fossa and externalized to one of our port sites in the right lower abdomen. After the drain had been secured, we then removed the trocars under direct laparoscopic visualization. The skin incisions were then closed with 4-0 Monocryl in a subcuticular fashion. The minilaparotomy incision was closed with #1 interrupted PDS to the fascial layer and 3-0 Vicryl sutures to the subcutaneous layer. The skin was closed with 4-0 Monocryl. Dermabond glue was then applied over all the incisions. The patient tolerated the procedure well. There were no complications associated with the operation. Dr. Roque Hawley, the attending surgeon, was present during the entirety of the case. All lap, needle, and instrument count correct x2. The patient was successfully extubated and was then transported to PACU in stable condition.       Elisabeth Hankins MD      [id number]  D:  12/27/2019 23:52  T:  12/28/2019 1:40  JOB #:  [id number]

## 2020-01-06 NOTE — DISCHARGE SUMMARY
Physician Discharge Summary     Patient ID:  Elsie Onofre  [id number]  39 y.o.  1940    Admit date: 12/14/2019    Discharge date and time: 1/6/2020    Admission Diagnoses: SBO (small bowel obstruction) ([facility] [address] [K56.609]; JOSE (acute kidney injury) ([facility] [address] [N17.9];Gallstone [K80.20]; Abdominal pain [R10.9]    Discharge Diagnoses:  Principal Diagnosis Acute cholecystitis                                            Principal Problem:    Acute cholecystitis (12/21/2019)    Active Problems:    Hypertension ()      JOSE (acute kidney injury) ([facility] [address] (12/14/2019)      Common bile duct calculus (12/14/2019)      Gallstone pancreatitis (12/27/2019)           Hospital Course: Admitted with RUQ pain and bilious emesis. Found to have cholecystitis on CT scan. MRCP showed no evidence of biliary obstruction. Patient had lap cholecystectomy with IOC. This showed distal CBD obstruction. Patient later had ERCP which was C/W papillary stenosis. Stent placed without difficulty. Post procedure labs improved as well as nausea and pain. Her lipase was persistently above 3000 but with improving sx she was discharged to home in much improved condition after a number of days of monitoring. Her initial Cr was 2.31 c/w JOSE. This improved during hospitalization. Follow up with me in 1 week and with GI in 3 months for biliary stent removal.     PCP: NELIDA Lorenzana    Consults: GI and General Surgery        Discharge Exam:  Visit Vitals  BP (!) 140/95   Pulse 88   Temp 98.8 °F (37.1 °C)   Resp 16   Ht 5' 6\" (1.676 m)   Wt 179 lb 14.3 oz (81.6 kg)   SpO2 91%   BMI 29.04 kg/m²     Lungs: clear to auscultation bilaterally  Heart: regular rate and rhythm, S1, S2 normal, no murmur, click, rub or gallop  Abdomen: soft, non-tender.  Bowel sounds normal. No masses,  no organomegaly  Extremities: extremities normal, atraumatic, no cyanosis or edema    Disposition: home    Patient Instructions:   Cannot display discharge medications since this patient is not currently admitted. Activity: Activity as tolerated  Diet: Regular Diet  Wound Care: Keep wound clean and dry    Follow-up Appointments   Procedures    FOLLOW UP VISIT Appointment in: Two Weeks With Dr. Charito Rocha     With Dr. Charito Rocha     Standing Status:   Standing     Number of Occurrences:   1     Order Specific Question:   Appointment in     Answer:    Two Weeks          Signed:  Laura Wright MD  1/6/2020  9:52 AM

## 2020-01-10 NOTE — CDMP QUERY
Patient admitted with abdominal pain . Noted documentation of gallstone pancreatitis in the operative report, and the discharge summary. If possible, please document in progress notes and discharge summary:     Pancreatitis was ruled out and amended documentation provided in the medical record    Pancreatitis present as evidenced by: ___________  (please document in a progress note and discharge summary)   Clinically unable to provide additional clarity regarding pancreatitis   Other, please specify   Unknown    The medical record reflects the following:       Risk Factors: 78year old with hx. Pancreatitis, cholelithiasis     Clinical Indicators: 12/14-Ultrasound abdomen, common bile dilatation, mass or noncalcified stone suspected in the distal duct. Lipase 73  Per H&PSuspected biliary infection/Cholangitis  with her history of upper abdominal pain, bilious emesis, gallstones, common bile duct stones, and bandemia and CBD stone vs mass. 12/15-Surgery-  MRCP was performed which confirmed distal CBD calculus. 12/17-Per op note,  Gallstone pancreatitis with biliary obstruction.   12/18-ERCP notes- Cholangiogram: -there was dilatation of the CBD up to 14 mm with severe papillary stenosis, no bile duct stricture seen, covered metal stent inserted thru the patella  12/19-Lipase >3000     Treatment: GI consult,  IVF, IV Zosyn, ERCP

## 2020-01-14 ENCOUNTER — APPOINTMENT (OUTPATIENT)
Dept: CT IMAGING | Age: 80
DRG: 252 | End: 2020-01-14
Attending: STUDENT IN AN ORGANIZED HEALTH CARE EDUCATION/TRAINING PROGRAM
Payer: MEDICARE

## 2020-01-14 ENCOUNTER — HOSPITAL ENCOUNTER (INPATIENT)
Age: 80
LOS: 9 days | Discharge: NURSING, REHAB OR LONG-TERM CARE | DRG: 252 | End: 2020-01-24
Attending: STUDENT IN AN ORGANIZED HEALTH CARE EDUCATION/TRAINING PROGRAM | Admitting: INTERNAL MEDICINE
Payer: MEDICARE

## 2020-01-14 DIAGNOSIS — R20.0 LEFT LEG NUMBNESS: Primary | ICD-10-CM

## 2020-01-14 DIAGNOSIS — I74.3 ARTERIAL EMBOLISM AND THROMBOSIS OF LOWER EXTREMITY (HCC): ICD-10-CM

## 2020-01-14 DIAGNOSIS — R29.898 LEFT LEG WEAKNESS: ICD-10-CM

## 2020-01-14 DIAGNOSIS — I67.82 CHRONIC CEREBRAL ISCHEMIA: ICD-10-CM

## 2020-01-14 DIAGNOSIS — M54.16 ACUTE LEFT LUMBAR RADICULOPATHY: ICD-10-CM

## 2020-01-14 DIAGNOSIS — R06.02 SOB (SHORTNESS OF BREATH): ICD-10-CM

## 2020-01-14 LAB
COMMENT, HOLDF: NORMAL
GLUCOSE BLD STRIP.AUTO-MCNC: 123 MG/DL (ref 65–100)
SAMPLES BEING HELD,HOLD: NORMAL
SERVICE CMNT-IMP: ABNORMAL

## 2020-01-14 PROCEDURE — 36415 COLL VENOUS BLD VENIPUNCTURE: CPT

## 2020-01-14 PROCEDURE — 99285 EMERGENCY DEPT VISIT HI MDM: CPT

## 2020-01-14 PROCEDURE — 84484 ASSAY OF TROPONIN QUANT: CPT

## 2020-01-14 PROCEDURE — 80053 COMPREHEN METABOLIC PANEL: CPT

## 2020-01-14 PROCEDURE — 70450 CT HEAD/BRAIN W/O DYE: CPT

## 2020-01-14 PROCEDURE — 85025 COMPLETE CBC W/AUTO DIFF WBC: CPT

## 2020-01-14 PROCEDURE — 82962 GLUCOSE BLOOD TEST: CPT

## 2020-01-14 PROCEDURE — 83880 ASSAY OF NATRIURETIC PEPTIDE: CPT

## 2020-01-15 ENCOUNTER — APPOINTMENT (OUTPATIENT)
Dept: MRI IMAGING | Age: 80
DRG: 252 | End: 2020-01-15
Attending: INTERNAL MEDICINE
Payer: MEDICARE

## 2020-01-15 ENCOUNTER — APPOINTMENT (OUTPATIENT)
Dept: NUCLEAR MEDICINE | Age: 80
DRG: 252 | End: 2020-01-15
Attending: EMERGENCY MEDICINE
Payer: MEDICARE

## 2020-01-15 ENCOUNTER — APPOINTMENT (OUTPATIENT)
Dept: NON INVASIVE DIAGNOSTICS | Age: 80
DRG: 252 | End: 2020-01-15
Attending: INTERNAL MEDICINE
Payer: MEDICARE

## 2020-01-15 ENCOUNTER — APPOINTMENT (OUTPATIENT)
Dept: VASCULAR SURGERY | Age: 80
DRG: 252 | End: 2020-01-15
Attending: EMERGENCY MEDICINE
Payer: MEDICARE

## 2020-01-15 ENCOUNTER — APPOINTMENT (OUTPATIENT)
Dept: GENERAL RADIOLOGY | Age: 80
DRG: 252 | End: 2020-01-15
Attending: EMERGENCY MEDICINE
Payer: MEDICARE

## 2020-01-15 PROBLEM — R09.02 HYPOXIA: Status: ACTIVE | Noted: 2020-01-15

## 2020-01-15 PROBLEM — I26.99 PULMONARY EMBOLISM (HCC): Status: ACTIVE | Noted: 2020-01-15

## 2020-01-15 PROBLEM — I82.409 DVT (DEEP VENOUS THROMBOSIS) (HCC): Status: ACTIVE | Noted: 2020-01-15

## 2020-01-15 PROBLEM — R29.898 WEAKNESS OF LEFT LOWER EXTREMITY: Status: ACTIVE | Noted: 2020-01-15

## 2020-01-15 LAB
ALBUMIN SERPL-MCNC: 3 G/DL (ref 3.5–5)
ALBUMIN SERPL-MCNC: 3.1 G/DL (ref 3.5–5)
ALBUMIN/GLOB SERPL: 0.8 {RATIO} (ref 1.1–2.2)
ALBUMIN/GLOB SERPL: 0.8 {RATIO} (ref 1.1–2.2)
ALP SERPL-CCNC: 56 U/L (ref 45–117)
ALP SERPL-CCNC: 68 U/L (ref 45–117)
ALT SERPL-CCNC: 17 U/L (ref 12–78)
ALT SERPL-CCNC: 20 U/L (ref 12–78)
ANION GAP SERPL CALC-SCNC: 6 MMOL/L (ref 5–15)
ANION GAP SERPL CALC-SCNC: 6 MMOL/L (ref 5–15)
APPEARANCE UR: CLEAR
APTT PPP: 23.5 SEC (ref 22.1–32)
APTT PPP: 51.4 SEC (ref 22.1–32)
AST SERPL-CCNC: 18 U/L (ref 15–37)
AST SERPL-CCNC: 20 U/L (ref 15–37)
BACTERIA URNS QL MICRO: NEGATIVE /HPF
BASOPHILS # BLD: 0 K/UL (ref 0–0.1)
BASOPHILS NFR BLD: 0 % (ref 0–1)
BILIRUB SERPL-MCNC: 0.2 MG/DL (ref 0.2–1)
BILIRUB SERPL-MCNC: 0.5 MG/DL (ref 0.2–1)
BILIRUB UR QL: NEGATIVE
BNP SERPL-MCNC: 3154 PG/ML
BUN SERPL-MCNC: 21 MG/DL (ref 6–20)
BUN SERPL-MCNC: 24 MG/DL (ref 6–20)
BUN/CREAT SERPL: 14 (ref 12–20)
BUN/CREAT SERPL: 16 (ref 12–20)
CALCIUM SERPL-MCNC: 9.6 MG/DL (ref 8.5–10.1)
CALCIUM SERPL-MCNC: 9.8 MG/DL (ref 8.5–10.1)
CHLORIDE SERPL-SCNC: 110 MMOL/L (ref 97–108)
CHLORIDE SERPL-SCNC: 111 MMOL/L (ref 97–108)
CO2 SERPL-SCNC: 25 MMOL/L (ref 21–32)
CO2 SERPL-SCNC: 25 MMOL/L (ref 21–32)
COLOR UR: ABNORMAL
COMMENT, HOLDF: NORMAL
CREAT SERPL-MCNC: 1.29 MG/DL (ref 0.55–1.02)
CREAT SERPL-MCNC: 1.66 MG/DL (ref 0.55–1.02)
D DIMER PPP FEU-MCNC: 16.78 MG/L FEU (ref 0–0.65)
DIFFERENTIAL METHOD BLD: ABNORMAL
EOSINOPHIL # BLD: 0.2 K/UL (ref 0–0.4)
EOSINOPHIL NFR BLD: 2 % (ref 0–7)
EPITH CASTS URNS QL MICRO: ABNORMAL /LPF
ERYTHROCYTE [DISTWIDTH] IN BLOOD BY AUTOMATED COUNT: 13.6 % (ref 11.5–14.5)
GLOBULIN SER CALC-MCNC: 3.6 G/DL (ref 2–4)
GLOBULIN SER CALC-MCNC: 3.9 G/DL (ref 2–4)
GLUCOSE SERPL-MCNC: 106 MG/DL (ref 65–100)
GLUCOSE SERPL-MCNC: 121 MG/DL (ref 65–100)
GLUCOSE UR STRIP.AUTO-MCNC: NEGATIVE MG/DL
HCT VFR BLD AUTO: 37.8 % (ref 35–47)
HGB BLD-MCNC: 12 G/DL (ref 11.5–16)
HGB UR QL STRIP: NEGATIVE
HYALINE CASTS URNS QL MICRO: ABNORMAL /LPF (ref 0–5)
IMM GRANULOCYTES # BLD AUTO: 0 K/UL (ref 0–0.04)
IMM GRANULOCYTES NFR BLD AUTO: 0 % (ref 0–0.5)
KETONES UR QL STRIP.AUTO: NEGATIVE MG/DL
LEUKOCYTE ESTERASE UR QL STRIP.AUTO: ABNORMAL
LYMPHOCYTES # BLD: 1.3 K/UL (ref 0.8–3.5)
LYMPHOCYTES NFR BLD: 13 % (ref 12–49)
MCH RBC QN AUTO: 33.6 PG (ref 26–34)
MCHC RBC AUTO-ENTMCNC: 31.7 G/DL (ref 30–36.5)
MCV RBC AUTO: 105.9 FL (ref 80–99)
MONOCYTES # BLD: 0.7 K/UL (ref 0–1)
MONOCYTES NFR BLD: 7 % (ref 5–13)
NEUTS SEG # BLD: 7.5 K/UL (ref 1.8–8)
NEUTS SEG NFR BLD: 78 % (ref 32–75)
NITRITE UR QL STRIP.AUTO: NEGATIVE
NRBC # BLD: 0 K/UL (ref 0–0.01)
NRBC BLD-RTO: 0 PER 100 WBC
PH UR STRIP: 5 [PH] (ref 5–8)
PLATELET # BLD AUTO: 108 K/UL (ref 150–400)
PMV BLD AUTO: 12.8 FL (ref 8.9–12.9)
POTASSIUM SERPL-SCNC: 4.4 MMOL/L (ref 3.5–5.1)
POTASSIUM SERPL-SCNC: 4.7 MMOL/L (ref 3.5–5.1)
PROT SERPL-MCNC: 6.6 G/DL (ref 6.4–8.2)
PROT SERPL-MCNC: 7 G/DL (ref 6.4–8.2)
PROT UR STRIP-MCNC: 30 MG/DL
RBC # BLD AUTO: 3.57 M/UL (ref 3.8–5.2)
RBC #/AREA URNS HPF: ABNORMAL /HPF (ref 0–5)
SAMPLES BEING HELD,HOLD: NORMAL
SODIUM SERPL-SCNC: 141 MMOL/L (ref 136–145)
SODIUM SERPL-SCNC: 142 MMOL/L (ref 136–145)
SP GR UR REFRACTOMETRY: 1.01 (ref 1–1.03)
THERAPEUTIC RANGE,PTTT: ABNORMAL SECS (ref 58–77)
THERAPEUTIC RANGE,PTTT: NORMAL SECS (ref 58–77)
TROPONIN I SERPL-MCNC: 0.08 NG/ML
TROPONIN I SERPL-MCNC: 0.09 NG/ML
UR CULT HOLD, URHOLD: NORMAL
UROBILINOGEN UR QL STRIP.AUTO: 0.2 EU/DL (ref 0.2–1)
WBC # BLD AUTO: 9.8 K/UL (ref 3.6–11)
WBC URNS QL MICRO: ABNORMAL /HPF (ref 0–4)

## 2020-01-15 PROCEDURE — 84484 ASSAY OF TROPONIN QUANT: CPT

## 2020-01-15 PROCEDURE — 85379 FIBRIN DEGRADATION QUANT: CPT

## 2020-01-15 PROCEDURE — 74011250637 HC RX REV CODE- 250/637: Performed by: FAMILY MEDICINE

## 2020-01-15 PROCEDURE — 72148 MRI LUMBAR SPINE W/O DYE: CPT

## 2020-01-15 PROCEDURE — 36415 COLL VENOUS BLD VENIPUNCTURE: CPT

## 2020-01-15 PROCEDURE — 70551 MRI BRAIN STEM W/O DYE: CPT

## 2020-01-15 PROCEDURE — 93005 ELECTROCARDIOGRAM TRACING: CPT

## 2020-01-15 PROCEDURE — 81001 URINALYSIS AUTO W/SCOPE: CPT

## 2020-01-15 PROCEDURE — A9558 XE133 XENON 10MCI: HCPCS

## 2020-01-15 PROCEDURE — 71045 X-RAY EXAM CHEST 1 VIEW: CPT

## 2020-01-15 PROCEDURE — 65660000000 HC RM CCU STEPDOWN

## 2020-01-15 PROCEDURE — 93306 TTE W/DOPPLER COMPLETE: CPT

## 2020-01-15 PROCEDURE — 80053 COMPREHEN METABOLIC PANEL: CPT

## 2020-01-15 PROCEDURE — 74011250636 HC RX REV CODE- 250/636: Performed by: INTERNAL MEDICINE

## 2020-01-15 PROCEDURE — 93970 EXTREMITY STUDY: CPT

## 2020-01-15 PROCEDURE — 85730 THROMBOPLASTIN TIME PARTIAL: CPT

## 2020-01-15 RX ORDER — DOXAZOSIN 2 MG/1
2 TABLET ORAL
COMMUNITY
End: 2020-01-24

## 2020-01-15 RX ORDER — DILTIAZEM HYDROCHLORIDE 240 MG/1
240 CAPSULE, COATED, EXTENDED RELEASE ORAL DAILY
Status: DISCONTINUED | OUTPATIENT
Start: 2020-01-15 | End: 2020-01-21

## 2020-01-15 RX ORDER — HEPARIN SODIUM 10000 [USP'U]/100ML
17-36 INJECTION, SOLUTION INTRAVENOUS
Status: DISCONTINUED | OUTPATIENT
Start: 2020-01-15 | End: 2020-01-21

## 2020-01-15 RX ORDER — SODIUM CHLORIDE 9 MG/ML
100 INJECTION, SOLUTION INTRAVENOUS ONCE
Status: COMPLETED | OUTPATIENT
Start: 2020-01-15 | End: 2020-01-15

## 2020-01-15 RX ORDER — HEPARIN SODIUM 5000 [USP'U]/ML
80 INJECTION, SOLUTION INTRAVENOUS; SUBCUTANEOUS ONCE
Status: COMPLETED | OUTPATIENT
Start: 2020-01-15 | End: 2020-01-15

## 2020-01-15 RX ORDER — ACETAMINOPHEN 500 MG
500 TABLET ORAL
COMMUNITY

## 2020-01-15 RX ORDER — HYDRALAZINE HYDROCHLORIDE 20 MG/ML
10 INJECTION INTRAMUSCULAR; INTRAVENOUS
Status: DISCONTINUED | OUTPATIENT
Start: 2020-01-15 | End: 2020-01-18

## 2020-01-15 RX ORDER — ALLOPURINOL 100 MG/1
100 TABLET ORAL DAILY
Status: DISCONTINUED | OUTPATIENT
Start: 2020-01-15 | End: 2020-01-24 | Stop reason: HOSPADM

## 2020-01-15 RX ORDER — DOXAZOSIN 2 MG/1
2 TABLET ORAL
Status: DISCONTINUED | OUTPATIENT
Start: 2020-01-15 | End: 2020-01-21

## 2020-01-15 RX ADMIN — ALLOPURINOL 100 MG: 100 TABLET ORAL at 11:00

## 2020-01-15 RX ADMIN — SODIUM CHLORIDE 100 ML/HR: 900 INJECTION, SOLUTION INTRAVENOUS at 06:37

## 2020-01-15 RX ADMIN — HEPARIN SODIUM 6700 UNITS: 5000 INJECTION INTRAVENOUS; SUBCUTANEOUS at 09:39

## 2020-01-15 RX ADMIN — HEPARIN SODIUM AND DEXTROSE 17 UNITS/KG/HR: 10000; 5 INJECTION INTRAVENOUS at 09:40

## 2020-01-15 RX ADMIN — DILTIAZEM HYDROCHLORIDE 240 MG: 240 CAPSULE, EXTENDED RELEASE ORAL at 10:59

## 2020-01-15 RX ADMIN — DOXAZOSIN 2 MG: 2 TABLET ORAL at 21:51

## 2020-01-15 NOTE — PROGRESS NOTES
1406 - TRANSFER - IN REPORT:    Verbal report received from HYGIEIA (name) on Ashli Bobby  being received from ED (unit) for routine progression of care      Report consisted of patients Situation, Background, Assessment and   Recommendations(SBAR). Information from the following report(s) SBAR, Kardex and ED Summary was reviewed with the receiving nurse. Opportunity for questions and clarification was provided. 1530 - Pt arrived to unit. 1600 - Pt and her  state that they are \"deficient in information\" and they would like to speak with a doctor. Dr. Eliane Erickson made aware by phone, he states he will come by to see patient this evening. 1954 - Bedside shift change report given to Nevin MCLAUGHLIN (oncoming nurse) by Juanito Garcia RN (offgoing nurse). Report included the following information SBAR, Kardex, Intake/Output, MAR, Accordion, Recent Results and Cardiac Rhythm NSR.

## 2020-01-15 NOTE — ACP (ADVANCE CARE PLANNING)
[facility]  Hospitalist Group                                      Advance Care Planning Note    Name: Lauren Almanzar  YOB: 1940  MRN: [id number]  Admission Date: 1/15/2020    Date of discussion: 1/15/2020    Active Diagnoses:    Hospital Problems  Date Reviewed: 1/1/2020          Codes Class Noted POA    Hypoxia ICD-10-CM: R09.02  ICD-9-CM: 799.02  1/15/2020 Unknown        Weakness of left lower extremity ICD-10-CM: R29.898  ICD-9-CM: 729.89  1/15/2020 Unknown              These active diagnoses are of sufficient risk that focused discussion on advance care planning is indicated in order to allow the patient to thoughtfully consider personal goals of care, and if situations arise that prevent the ability to personally give input, to ensure appropriate representation of their personal desires for different levels and aggressiveness of care. Discussion:     Persons present and participating in discussion: Janny Mancini MD.    Discussion: Medical condition, diagnosis and prognosis. Patient stated that does not want any extreme measurement in case of emergency. She is a DNR/DNI. Time Spent:     Total time spent face-to-face in education and discussion: 20 minutes.      Bee Puente MD  1/15/2020  7:27 AM

## 2020-01-15 NOTE — ED TRIAGE NOTES
Pt arrives from home via EMS for c/o L leg weakness/numbess. Code stroke initiated at 2235.    Pt to CT:     Last known well 2140

## 2020-01-15 NOTE — ED PROVIDER NOTES
78 y.o. female with past medical history significant for hypercholesteremia, HTN, thromboembolism LLE, arthritis, gout, and SVT who presents from home via EMS with chief complaint of leg pain, numbness, and weakness. Per EMS - patient was well when she at in a chair at 6:30 PM. She went to get up at 9:30 PM to go to the bathroom when she noticed her leg became numb, then weak, and finally painful. She could not walk. Patient was unable to bare weight at that time and stated she \"had no feeling in her leg\", following, her  placed her in bed and called EMS. Upon EMS arrival to the residence, patient was able to wiggle her toes, and no other body weakness, speech difficulty, or facial droop was noted. Her symptoms resolved by 11:15 AM. Per , patient has had fusion of L3-L5, as well as a Laminectomy. During transport to [facility], patients B/P was 169/97, and BGL was 123mg/dL. There are no other acute medical concerns at this time. Social hx: Former Smoker, + EtOH use, No illicit drug use  PCP: Savi Noguera MD      Note written by Bill Lange, as dictated by Zuelma Travis MD 10:50 PM      The history is provided by the spouse and the EMS personnel. No  was used.         Past Medical History:   Diagnosis Date    Abscess 3/22/2014    Lumbar area - MRSA    Arthritis     Common bile duct calculus 12/14/2019    Gout     Hypercholesterolemia     Hypertension     LBP (low back pain)     Other ill-defined conditions(799.89)     LEFT SHOULDER PAINFUL, NEEDS REPLACEMENT    Paroxysmal SVT (supraventricular tachycardia) ([address] 5/2/2017    Thromboembolus ([facility] [address] 12/26/13    left leg       Past Surgical History:   Procedure Laterality Date    ABDOMEN SURGERY PROC UNLISTED      hernia repair    BREAST SURGERY PROCEDURE UNLISTED      EXCISION OF BREAST CYST    ENDOSCOPY, COLON, DIAGNOSTIC      ostomy reversal    HX BREAST BIOPSY Right 1970    Benign    HX COLOSTOMY      HX GI      COLOSTOMY REVERSAL    HX HEMORRHOIDECTOMY      HX ORTHOPAEDIC      right knee  bilateral knee replacement    HX ORTHOPAEDIC      ORIF RIGHT ANKLE    HX ORTHOPAEDIC      KNEE ARTHROSCOPY         Family History:   Problem Relation Age of Onset    Hypertension Father     Anesth Problems Neg Hx        Social History     Socioeconomic History    Marital status:      Spouse name: Not on file    Number of children: Not on file    Years of education: Not on file    Highest education level: Not on file   Occupational History    Not on file   Social Needs    Financial resource strain: Not on file    Food insecurity:     Worry: Not on file     Inability: Not on file    Transportation needs:     Medical: Not on file     Non-medical: Not on file   Tobacco Use    Smoking status: Former Smoker     Packs/day: 1.00     Years: 20.00     Pack years: 20.00     Last attempt to quit: 1972     Years since quittin.7    Smokeless tobacco: Never Used   Substance and Sexual Activity    Alcohol use:  Yes     Alcohol/week: 1.7 standard drinks     Types: 2 Shots of liquor per week     Comment: 2 DAY    Drug use: No    Sexual activity: Not on file   Lifestyle    Physical activity:     Days per week: Not on file     Minutes per session: Not on file    Stress: Not on file   Relationships    Social connections:     Talks on phone: Not on file     Gets together: Not on file     Attends Latter-day service: Not on file     Active member of club or organization: Not on file     Attends meetings of clubs or organizations: Not on file     Relationship status: Not on file    Intimate partner violence:     Fear of current or ex partner: Not on file     Emotionally abused: Not on file     Physically abused: Not on file     Forced sexual activity: Not on file   Other Topics Concern    Not on file   Social History Narrative    Not on file         ALLERGIES: Shellfish derived    Review of Systems Constitutional: Negative for fever. HENT: Negative for congestion, nosebleeds and sore throat. Eyes: Negative for discharge. Respiratory: Negative for cough and shortness of breath. Cardiovascular: Negative for chest pain. Gastrointestinal: Negative for abdominal pain, diarrhea, nausea and vomiting. Genitourinary: Negative for dysuria. Musculoskeletal: Positive for gait problem. Skin: Negative for rash. Neurological: Positive for weakness and numbness. Negative for light-headedness and headaches. Hematological: Negative for adenopathy. Psychiatric/Behavioral: Negative. All other systems reviewed and are negative. Vitals:    01/14/20 2300 01/15/20 0000   BP: (!) 159/97 (!) 151/93   Pulse: 91 91   Resp: 18 22   Temp: 98.7 °F (37.1 °C) 98.5 °F (36.9 °C)   SpO2: 99% 94%   Weight: 83.5 kg (184 lb)    Height: 5' 7\" (1.702 m)             Physical Exam  Vitals signs and nursing note reviewed. Constitutional:       Appearance: Normal appearance. She is well-developed. HENT:      Head: Normocephalic and atraumatic. Nose: Nose normal.      Mouth/Throat:      Mouth: Mucous membranes are dry. Eyes:      Extraocular Movements: Extraocular movements intact. Conjunctiva/sclera: Conjunctivae normal.      Pupils: Pupils are equal, round, and reactive to light. Neck:      Musculoskeletal: Normal range of motion and neck supple. Cardiovascular:      Rate and Rhythm: Normal rate and regular rhythm. Pulses: Normal pulses. Heart sounds: Normal heart sounds. Pulmonary:      Effort: Pulmonary effort is normal.      Breath sounds: Normal breath sounds. Abdominal:      General: Abdomen is flat. Bowel sounds are normal.      Palpations: Abdomen is soft. Comments: Incisions are clean, dry, and intact. Musculoskeletal: Normal range of motion. Skin:     General: Skin is warm and dry. Capillary Refill: Capillary refill takes less than 2 seconds.    Neurological: General: No focal deficit present. Mental Status: She is alert and oriented to person, place, and time. Cranial Nerves: No cranial nerve deficit. Sensory: No sensory deficit. Motor: No weakness. Coordination: Coordination normal.   Psychiatric:         Mood and Affect: Mood normal.         Behavior: Behavior normal.         MDM       Procedures        CONSULT:  Dr. Alia Barboza - tele-neurology. Recommends CTA head and neck. Also recommends MRI brain and L spine. Also wants venous dopplers. A/P:  1. Left leg numbness/weakness - will need MRI. 2. Shortness of breath - will need VQ scan       Hospitalist Perfect Serve for Admission  12:56 AM - Dr. Darryn Delacruz    ED Room Number: UI55/72  Patient Name and age:  Cele Starr 78 y.o.  female  Working Diagnosis:   1. Left leg numbness    2. Left leg weakness    3. SOB (shortness of breath)      Readmission: yes  Isolation Requirements:  no  Recommended Level of Care:  telemetry  Code Status:  Patient states DNR  Department:[facility] Adult ED - [phone]  Other: recent cholecystectomy.

## 2020-01-15 NOTE — PROGRESS NOTES
Admission Medication Reconciliation:    Information obtained from:  Patient, [facility]  RxQuery data available¹:  YES    Comments/Recommendations: Spoke with patient regarding use of PTA medications including prescription/OTC, vitamins/supplements, inhaled, topical, injectable, nasal, otic and ophthalmic medications. Updated PTA meds/reviewed patient's allergies. 1)  Of note, patient was unsure if she still takes doxazosin. Patient states she thinks it was a medication they were giving her while in the hospital (recently admitted, discharged on 12/22). Doxazosin started on most recent hospitalization for elevated BP. Doxazosin filled per RxQuery on 12/21. Called [facility] to verify and per pharmacy doxazosin was picked up on 12/22 for a 30 day supply. Unsure if patient still taking. 2)  Medication changes (since last review): Added  - Acetaminophen    Adjusted  - None    Removed  - Albuterol HFA inhaler  - Ondansetron ODT     ¹RxQuery pharmacy benefit data reflects medications filled and processed through the patient's insurance, however   this data does NOT capture whether the medication was picked up or is currently being taken by the patient.     Allergies:  Shellfish derived    Significant PMH/Disease States:   Past Medical History:   Diagnosis Date    Abscess 3/22/2014    Lumbar area - MRSA    Arthritis     Common bile duct calculus 12/14/2019    Gout     Hypercholesterolemia     Hypertension     LBP (low back pain)     Other ill-defined conditions(799.89)     LEFT SHOULDER PAINFUL, NEEDS REPLACEMENT    Paroxysmal SVT (supraventricular tachycardia) ([facility] [address] 5/2/2017    Thromboembolus ([facility] [address] 12/26/13    left leg     Chief Complaint for this Admission:    Chief Complaint   Patient presents with    Leg Pain     +numbness     Prior to Admission Medications:   Prior to Admission Medications   Prescriptions Last Dose Informant Taking?   acetaminophen (TYLENOL EXTRA STRENGTH) 500 mg tablet   Yes   Sig: Take 500 mg by mouth two (2) times daily as needed for Pain. albuterol (PROVENTIL HFA, VENTOLIN HFA, PROAIR HFA) 90 mcg/actuation inhaler   Yes   Sig: Take 2 Puffs by inhalation every four (4) hours as needed for Wheezing. allopurinol (ZYLOPRIM) 100 mg tablet 1/14/2020 at Unknown time  Yes   Sig: TAKE 1 TABLET BY MOUTH EVERY DAY   calcium-vitamin D (OYSTER SHELL) 500 mg(1,250mg) -200 unit per tablet 1/14/2020 at Unknown time  Yes   Sig: Take 1 Tab by mouth daily. cholecalciferol (VITAMIN D3) 1,000 unit tablet 1/14/2020 at Unknown time  Yes   Sig: Take 1,000 Units by mouth daily. cyanocobalamin (VITAMIN B12) 1,000 mcg/mL injection 1/8/2020 at Unknown time  Yes   Sig: inject 1 milliliter subcutaneously EVERY 2 MONTHS   dilTIAZem XR (DILACOR XR) 240 mg XR capsule 1/14/2020 at Unknown time  Yes   Sig: Take 240 mg by mouth daily. diphenhydrAMINE (BENADRYL ALLERGY) 25 mg tablet   Yes   Sig: Take 25 mg by mouth nightly as needed for Sleep.   doxazosin (CARDURA) 2 mg tablet Unknown at Unknown time  No   Sig: Take 2 mg by mouth nightly. lovastatin (MEVACOR) 20 mg tablet 1/14/2020 at Unknown time  Yes   Sig: TAKE 1 TABLET BY MOUTH AT BEDTIME   multivit-min-iron-FA-lutein (CENTRUM SILVER WOMEN) 8 mg iron-400 mcg-300 mcg tab 1/14/2020 at Unknown time  Yes   Sig: Take 1 Tab by mouth daily. omega-3 fatty acids-vitamin e (FISH OIL) 1,000 mg cap 1/14/2020 at Unknown time  Yes   Sig: Take 1 Cap by mouth daily. Facility-Administered Medications: None       Please contact the main inpatient pharmacy with any questions or concerns at [phone] and we will direct you to the clinical pharmacist covering this patient's care while in-house.    GEORGIA Rios

## 2020-01-15 NOTE — PROGRESS NOTES
Spiritual Care Assessment/Progress Note  [address]      NAME: Linda Castanon      MRN: [id number]  AGE: 78 y.o. SEX: female  Baptism Affiliation: Restorationist   Language: English     1/14/2020     Total Time (in minutes): 15     Spiritual Assessment begun in [address] through conversation with:         [x]Patient        [x] Family    [] Friend(s)        Reason for Consult: Crisis(Stroke)     Spiritual beliefs: (Please include comment if needed)     [x] Identifies with a dakota tradition:         [x] Supported by a dakota community:            [] Claims no spiritual orientation:           [] Seeking spiritual identity:                [] Adheres to an individual form of spirituality:           [] Not able to assess:                           Identified resources for coping:      [x] Prayer                               [] Music                  [] Guided Imagery     [x] Family/friends                 [] Pet visits     [] Devotional reading                         [] Unknown     [] Other:                                              Interventions offered during this visit: (See comments for more details)    Patient Interventions: Affirmation of dakota, Affirmation of emotions/emotional suffering, Coping skills reviewed/reinforced, Iconic (affirming the presence of God/Higher Power), Normalization of emotional/spiritual concerns, Prayer (assurance of), Baptism beliefs/image of God discussed     Family/Friend(s):  Affirmation of emotions/emotional suffering, Affirmation of dakota, Coping skills reviewed/reinforced, Normalization of emotional/spiritual concerns, Iconic (affirming the presence of God/Higher Power), Prayer (assurance of), Baptism beliefs/image of God discussed     Plan of Care:     [x] Support spiritual and/or cultural needs    [] Support AMD and/or advance care planning process      [] Support grieving process   [] Coordinate Rites and/or Rituals    [] Coordination with community clergy [] No spiritual needs identified at this time   [] Detailed Plan of Care below (See Comments)  [] Make referral to Music Therapy  [] Make referral to Pet Therapy     [] Make referral to Addiction services  [] Make referral to [facility]  [] Make referral to Spiritual Care Partner  [] No future visits requested        [x] Follow up visits as needed     Comments: Provided support to pt and her . Pt appreciative of visit. Pt relies on family and prayer to cope with difficult situations.  assured pt and  of continued prayer and spiritual support. Advised of  availability.  will follow up as needed.     Asya Callejas,  Intern, MDiv  [phone]

## 2020-01-15 NOTE — ED NOTES
The documentation for this period is being entered following the guidelines as defined in the [facility] policy by Davida Simmons RN.

## 2020-01-15 NOTE — CONSULTS
Name: [facility]: Hattie Baldwin 53   : 1940 Admit Date: 2020   Phone: [phone]  Room: [facility] [place]/   PCP: Mckenna Gaxiola MD  MRN: [de-identified]   Date: 1/15/2020  Code: DNR        HPI:    4:54 PM       History was obtained from patient. I was asked by Lisa Tadeo MD to see Traci Spicer in consultation for a chief complaint of shortness of breath. History of Present Illness: 78year old female with past medical history as given below who presented to [facility] with increased left leg pain today. She was also having some shortness of breath. She denies cough, sputum, fever, chills, chest pain. In the ED noted to have bilateral LE DVT on US and started on heparin gtt. Echo was done and the report is pending.       Past Medical History:   Diagnosis Date    Abscess 3/22/2014    Lumbar area - MRSA    Arthritis     Common bile duct calculus 2019    Gout     Hypercholesterolemia     Hypertension     LBP (low back pain)     Other ill-defined conditions(799.89)     LEFT SHOULDER PAINFUL, NEEDS REPLACEMENT    Paroxysmal SVT (supraventricular tachycardia) ([address] 2017    Thromboembolus ([address] 13    left leg       Past Surgical History:   Procedure Laterality Date    ABDOMEN SURGERY PROC UNLISTED      hernia repair    BREAST SURGERY PROCEDURE UNLISTED      EXCISION OF BREAST CYST    ENDOSCOPY, COLON, DIAGNOSTIC      ostomy reversal    HX BREAST BIOPSY Right 1970    Benign    HX COLOSTOMY      HX GI      COLOSTOMY REVERSAL    HX HEMORRHOIDECTOMY      HX ORTHOPAEDIC      right knee  bilateral knee replacement    HX ORTHOPAEDIC      ORIF RIGHT ANKLE    HX ORTHOPAEDIC      KNEE ARTHROSCOPY       Family History   Problem Relation Age of Onset    Hypertension Father    [facility] Anesth Problems Neg Hx        Social History     Tobacco Use    Smoking status: Former Smoker     Packs/day: 1.00     Years: 20.00     Pack years: 20.00     Last attempt to quit: 1972 Years since quittin.8    Smokeless tobacco: Never Used   Substance Use Topics    Alcohol use: Yes     Alcohol/week: 1.7 standard drinks     Types: 2 Shots of liquor per week     Comment: 2 DAY       Allergies   Allergen Reactions    Shellfish Derived Angioedema       Current Facility-Administered Medications   Medication Dose Route Frequency    heparin 25,000 units in D5W 250 ml infusion  17-36 Units/kg/hr IntraVENous TITRATE    hydrALAZINE (APRESOLINE) 20 mg/mL injection 10 mg  10 mg IntraVENous Q6H PRN    dilTIAZem CD (CARDIZEM CD) capsule 240 mg  240 mg Oral DAILY    doxazosin (CARDURA) tablet 2 mg  2 mg Oral QHS    allopurinol (ZYLOPRIM) tablet 100 mg  100 mg Oral DAILY         REVIEW OF SYSTEMS   Negative except as stated in the HPI. Physical Exam:   Visit Vitals  /84 (BP 1 Location: Right arm, BP Patient Position: At rest)   Pulse 90   Temp 98.6 °F (37 °C)   Resp 20   Ht 5' 7\" (1.702 m)   Wt 83.5 kg (184 lb)   SpO2 92%   BMI 28.82 kg/m²       General:  Alert, cooperative, no distress, appears stated age. Head:  Normocephalic, without obvious abnormality, atraumatic. Eyes:  Conjunctivae/corneas clear. Nose: Nares normal. Septum midline. Mucosa normal.   Throat: Lips, mucosa, and tongue normal.   Neck: Supple, symmetrical, trachea midline, no adenopathy. Lungs:   Clear to auscultation bilaterally. Heart:  Regular rate and rhythm, S1, S2 normal, no murmur. Abdomen:   Soft, non-tender. Bowel sounds normal.   Extremities: Leg swelling. Pulses: 2+ and symmetric all extremities.            Neurologic: Grossly nonfocal       Lab Results   Component Value Date/Time    Sodium 141 01/15/2020 05:20 AM    Potassium 4.4 01/15/2020 05:20 AM    Chloride 110 (H) 01/15/2020 05:20 AM    CO2 25 01/15/2020 05:20 AM    BUN 21 (H) 01/15/2020 05:20 AM    Creatinine 1.29 (H) 01/15/2020 05:20 AM    Glucose 106 (H) 01/15/2020 05:20 AM    Calcium 9.6 01/15/2020 05:20 AM    Magnesium 1.5 (L) 01/26/2018 04:28 AM    Phosphorus 3.3 01/26/2018 04:28 AM    Lactic acid 1.4 12/13/2019 08:12 AM       Lab Results   Component Value Date/Time    WBC 9.8 01/14/2020 11:12 PM    HGB 12.0 01/14/2020 11:12 PM    PLATELET 556 (L) 14/12/4523 11:12 PM    .9 (H) 01/14/2020 11:12 PM       Lab Results   Component Value Date/Time    INR 1.0 08/18/2015 06:16 PM    aPTT 23.5 01/15/2020 06:30 AM    AST (SGOT) 18 01/15/2020 05:20 AM    Alk. phosphatase 56 01/15/2020 05:20 AM    Protein, total 6.6 01/15/2020 05:20 AM    Albumin 3.0 (L) 01/15/2020 05:20 AM    Globulin 3.6 01/15/2020 05:20 AM       Lab Results   Component Value Date/Time    Ferritin 13 07/20/2014 11:37 AM       Lab Results   Component Value Date/Time    TSH 0.91 01/26/2018 04:49 AM       Lab Results   Component Value Date/Time    CK 53 01/26/2018 04:28 AM    CK-MB Index 2.3 01/26/2018 04:28 AM    Troponin-I, Qt. 0.09 (H) 01/15/2020 05:20 AM     (H) 09/09/2015 05:08 AM        Lab Results   Component Value Date/Time    Culture result: MRSA NOT PRESENT 12/14/2019 08:35 PM    Culture result:  12/14/2019 08:35 PM         Screening of patient nares for MRSA is for surveillance purposes and, if positive, to facilitate isolation considerations in high risk settings. It is not intended for automatic decolonization interventions per se as regimens are not sufficiently effective to warrant routine use. Culture result: MRSA NOT PRESENT 01/26/2018 12:59 AM    Culture result:  01/26/2018 12:59 AM         Screening of patient nares for MRSA is for surveillance purposes and, if positive, to facilitate isolation considerations in high risk settings. It is not intended for automatic decolonization interventions per se as regimens are not sufficiently effective to warrant routine use.          Lab Results   Component Value Date/Time    Vancomycin,trough 19.5 (H) 04/04/2014 12:19 AM    Vancomycin,trough 19.2 (H) 03/29/2014 11:30 PM    CK 53 01/26/2018 04:28 AM    CK 51 01/25/2018 11:00 PM       Lab Results   Component Value Date/Time    Color YELLOW/STRAW 01/15/2020 12:46 AM    Appearance CLEAR 01/15/2020 12:46 AM    pH (UA) 5.0 01/15/2020 12:46 AM    Protein 30 (A) 01/15/2020 12:46 AM    Glucose NEGATIVE  01/15/2020 12:46 AM    Ketone NEGATIVE  01/15/2020 12:46 AM    Bilirubin NEGATIVE  01/15/2020 12:46 AM    Blood NEGATIVE  01/15/2020 12:46 AM    Urobilinogen 0.2 01/15/2020 12:46 AM    Nitrites NEGATIVE  01/15/2020 12:46 AM    Leukocyte Esterase TRACE (A) 01/15/2020 12:46 AM    WBC 0-4 01/15/2020 12:46 AM    RBC 0-5 01/15/2020 12:46 AM    Bacteria NEGATIVE  01/15/2020 12:46 AM       IMPRESSION:  ===========  -DVT. -SOB - suspect likely has PE.  -JOSE. -Hypoxemia. PLAN:  =====  -heparin gtt and transition to coumadin.  -likely has pe as she has hypoxemia and shortness of breath. No need for cta chest as it would not change the management. -O2 supplementation. -echo is pending. Will follow for RV dysfunction. biochemically their is evidence of Rv dysfunction as increased trop I and pro BNP. Thank you for the consult. Will follow.     Socorro Crain MD

## 2020-01-15 NOTE — CONSULTS
INPATIENT NEUROLOGY CONSULTATION  1/15/2020     Consulted by: Javad Yee MD        Patient ID:  Kayley Salazar  [id number]  78 y.o.  1940    Chief Complaint   Patient presents with    Leg Pain     +numbness       HPI    Christopher cooper is an 78-year-old woman with a history of hypertension, hyperlipidemia, colectomy who is here because she was sitting in a chair and when she stood up her left leg did not work. It felt weak. Then she began to have numbness and pain. She felt pain throughout the entire length of the left leg. Since being in the hospital her symptoms have dramatically improved. She thinks her left leg is about 90% normal now. She walks with a Rollator. Concomitant to all of this she is being evaluated for a possible PE and now is on a heparin drip. The MRI of the brain was completed showing diffuse chronic ischemic changes with evidence of ischemia most recently in the left thalamus but not acute. MRI of the low back showing multilevel changes and canal stenosis with evidence of decompression. At the moment she denies any vision symptoms or speech symptoms. She has residual right arm weakness from a TIA she tells me. She is not on baby aspirin because she had bleeding a few years ago and now has a total colectomy. Review of Systems   Eyes: Negative for double vision. Musculoskeletal: Positive for back pain and myalgias. Neurological: Positive for sensory change and focal weakness. Negative for speech change. All other systems reviewed and are negative.       Past Medical History:   Diagnosis Date    Abscess 3/22/2014    Lumbar area - MRSA    Arthritis     Common bile duct calculus 12/14/2019    Gout     Hypercholesterolemia     Hypertension     LBP (low back pain)     Other ill-defined conditions(799.89)     LEFT SHOULDER PAINFUL, NEEDS REPLACEMENT    Paroxysmal SVT (supraventricular tachycardia) ([address] 5/2/2017    Thromboembolus ([facility] [address] 12/26/13    left leg Family History   Problem Relation Age of Onset    Hypertension Father     Anesth Problems Neg Hx      Social History     Socioeconomic History    Marital status:      Spouse name: Not on file    Number of children: Not on file    Years of education: Not on file    Highest education level: Not on file   Occupational History    Not on file   Social Needs    Financial resource strain: Not on file    Food insecurity:     Worry: Not on file     Inability: Not on file    Transportation needs:     Medical: Not on file     Non-medical: Not on file   Tobacco Use    Smoking status: Former Smoker     Packs/day: 1.00     Years: 20.00     Pack years: 20.00     Last attempt to quit: 1972     Years since quittin.7    Smokeless tobacco: Never Used   Substance and Sexual Activity    Alcohol use:  Yes     Alcohol/week: 1.7 standard drinks     Types: 2 Shots of liquor per week     Comment: 2 DAY    Drug use: No    Sexual activity: Not on file   Lifestyle    Physical activity:     Days per week: Not on file     Minutes per session: Not on file    Stress: Not on file   Relationships    Social connections:     Talks on phone: Not on file     Gets together: Not on file     Attends Hindu service: Not on file     Active member of club or organization: Not on file     Attends meetings of clubs or organizations: Not on file     Relationship status: Not on file    Intimate partner violence:     Fear of current or ex partner: Not on file     Emotionally abused: Not on file     Physically abused: Not on file     Forced sexual activity: Not on file   Other Topics Concern    Not on file   Social History Narrative    Not on file     Current Facility-Administered Medications   Medication Dose Route Frequency    heparin 25,000 units in D5W 250 ml infusion  17-36 Units/kg/hr IntraVENous TITRATE    hydrALAZINE (APRESOLINE) 20 mg/mL injection 10 mg  10 mg IntraVENous Q6H PRN    dilTIAZem CD (CARDIZEM CD) capsule 240 mg  240 mg Oral DAILY    doxazosin (CARDURA) tablet 2 mg  2 mg Oral QHS    allopurinol (ZYLOPRIM) tablet 100 mg  100 mg Oral DAILY     Current Outpatient Medications   Medication Sig    acetaminophen (TYLENOL EXTRA STRENGTH) 500 mg tablet Take 500 mg by mouth two (2) times daily as needed for Pain.  cyanocobalamin (VITAMIN B12) 1,000 mcg/mL injection inject 1 milliliter subcutaneously EVERY 2 MONTHS    allopurinol (ZYLOPRIM) 100 mg tablet TAKE 1 TABLET BY MOUTH EVERY DAY    dilTIAZem XR (DILACOR XR) 240 mg XR capsule Take 240 mg by mouth daily.  lovastatin (MEVACOR) 20 mg tablet TAKE 1 TABLET BY MOUTH AT BEDTIME    albuterol (PROVENTIL HFA, VENTOLIN HFA, PROAIR HFA) 90 mcg/actuation inhaler Take 2 Puffs by inhalation every four (4) hours as needed for Wheezing.  calcium-vitamin D (OYSTER SHELL) 500 mg(1,250mg) -200 unit per tablet Take 1 Tab by mouth daily.  multivit-min-iron-FA-lutein (CENTRUM SILVER WOMEN) 8 mg iron-400 mcg-300 mcg tab Take 1 Tab by mouth daily.  cholecalciferol (VITAMIN D3) 1,000 unit tablet Take 1,000 Units by mouth daily.  omega-3 fatty acids-vitamin e (FISH OIL) 1,000 mg cap Take 1 Cap by mouth daily.  diphenhydrAMINE (BENADRYL ALLERGY) 25 mg tablet Take 25 mg by mouth nightly as needed for Sleep.  doxazosin (CARDURA) 2 mg tablet Take 2 mg by mouth nightly. Allergies   Allergen Reactions    Shellfish Derived Angioedema       Visit Vitals  /85 (BP 1 Location: Right arm)   Pulse 92   Temp 98.7 °F (37.1 °C)   Resp 12   Ht 5' 7\" (1.702 m)   Wt 83.5 kg (184 lb)   SpO2 94%   BMI 28.82 kg/m²     Physical Exam   Constitutional: She appears well-developed and well-nourished. Cardiovascular: Normal rate. Pulmonary/Chest: Effort normal.   Skin: Skin is warm and dry. Psychiatric: Her behavior is normal.   Vitals reviewed.     Neurologic Exam     Mental Status   Elderly woman awake and alert who can give me good historical details of her symptoms and history. Pupils are equal, EOMI face is grossly symmetric on smile tongue is midline without aphasia  Right upper extremity 4+ left upper extremity 5/5  Bilateral lower extremities 5/5 and she can activate both legs simultaneously against gravity supine  Sensation grossly intact to light touch  Finger-to-nose intact, no ataxia  Bilateral patella DTRs 2/4  Gait deferredon a heparin drip            Lab Results   Component Value Date/Time    WBC 9.8 01/14/2020 11:12 PM    WBC (POC) 9.5 10/13/2015 09:10 AM    HGB 12.0 01/14/2020 11:12 PM    Hemoglobin (POC) 4.8 (LL) 07/20/2014 12:23 PM    HGB (POC) 10.5 (A) 10/13/2015 09:10 AM    HCT 37.8 01/14/2020 11:12 PM    Hematocrit (POC) 14 (LL) 07/20/2014 12:23 PM    HCT (POC) 34.1 (A) 10/13/2015 09:10 AM    PLATELET 195 (L) 68/61/0803 11:12 PM    PLATELET (POC) 091 16/84/1544 09:10 AM    .9 (H) 01/14/2020 11:12 PM    MCV (POC) 92.9 10/13/2015 09:10 AM     Lab Results   Component Value Date/Time    Hemoglobin A1c 4.9 02/12/2014 01:19 PM    Glucose 106 (H) 01/15/2020 05:20 AM    Glucose (POC) 123 (H) 01/14/2020 10:45 PM    LDL, calculated 75.2 01/26/2018 04:28 AM    Creatinine (POC) 2.1 (H) 07/20/2014 12:23 PM    Creatinine 1.29 (H) 01/15/2020 05:20 AM      Lab Results   Component Value Date/Time    Cholesterol, total 161 01/26/2018 04:28 AM    Cholesterol (POC) 198 02/26/2013 02:35 PM    HDL Cholesterol 69 01/26/2018 04:28 AM    LDL, calculated 75.2 01/26/2018 04:28 AM    LDL Cholesterol (POC) 82 02/26/2013 02:35 PM    Triglyceride 84 01/26/2018 04:28 AM    Triglycerides (POC) 84 02/26/2013 02:35 PM    CHOL/HDL Ratio 2.3 01/26/2018 04:28 AM     Lab Results   Component Value Date/Time    ALT (SGPT) 17 01/15/2020 05:20 AM    AST (SGOT) 18 01/15/2020 05:20 AM    Alk.  phosphatase 56 01/15/2020 05:20 AM    Bilirubin, total 0.5 01/15/2020 05:20 AM    Albumin 3.0 (L) 01/15/2020 05:20 AM    Protein, total 6.6 01/15/2020 05:20 AM    INR 1.0 08/18/2015 06:16 PM    Prothrombin time 10.2 08/18/2015 06:16 PM    PLATELET 392 (L) 61/25/9588 11:12 PM    PLATELET (POC) 316 35/10/1442 09:10 AM        CT Results (maximum last 3): Results from [place] encounter on 01/14/20   CT CODE NEURO HEAD WO CONTRAST    Narrative Indication:  LL weakness     Comparison: March 2014    Findings: 5 mm axial images were obtained from the skull base through the  vertex. CT dose reduction was achieved through the use of a standardized protocol  tailored for this examination and automatic exposure control for dose  modulation. The ventricles and cortical sulci are prominent, compatible with age related  volume loss. There is no evidence of intracranial hemorrhage, mass, mass effect,  or acute infarct. 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 There is  periventricular white matter disease. No extra-axial fluid collections are seen. The visualized paranasal sinuses and mastoid air cells are clear. The orbital  structures are unremarkable. No osseous abnormalities are seen. Impression Impression:   1. No evidence of acute infarct or intracranial hemorrhage. 2. Periventricular white matter disease is likely secondary to chronic small  vessel ischemic changes. Results from [place] encounter on 12/13/19   CT ABD PELV WO CONT    Narrative EXAM: CT ABD PELV WO CONT    INDICATION: abd pain, n/v    COMPARISON: 2018    CONTRAST:  None. TECHNIQUE:   Thin axial images were obtained through the abdomen and pelvis. Coronal and  sagittal reconstructions were generated. Oral contrast was not administered. CT  dose reduction was achieved through use of a standardized protocol tailored for  this examination and automatic exposure control for dose modulation. The absence of intravenous contrast material reduces the sensitivity for  evaluation of the solid parenchymal organs of the abdomen. FINDINGS:   LUNG BASES: Clear.   INCIDENTALLY IMAGED HEART AND MEDIASTINUM: Unremarkable. LIVER: No mass or biliary dilatation. GALLBLADDER: Cholelithiasis. SPLEEN: No mass. PANCREAS: No mass or ductal dilatation. ADRENALS: Unremarkable. KIDNEYS/URETERS: Renal masses are stable since 2017. STOMACH: Unremarkable. SMALL BOWEL: Dilated small bowel loops in the mid and lower abdomen, similar to  prior examination. The previously seen mesenteric infiltration is no longer  identified. COLON: Colectomy. APPENDIX: Unremarkable. PERITONEUM: No ascites or pneumoperitoneum. RETROPERITONEUM: No lymphadenopathy or aortic aneurysm. REPRODUCTIVE ORGANS: Calcified leiomyomata. URINARY BLADDER: No mass or calculus. BONES: No destructive bone lesion. ADDITIONAL COMMENTS: Small anterior abdominal wall hernia containing only  adipose without change. Impression IMPRESSION:  Persistently dilated small bowel loops in the mid and lower abdomen without  change when compared to prior examination. Status post colectomy. Unchanged  renal masses. Cholelithiasis. MRI Results (maximum last 3): Results from [place] encounter on 01/14/20   MRI LUMB SPINE WO CONT    Narrative EXAM: MRI LUMB SPINE WO CONT  History: Lower extremity weakness and radicular pain  INDICATION: LE weakness and radicular pain. .    COMPARISON: None    TECHNIQUE: MR imaging of the lumbar spine was performed using the following  sequences: sagittal T1, T2, STIR;  axial T1, T2.     CONTRAST:  None. FINDINGS:    Retrolisthesis of T12 on L1 3 mm. Retrolisthesis of L1 on L2 L2 on L3. Anterolisthesis of L3 on L4 and L4 on L5. Stabilization hardware radicular  screws at L3, L4 and L5. Vertebral body heights are maintained. Discogenic  marrow degenerative change. No acute fracture or dislocation. The conus medullaris terminates at L1. Signal and caliber of the distal spinal  cord are within normal limits. T12/L1. Mild broad-based protrusion/osteophyte. Facet arthropathy and  hypertrophy.  Ligament flavum hypertrophy as well. Moderate to severe canal  stenosis. Mild bilateral foraminal stenoses. L1-L2: Retrolisthesis. Discogenic degenerative change. Facet arthropathy and  hypertrophy. Ligament flavum hypertrophy. Moderate to large left paracentral  protrusion extends into the left foramen. Moderate central canal stenosis. Severe left and right foraminal stenosis. L2-L3: Minimal retrolisthesis. Disc bulge/osteophyte. Facet arthropathy. The  canal is patent. Moderate bilateral foraminal stenoses. L3-L4: Facet arthropathy and hypertrophy. Disc bulge/osteophyte. Canal is  patent. Mild left foraminal stenosis. L4-L5: 7 mm anterolisthesis with uncovering of intervertebral disc space. Facet  arthropathy. The canal is patent. There is moderate bilateral foraminal  stenosis. L5-S1: Facet arthropathy. Laminectomy. Canal is patent. Foramina are patent. Impression IMPRESSION:  Multilevel disc and facet degenerative change with multilevel spondylolisthesis  as well. The canal is decompressed at L3-L4 and L4-L5. Moderate to severe canal stenosis with mild bilateral foraminal stenoses at  T12-L1. Moderate canal stenosis with severe bilateral foraminal stenosis at L1/L2. Other less severe degenerative changes are as described above. MRI BRAIN WO CONT    Narrative EXAM: MRI BRAIN WO CONT    INDICATION: Lower extremity weakness    COMPARISON: CT head 1/14/2020, MRI brain 3/17/2014. CONTRAST: None. TECHNIQUE:    Multiplanar multisequence acquisition without contrast of the brain. FINDINGS:  Unchanged mild generalized parenchymal volume loss with commensurate dilation of  the sulci and ventricular system. Unchanged periventricular deep white matter  T2/FLAIR hyperintensities, consistent with mild chronic microvascular ischemic  disease. Unchanged numerous small chronic infarcts in the bilateral cerebellum.   Small chronic infarct in the left thalamus, but new since prior exam. Chronic  hemosiderosis in the superior cerebellar vermis. There is no acute infarct,  hemorrhage, extra-axial fluid collection, or mass effect. There is no cerebellar  tonsillar herniation. Expected arterial flow-voids are present. Mild mucosal thickening in the bilateral ethmoidal air cells and left maxillary  sinus without air-fluid level. Small bilateral mastoid effusions. The orbital  contents are within normal limits with bilateral lens implants. No significant  osseous or scalp lesions are identified. Impression IMPRESSION:     1. No acute intracranial abnormality. 2. Unchanged mild generalized parenchymal volume loss and mild chronic  microvascular ischemic disease. 3. Unchanged numerous small chronic infarcts in the bilateral cerebellum. Small  chronic infarct in the left thalamus is new since 2014. Results from [place] encounter on 12/14/19   MRI ABD W MRCP WO CONT    Narrative MRI OF THE ABDOMEN AND MRCP  WITHOUT CONTRAST. 12/14/2019 3:47 PM    INDICATION: Cholelithiasis. COMPARISON: CT abdomen pelvis 12/13/2019, 4/27/2017, 8/18/2016; abdominal  ultrasound 12/14/2019. TECHNIQUE: Multisequence and multiplanar MRI was performed of the abdomen  without contrast. Heavily T2-weighted thick slab and thin slice images were  obtained in the oblique coronal plane through the biliary tree (MRCP). FINDINGS:   There is mild intrahepatic and moderate extrahepatic biliary ductal dilation. The common bile duct measures 12 mm. Rounded, convex inward contour of the  distal common bile duct lumen at the ampulla (18-7) suggests a ampullary  calculus. On axial image 8-25 and coronal image 11-13, however, the ampulla  appears more normal. Further, the common bile duct is chronically dilated colon  it measured 11 mm in 2017, 14 mm in 2016, and 11 mm in 2015. The ampullary  contour in 2016 (image [id number] on 8/18/2016) is similar to the appearance on  image 18-7 today.  The cystic duct is dilated as well. Posteroinferior insertion  of the cystic duct on the bile duct is a normal variant. The gallbladder is  normal caliber. The pancreatic duct is at the upper limits of normal in caliber. There are numerous bilateral renal cysts, hemorrhagic right renal cyst, and  probable hemorrhagic left renal cysts. The unenhanced distal esophagus, stomach,  pancreas, spleen, and adrenals are normal.    A jejunal obstruction with transition point deep to the umbilicus persists. This  is also a chronic finding, and was nearly identical on 2016 and 2017 CTs. Post  total colectomy and lower lumbar fusion. Impression IMPRESSION:  1. Chronic, moderate, biliary ductal dilation. Questionable convexity at the  ampulla is similar in appearance to 2016.  2. Chronic jejunal obstruction. VAS/US/Carotid Doppler Results (maximum last 3): Results from [place] encounter on 01/25/18   DUPLEX CAROTID BILATERAL       PET Results (maximum last 3): No results found for this or any previous visit. Assessment and Plan      78-year-old woman who presents with sudden left leg weakness followed by numbness and pain. I suspect this is secondary to a lumbar radicular issue. MRI brain does not show acute stroke. 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  She is feeling better with management in the ER as far as her leg symptoms go. I do think it would be worthwhile to consider re-challenging her with aspirin 81 mg for stroke prevention given the chronic ischemic changes seen on the brain scan. Can consider that after her work-up for PE is completed. I do not think she needs a stroke work-up. Defer lumbosacral disease to primary team.  She seems to be improving. Will sign off. Please call us if needed. Any acute changes activate code stroke. During this evaluation, we also discussed stroke education to include signs and symptoms of stroke and TIA. This clinical note was dictated with an electronic dictation software that can make unintentional errors. If there are any questions, please contact me directly for clarification.       [address] [facility], DO  NEUROLOGIST  Diplomate PATRICA  1/15/2020

## 2020-01-15 NOTE — ROUTINE PROCESS
TRANSFER - OUT REPORT: 
 
Verbal report given to Didi Segovia RN(name) on Sivakumar Suarez  being transferred to [facility](unit) for routine progression of care Report consisted of patients Situation, Background, Assessment and  
Recommendations(SBAR). Information from the following report(s) SBAR and Kardex was reviewed with the receiving nurse. Lines:    
 
Opportunity for questions and clarification was provided. Patient transported with: 
 [employer]

## 2020-01-15 NOTE — PROGRESS NOTES
Care assumed. Some of home meds started  Continue heparin until V/Q scan complete.   Neurology consulted for leg weakness  Await MRI brain and C spine

## 2020-01-16 LAB
ANION GAP SERPL CALC-SCNC: 7 MMOL/L (ref 5–15)
APTT PPP: 63.2 SEC (ref 22.1–32)
APTT PPP: 71.4 SEC (ref 22.1–32)
APTT PPP: 79.7 SEC (ref 22.1–32)
ATRIAL RATE: 95 BPM
AV VELOCITY RATIO: 0.72
BUN SERPL-MCNC: 17 MG/DL (ref 6–20)
BUN/CREAT SERPL: 14 (ref 12–20)
CALCIUM SERPL-MCNC: 9.3 MG/DL (ref 8.5–10.1)
CALCULATED P AXIS, ECG09: 46 DEGREES
CALCULATED R AXIS, ECG10: 19 DEGREES
CALCULATED T AXIS, ECG11: 42 DEGREES
CHLORIDE SERPL-SCNC: 109 MMOL/L (ref 97–108)
CO2 SERPL-SCNC: 25 MMOL/L (ref 21–32)
CREAT SERPL-MCNC: 1.19 MG/DL (ref 0.55–1.02)
DIAGNOSIS, 93000: NORMAL
ECHO AV AREA PEAK VELOCITY: 1.9 CM2
ECHO AV PEAK GRADIENT: 12.9 MMHG
ECHO AV PEAK VELOCITY: 179.41 CM/S
ECHO LA AREA 4C: 21.5 CM2
ECHO LA MAJOR AXIS: 2.78 CM
ECHO LA VOL 4C: 67.54 ML (ref 22–52)
ECHO LA VOLUME INDEX A4C: 34.53 ML/M2 (ref 16–28)
ECHO LV INTERNAL DIMENSION DIASTOLIC: 3.68 CM (ref 3.9–5.3)
ECHO LV INTERNAL DIMENSION SYSTOLIC: 2.76 CM
ECHO LV IVSD: 1.11 CM (ref 0.6–0.9)
ECHO LV MASS 2D: 145.5 G (ref 67–162)
ECHO LV MASS INDEX 2D: 74.4 G/M2 (ref 43–95)
ECHO LV POSTERIOR WALL DIASTOLIC: 1.09 CM (ref 0.6–0.9)
ECHO LVOT DIAM: 1.86 CM
ECHO LVOT PEAK GRADIENT: 6.7 MMHG
ECHO LVOT PEAK VELOCITY: 129.11 CM/S
ECHO PV MAX VELOCITY: 101.54 CM/S
ECHO PV PEAK GRADIENT: 4.1 MMHG
ECHO RV INTERNAL DIMENSION: 4.52 CM
ECHO RV TAPSE: 1.99 CM (ref 1.5–2)
GLUCOSE SERPL-MCNC: 114 MG/DL (ref 65–100)
HCT VFR BLD AUTO: 33 % (ref 35–47)
HGB BLD-MCNC: 10.4 G/DL (ref 11.5–16)
LVFS 2D: 25.1 %
P-R INTERVAL, ECG05: 164 MS
POTASSIUM SERPL-SCNC: 4.2 MMOL/L (ref 3.5–5.1)
Q-T INTERVAL, ECG07: 340 MS
QRS DURATION, ECG06: 90 MS
QTC CALCULATION (BEZET), ECG08: 427 MS
SODIUM SERPL-SCNC: 141 MMOL/L (ref 136–145)
THERAPEUTIC RANGE,PTTT: ABNORMAL SECS (ref 58–77)
VENTRICULAR RATE, ECG03: 95 BPM

## 2020-01-16 PROCEDURE — 97535 SELF CARE MNGMENT TRAINING: CPT

## 2020-01-16 PROCEDURE — 97161 PT EVAL LOW COMPLEX 20 MIN: CPT

## 2020-01-16 PROCEDURE — 74011250636 HC RX REV CODE- 250/636: Performed by: INTERNAL MEDICINE

## 2020-01-16 PROCEDURE — 36415 COLL VENOUS BLD VENIPUNCTURE: CPT

## 2020-01-16 PROCEDURE — 97165 OT EVAL LOW COMPLEX 30 MIN: CPT

## 2020-01-16 PROCEDURE — 97116 GAIT TRAINING THERAPY: CPT

## 2020-01-16 PROCEDURE — 65660000000 HC RM CCU STEPDOWN

## 2020-01-16 PROCEDURE — 85730 THROMBOPLASTIN TIME PARTIAL: CPT

## 2020-01-16 PROCEDURE — 85018 HEMOGLOBIN: CPT

## 2020-01-16 PROCEDURE — 80048 BASIC METABOLIC PNL TOTAL CA: CPT

## 2020-01-16 PROCEDURE — 74011250637 HC RX REV CODE- 250/637: Performed by: FAMILY MEDICINE

## 2020-01-16 RX ORDER — WARFARIN SODIUM 5 MG/1
5 TABLET ORAL ONCE
Status: COMPLETED | OUTPATIENT
Start: 2020-01-16 | End: 2020-01-16

## 2020-01-16 RX ADMIN — HEPARIN SODIUM AND DEXTROSE 16 UNITS/KG/HR: 10000; 5 INJECTION INTRAVENOUS at 04:54

## 2020-01-16 RX ADMIN — DILTIAZEM HYDROCHLORIDE 240 MG: 240 CAPSULE, EXTENDED RELEASE ORAL at 10:21

## 2020-01-16 RX ADMIN — HEPARIN SODIUM AND DEXTROSE 16 UNITS/KG/HR: 10000; 5 INJECTION INTRAVENOUS at 04:11

## 2020-01-16 RX ADMIN — DOXAZOSIN 2 MG: 2 TABLET ORAL at 21:40

## 2020-01-16 RX ADMIN — ALLOPURINOL 100 MG: 100 TABLET ORAL at 10:21

## 2020-01-16 RX ADMIN — WARFARIN SODIUM 5 MG: 5 TABLET ORAL at 18:13

## 2020-01-16 NOTE — PROGRESS NOTES
Pharmacist Note - Warfarin Dosing  Consult provided for this 78 y. o.female to manage warfarin for Pulmonary Embolism    INR Goal: 2 - 3    Home regimen/ tablet size: NA    Drugs that may increase INR: Allopurinol  Drugs that may decrease INR: None  Other current anticoagulants/ drugs that may increase bleeding risk: Heparin  Risk factors: Age > 65  Daily INR ordered: YES    Recent Labs     01/16/20  0227 01/14/20  2312   HGB 10.4* 12.0     Date               INR                  Dose  1/16  --  5 mg                                                                                Assessment/ Plan: Will order warfarin 5 mg PO x 1 dose. Pharmacy will continue to monitor daily and adjust therapy as indicated. Please contact the pharmacist at m [phone] or  for outpatient recommendations if needed.

## 2020-01-16 NOTE — PROGRESS NOTES
Name: [facility]: Anton Watters   : 1940 Admit Date: 2020   Phone: [phone]  Room: [facility]/01   PCP: Loan Yates MD  MRN: [de-identified]   Date: 2020  Code: DNR        HPI:      Echo pending. Legs do not hurt as much     1/15  4:54 PM       History was obtained from patient. I was asked by Souleymane Lopez MD to see Chester Tong in consultation for a chief complaint of shortness of breath. History of Present Illness: 78year old female with past medical history as given below who presented to [facility] with increased left leg pain today. She was also having some shortness of breath. She denies cough, sputum, fever, chills, chest pain. In the ED noted to have bilateral LE DVT on US and started on heparin gtt. Echo was done and the report is pending.       Past Medical History:   Diagnosis Date    Abscess 3/22/2014    Lumbar area - MRSA    Arthritis     Common bile duct calculus 2019    Gout     Hypercholesterolemia     Hypertension     LBP (low back pain)     Other ill-defined conditions(799.89)     LEFT SHOULDER PAINFUL, NEEDS REPLACEMENT    Paroxysmal SVT (supraventricular tachycardia) ([address] 2017    Thromboembolus ([address] 13    left leg       Past Surgical History:   Procedure Laterality Date    ABDOMEN SURGERY PROC UNLISTED      hernia repair    BREAST SURGERY PROCEDURE UNLISTED      EXCISION OF BREAST CYST    ENDOSCOPY, COLON, DIAGNOSTIC      ostomy reversal    HX BREAST BIOPSY Right 1970    Benign    HX COLOSTOMY      HX GI      COLOSTOMY REVERSAL    HX HEMORRHOIDECTOMY      HX ORTHOPAEDIC      right knee  bilateral knee replacement    HX ORTHOPAEDIC      ORIF RIGHT ANKLE    HX ORTHOPAEDIC      KNEE ARTHROSCOPY       Family History   Problem Relation Age of Onset    Hypertension Father     Anesth Problems Neg Hx        Social History     Tobacco Use    Smoking status: Former Smoker     Packs/day: 1.00     Years: 20.00 Pack years: 20.00     Last attempt to quit: 1972     Years since quittin.7    Smokeless tobacco: Never Used   Substance Use Topics    Alcohol use: Yes     Alcohol/week: 1.7 standard drinks     Types: 2 Shots of liquor per week     Comment: 2 DAY       Allergies   Allergen Reactions    Shellfish Derived Angioedema       Current Facility-Administered Medications   Medication Dose Route Frequency    warfarin (COUMADIN) tablet 5 mg  5 mg Oral ONCE    Warfarin - pharmacy to dose   Other Rx Dosing/Monitoring    heparin 25,000 units in D5W 250 ml infusion  17-36 Units/kg/hr IntraVENous TITRATE    hydrALAZINE (APRESOLINE) 20 mg/mL injection 10 mg  10 mg IntraVENous Q6H PRN    dilTIAZem CD (CARDIZEM CD) capsule 240 mg  240 mg Oral DAILY    doxazosin (CARDURA) tablet 2 mg  2 mg Oral QHS    allopurinol (ZYLOPRIM) tablet 100 mg  100 mg Oral DAILY         REVIEW OF SYSTEMS   Negative except as stated in the HPI. Physical Exam:   Visit Vitals  /75 (BP 1 Location: Right arm, BP Patient Position: At rest)   Pulse 91   Temp 98.1 °F (36.7 °C)   Resp 18   Ht 5' 7\" (1.702 m)   Wt 84.2 kg (185 lb 10 oz)   SpO2 98%   BMI 29.07 kg/m²       General:  Alert, cooperative, no distress, appears stated age. Head:  Normocephalic, without obvious abnormality, atraumatic. Eyes:  Conjunctivae/corneas clear. Nose: Nares normal. Septum midline. Mucosa normal.   Throat: Lips, mucosa, and tongue normal.   Neck: Supple, symmetrical, trachea midline, no adenopathy. Lungs:   Clear to auscultation bilaterally. Heart:  Regular rate and rhythm, S1, S2 normal, no murmur. Abdomen:   Soft, non-tender. Bowel sounds normal.   Extremities: Leg swelling. Pulses: 2+ and symmetric all extremities.            Neurologic: Grossly nonfocal       Lab Results   Component Value Date/Time    Sodium 141 2020 02:27 AM    Potassium 4.2 2020 02:27 AM    Chloride 109 (H) 2020 02:27 AM    CO2 25 2020 02:27 AM    BUN 17 01/16/2020 02:27 AM    Creatinine 1.19 (H) 01/16/2020 02:27 AM    Glucose 114 (H) 01/16/2020 02:27 AM    Calcium 9.3 01/16/2020 02:27 AM    Magnesium 1.5 (L) 01/26/2018 04:28 AM    Phosphorus 3.3 01/26/2018 04:28 AM    Lactic acid 1.4 12/13/2019 08:12 AM       Lab Results   Component Value Date/Time    WBC 9.8 01/14/2020 11:12 PM    HGB 10.4 (L) 01/16/2020 02:27 AM    PLATELET 534 (L) 24/61/2562 11:12 PM    .9 (H) 01/14/2020 11:12 PM       Lab Results   Component Value Date/Time    INR 1.0 08/18/2015 06:16 PM    aPTT 79.7 (H) 01/16/2020 02:27 AM    AST (SGOT) 18 01/15/2020 05:20 AM    Alk. phosphatase 56 01/15/2020 05:20 AM    Protein, total 6.6 01/15/2020 05:20 AM    Albumin 3.0 (L) 01/15/2020 05:20 AM    Globulin 3.6 01/15/2020 05:20 AM       Lab Results   Component Value Date/Time    Ferritin 13 07/20/2014 11:37 AM       Lab Results   Component Value Date/Time    TSH 0.91 01/26/2018 04:49 AM       Lab Results   Component Value Date/Time    CK 53 01/26/2018 04:28 AM    CK-MB Index 2.3 01/26/2018 04:28 AM    Troponin-I, Qt. 0.09 (H) 01/15/2020 05:20 AM     (H) 09/09/2015 05:08 AM        Lab Results   Component Value Date/Time    Culture result: MRSA NOT PRESENT 12/14/2019 08:35 PM    Culture result:  12/14/2019 08:35 PM         Screening of patient nares for MRSA is for surveillance purposes and, if positive, to facilitate isolation considerations in high risk settings. It is not intended for automatic decolonization interventions per se as regimens are not sufficiently effective to warrant routine use. Culture result: MRSA NOT PRESENT 01/26/2018 12:59 AM    Culture result:  01/26/2018 12:59 AM         Screening of patient nares for MRSA is for surveillance purposes and, if positive, to facilitate isolation considerations in high risk settings. It is not intended for automatic decolonization interventions per se as regimens are not sufficiently effective to warrant routine use. Lab Results   Component Value Date/Time    Vancomycin,trough 19.5 (H) 04/04/2014 12:19 AM    Vancomycin,trough 19.2 (H) 03/29/2014 11:30 PM    CK 53 01/26/2018 04:28 AM    CK 51 01/25/2018 11:00 PM       Lab Results   Component Value Date/Time    Color YELLOW/STRAW 01/15/2020 12:46 AM    Appearance CLEAR 01/15/2020 12:46 AM    pH (UA) 5.0 01/15/2020 12:46 AM    Protein 30 (A) 01/15/2020 12:46 AM    Glucose NEGATIVE  01/15/2020 12:46 AM    Ketone NEGATIVE  01/15/2020 12:46 AM    Bilirubin NEGATIVE  01/15/2020 12:46 AM    Blood NEGATIVE  01/15/2020 12:46 AM    Urobilinogen 0.2 01/15/2020 12:46 AM    Nitrites NEGATIVE  01/15/2020 12:46 AM    Leukocyte Esterase TRACE (A) 01/15/2020 12:46 AM    WBC 0-4 01/15/2020 12:46 AM    RBC 0-5 01/15/2020 12:46 AM    Bacteria NEGATIVE  01/15/2020 12:46 AM       IMPRESSION:  ===========  -DVT. -SOB - suspect likely has PE.  -JOSE. -Hypoxemia. PLAN:  =====  -heparin gtt and transition to coumadin. This sounds like her first offense; 3-6 mts of therapy   -O2 supplementation if needed   -echo is pending. Will follow for RV dysfunction. biochemically their is evidence of Rv dysfunction as increased trop I and pro BNP.     Jyoti Kate PA-C

## 2020-01-16 NOTE — PROGRESS NOTES
FUNCTIONAL STATUS PRIOR TO ADMISSION: Patient was modified independent using a rollator and SPC for functional mobility. Pt stated she was requiring more assistance from rollator. Recent decline in functional status after gall bladder sx 12/17/19 where pt reported she was in hospital for 10 days (with no OT/PT ordered) and discharged home. Pt requiring most assist from  at that time for her ADL. she has also been limited with her community outings since this sx, reporting increased fatigue day after. One fall within last month. HOME SUPPORT: The patient lived with  and has two children in live locally. Occupational Therapy Goals  Initiated 1/16/2020  1. Patient will perform standing ADLs at sink x 5 minutes with modified independence within 7 day(s). 2.  Patient will perform upper body dressing and lower body dressing with supervision/set-up within 7 day(s). 3.  Patient will perform bathing with supervision/set-up within 7 day(s). 4.  Patient will perform toilet transfers with supervision/set-up within 7 day(s). 5.  Patient will perform all aspects of toileting with supervision/set-up within 7 day(s). 6.  Patient will perform basic ADL while maintaining O2 >90% within 7 days. 7.  Patient will utilize energy conservation techniques during functional activities with verbal cues within 7 day(s). OCCUPATIONAL THERAPY EVALUATION  Patient: Francisco Magana (15 y.o. female)  Date: 1/16/2020  Primary Diagnosis: Weakness of left lower extremity [R29.898]  Hypoxia [R09.02]       Precautions:   Fall(bilateral DVTs and PE, on heparin drip)    ASSESSMENT  Based on the objective data described below, the patient presents with general deconditioning, impaired balance with need for RW, decreased activity tolerance and decline in functional status s/p admission for PE and B DVT. Pt currently on Heparin drip with APTT within therapeutic range for activity.  Pt is currently needing CGA for toilet transfers and functional mobility with RW. However, pt DUVAL with O2 as low as 81% on room air, requiring placement of 2 L NC and ~10 minutes of seated rest break to recover 90%. Pt reports decline in functional status since her gall bladder sx 12/17/19 and requiring increased A with ADLs from her  since then. Current Level of Function Impacting Discharge (ADLs/self-care): CGA to min A, decreased activity tolerance, need for A from  since her gallbladder sx    Functional Outcome Measure: The patient scored Total: 60/100 on the Barthel Index outcome measure which is indicative of 40% impaired ability to care for basic self needs/dependency on others; inferred 100% dependency on others for instrumental ADLs. Other factors to consider for discharge: recent decline at home since her sx     Patient will benefit from skilled therapy intervention to address the above noted impairments. PLAN :  Recommendations and Planned Interventions: self care training, functional mobility training, therapeutic exercise, balance training, endurance activities, patient education, home safety training and family training/education    Frequency/Duration: Patient will be followed by occupational therapy 5 times a week to address goals. Recommendation for discharge: (in order for the patient to meet his/her long term goals)  Therapy 3 hours per day 5-7 days per week    This discharge recommendation:  Has been made in collaboration with the attending provider and/or case management    IF patient discharges home will need the following DME: none       SUBJECTIVE:   Patient stated I have been pretty weak since my gall bladder sx this past winter. My  has been having to help me out more.     OBJECTIVE DATA SUMMARY:   HISTORY:   Past Medical History:   Diagnosis Date    Abscess 3/22/2014    Lumbar area - MRSA    Arthritis     Common bile duct calculus 12/14/2019    Gout     Hypercholesterolemia     Hypertension     LBP (low back pain)     Other ill-defined conditions(799.89)     LEFT SHOULDER PAINFUL, NEEDS REPLACEMENT    Paroxysmal [facility] 5/2/2017    [address]rombo[address] [address] 12/26/13    left leg     Past Surgical History:   Procedure Laterality Date    ABDOMEN SURGERY PROC UNLISTED      hernia repair    BREAST SURGERY PROCEDURE UNLISTED      EXCISION OF BREAST CYST    ENDOSCOPY, COLON, DIAGNOSTIC      ostomy reversal    HX BREAST BIOPSY Right 1970    Benign    HX COLOSTOMY      HX GI      COLOSTOMY REVERSAL    HX HEMORRHOIDECTOMY      HX ORTHOPAEDIC      right knee  bilateral knee replacement    HX ORTHOPAEDIC      ORIF RIGHT ANKLE    HX ORTHOPAEDIC      KNEE ARTHROSCOPY       Expanded or extensive additional review of patient history:     Home Situation  Home Environment: Private residence  # Steps to Enter: 2  Rails to Enter: Yes  Hand Rails : Right  One/Two Story Residence: One story  Living Alone: No  Support Systems: Spouse/Significant Other/Partner, Child(tulio)  Patient Expects to be Discharged to[de-identified] Rehabilitation facility  Current DME Used/Available at Home: [address] [facility], [address], straight, Loyda Severe, rollator, Shower chair  Tub or Shower Type: Shower    Hand dominance: Right    EXAMINATION OF PERFORMANCE DEFICITS:  Cognitive/Behavioral Status:           Perception: Appears intact  Perseveration: No perseveration noted  Safety/Judgement: Awareness of environment    Skin: intact    Edema: none noted    Hearing: Auditory  Auditory Impairment: Hearing aid(s)  Hearing Aids/Status: Bilateral    Vision/Perceptual:                           Acuity: Within Defined Limits    Corrective Lenses: Glasses    Range of Motion:    AROM: Generally decreased, functional(grossly decreased shoulder flexion)                         Strength:    Strength: Generally decreased, functional                Coordination:  Coordination: Within functional limits  Fine Motor Skills-Upper: Left Intact; Right Intact(mild tremors at rest)    Gross Motor Skills-Upper: Left Intact; Right Intact    Tone & Sensation:    Tone: Normal  Sensation: Intact                      Balance:  Sitting: Intact; Without support  Standing: Impaired; With support  Standing - Static: Good  Standing - Dynamic : Fair;Good    Functional Mobility and Transfers for ADLs:  Bed Mobility:  Supine to Sit: Supervision  Scooting: Supervision    Transfers:  Sit to Stand: Contact guard assistance  Stand to Sit: Contact guard assistance  Toilet Transfer : Contact guard assistance    ADL Assessment:  Feeding: Independent    Oral Facial Hygiene/Grooming: Stand-by assistance(washing hands at sink)    Bathing: Minimum assistance    Upper Body Dressing: Setup    Lower Body Dressing: Minimum assistance    Toileting: Contact guard assistance(for clothing management)                ADL Intervention and task modifications:          Pt was CGA with RW for functional mobility to bathroom, CGA and verbal cues for safety with transfers in small space. Pt was CGA for toilet transfer, supervision seated for hygiene and CGA in standing for underwear management. Verbal cues for safety with RW to square off to sink surface as pt attempted to turn to sink without RW. No LOB with dynamic standing task with hands removed from RW. Cognitive Retraining  Safety/Judgement: Awareness of environment      Functional Measure:  Barthel Index:    Bathin  Bladder: 5  Bowels: 10  Groomin  Dressin  Feeding: 10  Mobility: 10  Stairs: 0  Toilet Use: 5  Transfer (Bed to Chair and Back): 10  Total: 60/100        The Barthel ADL Index: Guidelines  1. The index should be used as a record of what a patient does, not as a record of what a patient could do. 2. The main aim is to establish degree of independence from any help, physical or verbal, however minor and for whatever reason. 3. The need for supervision renders the patient not independent.   4. A patient's performance should be established using the best available evidence. Asking the patient, friends/relatives and nurses are the usual sources, but direct observation and common sense are also important. However direct testing is not needed. 5. Usually the patient's performance over the preceding 24-48 hours is important, but occasionally longer periods will be relevant. 6. Middle categories imply that the patient supplies over 50 per cent of the effort. 7. Use of aids to be independent is allowed. Daksha Hardwick., Barthel, D.W. (3100). Functional evaluation: the Barthel Index. [address] [facility] (14)2. Jorge Blackwell jon МАРИНА DiazF, Hussein Dennis., Kumar Louis., Lucero Jean, [address] (1999). Measuring the change indisability after inpatient rehabilitation; comparison of the responsiveness of the Barthel Index and Functional [place] Measure. Journal of Neurology, Neurosurgery, and Psychiatry, 66(4), 489-068. Tiara Ralph, N.J.A, IONA Dc, & Addis Armenta MAdaA. (2004.) Assessment of post-stroke quality of life in cost-effectiveness studies: The usefulness of the Barthel Index and the EuroQoL-5D. Quality of Life Research, 15, 784-97         Occupational Therapy Evaluation Charge Determination   History Examination Decision-Making   LOW Complexity : Brief history review  LOW Complexity : 1-3 performance deficits relating to physical, cognitive , or psychosocial skils that result in activity limitations and / or participation restrictions  LOW Complexity : No comorbidities that affect functional and no verbal or physical assistance needed to complete eval tasks       Based on the above components, the patient evaluation is determined to be of the following complexity level: LOW   Pain Rating:  No pain    Activity Tolerance:   desaturates with exertion and requires oxygen and observed SOB with activity  Please refer to the flowsheet for vital signs taken during this treatment.     After treatment patient left in no apparent distress:    Sitting in chair and Call bell within reach    COMMUNICATION/EDUCATION:   The patients plan of care was discussed with: Physical Therapist, Registered Nurse and . Patient/family have participated as able in goal setting and plan of care. This patients plan of care is appropriate for delegation to [facility].     Thank you for this referral.  Suni Batres, OT  Time Calculation: 26 mins

## 2020-01-16 NOTE — ROUTINE PROCESS
Bedside and Verbal shift change report given to April (oncoming nurse) by Oriana Ritchie (offgoing nurse). Report included the following information SBAR, Kardex, ED Summary, Intake/Output, MAR, Recent Results and Cardiac Rhythm NSR.

## 2020-01-16 NOTE — PROGRESS NOTES
Samantha Yung, Dhara Cuevas, and [facility] Date: 1/14/2020      Subjective:     Patient slept well last night. No dyspnea at rest. Hgb stable. ..       Current Facility-Administered Medications   Medication Dose Route Frequency    warfarin (COUMADIN) tablet 5 mg  5 mg Oral ONCE    heparin 25,000 units in D5W 250 ml infusion  17-36 Units/kg/hr IntraVENous TITRATE    hydrALAZINE (APRESOLINE) 20 mg/mL injection 10 mg  10 mg IntraVENous Q6H PRN    dilTIAZem CD (CARDIZEM CD) capsule 240 mg  240 mg Oral DAILY    doxazosin (CARDURA) tablet 2 mg  2 mg Oral QHS    allopurinol (ZYLOPRIM) tablet 100 mg  100 mg Oral DAILY          Objective:     Patient Vitals for the past 8 hrs:   BP Temp Pulse Resp SpO2 Weight   01/16/20 0729 121/75 98.1 °F (36.7 °C) 91 18 98 %    01/16/20 0532      185 lb 10 oz (84.2 kg)   01/16/20 0347 109/74 98.2 °F (36.8 °C) 95 18 95 %    01/16/20 0026 138/85 98.9 °F (37.2 °C) 98 18 97 %      No intake/output data recorded. 01/14 1901 - 01/16 0700  In: 100 [I.V.:100]  Out: -     Physical Exam: Lungs: clear to auscultation bilaterally  Heart: regular rate and rhythm, S1, S2 normal, no murmur, click, rub or gallop  Abdomen: soft, non-tender.  Bowel sounds normal. No masses,  no organomegaly        Data Review   Recent Results (from the past 24 hour(s))   ECHO ADULT COMPLETE    Collection Time: 01/15/20 12:16 PM   Result Value Ref Range    Aortic Valve Systolic Peak Velocity 768.80 cm/s    Aortic Valve Area by Continuity of Peak Velocity 1.9 cm2    AoV PG 12.9 mmHg    LVIDd 3.68 (A) 3.9 - 5.3 cm    LVPWd 1.09 (A) 0.6 - 0.9 cm    LVIDs 2.76 cm    IVSd 1.11 (A) 0.6 - 0.9 cm    LVOT d 1.86 cm    LVOT Peak Velocity 129.11 cm/s    LVOT Peak Gradient 6.7 mmHg    RVIDd 4.52 cm    LA Vol 4C 67.54 (A) 22 - 52 mL    LA Area 4C 21.5 cm2    LV Mass .5 67 - 162 g    LV Mass AL Index 74.4 43 - 95 g/m2    Left Atrium Major Axis 2.78 cm    Tapse 1.99 1.5 - 2.0 cm    Pulmonic Valve Max Velocity 101.54 cm/s    LA Vol Index 34.53 16 - 28 ml/m2    Left Ventricular Fractional Shortening by 2D 34.889860739 %    AV Velocity Ratio 0.72     PV peak gradient 4.1 mmHg   PTT    Collection Time: 01/15/20  6:36 PM   Result Value Ref Range    aPTT 51.4 (H) 22.1 - 32.0 sec    aPTT, therapeutic range     58.0 - 77.0 SECS   PTT    Collection Time: 01/16/20  2:27 AM   Result Value Ref Range    aPTT 79.7 (H) 22.1 - 32.0 sec    aPTT, therapeutic range     58.0 - 77.0 SECS   HGB & HCT    Collection Time: 01/16/20  2:27 AM   Result Value Ref Range    HGB 10.4 (L) 11.5 - 16.0 g/dL    HCT 33.0 (L) 35.0 - 91.1 %   METABOLIC PANEL, BASIC    Collection Time: 01/16/20  2:27 AM   Result Value Ref Range    Sodium 141 136 - 145 mmol/L    Potassium 4.2 3.5 - 5.1 mmol/L    Chloride 109 (H) 97 - 108 mmol/L    CO2 25 21 - 32 mmol/L    Anion gap 7 5 - 15 mmol/L    Glucose 114 (H) 65 - 100 mg/dL    BUN 17 6 - 20 MG/DL    Creatinine 1.19 (H) 0.55 - 1.02 MG/DL    BUN/Creatinine ratio 14 12 - 20      GFR est AA 53 (L) >60 ml/min/1.73m2    GFR est non-AA 44 (L) >60 ml/min/1.73m2    Calcium 9.3 8.5 - 10.1 MG/DL           Assessment:     Principal Problem:    Pulmonary embolism (HCC) (1/15/2020)    Active Problems:    Hypoxia (1/15/2020)      Weakness of left lower extremity (1/15/2020)      DVT (deep venous thrombosis) ([email or web address] (1/15/2020)        Plan:     1) Start to transition to Warfarin today  2) PT/OT to see  3) I think needs inpt rehab to recover from previous hospitalization and this one.

## 2020-01-16 NOTE — PROGRESS NOTES
NAVEED PLAN:    1. CM noted order for IP Rehab    2. A referral was sent to [facility]    3. The list of local SNFs with rating given to patient. CM will continue to follow for transition of care.    Arabella Estrella MSA, RN, CRM

## 2020-01-16 NOTE — PROGRESS NOTES
Problem: Mobility Impaired (Adult and Pediatric)  Goal: *Acute Goals and Plan of Care (Insert Text)  Description  FUNCTIONAL STATUS PRIOR TO ADMISSION: Patient was modified independent using a rollator and single point cane for functional mobility. She was recently relying more on the rollator due to weakness following surgery around Christmas of 2018, when she had her gallbladder removed (had 10 day hospital stay). States she has had one fall in last month and admits to balance difficulty. Has only been able to leave the house once since surgery due to fatigue and weakness and was tired for the whole next day following her one trip out of the home. Prior to surgery in December she was actively going out in the community with her . HOME SUPPORT PRIOR TO ADMISSION: The patient lived with her  who has been assisting more with ADLs due to fatigue and weakness. Has 2 children in the [place] area as well. Physical Therapy Goals  Initiated 1/16/2020  1. Patient will move from supine to sit and sit to supine , scoot up and down and roll side to side in bed with supervision/set-up within 7 day(s). 2.  Patient will transfer from bed to chair and chair to bed with supervision/set-up using the least restrictive device within 7 day(s). 3.  Patient will perform sit to stand with supervision/set-up within 7 day(s). 4.  Patient will ambulate with minimal assistance/contact guard assist for 150 feet with the least restrictive device within 7 day(s). 5.  Patient will ascend/descend 2 stairs with right handrail(s) with minimal assistance/contact guard assist within 7 day(s). 6.  Patient will improve Tinetti score by 4-5 points within 7 days.    Outcome: Progressing Towards Goal   PHYSICAL THERAPY EVALUATION  Patient: Coco Sruesh (57 y.o. female)  Date: 1/16/2020  Primary Diagnosis: Weakness of left lower extremity [R29.898]  Hypoxia [R09.02]   Precautions:   Fall(bilateral DVTs and PE, on heparin drip)      ASSESSMENT  Based on the objective data described below, the patient presents with DUVAL, decreased endurance, generalized weakness, impaired balance, and increased fall risk following admission for bilateral LE DVTs and likely PE. PTT therapeutic this date on heparin drip prior to initiation of therapy. She was resting on room air, however with gait x 30 she became very dyspneic requiring therapist driven standing rest break and cues for pursed lip breathing. Sats 81% at lowest and she required seated rest, donning 2L via NC, and 10 minutes rest time to recover sats >90%. It appears she was weak and below baseline even prior to this admission and with her current medical complexities, is now well below baseline. Recommend inpatient rehab at d/c. Current Level of Function Impacting Discharge (mobility/balance): CGA, max cues for breathing technique and pacing self    Functional Outcome Measure: The patient scored 14/28 on the Tinetti outcome measure which is indicative of high fall risk. Other factors to consider for discharge: previously community ambulator, now desat with 30 ft and DUVAL, requiring O2 to recover     Patient will benefit from skilled therapy intervention to address the above noted impairments. PLAN :  Recommendations and Planned Interventions: bed mobility training, transfer training, gait training, therapeutic exercises, neuromuscular re-education, patient and family training/education, and therapeutic activities      Frequency/Duration: Patient will be followed by physical therapy:  5 times a week to address goals.     Recommendation for discharge: (in order for the patient to meet his/her long term goals)  Therapy 3 hours per day 5-7 days per week    This discharge recommendation:  Has been made in collaboration with the attending provider and/or case management    IF patient discharges home will need the following DME: to be determined (TBD)         SUBJECTIVE:   Patient stated I got winded going to the bathroom the other morning.     OBJECTIVE DATA SUMMARY:   HISTORY:    Past Medical History:   Diagnosis Date    Abscess 3/22/2014    Lumbar area - MRSA    Arthritis     Common bile duct calculus 12/14/2019    Gout     Hypercholesterolemia     Hypertension     LBP (low back pain)     Other ill-defined conditions(799.89)     LEFT SHOULDER PAINFUL, NEEDS REPLACEMENT    Paroxysmal SVT (supraventricular tachycardia) ([facility] 5/2/2017    Thromboembolus ([facility] [address] [facility] 12/26/13    left leg     Past Surgical History:   Procedure Laterality Date    ABDOMEN SURGERY PROC UNLISTED      hernia repair    BREAST SURGERY PROCEDURE UNLISTED      EXCISION OF BREAST CYST    ENDOSCOPY, COLON, DIAGNOSTIC      ostomy reversal    HX BREAST BIOPSY Right 1970    Benign    HX COLOSTOMY      HX GI      COLOSTOMY REVERSAL    HX HEMORRHOIDECTOMY      HX ORTHOPAEDIC      right knee  bilateral knee replacement    HX ORTHOPAEDIC      ORIF RIGHT ANKLE    HX ORTHOPAEDIC      KNEE ARTHROSCOPY       Personal factors and/or comorbidities impacting plan of care: PMH    Home Situation  Home Environment: Private residence  # Steps to Enter: 2  Rails to Enter: Yes  Hand Rails : Right  One/Two Story Residence: One story  Living Alone: No  Support Systems: Spouse/Significant Other/Partner, Child(tulio)  Patient Expects to be Discharged to[de-identified] Rehabilitation facility  Current DME Used/Available at Home: Yrn Hebert, familia, Amanda Mcclellan, rollator, Shower chair  Tub or Shower Type: Shower    EXAMINATION/PRESENTATION/DECISION MAKING:   Critical Behavior:  Neurologic State: Alert  Orientation Level: Oriented X4  Cognition: Appropriate decision making  Safety/Judgement: Awareness of environment  Hearing:   Auditory  Auditory Impairment: Hearing aid(s)  Hearing Aids/Status: Bilateral  Range Of Motion:  AROM: Generally decreased, functional(grossly decreased shoulder flexion)  Strength:    Strength: Generally decreased, functional  Tone & Sensation:   Tone: Normal  Sensation: Intact  Coordination:  Coordination: Within functional limits  Vision:   Acuity: Within Defined Limits  Corrective Lenses: Glasses  Functional Mobility:  Bed Mobility:  Supine to Sit: Supervision  Scooting: Supervision  Transfers:  Sit to Stand: Contact guard assistance  Stand to Sit: Contact guard assistance  Balance:   Sitting: Intact; Without support  Standing: Impaired; With support  Standing - Static: Good  Standing - Dynamic : Fair;Good  Ambulation/Gait Training:  Distance (ft): 30 Feet (ft)(x2)  Assistive Device: Gait belt;Walker, rolling  Ambulation - Level of Assistance: Contact guard assistance  Gait Abnormalities: Decreased step clearance  Base of Support: Widened  Speed/Iveth: Slow  Step Length: Right shortened;Left shortened    Functional Measure:  Tinetti test:    Sitting Balance: 1  Arises: 1  Attempts to Rise: 1  Immediate Standing Balance: 1  Standing Balance: 1  Nudged: 0  Eyes Closed: 0  Turn 360 Degrees - Continuous/Discontinuous: 0  Turn 360 Degrees - Steady/Unsteady: 0  Sitting Down: 1  Balance Score: 6 Balance total score  Indication of Gait: 1  R Step Length/Height: 1  L Step Length/Height: 1  R Foot Clearance: 1  L Foot Clearance: 1  Step Symmetry: 1  Step Continuity: 1  Path: 1  Trunk: 0  Walking Time: 0  Gait Score: 8 Gait total score  Total Score: 14/28 Overall total score         Tinetti Tool Score Risk of Falls  <19 = High Fall Risk  19-24 = Moderate Fall Risk  25-28 = Low Fall Risk  Tinetti ME. Performance-Oriented Assessment of Mobility Problems in Elderly Patients. Garcia 64; [id number].  (Scoring Description: PT Bulletin Feb. 10, 1993)    Older adults: Siri Kelley et al, 2009; n = 1000 [facility] elderly evaluated with ABC, DAPHNE, ADL, and IADL)  · Mean DAPHNE score for males aged 69-68 years = 26.21(3.40)  · Mean DAPHNE score for females age 69-68 years = 25.16(4.30)  · Mean DAPHNE score for males over 80 years = 23.29(6.02)  · Mean DAPHNE score for females over 80 years = 17.20(8.32) Physical Therapy Evaluation Charge Determination   History Examination Presentation Decision-Making   HIGH Complexity :3+ comorbidities / personal factors will impact the outcome/ POC  HIGH Complexity : 4+ Standardized tests and measures addressing body structure, function, activity limitation and / or participation in recreation  LOW Complexity : Stable, uncomplicated  Other outcome measures Tinetti 14/28  MEDIUM      Based on the above components, the patient evaluation is determined to be of the following complexity level: LOW     Pain Rating:  Denied pain    Activity Tolerance:   Fair, desaturates with exertion and requires oxygen, requires frequent rest breaks, and observed SOB with activity  Please refer to the flowsheet for vital signs taken during this treatment. After treatment patient left in no apparent distress:   Sitting in chair and Call bell within reach    COMMUNICATION/EDUCATION:   The patients plan of care was discussed with: Occupational Therapist and Registered Nurse and CM    Fall prevention education was provided and the patient/caregiver indicated understanding., Patient/family have participated as able in goal setting and plan of care. , and Patient/family agree to work toward stated goals and plan of care.     Thank you for this referral.  Moses Mcclure, PT, DPT   Time Calculation: 37 mins

## 2020-01-17 ENCOUNTER — ANESTHESIA EVENT (OUTPATIENT)
Dept: SURGERY | Age: 80
DRG: 252 | End: 2020-01-17
Payer: MEDICARE

## 2020-01-17 ENCOUNTER — APPOINTMENT (OUTPATIENT)
Dept: VASCULAR SURGERY | Age: 80
DRG: 252 | End: 2020-01-17
Attending: FAMILY MEDICINE
Payer: MEDICARE

## 2020-01-17 ENCOUNTER — APPOINTMENT (OUTPATIENT)
Dept: VASCULAR SURGERY | Age: 80
DRG: 252 | End: 2020-01-17
Payer: MEDICARE

## 2020-01-17 LAB
APTT PPP: 99 SEC (ref 22.1–32)
HCT VFR BLD AUTO: 31.7 % (ref 35–47)
HGB BLD-MCNC: 10 G/DL (ref 11.5–16)
INR PPP: 1 (ref 0.9–1.1)
PROTHROMBIN TIME: 10.4 SEC (ref 9–11.1)
THERAPEUTIC RANGE,PTTT: ABNORMAL SECS (ref 58–77)

## 2020-01-17 PROCEDURE — 85610 PROTHROMBIN TIME: CPT

## 2020-01-17 PROCEDURE — 65660000000 HC RM CCU STEPDOWN

## 2020-01-17 PROCEDURE — 93926 LOWER EXTREMITY STUDY: CPT

## 2020-01-17 PROCEDURE — 93922 UPR/L XTREMITY ART 2 LEVELS: CPT

## 2020-01-17 PROCEDURE — 85730 THROMBOPLASTIN TIME PARTIAL: CPT

## 2020-01-17 PROCEDURE — 36415 COLL VENOUS BLD VENIPUNCTURE: CPT

## 2020-01-17 PROCEDURE — 85018 HEMOGLOBIN: CPT

## 2020-01-17 PROCEDURE — 74011250636 HC RX REV CODE- 250/636: Performed by: INTERNAL MEDICINE

## 2020-01-17 PROCEDURE — 74011250637 HC RX REV CODE- 250/637: Performed by: FAMILY MEDICINE

## 2020-01-17 RX ORDER — WARFARIN SODIUM 5 MG/1
5 TABLET ORAL ONCE
Status: DISCONTINUED | OUTPATIENT
Start: 2020-01-17 | End: 2020-01-17

## 2020-01-17 RX ADMIN — HEPARIN SODIUM AND DEXTROSE 13 UNITS/KG/HR: 10000; 5 INJECTION INTRAVENOUS at 21:07

## 2020-01-17 RX ADMIN — DILTIAZEM HYDROCHLORIDE 240 MG: 240 CAPSULE, EXTENDED RELEASE ORAL at 08:56

## 2020-01-17 RX ADMIN — HEPARIN SODIUM AND DEXTROSE 16 UNITS/KG/HR: 10000; 5 INJECTION INTRAVENOUS at 01:36

## 2020-01-17 RX ADMIN — DOXAZOSIN 2 MG: 2 TABLET ORAL at 21:09

## 2020-01-17 RX ADMIN — ALLOPURINOL 100 MG: 100 TABLET ORAL at 08:56

## 2020-01-17 NOTE — PROGRESS NOTES
Problem: Falls - Risk of  Goal: *Absence of Falls  Description  Document Opal Latin Fall Risk and appropriate interventions in the flowsheet. Outcome: Progressing Towards Goal  Note: Fall Risk Interventions:  Mobility Interventions: Patient to call before getting OOB, PT Consult for mobility concerns         Medication Interventions: Patient to call before getting OOB, Teach patient to arise slowly         History of Falls Interventions: Investigate reason for fall, Door open when patient unattended         Problem: Patient Education: Go to Patient Education Activity  Goal: Patient/Family Education  Outcome: Progressing Towards Goal     Problem: Risk for Spread of Infection  Goal: Prevent transmission of infectious organism to others  Description  Prevent the transmission of infectious organisms to other patients, staff members, and visitors.   Outcome: Progressing Towards Goal     Problem: Patient Education:  Go to Education Activity  Goal: Patient/Family Education  Outcome: Progressing Towards Goal     Problem: Patient Education: Go to Patient Education Activity  Goal: Patient/Family Education  Outcome: Progressing Towards Goal     Problem: Patient Education: Go to Patient Education Activity  Goal: Patient/Family Education  Outcome: Progressing Towards Goal

## 2020-01-17 NOTE — PROGRESS NOTES
[facility]   : 1940 Admit Date: 2020   Phone: [phone]  Room: [facility]/01   PCP: Dawna Maguire MD  MRN: [de-identified]   Date: 2020  Code: DNR        HPI:      Some leg pains noted. Breathing stable       Echo pending. Legs do not hurt as much     1/15  4:54 PM       History was obtained from patient. I was asked by Dominick Jimenez MD to see Elsie Onofre in consultation for a chief complaint of shortness of breath. History of Present Illness: 78year old female with past medical history as given below who presented to [address] [place] [address] with increased left leg pain today. She was also having some shortness of breath. She denies cough, sputum, fever, chills, chest pain. In the ED noted to have bilateral LE DVT on US and started on heparin gtt. Echo was done and the report is pending.       Past Medical History:   Diagnosis Date    Abscess 3/22/2014    Lumbar area - MRSA    Arthritis     Common bile duct calculus 2019    Gout     Hypercholesterolemia     Hypertension     LBP (low back pain)     Other ill-defined conditions(799.89)     LEFT SHOULDER PAINFUL, NEEDS REPLACEMENT    Paroxysmal SVT (supraventricular tachycardia) ([address] 2017    Thromboembolus ([address] 13    left leg       Past Surgical History:   Procedure Laterality Date    ABDOMEN SURGERY PROC UNLISTED      hernia repair    BREAST SURGERY PROCEDURE UNLISTED      EXCISION OF BREAST CYST    ENDOSCOPY, COLON, DIAGNOSTIC      ostomy reversal    HX BREAST BIOPSY Right 1970    Benign    HX COLOSTOMY      HX GI      COLOSTOMY REVERSAL    HX HEMORRHOIDECTOMY      HX ORTHOPAEDIC      right knee  bilateral knee replacement    HX ORTHOPAEDIC      ORIF RIGHT ANKLE    HX ORTHOPAEDIC      KNEE ARTHROSCOPY       Family History   Problem Relation Age of Onset    Hypertension Father     Anesth Problems Neg Hx        Social History     Tobacco Use    Smoking status: Former Smoker     Packs/day: 1.00     Years: 20.00     Pack years: 20.00     Last attempt to quit: 1972     Years since quittin.7    Smokeless tobacco: Never Used   Substance Use Topics    Alcohol use: Yes     Alcohol/week: 1.7 standard drinks     Types: 2 Shots of liquor per week     Comment: 2 DAY       Allergies   Allergen Reactions    Shellfish Derived Angioedema       Current Facility-Administered Medications   Medication Dose Route Frequency    warfarin (COUMADIN) tablet 5 mg  5 mg Oral ONCE    Warfarin - pharmacy to dose   Other Rx Dosing/Monitoring    heparin 25,000 units in D5W 250 ml infusion  17-36 Units/kg/hr IntraVENous TITRATE    hydrALAZINE (APRESOLINE) 20 mg/mL injection 10 mg  10 mg IntraVENous Q6H PRN    dilTIAZem CD (CARDIZEM CD) capsule 240 mg  240 mg Oral DAILY    doxazosin (CARDURA) tablet 2 mg  2 mg Oral QHS    allopurinol (ZYLOPRIM) tablet 100 mg  100 mg Oral DAILY         REVIEW OF SYSTEMS   Negative except as stated in the HPI. Physical Exam:   Visit Vitals  /89 (BP 1 Location: Right arm, BP Patient Position: At rest)   Pulse (!) 101   Temp 98.1 °F (36.7 °C)   Resp 18   Ht 5' 7\" (1.702 m)   Wt 79.9 kg (176 lb 2.4 oz)   SpO2 95%   BMI 27.59 kg/m²       General:  Alert, cooperative, no distress, appears stated age. Head:  Normocephalic, without obvious abnormality, atraumatic. Eyes:  Conjunctivae/corneas clear. Nose: Nares normal. Septum midline. Mucosa normal.   Throat: Lips, mucosa, and tongue normal.   Neck: Supple, symmetrical, trachea midline, no adenopathy. Lungs:   Clear to auscultation bilaterally. Heart:  Regular rate and rhythm, S1, S2 normal, no murmur. Abdomen:   Soft, non-tender. Bowel sounds normal.   Extremities: Leg swelling. Pulses: 2+ and symmetric all extremities.            Neurologic: Grossly nonfocal       Lab Results   Component Value Date/Time    Sodium 141 2020 02:27 AM    Potassium 4.2 2020 02:27 AM    Chloride 109 (H) 01/16/2020 02:27 AM    CO2 25 01/16/2020 02:27 AM    BUN 17 01/16/2020 02:27 AM    Creatinine 1.19 (H) 01/16/2020 02:27 AM    Glucose 114 (H) 01/16/2020 02:27 AM    Calcium 9.3 01/16/2020 02:27 AM    Magnesium 1.5 (L) 01/26/2018 04:28 AM    Phosphorus 3.3 01/26/2018 04:28 AM    Lactic acid 1.4 12/13/2019 08:12 AM       Lab Results   Component Value Date/Time    WBC 9.8 01/14/2020 11:12 PM    HGB 10.0 (L) 01/17/2020 03:48 AM    PLATELET 758 (L) 86/60/7350 11:12 PM    .9 (H) 01/14/2020 11:12 PM       Lab Results   Component Value Date/Time    INR 1.0 01/17/2020 03:48 AM    aPTT 63.2 (H) 01/16/2020 04:40 PM    AST (SGOT) 18 01/15/2020 05:20 AM    Alk. phosphatase 56 01/15/2020 05:20 AM    Protein, total 6.6 01/15/2020 05:20 AM    Albumin 3.0 (L) 01/15/2020 05:20 AM    Globulin 3.6 01/15/2020 05:20 AM       Lab Results   Component Value Date/Time    Ferritin 13 07/20/2014 11:37 AM       Lab Results   Component Value Date/Time    TSH 0.91 01/26/2018 04:49 AM       Lab Results   Component Value Date/Time    CK 53 01/26/2018 04:28 AM    CK-MB Index 2.3 01/26/2018 04:28 AM    Troponin-I, Qt. 0.09 (H) 01/15/2020 05:20 AM     (H) 09/09/2015 05:08 AM        Lab Results   Component Value Date/Time    Culture result: MRSA NOT PRESENT 12/14/2019 08:35 PM    Culture result:  12/14/2019 08:35 PM         Screening of patient nares for MRSA is for surveillance purposes and, if positive, to facilitate isolation considerations in high risk settings. It is not intended for automatic decolonization interventions per se as regimens are not sufficiently effective to warrant routine use. Culture result: MRSA NOT PRESENT 01/26/2018 12:59 AM    Culture result:  01/26/2018 12:59 AM         Screening of patient nares for MRSA is for surveillance purposes and, if positive, to facilitate isolation considerations in high risk settings.  It is not intended for automatic decolonization interventions per se as regimens are not sufficiently effective to warrant routine use. Lab Results   Component Value Date/Time    Vancomycin,trough 19.5 (H) 04/04/2014 12:19 AM    Vancomycin,trough 19.2 (H) 03/29/2014 11:30 PM    CK 53 01/26/2018 04:28 AM    CK 51 01/25/2018 11:00 PM       Lab Results   Component Value Date/Time    Color YELLOW/STRAW 01/15/2020 12:46 AM    Appearance CLEAR 01/15/2020 12:46 AM    pH (UA) 5.0 01/15/2020 12:46 AM    Protein 30 (A) 01/15/2020 12:46 AM    Glucose NEGATIVE  01/15/2020 12:46 AM    Ketone NEGATIVE  01/15/2020 12:46 AM    Bilirubin NEGATIVE  01/15/2020 12:46 AM    Blood NEGATIVE  01/15/2020 12:46 AM    Urobilinogen 0.2 01/15/2020 12:46 AM    Nitrites NEGATIVE  01/15/2020 12:46 AM    Leukocyte Esterase TRACE (A) 01/15/2020 12:46 AM    WBC 0-4 01/15/2020 12:46 AM    RBC 0-5 01/15/2020 12:46 AM    Bacteria NEGATIVE  01/15/2020 12:46 AM       IMPRESSION:  ===========  -DVT. -SOB - suspect likely has PE.  -JOSE. -Hypoxemia. PLAN:  =====  -heparin gtt and transition to coumadin. This sounds like her first offense; 3-6 mts of therapy   -O2 supplementation if needed   -echo is RV dilation noted; would benefit from repeat echo in 3 mts   -monitor Right leg symptoms.  Discussed with RN   -prn over the weekend     Jeffery Pruett PA-C

## 2020-01-17 NOTE — PROGRESS NOTES
Occupational Therapy  Chart reviewed. Noted RN note in which pt c/o L LE pain and currently now with absent pulses. Vascular has been ordered. Pt admitted with B LE DVT and PE. Will defer OT intervention at this time. Thank you.  Addison De Los Santos OT

## 2020-01-17 NOTE — PROGRESS NOTES
Pharmacist Note - Warfarin Dosing  Consult provided for this 78 y. o.female to manage warfarin for Pulmonary Embolism    INR Goal: 2 - 3    Home regimen/ tablet size: NA    Drugs that may increase INR: Allopurinol  Drugs that may decrease INR: None  Other current anticoagulants/ drugs that may increase bleeding risk: Heparin  Risk factors: Age > 65  Daily INR ordered: YES    Recent Labs     01/17/20  0348 01/16/20  0227 01/14/20  2312   HGB 10.0* 10.4* 12.0   INR 1.0  --   --      Date               INR                  Dose  1/16  --  5 mg   1/17  1.0  5 mg                                                                                 Assessment/ Plan: Will order warfarin 5 mg PO x 1 dose. Pharmacy will continue to monitor daily and adjust therapy as indicated. Please contact the pharmacist at v [phone] or  for outpatient recommendations if needed.

## 2020-01-17 NOTE — PROGRESS NOTES
Physical Therapy  1/17/2020    Chart reviewed. Patient currently with absent pulses in L LE (see RN note). Hold PT at this time and continue to follow. Thank you.     Chelsie Carver Felty, PT, DPT

## 2020-01-17 NOTE — PROGRESS NOTES
Reason for Admission:   Pulmonary Embolus, DVT, bilateral LE DVT               RRAT Score:    22. Patient was discharged from [facility] on 12/22/19 following a gallbladder surgery              Resources/supports as identified by patient/family:  Patient is  and has 2 adult children                 Top Challenges facing patient (as identified by patient/family and CM):  Health challenges                     Finances/Medication cost?   Patient has Medicare and did not voice any financial stressors                 Transportation? Patient's  will transport her              Support system or lack thereof? See above                     Living arrangements? Patient lives with her             Self-care/ADLs/Cognition? She alert and oriented  and independent without any assistive devices          Current Advanced Directive/Advance Care   Patient said her  and children will speak on her behalf                              Plan for utilizing home health:   Not indicated                  Transition of Care Plan: A referral was sent to [facility] via [employer]. CM will continue to follow. Kenya Bermudez MSA, RN, CRM. Care Management Interventions  PCP Verified by CM: Yes  Palliative Care Criteria Met (RRAT>21 & CHF Dx)?: Yes  Palliative Consult Recommended?: No  Reason Palliative Care Not Recommended?: Provider preference to wait  Mode of Transport at Discharge: Other (see comment)(Private car)  Transition of Care Consult (CM Consult):  Other(IP Rehab)  MyChart Signup: No  Discharge Durable Medical Equipment: No  Health Maintenance Reviewed: Yes  Physical Therapy Consult: Yes  Occupational Therapy Consult: Yes  Speech Therapy Consult: No  Confirm Follow Up Transport: Family  The Patient and/or Patient Representative was Provided with a Choice of Provider and Agrees with the Discharge Plan?: Yes  Freedom of Choice List was Provided with Basic Dialogue that Supports the Patient's Individualized Plan of Care/Goals, Treatment Preferences and Shares the Quality Data Associated with the Providers?: Yes  Discharge Location  Discharge Placement: Rehab hospital/unit acute

## 2020-01-17 NOTE — ROUTINE PROCESS
Bedside and Verbal shift change report given to April (oncoming nurse) by Irene Noguera (offgoing nurse). Report included the following information SBAR, Kardex, ED Summary, Intake/Output, MAR, Recent Results and Cardiac Rhythm NSR.

## 2020-01-17 NOTE — CONSULTS
[address]    Name:  Haseeb Mckeon  MR#:  [id number]  :  1940  ACCOUNT #:  [de-identified]  DATE OF SERVICE:  2020      REASON FOR CONSULTATION:  Ischemic left leg. HISTORY OF PRESENT ILLNESS:  The patient is an 69-year-old female who was hospitalized 2 days ago with the acute onset of left leg weakness and pain. The pain resolved after a couple of hours. She also had hypoxemia. She was found to have DVT bilaterally and has been treated with heparin. She seemed to be improving, but today her leg felt worse. She was noted to have absent Doppler signals at the ankle level. She had no previous history of vascular problems. At present, she has no left leg pain, but it feels somewhat numb and feels somewhat weak. PAST MEDICAL HISTORY:   Hypertension. MEDICATIONS:  1. Allopurinol. 2.  Cardura. 3.  Diltiazem. 4.  Mevacor. ALLERGIES:  NO MEDICATIONS. SOCIAL HISTORY:  The patient lives at home with her . FAMILY HISTORY:  Unremarkable. REVIEW OF SYSTEMS:  She has had no recent cardiac, GI, , or psychiatric complaints. PHYSICAL EXAMINATION:  GENERAL:  She is an elderly female, in no distress. She is awake, alert, and comfortable. LUNGS:  Bilateral breath sounds. HEART:  Regular rate and rhythm. ABDOMEN:  Soft and nontender. PERIPHERAL VASCULAR:  She has palpable femoral and dorsalis pedis pulses on the right. On the left, she has palpable femoral, but no palpable distal pulses. Her left foot is cooler than the right. She has intact motor and sensory function. NEUROLOGIC:  Grossly normal with intact motor and sensory function. IMPRESSION:  Based on her current finding, she likely had an embolic event to the left leg at the time of her admission that improved significantly with time and heparin. She now may have had a recurrent event. She is not in imminent jeopardy and does not need urgent vascular intervention.   Further vascular workup is in process with an arterial duplex and an ankle-brachial index. I suspect she will need embolectomy on the left. In the meantime, we will continue heparin.       Wyline Cheadle, MD      [id number]  D:  01/17/2020 12:03  T:  01/17/2020 12:42  JOB #:  [id number]

## 2020-01-17 NOTE — PROGRESS NOTES
1040-Pulmonary PA Fredy was in to see patient and pt mentioned that her left leg felt a little different. This RN was informed by PA of pt statement. This RN entered room and pt stated \"this started about 30 minutes ago and this is what it felt like when I came in and it went away\". Pt's left leg is cooler to the touch than the right, which is new from my assessment this AM, unable to palpate pedal/PT/popliteal pulses, which is also new. I used the doppler and was unable to obtain pedal/Pt/popliteal pulses. Second RN confirmed absent pulses. Dr. Ariel Rausch paged. [phone]-Spoke with Dr. Ariel Rausch who ordered Vascular consult and arterial duplex. Pt resting in bed, no other complaints at this time, will continue to monitor.

## 2020-01-17 NOTE — PROGRESS NOTES
Bedside shift change report given to Do Nolen RN (oncoming nurse) by LISSETTE Mosley (offgoing nurse). Report included the following information SBAR, Kardex, Intake/Output, MAR, Accordion and Cardiac Rhythm NSR.

## 2020-01-17 NOTE — PROGRESS NOTES
Samantha Lucio, Nikos Payne, and Laura Wade Date: 1/14/2020      Subjective:     Patient feeling OK. Req O2. Legs less painful. .       Current Facility-Administered Medications   Medication Dose Route Frequency    Warfarin - pharmacy to dose   Other Rx Dosing/Monitoring    heparin 25,000 units in D5W 250 ml infusion  17-36 Units/kg/hr IntraVENous TITRATE    hydrALAZINE (APRESOLINE) 20 mg/mL injection 10 mg  10 mg IntraVENous Q6H PRN    dilTIAZem CD (CARDIZEM CD) capsule 240 mg  240 mg Oral DAILY    doxazosin (CARDURA) tablet 2 mg  2 mg Oral QHS    allopurinol (ZYLOPRIM) tablet 100 mg  100 mg Oral DAILY          Objective:     Patient Vitals for the past 8 hrs:   BP Temp Pulse Resp SpO2 Weight   01/17/20 0346 139/82 98.3 °F (36.8 °C) 93 18 94 % 176 lb 2.4 oz (79.9 kg)   01/16/20 2310 153/84 98.1 °F (36.7 °C) 97 18 92 %      No intake/output data recorded. 01/15 1901 - 01/17 0700  In: 415.8 [I.V.:415.8]  Out: -     Physical Exam: Lungs: clear to auscultation bilaterally  Heart: regular rate and rhythm, S1, S2 normal, no murmur, click, rub or gallop  Abdomen: soft, non-tender.  Bowel sounds normal. No masses,  no organomegaly        Data Review   Recent Results (from the past 24 hour(s))   PTT    Collection Time: 01/16/20 10:29 AM   Result Value Ref Range    aPTT 71.4 (H) 22.1 - 32.0 sec    aPTT, therapeutic range     58.0 - 77.0 SECS   PTT    Collection Time: 01/16/20  4:40 PM   Result Value Ref Range    aPTT 63.2 (H) 22.1 - 32.0 sec    aPTT, therapeutic range     58.0 - 77.0 SECS   PROTHROMBIN TIME + INR    Collection Time: 01/17/20  3:48 AM   Result Value Ref Range    INR 1.0 0.9 - 1.1      Prothrombin time 10.4 9.0 - 11.1 sec   HGB & HCT    Collection Time: 01/17/20  3:48 AM   Result Value Ref Range    HGB 10.0 (L) 11.5 - 16.0 g/dL    HCT 31.7 (L) 35.0 - 47.0 %           Assessment:     Principal Problem:    Pulmonary embolism ([facility] (1/15/2020)    Active Problems:    Hypoxia (1/15/2020) Weakness of left lower extremity (1/15/2020)      DVT (deep venous thrombosis) ([facility] (1/15/2020)        Plan:     1) Transitioning from heparin drip to Warfarin  2) dispo- inpt [facility] or inpt rehab

## 2020-01-17 NOTE — PROGRESS NOTES
Vascular:    Admitted with left leg pain and weakness 1/15 that resolved. She was found to have bilateral DVT and was started on heparin. Today she had recurrent numbness left lower leg and foot with coolness suggesting arterial issues. Currently she is comfortable with some foot numbness. Motor function and sensation intact. Palpable left femoral pulse but none below. Palpable right DP pulse. Suspect left leg emboli based on clinical course. Arterial duplex pending. No need for urgent intervention based on current exam. Plan to continue heparin, hold coumadin and await vascular studies. Likely OR Mon for embolectomy.

## 2020-01-18 PROBLEM — N18.30 STAGE 3 CHRONIC KIDNEY DISEASE (HCC): Status: ACTIVE | Noted: 2020-01-18

## 2020-01-18 LAB
APTT PPP: 111.6 SEC (ref 22.1–32)
APTT PPP: 44.4 SEC (ref 22.1–32)
APTT PPP: 47.9 SEC (ref 22.1–32)
APTT PPP: 51.1 SEC (ref 22.1–32)
GLUCOSE BLD STRIP.AUTO-MCNC: 97 MG/DL (ref 65–100)
HCT VFR BLD AUTO: 32 % (ref 35–47)
HGB BLD-MCNC: 9.8 G/DL (ref 11.5–16)
LEFT ABI: 0
LEFT ANTERIOR TIBIAL: 0 MMHG
LEFT ARM BP: 135 MMHG
LEFT CFA PROX SYS PSV: 87.5 CM/S
LEFT DIST ATA VELOCITY: 0 CM/S
LEFT POPLITEAL DIST SYS PSV: 7.3 CM/S
LEFT POSTERIOR TIBIAL: 0 MMHG
LEFT PROX PFA A PSV: 49.8 CM/S
LEFT SFA PROX VEL RATIO: 0.83
LEFT SUPER FEMORAL DIST SYS PSV: 40.4 CM/S
LEFT SUPER FEMORAL PROX SYS PSV: 72.8 CM/S
RIGHT ABI: 0.93
RIGHT ANTERIOR TIBIAL: 140 MMHG
RIGHT ARM BP: 161 MMHG
RIGHT POSTERIOR TIBIAL: 149 MMHG
RIGHT TBI: 0.43
RIGHT TOE PRESSURE: 70 MMHG
SERVICE CMNT-IMP: NORMAL
THERAPEUTIC RANGE,PTTT: ABNORMAL SECS (ref 58–77)

## 2020-01-18 PROCEDURE — 85730 THROMBOPLASTIN TIME PARTIAL: CPT

## 2020-01-18 PROCEDURE — 36415 COLL VENOUS BLD VENIPUNCTURE: CPT

## 2020-01-18 PROCEDURE — 82962 GLUCOSE BLOOD TEST: CPT

## 2020-01-18 PROCEDURE — 74011250637 HC RX REV CODE- 250/637: Performed by: FAMILY MEDICINE

## 2020-01-18 PROCEDURE — 74011250636 HC RX REV CODE- 250/636: Performed by: STUDENT IN AN ORGANIZED HEALTH CARE EDUCATION/TRAINING PROGRAM

## 2020-01-18 PROCEDURE — 74011250636 HC RX REV CODE- 250/636: Performed by: SURGERY

## 2020-01-18 PROCEDURE — 65660000000 HC RM CCU STEPDOWN

## 2020-01-18 PROCEDURE — 74011250637 HC RX REV CODE- 250/637: Performed by: INTERNAL MEDICINE

## 2020-01-18 PROCEDURE — 85018 HEMOGLOBIN: CPT

## 2020-01-18 RX ORDER — HEPARIN SODIUM 1000 [USP'U]/ML
3340 INJECTION, SOLUTION INTRAVENOUS; SUBCUTANEOUS ONCE
Status: COMPLETED | OUTPATIENT
Start: 2020-01-18 | End: 2020-01-18

## 2020-01-18 RX ORDER — HEPARIN SODIUM 1000 [USP'U]/ML
3340 INJECTION, SOLUTION INTRAVENOUS; SUBCUTANEOUS ONCE
Status: COMPLETED | OUTPATIENT
Start: 2020-01-19 | End: 2020-01-18

## 2020-01-18 RX ORDER — LOVASTATIN 20 MG/1
20 TABLET ORAL
Status: DISCONTINUED | OUTPATIENT
Start: 2020-01-18 | End: 2020-01-24 | Stop reason: HOSPADM

## 2020-01-18 RX ADMIN — HEPARIN SODIUM 3340 UNITS: 1000 INJECTION INTRAVENOUS; SUBCUTANEOUS at 04:40

## 2020-01-18 RX ADMIN — ALLOPURINOL 100 MG: 100 TABLET ORAL at 08:51

## 2020-01-18 RX ADMIN — HEPARIN SODIUM 3340 UNITS: 1000 INJECTION INTRAVENOUS; SUBCUTANEOUS at 23:37

## 2020-01-18 RX ADMIN — DOXAZOSIN 2 MG: 2 TABLET ORAL at 22:09

## 2020-01-18 RX ADMIN — LOVASTATIN 20 MG: 20 TABLET ORAL at 22:09

## 2020-01-18 RX ADMIN — DILTIAZEM HYDROCHLORIDE 240 MG: 240 CAPSULE, EXTENDED RELEASE ORAL at 08:51

## 2020-01-18 NOTE — PROGRESS NOTES
Bedside shift change report given to LISSETTE Carmen (oncoming nurse) by LISSETTE Mosley (offgoing nurse). Report included the following information SBAR, Kardex, Intake/Output, MAR, Accordion and Cardiac Rhythm NSR.

## 2020-01-18 NOTE — PROGRESS NOTES
Vascular    No new c/o  Duplex showed occlusion of Lt popliteal artery  AMAURY on left is zero  Sensory and motor function is intact  No rest pain  No need for emergent intervention  Continue heparin  Embolectomy Monday

## 2020-01-18 NOTE — PROGRESS NOTES
Bedside shift change report given to Latanya Mccormick (oncoming nurse) by Donal Shin (offgoing nurse). Report included the following information SBAR, Kardex, MAR, Accordion and Cardiac Rhythm NSR.

## 2020-01-18 NOTE — PROGRESS NOTES
Problem: Risk for Spread of Infection  Goal: Prevent transmission of infectious organism to others  Description  Prevent the transmission of infectious organisms to other patients, staff members, and visitors. Outcome: Progressing Towards Goal     Problem: Falls - Risk of  Goal: *Absence of Falls  Description  Document Coretta Sanabria Fall Risk and appropriate interventions in the flowsheet.   Outcome: Progressing Towards Goal  Note: Fall Risk Interventions:  Mobility Interventions: Utilize walker, cane, or other assistive device         Medication Interventions: Patient to call before getting OOB    Elimination Interventions: Call light in reach, Toileting schedule/hourly rounds    History of Falls Interventions: Door open when patient unattended

## 2020-01-18 NOTE — PROGRESS NOTES
11:57 Dr. Jennifer Martin returned paged; Advised of the .6; Heparin has been stopped will redraw in 2 hours; He gave thanks for the information.   Alma Virgen

## 2020-01-18 NOTE — PROGRESS NOTES
General Daily Progress Note    Plan:   Cont heparin  Hold coumadin pending probable embolectomy  LLE Monday      Assessment:     Principal Problem:    Pulmonary embolism ([facility] (1/15/2020)      Overview: High probability VQ lung scan - bilateral    Active Problems:    [facility] (1/15/2020)      Hypoxia (1/15/2020)      Weakness of left lower extremity (1/15/2020)      Overview: Probably embolic      Stage 3 chronic kidney disease ([facility] (1/18/2020)          1/18/2020    Admit Date: 1/14/2020    Subjective:     Using walker for weakness LLE  No chest pain breathing comfortably room air    Objective:     Patient Vitals for the past 8 hrs:   BP Temp Pulse Resp SpO2 Weight   01/18/20 0817 131/79 98.4 °F (36.9 °C) 96 18 92 %    01/18/20 0329 115/82 98.7 °F (37.1 °C) 91 19 92 % 176 lb 5.9 oz (80 kg)     No intake/output data recorded. 01/16 1901 - 01/18 0700  In: 694.2 [P.O.:240;  I.V.:454.2]  Out: -     Physical Exam:  Lungs -clear    Heart -reg  Abdomen-  Extremities-no edema    Data Review   Recent Results (from the past 24 hour(s))   ANKLE BRACHIAL INDEX    Collection Time: 01/17/20  3:13 PM   Result Value Ref Range    Left anterior tibial 0 mmHg    Left posterior tibial 0 mmHg    Right toe pressure 70 mmHg    Left arm  mmHg    Right arm  mmHg    Right posterior tibial 149 mmHg    Right anterior tibial 140 mmHg    Left AMAURY 0.00     Right AMAURY 0.93     Right TBI 0.43    DUPLEX LOWER EXT ARTERY LEFT    Collection Time: 01/17/20  3:27 PM   Result Value Ref Range    Left CFA prox sys PSV 87.5 cm/s    Left Prox PFA A PSV 49.8 cm/s    Left super femoral dist sys PSV 40.4 cm/s    Left super femoral prox sys PSV 72.8 cm/s    Left popliteal dist sys PSV 7.3 cm/s    Left Dist CHI Velocity 0.0 cm/s    Left SFA Prox Micheal Ratio 0.83    PTT    Collection Time: 01/17/20  6:35 PM   Result Value Ref Range    aPTT 99.0 (HH) 22.1 - 32.0 sec    aPTT, therapeutic range     58.0 - 77.0 SECS   HGB & HCT Collection Time: 01/18/20  3:19 AM   Result Value Ref Range    HGB 9.8 (L) 11.5 - 16.0 g/dL    HCT 32.0 (L) 35.0 - 47.0 %   PTT    Collection Time: 01/18/20  3:19 AM   Result Value Ref Range    aPTT 47.9 (H) 22.1 - 32.0 sec    aPTT, therapeutic range     58.0 - 77.0 SECS   GLUCOSE, POC    Collection Time: 01/18/20  5:53 AM   Result Value Ref Range    Glucose (POC) 97 65 - 100 mg/dL    Performed by Radha Guerrero        CULTURES:    Lab Results   Component Value Date/Time    Specimen Description: MISC. WOUND PUS FROM BACK WOUND 03/19/2014 08:19 PM    Specimen Description: MISC. WOUND PUS FROM BACK WOUND 03/19/2014 08:19 PM    Lab Results   Component Value Date/Time    Culture result: MRSA NOT PRESENT 12/14/2019 08:35 PM    Culture result:  12/14/2019 08:35 PM         Screening of patient nares for MRSA is for surveillance purposes and, if positive, to facilitate isolation considerations in high risk settings. It is not intended for automatic decolonization interventions per se as regimens are not sufficiently effective to warrant routine use.                                                   Lyle Carlisle

## 2020-01-18 NOTE — PROGRESS NOTES
Problem: Falls - Risk of  Goal: *Absence of Falls  Description  Document Clide Failing Fall Risk and appropriate interventions in the flowsheet. Outcome: Progressing Towards Goal  Note: Fall Risk Interventions:  Mobility Interventions: Communicate number of staff needed for ambulation/transfer, Utilize walker, cane, or other assistive device         Medication Interventions: Patient to call before getting OOB    Elimination Interventions: Call light in reach, Patient to call for help with toileting needs    History of Falls Interventions: Door open when patient unattended         Problem: Patient Education: Go to Patient Education Activity  Goal: Patient/Family Education  Outcome: Progressing Towards Goal     Problem: Risk for Spread of Infection  Goal: Prevent transmission of infectious organism to others  Description  Prevent the transmission of infectious organisms to other patients, staff members, and visitors.   Outcome: Progressing Towards Goal     Problem: Patient Education:  Go to Education Activity  Goal: Patient/Family Education  Outcome: Progressing Towards Goal     Problem: Patient Education: Go to Patient Education Activity  Goal: Patient/Family Education  Outcome: Progressing Towards Goal     Problem: Patient Education: Go to Patient Education Activity  Goal: Patient/Family Education  Outcome: Progressing Towards Goal     Problem: Discharge Planning  Goal: *Discharge to safe environment  Outcome: Progressing Towards Goal

## 2020-01-19 LAB
APTT PPP: 49.8 SEC (ref 22.1–32)
APTT PPP: 58.6 SEC (ref 22.1–32)
APTT PPP: 90.9 SEC (ref 22.1–32)
BASOPHILS # BLD: 0 K/UL (ref 0–0.1)
BASOPHILS NFR BLD: 0 % (ref 0–1)
DIFFERENTIAL METHOD BLD: ABNORMAL
EOSINOPHIL # BLD: 0.2 K/UL (ref 0–0.4)
EOSINOPHIL NFR BLD: 3 % (ref 0–7)
ERYTHROCYTE [DISTWIDTH] IN BLOOD BY AUTOMATED COUNT: 13.8 % (ref 11.5–14.5)
HCT VFR BLD AUTO: 33.4 % (ref 35–47)
HGB BLD-MCNC: 10.5 G/DL (ref 11.5–16)
IMM GRANULOCYTES # BLD AUTO: 0 K/UL (ref 0–0.04)
IMM GRANULOCYTES NFR BLD AUTO: 1 % (ref 0–0.5)
LYMPHOCYTES # BLD: 1.3 K/UL (ref 0.8–3.5)
LYMPHOCYTES NFR BLD: 22 % (ref 12–49)
MCH RBC QN AUTO: 33.8 PG (ref 26–34)
MCHC RBC AUTO-ENTMCNC: 31.4 G/DL (ref 30–36.5)
MCV RBC AUTO: 107.4 FL (ref 80–99)
MONOCYTES # BLD: 0.5 K/UL (ref 0–1)
MONOCYTES NFR BLD: 9 % (ref 5–13)
NEUTS SEG # BLD: 3.9 K/UL (ref 1.8–8)
NEUTS SEG NFR BLD: 65 % (ref 32–75)
NRBC # BLD: 0 K/UL (ref 0–0.01)
NRBC BLD-RTO: 0 PER 100 WBC
PLATELET # BLD AUTO: 118 K/UL (ref 150–400)
PMV BLD AUTO: 11.9 FL (ref 8.9–12.9)
RBC # BLD AUTO: 3.11 M/UL (ref 3.8–5.2)
THERAPEUTIC RANGE,PTTT: ABNORMAL SECS (ref 58–77)
WBC # BLD AUTO: 5.9 K/UL (ref 3.6–11)

## 2020-01-19 PROCEDURE — 85730 THROMBOPLASTIN TIME PARTIAL: CPT

## 2020-01-19 PROCEDURE — 74011250636 HC RX REV CODE- 250/636: Performed by: INTERNAL MEDICINE

## 2020-01-19 PROCEDURE — 74011250636 HC RX REV CODE- 250/636: Performed by: FAMILY MEDICINE

## 2020-01-19 PROCEDURE — 74011250637 HC RX REV CODE- 250/637: Performed by: FAMILY MEDICINE

## 2020-01-19 PROCEDURE — 85025 COMPLETE CBC W/AUTO DIFF WBC: CPT

## 2020-01-19 PROCEDURE — 65660000000 HC RM CCU STEPDOWN

## 2020-01-19 PROCEDURE — 74011250637 HC RX REV CODE- 250/637: Performed by: INTERNAL MEDICINE

## 2020-01-19 PROCEDURE — 36415 COLL VENOUS BLD VENIPUNCTURE: CPT

## 2020-01-19 RX ORDER — HEPARIN SODIUM 5000 [USP'U]/ML
40 INJECTION, SOLUTION INTRAVENOUS; SUBCUTANEOUS ONCE
Status: COMPLETED | OUTPATIENT
Start: 2020-01-19 | End: 2020-01-19

## 2020-01-19 RX ADMIN — DILTIAZEM HYDROCHLORIDE 240 MG: 240 CAPSULE, EXTENDED RELEASE ORAL at 09:06

## 2020-01-19 RX ADMIN — HEPARIN SODIUM AND DEXTROSE 14 UNITS/KG/HR: 10000; 5 INJECTION INTRAVENOUS at 05:23

## 2020-01-19 RX ADMIN — HEPARIN SODIUM AND DEXTROSE 11 UNITS/KG/HR: 10000; 5 INJECTION INTRAVENOUS at 19:41

## 2020-01-19 RX ADMIN — LOVASTATIN 20 MG: 20 TABLET ORAL at 21:32

## 2020-01-19 RX ADMIN — HEPARIN SODIUM 3250 UNITS: 5000 INJECTION INTRAVENOUS; SUBCUTANEOUS at 21:53

## 2020-01-19 RX ADMIN — ALLOPURINOL 100 MG: 100 TABLET ORAL at 09:06

## 2020-01-19 RX ADMIN — HEPARIN SODIUM AND DEXTROSE 13 UNITS/KG/HR: 10000; 5 INJECTION INTRAVENOUS at 21:33

## 2020-01-19 RX ADMIN — DOXAZOSIN 2 MG: 2 TABLET ORAL at 21:32

## 2020-01-19 NOTE — PROGRESS NOTES
Heparin Infusion Monitoring  Recent Labs     01/19/20  0522 01/18/20  2217 01/18/20  1407  01/18/20  0319  01/17/20  0348   HGB  --   --   --   --  9.8*  --  10.0*   INR  --   --   --   --   --   --  1.0   APTT 90.9* 44.4* 51.1*   < > 47.9*   < >  --     < > = values in this interval not displayed. PTT: 90.9 sec @ 0522 (time)  Current rate:  14 units/kg/hr  Action taken: Held Dose due to elevated PTT, PTT .  Hold for 1 hour  New rate: 11 units/kg/hr

## 2020-01-19 NOTE — PROGRESS NOTES
General Daily Progress Note    Plan:   Continue heparin drip  Anticipate embolectomy left leg tomorrow JEAN rand vs coumadin on discharge      Assessment:     Principal Problem:    Pulmonary embolism ([facility]) (1/15/2020)      Overview: High probability VQ lung scan - bilateral    Active Problems:    DVT (deep venous thrombosis) ([facility] (1/15/2020)      Hypoxia (1/15/2020)      Weakness of left lower extremity (1/15/2020)      Overview: Probably embolic      Stage 3 chronic kidney disease ([facility] (1/18/2020)          1/19/2020    Admit Date: 1/14/2020    Subjective:     No chest pain some DUVAL left leg remains \"weak\"    Objective:     Patient Vitals for the past 8 hrs:   BP Temp Pulse Resp SpO2 Weight   01/19/20 0723 119/75 98.7 °F (37.1 °C) 92 20 92 %    01/19/20 0342 146/79 99.1 °F (37.3 °C) 89 18 94 % 179 lb 3.7 oz (81.3 kg)     No intake/output data recorded. 01/17 1901 - 01/19 0700  In: 743.5 [P.O.:300; I.V.:443.5]  Out: -     Physical Exam:  Lungs -clear    Heart -ectopic beats  Abdomen-  Extremities-left forefoot apspears more ischemic than right side no palpable pedal pulses bilaterally    Data Review   Recent Results (from the past 24 hour(s))   PTT    Collection Time: 01/18/20 10:20 AM   Result Value Ref Range    aPTT 111.6 (HH) 22.1 - 32.0 sec    aPTT, therapeutic range     58.0 - 77.0 SECS   PTT    Collection Time: 01/18/20  2:07 PM   Result Value Ref Range    aPTT 51.1 (H) 22.1 - 32.0 sec    aPTT, therapeutic range     58.0 - 77.0 SECS   PTT    Collection Time: 01/18/20 10:17 PM   Result Value Ref Range    aPTT 44.4 (H) 22.1 - 32.0 sec    aPTT, therapeutic range     58.0 - 77.0 SECS   PTT    Collection Time: 01/19/20  5:22 AM   Result Value Ref Range    aPTT 90.9 (HH) 22.1 - 32.0 sec    aPTT, therapeutic range     58.0 - 77.0 SECS       CULTURES:    Lab Results   Component Value Date/Time    Specimen Description: MISC. WOUND PUS FROM BACK WOUND 03/19/2014 08:19 PM    Specimen Description: MISC.  WOUND PUS FROM BACK WOUND 03/19/2014 08:19 PM    Lab Results   Component Value Date/Time    Culture result: MRSA NOT PRESENT 12/14/2019 08:35 PM    Culture result:  12/14/2019 08:35 PM         Screening of patient nares for MRSA is for surveillance purposes and, if positive, to facilitate isolation considerations in high risk settings. It is not intended for automatic decolonization interventions per se as regimens are not sufficiently effective to warrant routine use.                                                   Ana Dong

## 2020-01-19 NOTE — PROGRESS NOTES
Bedside and Verbal shift change report given to Kermit (oncoming nurse) by Rosario Buchanan (offgoing nurse). Report included the following information SBAR, OR Summary, Procedure Summary, MAR and Cardiac Rhythm normal sinus.

## 2020-01-20 ENCOUNTER — ANESTHESIA (OUTPATIENT)
Dept: SURGERY | Age: 80
DRG: 252 | End: 2020-01-20
Payer: MEDICARE

## 2020-01-20 ENCOUNTER — APPOINTMENT (OUTPATIENT)
Dept: GENERAL RADIOLOGY | Age: 80
DRG: 252 | End: 2020-01-20
Payer: MEDICARE

## 2020-01-20 LAB
APTT PPP: 99.6 SEC (ref 22.1–32)
ERYTHROCYTE [DISTWIDTH] IN BLOOD BY AUTOMATED COUNT: 13.4 % (ref 11.5–14.5)
HCT VFR BLD AUTO: 33.8 % (ref 35–47)
HGB BLD-MCNC: 10.7 G/DL (ref 11.5–16)
INR PPP: 1.1 (ref 0.9–1.1)
MCH RBC QN AUTO: 33.4 PG (ref 26–34)
MCHC RBC AUTO-ENTMCNC: 31.7 G/DL (ref 30–36.5)
MCV RBC AUTO: 105.6 FL (ref 80–99)
NRBC # BLD: 0 K/UL (ref 0–0.01)
NRBC BLD-RTO: 0 PER 100 WBC
PLATELET # BLD AUTO: 121 K/UL (ref 150–400)
PMV BLD AUTO: 12 FL (ref 8.9–12.9)
PROTHROMBIN TIME: 10.8 SEC (ref 9–11.1)
RBC # BLD AUTO: 3.2 M/UL (ref 3.8–5.2)
THERAPEUTIC RANGE,PTTT: ABNORMAL SECS (ref 58–77)
WBC # BLD AUTO: 6.6 K/UL (ref 3.6–11)

## 2020-01-20 PROCEDURE — 04CN0ZZ EXTIRPATION OF MATTER FROM LEFT POPLITEAL ARTERY, OPEN APPROACH: ICD-10-PCS

## 2020-01-20 PROCEDURE — 74011636320 HC RX REV CODE- 636/320

## 2020-01-20 PROCEDURE — C1894 INTRO/SHEATH, NON-LASER: HCPCS

## 2020-01-20 PROCEDURE — 77030040922 HC BLNKT HYPOTHRM STRY -A

## 2020-01-20 PROCEDURE — 76060000035 HC ANESTHESIA 2 TO 2.5 HR

## 2020-01-20 PROCEDURE — 76010000153 HC OR TIME 1.5 TO 2 HR

## 2020-01-20 PROCEDURE — 77030004561 HC CATH ANGI DX COBRA ANGI -B

## 2020-01-20 PROCEDURE — 74011250636 HC RX REV CODE- 250/636: Performed by: ANESTHESIOLOGY

## 2020-01-20 PROCEDURE — 77030031139 HC SUT VCRL2 J&J -A

## 2020-01-20 PROCEDURE — C1757 CATH, THROMBECTOMY/EMBOLECT: HCPCS

## 2020-01-20 PROCEDURE — 74011250637 HC RX REV CODE- 250/637: Performed by: ANESTHESIOLOGY

## 2020-01-20 PROCEDURE — 36415 COLL VENOUS BLD VENIPUNCTURE: CPT

## 2020-01-20 PROCEDURE — 77030020256 HC SOL INJ NACL 0.9%  500ML

## 2020-01-20 PROCEDURE — 77030008684 HC TU ET CUF COVD -B: Performed by: ANESTHESIOLOGY

## 2020-01-20 PROCEDURE — 77030011640 HC PAD GRND REM COVD -A

## 2020-01-20 PROCEDURE — C1725 CATH, TRANSLUMIN NON-LASER: HCPCS

## 2020-01-20 PROCEDURE — 77030005513 HC CATH URETH FOL11 MDII -B

## 2020-01-20 PROCEDURE — 85027 COMPLETE CBC AUTOMATED: CPT

## 2020-01-20 PROCEDURE — 74011000250 HC RX REV CODE- 250: Performed by: NURSE ANESTHETIST, CERTIFIED REGISTERED

## 2020-01-20 PROCEDURE — 85730 THROMBOPLASTIN TIME PARTIAL: CPT

## 2020-01-20 PROCEDURE — 85610 PROTHROMBIN TIME: CPT

## 2020-01-20 PROCEDURE — 77030002987 HC SUT PROL J&J -B

## 2020-01-20 PROCEDURE — C1769 GUIDE WIRE: HCPCS

## 2020-01-20 PROCEDURE — 74011250636 HC RX REV CODE- 250/636: Performed by: NURSE ANESTHETIST, CERTIFIED REGISTERED

## 2020-01-20 PROCEDURE — 047Q0ZZ DILATION OF LEFT ANTERIOR TIBIAL ARTERY, OPEN APPROACH: ICD-10-PCS

## 2020-01-20 PROCEDURE — 74011250637 HC RX REV CODE- 250/637

## 2020-01-20 PROCEDURE — 77030013060 HC DEV INFL PRSS MRTM -B

## 2020-01-20 PROCEDURE — 77030026438 HC STYL ET INTUB CARD -A: Performed by: ANESTHESIOLOGY

## 2020-01-20 PROCEDURE — 77030003704 HC NDL VASC ACC ARMD -A

## 2020-01-20 PROCEDURE — 77030008462 HC STPLR SKN PROX J&J -A

## 2020-01-20 PROCEDURE — 65660000000 HC RM CCU STEPDOWN

## 2020-01-20 PROCEDURE — 76210000016 HC OR PH I REC 1 TO 1.5 HR

## 2020-01-20 PROCEDURE — 74011250636 HC RX REV CODE- 250/636: Performed by: INTERNAL MEDICINE

## 2020-01-20 PROCEDURE — 74011250636 HC RX REV CODE- 250/636

## 2020-01-20 RX ORDER — HEPARIN SODIUM 1000 [USP'U]/ML
INJECTION, SOLUTION INTRAVENOUS; SUBCUTANEOUS AS NEEDED
Status: DISCONTINUED | OUTPATIENT
Start: 2020-01-20 | End: 2020-01-20 | Stop reason: HOSPADM

## 2020-01-20 RX ORDER — FENTANYL CITRATE 50 UG/ML
50 INJECTION, SOLUTION INTRAMUSCULAR; INTRAVENOUS AS NEEDED
Status: DISCONTINUED | OUTPATIENT
Start: 2020-01-20 | End: 2020-01-20 | Stop reason: HOSPADM

## 2020-01-20 RX ORDER — SODIUM CHLORIDE 0.9 % (FLUSH) 0.9 %
5-40 SYRINGE (ML) INJECTION AS NEEDED
Status: DISCONTINUED | OUTPATIENT
Start: 2020-01-20 | End: 2020-01-20 | Stop reason: HOSPADM

## 2020-01-20 RX ORDER — FENTANYL CITRATE 50 UG/ML
25 INJECTION, SOLUTION INTRAMUSCULAR; INTRAVENOUS
Status: DISCONTINUED | OUTPATIENT
Start: 2020-01-20 | End: 2020-01-20 | Stop reason: HOSPADM

## 2020-01-20 RX ORDER — DIPHENHYDRAMINE HYDROCHLORIDE 50 MG/ML
12.5 INJECTION, SOLUTION INTRAMUSCULAR; INTRAVENOUS AS NEEDED
Status: DISCONTINUED | OUTPATIENT
Start: 2020-01-20 | End: 2020-01-20 | Stop reason: HOSPADM

## 2020-01-20 RX ORDER — HYDROCODONE BITARTRATE AND ACETAMINOPHEN 7.5; 325 MG/1; MG/1
1 TABLET ORAL
Status: DISCONTINUED | OUTPATIENT
Start: 2020-01-20 | End: 2020-01-24 | Stop reason: HOSPADM

## 2020-01-20 RX ORDER — OXYCODONE AND ACETAMINOPHEN 5; 325 MG/1; MG/1
1 TABLET ORAL AS NEEDED
Status: DISCONTINUED | OUTPATIENT
Start: 2020-01-20 | End: 2020-01-20 | Stop reason: HOSPADM

## 2020-01-20 RX ORDER — CEFAZOLIN SODIUM 1 G/3ML
INJECTION, POWDER, FOR SOLUTION INTRAMUSCULAR; INTRAVENOUS AS NEEDED
Status: DISCONTINUED | OUTPATIENT
Start: 2020-01-20 | End: 2020-01-20 | Stop reason: HOSPADM

## 2020-01-20 RX ORDER — ROCURONIUM BROMIDE 10 MG/ML
INJECTION, SOLUTION INTRAVENOUS AS NEEDED
Status: DISCONTINUED | OUTPATIENT
Start: 2020-01-20 | End: 2020-01-20 | Stop reason: HOSPADM

## 2020-01-20 RX ORDER — NEOSTIGMINE METHYLSULFATE 1 MG/ML
INJECTION INTRAVENOUS AS NEEDED
Status: DISCONTINUED | OUTPATIENT
Start: 2020-01-20 | End: 2020-01-20 | Stop reason: HOSPADM

## 2020-01-20 RX ORDER — MIDAZOLAM HYDROCHLORIDE 1 MG/ML
1 INJECTION, SOLUTION INTRAMUSCULAR; INTRAVENOUS AS NEEDED
Status: DISCONTINUED | OUTPATIENT
Start: 2020-01-20 | End: 2020-01-20 | Stop reason: HOSPADM

## 2020-01-20 RX ORDER — SODIUM CHLORIDE 0.9 % (FLUSH) 0.9 %
5-40 SYRINGE (ML) INJECTION EVERY 8 HOURS
Status: DISCONTINUED | OUTPATIENT
Start: 2020-01-20 | End: 2020-01-20 | Stop reason: HOSPADM

## 2020-01-20 RX ORDER — PROPOFOL 10 MG/ML
INJECTION, EMULSION INTRAVENOUS AS NEEDED
Status: DISCONTINUED | OUTPATIENT
Start: 2020-01-20 | End: 2020-01-20 | Stop reason: HOSPADM

## 2020-01-20 RX ORDER — MORPHINE SULFATE 4 MG/ML
4 INJECTION, SOLUTION INTRAMUSCULAR; INTRAVENOUS
Status: DISCONTINUED | OUTPATIENT
Start: 2020-01-20 | End: 2020-01-24 | Stop reason: HOSPADM

## 2020-01-20 RX ORDER — SODIUM CHLORIDE, SODIUM LACTATE, POTASSIUM CHLORIDE, CALCIUM CHLORIDE 600; 310; 30; 20 MG/100ML; MG/100ML; MG/100ML; MG/100ML
125 INJECTION, SOLUTION INTRAVENOUS CONTINUOUS
Status: DISCONTINUED | OUTPATIENT
Start: 2020-01-20 | End: 2020-01-20 | Stop reason: HOSPADM

## 2020-01-20 RX ORDER — MORPHINE SULFATE 2 MG/ML
2 INJECTION, SOLUTION INTRAMUSCULAR; INTRAVENOUS
Status: DISCONTINUED | OUTPATIENT
Start: 2020-01-20 | End: 2020-01-20 | Stop reason: HOSPADM

## 2020-01-20 RX ORDER — ONDANSETRON 2 MG/ML
4 INJECTION INTRAMUSCULAR; INTRAVENOUS AS NEEDED
Status: DISCONTINUED | OUTPATIENT
Start: 2020-01-20 | End: 2020-01-20 | Stop reason: HOSPADM

## 2020-01-20 RX ORDER — LIDOCAINE HYDROCHLORIDE 20 MG/ML
INJECTION, SOLUTION EPIDURAL; INFILTRATION; INTRACAUDAL; PERINEURAL AS NEEDED
Status: DISCONTINUED | OUTPATIENT
Start: 2020-01-20 | End: 2020-01-20 | Stop reason: HOSPADM

## 2020-01-20 RX ORDER — LIDOCAINE HYDROCHLORIDE 10 MG/ML
0.1 INJECTION, SOLUTION EPIDURAL; INFILTRATION; INTRACAUDAL; PERINEURAL AS NEEDED
Status: DISCONTINUED | OUTPATIENT
Start: 2020-01-20 | End: 2020-01-20 | Stop reason: HOSPADM

## 2020-01-20 RX ORDER — GLYCOPYRROLATE 0.2 MG/ML
INJECTION INTRAMUSCULAR; INTRAVENOUS AS NEEDED
Status: DISCONTINUED | OUTPATIENT
Start: 2020-01-20 | End: 2020-01-20 | Stop reason: HOSPADM

## 2020-01-20 RX ORDER — SODIUM CHLORIDE 9 MG/ML
25 INJECTION, SOLUTION INTRAVENOUS CONTINUOUS
Status: DISCONTINUED | OUTPATIENT
Start: 2020-01-20 | End: 2020-01-20 | Stop reason: HOSPADM

## 2020-01-20 RX ORDER — ONDANSETRON 2 MG/ML
INJECTION INTRAMUSCULAR; INTRAVENOUS AS NEEDED
Status: DISCONTINUED | OUTPATIENT
Start: 2020-01-20 | End: 2020-01-20 | Stop reason: HOSPADM

## 2020-01-20 RX ORDER — PHENYLEPHRINE HCL IN 0.9% NACL 0.4MG/10ML
SYRINGE (ML) INTRAVENOUS AS NEEDED
Status: DISCONTINUED | OUTPATIENT
Start: 2020-01-20 | End: 2020-01-20 | Stop reason: HOSPADM

## 2020-01-20 RX ORDER — ACETAMINOPHEN 325 MG/1
650 TABLET ORAL ONCE
Status: COMPLETED | OUTPATIENT
Start: 2020-01-20 | End: 2020-01-20

## 2020-01-20 RX ORDER — HYDROMORPHONE HYDROCHLORIDE 1 MG/ML
0.2 INJECTION, SOLUTION INTRAMUSCULAR; INTRAVENOUS; SUBCUTANEOUS
Status: DISCONTINUED | OUTPATIENT
Start: 2020-01-20 | End: 2020-01-20 | Stop reason: HOSPADM

## 2020-01-20 RX ORDER — DEXAMETHASONE SODIUM PHOSPHATE 4 MG/ML
INJECTION, SOLUTION INTRA-ARTICULAR; INTRALESIONAL; INTRAMUSCULAR; INTRAVENOUS; SOFT TISSUE AS NEEDED
Status: DISCONTINUED | OUTPATIENT
Start: 2020-01-20 | End: 2020-01-20 | Stop reason: HOSPADM

## 2020-01-20 RX ORDER — FENTANYL CITRATE 50 UG/ML
INJECTION, SOLUTION INTRAMUSCULAR; INTRAVENOUS AS NEEDED
Status: DISCONTINUED | OUTPATIENT
Start: 2020-01-20 | End: 2020-01-20 | Stop reason: HOSPADM

## 2020-01-20 RX ORDER — MIDAZOLAM HYDROCHLORIDE 1 MG/ML
0.5 INJECTION, SOLUTION INTRAMUSCULAR; INTRAVENOUS
Status: DISCONTINUED | OUTPATIENT
Start: 2020-01-20 | End: 2020-01-20 | Stop reason: HOSPADM

## 2020-01-20 RX ADMIN — SODIUM CHLORIDE, SODIUM LACTATE, POTASSIUM CHLORIDE, AND CALCIUM CHLORIDE 125 ML/HR: 600; 310; 30; 20 INJECTION, SOLUTION INTRAVENOUS at 09:02

## 2020-01-20 RX ADMIN — ALLOPURINOL 100 MG: 100 TABLET ORAL at 13:48

## 2020-01-20 RX ADMIN — HEPARIN SODIUM 3000 UNITS: 1000 INJECTION, SOLUTION INTRAVENOUS; SUBCUTANEOUS at 10:01

## 2020-01-20 RX ADMIN — DEXAMETHASONE SODIUM PHOSPHATE 4 MG: 4 INJECTION, SOLUTION INTRAMUSCULAR; INTRAVENOUS at 09:38

## 2020-01-20 RX ADMIN — Medication 80 MCG: at 10:09

## 2020-01-20 RX ADMIN — FENTANYL CITRATE 25 MCG: 50 INJECTION, SOLUTION INTRAMUSCULAR; INTRAVENOUS at 10:54

## 2020-01-20 RX ADMIN — CEFAZOLIN 2 G: 330 INJECTION, POWDER, FOR SOLUTION INTRAMUSCULAR; INTRAVENOUS at 09:40

## 2020-01-20 RX ADMIN — FENTANYL CITRATE 25 MCG: 50 INJECTION, SOLUTION INTRAMUSCULAR; INTRAVENOUS at 12:24

## 2020-01-20 RX ADMIN — PROPOFOL 50 MG: 10 INJECTION, EMULSION INTRAVENOUS at 09:29

## 2020-01-20 RX ADMIN — FENTANYL CITRATE 25 MCG: 50 INJECTION, SOLUTION INTRAMUSCULAR; INTRAVENOUS at 09:29

## 2020-01-20 RX ADMIN — HYDROCODONE BITARTRATE AND ACETAMINOPHEN 1 TABLET: 7.5; 325 TABLET ORAL at 21:39

## 2020-01-20 RX ADMIN — ONDANSETRON HYDROCHLORIDE 4 MG: 2 INJECTION, SOLUTION INTRAMUSCULAR; INTRAVENOUS at 09:38

## 2020-01-20 RX ADMIN — FENTANYL CITRATE 25 MCG: 50 INJECTION, SOLUTION INTRAMUSCULAR; INTRAVENOUS at 09:35

## 2020-01-20 RX ADMIN — NEOSTIGMINE METHYLSULFATE 3 MG: 1 INJECTION, SOLUTION INTRAMUSCULAR; INTRAVENOUS; SUBCUTANEOUS at 11:01

## 2020-01-20 RX ADMIN — PHENYLEPHRINE HYDROCHLORIDE 20 MCG/MIN: 10 INJECTION INTRAVENOUS at 09:38

## 2020-01-20 RX ADMIN — FENTANYL CITRATE 25 MCG: 50 INJECTION, SOLUTION INTRAMUSCULAR; INTRAVENOUS at 11:01

## 2020-01-20 RX ADMIN — ROCURONIUM BROMIDE 30 MG: 10 SOLUTION INTRAVENOUS at 09:31

## 2020-01-20 RX ADMIN — Medication 80 MCG: at 10:06

## 2020-01-20 RX ADMIN — ACETAMINOPHEN 650 MG: 325 TABLET ORAL at 09:17

## 2020-01-20 RX ADMIN — DOXAZOSIN 2 MG: 2 TABLET ORAL at 21:34

## 2020-01-20 RX ADMIN — HEPARIN SODIUM AND DEXTROSE 10 UNITS/KG/HR: 10000; 5 INJECTION INTRAVENOUS at 07:47

## 2020-01-20 RX ADMIN — ROCURONIUM BROMIDE 10 MG: 10 SOLUTION INTRAVENOUS at 10:19

## 2020-01-20 RX ADMIN — HYDROCODONE BITARTRATE AND ACETAMINOPHEN 1 TABLET: 7.5; 325 TABLET ORAL at 13:48

## 2020-01-20 RX ADMIN — FENTANYL CITRATE 25 MCG: 50 INJECTION, SOLUTION INTRAMUSCULAR; INTRAVENOUS at 12:00

## 2020-01-20 RX ADMIN — LOVASTATIN 20 MG: 20 TABLET ORAL at 21:33

## 2020-01-20 RX ADMIN — GLYCOPYRROLATE 0.4 MG: 0.2 INJECTION, SOLUTION INTRAMUSCULAR; INTRAVENOUS at 11:01

## 2020-01-20 RX ADMIN — DILTIAZEM HYDROCHLORIDE 240 MG: 240 CAPSULE, EXTENDED RELEASE ORAL at 13:48

## 2020-01-20 RX ADMIN — PROPOFOL 50 MG: 10 INJECTION, EMULSION INTRAVENOUS at 09:31

## 2020-01-20 RX ADMIN — LIDOCAINE HYDROCHLORIDE 50 MG: 20 INJECTION, SOLUTION EPIDURAL; INFILTRATION; INTRACAUDAL; PERINEURAL at 09:29

## 2020-01-20 NOTE — BRIEF OP NOTE
BRIEF OPERATIVE NOTE    Date of Procedure: 1/20/2020   Preoperative Diagnosis: LEFT LEG ARTERIAL EMBOLUS  Postoperative Diagnosis: same    Procedure(s):  LEFT POPLITEAL THROMBECTOMY, LEFT ANTERIOR TIBIAL BALLOON ANGIOPLASTY  Surgeon(s) and Role:     * Merry Gold MD - Primary         Surgical Assistant: Shyla Cameron    Surgical Staff:  Circ-1: Becka Kelley RN  Circ-Relief:  Juan Fofana RN  Registered Nurse Assistant: Wen Ferreira RN  Scrub Tech-1: Robert Pope Time In Time Out   Incision Start 01/20/2020 5809    Incision Close 01/20/2020 1101      Anesthesia: General   Estimated Blood Loss: 100 ml  Specimens: * No specimens in log *   Findings: fresh clot in popliteal, chronic occlusion anterior tibial - recanalized   Complications: none  Implants: * No implants in log *

## 2020-01-20 NOTE — PROGRESS NOTES
Bedside and Verbal shift change report given to kristine (oncoming nurse) by Zoey Nicole (offgoing nurse). Report included the following information SBAR, Kardex, Intake/Output, MAR, Recent Results and Cardiac Rhythm NSR.

## 2020-01-20 NOTE — PERIOP NOTES
Pt reports her right lower back always hurts when she lies flat.   Pt repositioned to left side with pillows placed behind her back  Legs at hips and knees kept straight

## 2020-01-20 NOTE — PROGRESS NOTES
Bedside and Verbal shift change report given to Chon Rojo (oncoming nurse) by Rosey Lamas (offgoing nurse). Report included the following information SBAR, ED Summary, Procedure Summary, Intake/Output, MAR and Cardiac Rhythm Normal Sinus.

## 2020-01-20 NOTE — PERIOP NOTES
Patient: Ronny Warren MRN: [id number]  SSN: [id number]   YOB: 1940  Age: 78 y.o. Sex: female     Patient is status post Procedure(s):  LEFT POPLITEAL THROMBECTOMY, LEFT ANTERIOR TIBIAL BALLOON ANGIOPLASTY. Surgeon(s) and Role:     * Yanet St MD - Primary    Local/Dose/Irrigation:  SEE MAR                  Peripheral IV 01/15/20 Left Antecubital (Active)   Site Assessment Clean, dry, & intact 1/20/2020  4:18 AM   Phlebitis Assessment 0 1/20/2020  4:18 AM   Infiltration Assessment 0 1/20/2020  4:18 AM   Dressing Status Clean, dry, & intact 1/20/2020  4:18 AM   Dressing Type Tape;Transparent 1/20/2020  4:18 AM   Hub Color/Line Status Pink;Capped 1/20/2020  4:18 AM   Action Taken Open ports on tubing capped 1/20/2020  4:18 AM   Alcohol Cap Used No 1/20/2020  4:18 AM       Peripheral IV 01/15/20 Right Forearm (Active)   Site Assessment Clean, dry, & intact 1/20/2020  4:18 AM   Phlebitis Assessment 0 1/20/2020  4:18 AM   Infiltration Assessment 0 1/20/2020  4:18 AM   Dressing Status Clean, dry, & intact 1/20/2020  4:18 AM   Dressing Type Tape;Transparent 1/20/2020  4:18 AM   Hub Color/Line Status Pink; Infusing 1/20/2020  4:18 AM   Action Taken Open ports on tubing capped 1/20/2020  4:18 AM   Alcohol Cap Used No 1/20/2020  4:18 AM       Peripheral IV 01/20/20 Left Hand (Active)                           Dressing/Packing:  Wound Leg Left-Dressing Type: 4 x 4;Special tape (comment) (01/20/20 0900)    Splint/Cast:  ]    Other:       .

## 2020-01-20 NOTE — OP NOTES
[address]  OPERATIVE REPORT    Name:  Jacky Keen  MR#:  [id number]  :  1940  ACCOUNT #:  [de-identified]  DATE OF SERVICE:  2020      PREOPERATIVE DIAGNOSIS:  Ischemic left leg. POSTOPERATIVE DIAGNOSIS:  Ischemic left leg. PROCEDURES PERFORMED:  1. Left popliteal artery thrombectomy. 2.  Left anterior tibial artery balloon angioplasty. SURGEON:  Bravo Marcum MD    ASSISTANT:  Fito Jon. ANESTHESIA:  General.    COMPLICATIONS:  None. SPECIMENS REMOVED:  None. IMPLANTS:  None. ESTIMATED BLOOD LOSS:  100 mL. INDICATIONS:  The patient is an 52-year-old female who was hospitalized several days ago with the acute onset of left leg pain and weakness, which resolved after an hour or two. She was found to have a deep vein thrombosis and has been treated with heparin. She had recurrence of leg symptoms, though milder, 2 days ago. She was noted to have absent signals in the left foot. She had no significant leg pain and intact motor and sensory function. Vascular workup showed occlusion of the distal superficial femoral artery and popliteal artery by duplex with an AMAURY of 0. She will undergo popliteal exploration and thrombectomy. PROCEDURE:  The patient's left leg was prepped and draped. A longitudinal incision was made in the medial proximal lower leg. The popliteal space was entered. The popliteal artery was dissected free as was the origin of the anterior tibial artery and tibioperoneal trunk. The patient was heparinized. The popliteal artery was opened. There was no clot within the popliteal artery at the site of the arteriotomy. There was some chronic plaque. A #3 Zoya catheter was passed superiorly with return of fresh-appearing clot and good inflow. Attempts were made to pass the catheter down both the tibioperoneal trunk and the anterior tibial artery but were unsuccessful.   The catheter would pass down the initial 2-3 cm of the anterior tibial before it stopped. There was backbleeding from the anterior tibial, but not from the tibioperoneal trunk. A segment of saphenous vein was harvested from the incision in the subcutaneous layer. This was applied as a vein patch to close the arteriotomy with running 5-0 Prolene. Flow was then restored. There was a good pulse in the popliteal artery at the completion and good Doppler signal.  A butterfly needle was inserted into the popliteal artery and an arteriogram was done. This showed a 3-4 cm occlusion of the anterior tibial artery several centimeters beyond its origin. The occlusion was well collateralized. The butterfly needle was removed. A 5-Czech sheath was inserted over a guidewire into the popliteal artery directed down the leg. Using a Glidewire and catheter, the anterior tibial artery occlusion was crossed. The occlusion was then dilated with a 3-mm balloon. Following this, there was a good angiographic result with effective recanalization of the anterior tibial artery, which was the single runoff vessel filling the foot. The sheath was then removed. The puncture site closed with 5-0 Prolene. The incision was then closed with Vicryl subcutaneous and fascial sutures and skin staples. Dressings were applied and the patient was returned to the recovery room in stable condition.         Caprice Mcbride MD      [id number]  D:  01/20/2020 11:15  T:  01/20/2020 14:28  JOB #:  [id number]

## 2020-01-20 NOTE — PERIOP NOTES
TRANSFER - OUT REPORT:    Verbal report given to Catherine Ha (name) on Vasyl Kc  being transferred to [facility] (unit) for routine post - op       Report consisted of patients Situation, Background, Assessment and   Recommendations(SBAR). Time Pre op antibiotic given:0940  Anesthesia Stop time: 9644  Haskins Present on Transfer to floor:y  Order for Haskins on Chart:y  Discharge Prescriptions with Chart:    Information from the following report(s) SBAR, OR Summary, Intake/Output, MAR and Accordion was reviewed with the receiving nurse. Opportunity for questions and clarification was provided. Is the patient on 02? YES       L/Min 2       Other     Is the patient on a monitor? YES    Is the nurse transporting with the patient? YES    Surgical Waiting Area notified of patient's transfer from PACU? YES      The following personal items collected during your admission accompanied patient upon transfer:   Dental Appliance: Dental Appliances: None  Vision: Visual Aid: None  Hearing Aid: Hearing Aid: With patient  Jewelry: Jewelry: None  Clothing: Clothing: At bedside  Other Valuables:  Other Valuables: None  Valuables sent to safe:

## 2020-01-20 NOTE — PROGRESS NOTES
Dr. Roselyn Tracy called and said not to start heparin drip until bleeding from incision site subsides.

## 2020-01-20 NOTE — PROGRESS NOTES
TRANSFER - IN REPORT:    Verbal report received from Dav Valderrama RN(name) on [employer]  being received from [employer]) for routine progression of care      Report consisted of patients Situation, Background, Assessment and   Recommendations(SBAR). Information from the following report(s) SBAR, Intake/Output, MAR, Recent Results and Cardiac Rhythm SR was reviewed with the receiving nurse. Opportunity for questions and clarification was provided. Assessment completed upon patients arrival to unit and care assumed.

## 2020-01-20 NOTE — PROGRESS NOTES
0630 PTT  Result   99.6,  Stopped Heparin  Drip  And resume  In 1 hour as per protocol ,  MD notified too.

## 2020-01-20 NOTE — PROGRESS NOTES
0200 blood  Sample  for PTT drawn and sent to lab     0300  Called  Lab  To  follow up on result , they said  They  Only  Did PT ,  Placed and  New  Order  Of  PTT and called  Lab to notify them that I  Have  Done\" add on \" order . 5403 called  Lab again to follow up  On  Result ,  They  [facility]  They are  Working on  It.

## 2020-01-20 NOTE — PROGRESS NOTES
General Daily Progress Note    Plan:   Rehab screen [facility]  Plan eliquis following thrombectomy by   Galindo Landry      Assessment:     Principal Problem:    Pulmonary embolism ([facility] (1/15/2020)      Overview: High probability VQ lung scan - bilateral    Active Problems:    DVT (deep venous thrombosis) ([facility] (1/15/2020)      Hypoxia (1/15/2020)      Weakness of left lower extremity (1/15/2020)      Overview: Probably embolic      Stage 3 chronic kidney disease ([facility] (1/18/2020)          1/20/2020    Admit Date: 1/14/2020    Subjective:         Objective:     Patient Vitals for the past 8 hrs:   BP Temp Pulse Resp Weight   01/20/20 0646     176 lb 12.9 oz (80.2 kg)   01/20/20 0342 98/70 98.2 °F (36.8 °C) 87 18      No intake/output data recorded. 01/18 1901 - 01/20 0700  In: 478.2 [P.O.:240; I.V.:238.2]  Out: -     Physical Exam:  Lungs -    Heart -  Abdomen-  Extremities-    Data Review   Recent Results (from the past 24 hour(s))   PTT    Collection Time: 01/19/20  1:07 PM   Result Value Ref Range    aPTT 58.6 (H) 22.1 - 32.0 sec    aPTT, therapeutic range     58.0 - 77.0 SECS   CBC WITH AUTOMATED DIFF    Collection Time: 01/19/20  3:04 PM   Result Value Ref Range    WBC 5.9 3.6 - 11.0 K/uL    RBC 3.11 (L) 3.80 - 5.20 M/uL    HGB 10.5 (L) 11.5 - 16.0 g/dL    HCT 33.4 (L) 35.0 - 47.0 %    .4 (H) 80.0 - 99.0 FL    MCH 33.8 26.0 - 34.0 PG    MCHC 31.4 30.0 - 36.5 g/dL    RDW 13.8 11.5 - 14.5 %    PLATELET 833 (L) 187 - 400 K/uL    MPV 11.9 8.9 - 12.9 FL    NRBC 0.0 0  WBC    ABSOLUTE NRBC 0.00 0.00 - 0.01 K/uL    NEUTROPHILS 65 32 - 75 %    LYMPHOCYTES 22 12 - 49 %    MONOCYTES 9 5 - 13 %    EOSINOPHILS 3 0 - 7 %    BASOPHILS 0 0 - 1 %    IMMATURE GRANULOCYTES 1 (H) 0.0 - 0.5 %    ABS. NEUTROPHILS 3.9 1.8 - 8.0 K/UL    ABS. LYMPHOCYTES 1.3 0.8 - 3.5 K/UL    ABS. MONOCYTES 0.5 0.0 - 1.0 K/UL    ABS. EOSINOPHILS 0.2 0.0 - 0.4 K/UL    ABS. BASOPHILS 0.0 0.0 - 0.1 K/UL    ABS. IMM.  GRANS. 0.0 0.00 - 0.04 K/UL DF AUTOMATED     PTT    Collection Time: 01/19/20  8:15 PM   Result Value Ref Range    aPTT 49.8 (H) 22.1 - 32.0 sec    aPTT, therapeutic range     58.0 - 77.0 SECS   PROTHROMBIN TIME + INR    Collection Time: 01/20/20  2:12 AM   Result Value Ref Range    INR 1.1 0.9 - 1.1      Prothrombin time 10.8 9.0 - 11.1 sec   PTT    Collection Time: 01/20/20  2:12 AM   Result Value Ref Range    aPTT 99.6 (HH) 22.1 - 32.0 sec    aPTT, therapeutic range     58.0 - 77.0 SECS   CBC W/O DIFF    Collection Time: 01/20/20  2:22 AM   Result Value Ref Range    WBC 6.6 3.6 - 11.0 K/uL    RBC 3.20 (L) 3.80 - 5.20 M/uL    HGB 10.7 (L) 11.5 - 16.0 g/dL    HCT 33.8 (L) 35.0 - 47.0 %    .6 (H) 80.0 - 99.0 FL    MCH 33.4 26.0 - 34.0 PG    MCHC 31.7 30.0 - 36.5 g/dL    RDW 13.4 11.5 - 14.5 %    PLATELET 204 (L) 244 - 400 K/uL    MPV 12.0 8.9 - 12.9 FL    NRBC 0.0 0  WBC    ABSOLUTE NRBC 0.00 0.00 - 0.01 K/uL       CULTURES:    Lab Results   Component Value Date/Time    Specimen Description: MISC. WOUND PUS FROM BACK WOUND 03/19/2014 08:19 PM    Specimen Description: MISC. WOUND PUS FROM BACK WOUND 03/19/2014 08:19 PM    Lab Results   Component Value Date/Time    Culture result: MRSA NOT PRESENT 12/14/2019 08:35 PM    Culture result:  12/14/2019 08:35 PM         Screening of patient nares for MRSA is for surveillance purposes and, if positive, to facilitate isolation considerations in high risk settings. It is not intended for automatic decolonization interventions per se as regimens are not sufficiently effective to warrant routine use.                                                   Jana Sanchez

## 2020-01-20 NOTE — ANESTHESIA PREPROCEDURE EVALUATION
Relevant Problems   No relevant active problems       Anesthetic History   No history of anesthetic complications            Review of Systems / Medical History  Patient summary reviewed, nursing notes reviewed and pertinent labs reviewed    Pulmonary  Within defined limits              Comments: Hypoxia early in adm.    Neuro/Psych   Within defined limits           Cardiovascular    Hypertension: well controlled        Dysrhythmias : PVC      Exercise tolerance: >4 METS  Comments: B PE sec to fem thrombus   GI/Hepatic/Renal         Renal disease: CRI       Endo/Other        Arthritis     Other Findings   Comments: [place]         Physical Exam    Airway  Mallampati: II  TM Distance: > 6 cm  Neck ROM: normal range of motion   Mouth opening: Normal     Cardiovascular  Regular rate and rhythm,  S1 and S2 normal,  no murmur, click, rub, or gallop             Dental  No notable dental hx       Pulmonary  Breath sounds clear to auscultation               Abdominal  GI exam deferred       Other Findings            Anesthetic Plan    ASA: 3  Anesthesia type: general          Induction: Intravenous  Anesthetic plan and risks discussed with: Patient

## 2020-01-20 NOTE — ANESTHESIA POSTPROCEDURE EVALUATION
Post-Anesthesia Evaluation and Assessment    Patient: Rashard Chang MRN: [id number]  SSN: [id number]    YOB: 1940  Age: 78 y.o. Sex: female      I have evaluated the patient and they are stable and ready for discharge from the PACU. Cardiovascular Function/Vital Signs  Visit Vitals  /62   Pulse 87   Temp 36.7 °C (98.1 °F)   Resp 15   Ht 5' 7\" (1.702 m)   Wt 80.2 kg (176 lb 12.9 oz)   SpO2 96%   BMI 27.69 kg/m²       Patient is status post General anesthesia for Procedure(s):  LEFT POPLITEAL THROMBECTOMY, LEFT ANTERIOR TIBIAL BALLOON ANGIOPLASTY. Nausea/Vomiting: None    Postoperative hydration reviewed and adequate. Pain:  Pain Scale 1: Numeric (0 - 10) (01/20/20 0858)  Pain Intensity 1: 0 (01/20/20 0858)   Managed    Neurological Status:   Neuro  Neurologic State: Alert (01/19/20 2011)  Orientation Level: Oriented X4 (01/19/20 2011)  Cognition: Follows commands (01/19/20 2011)  Speech: Clear (01/19/20 2011)  Assessment L Pupil: Brisk (01/19/20 0806)  Size L Pupil (mm): 3 (01/19/20 0806)  Assessment R Pupil: Brisk (01/19/20 0806)  Size R Pupil (mm): 3 (01/19/20 0806)  LUE Motor Response: Purposeful (01/19/20 2011)  LLE Motor Response: Purposeful (01/19/20 2011)  RUE Motor Response: Purposeful (01/19/20 2011)  RLE Motor Response: Purposeful (01/19/20 2011)   At baseline    Mental Status, Level of Consciousness: Alert and  oriented to person, place, and time    Pulmonary Status:   O2 Device: Nasal cannula (01/20/20 1120)   Adequate oxygenation and airway patent    Complications related to anesthesia: None    Post-anesthesia assessment completed. No concerns    Signed By: Ondina Salmeron MD     January 20, 2020              Procedure(s):  LEFT POPLITEAL THROMBECTOMY, LEFT ANTERIOR TIBIAL BALLOON ANGIOPLASTY.     general    <BSHSIANPOST>    Vitals Value Taken Time   /100 1/20/2020 11:45 AM   Temp 36.7 °C (98.1 °F) 1/20/2020 11:20 AM   Pulse 85 1/20/2020 11:52 AM   Resp 15 1/20/2020 11:52 AM   SpO2 98 % 1/20/2020 11:52 AM   Vitals shown include unvalidated device data.

## 2020-01-21 LAB
APTT PPP: 39.2 SEC (ref 22.1–32)
THERAPEUTIC RANGE,PTTT: ABNORMAL SECS (ref 58–77)

## 2020-01-21 PROCEDURE — 97110 THERAPEUTIC EXERCISES: CPT

## 2020-01-21 PROCEDURE — 74011250637 HC RX REV CODE- 250/637

## 2020-01-21 PROCEDURE — 85730 THROMBOPLASTIN TIME PARTIAL: CPT

## 2020-01-21 PROCEDURE — 65660000000 HC RM CCU STEPDOWN

## 2020-01-21 PROCEDURE — 36415 COLL VENOUS BLD VENIPUNCTURE: CPT

## 2020-01-21 PROCEDURE — 97164 PT RE-EVAL EST PLAN CARE: CPT

## 2020-01-21 PROCEDURE — 77030027138 HC INCENT SPIROMETER -A

## 2020-01-21 PROCEDURE — 97116 GAIT TRAINING THERAPY: CPT

## 2020-01-21 PROCEDURE — 74011250636 HC RX REV CODE- 250/636

## 2020-01-21 PROCEDURE — 74011250637 HC RX REV CODE- 250/637: Performed by: INTERNAL MEDICINE

## 2020-01-21 RX ORDER — SODIUM CHLORIDE 9 MG/ML
100 INJECTION, SOLUTION INTRAVENOUS CONTINUOUS
Status: DISCONTINUED | OUTPATIENT
Start: 2020-01-21 | End: 2020-01-22

## 2020-01-21 RX ADMIN — HYDROCODONE BITARTRATE AND ACETAMINOPHEN 1 TABLET: 7.5; 325 TABLET ORAL at 18:17

## 2020-01-21 RX ADMIN — SODIUM CHLORIDE 500 ML: 900 INJECTION, SOLUTION INTRAVENOUS at 10:18

## 2020-01-21 RX ADMIN — SODIUM CHLORIDE 100 ML/HR: 900 INJECTION, SOLUTION INTRAVENOUS at 10:17

## 2020-01-21 RX ADMIN — LOVASTATIN 20 MG: 20 TABLET ORAL at 22:24

## 2020-01-21 RX ADMIN — DILTIAZEM HYDROCHLORIDE 240 MG: 240 CAPSULE, EXTENDED RELEASE ORAL at 08:55

## 2020-01-21 RX ADMIN — APIXABAN 10 MG: 5 TABLET, FILM COATED ORAL at 22:25

## 2020-01-21 RX ADMIN — APIXABAN 10 MG: 5 TABLET, FILM COATED ORAL at 08:55

## 2020-01-21 RX ADMIN — HYDROCODONE BITARTRATE AND ACETAMINOPHEN 1 TABLET: 7.5; 325 TABLET ORAL at 22:25

## 2020-01-21 RX ADMIN — ALLOPURINOL 100 MG: 100 TABLET ORAL at 08:55

## 2020-01-21 NOTE — PROGRESS NOTES
Bedside shift change report given to Efren Corrigan RN (oncoming nurse) by Moriah Gonzalez RN (offgoing nurse). Report included the following information SBAR, Kardex, MAR and Cardiac Rhythm NSR.

## 2020-01-21 NOTE — PROGRESS NOTES
NUTRITION  Reason for Rescreen: LOS        RECOMMENDATIONS:   No changes    Interventions   - none at this time  - continue current diet       Information obtained from:   patient and RN      Past Medical History:   Diagnosis Date    Abscess 3/22/2014    Lumbar area - MRSA    Arthritis     Common bile duct calculus 12/14/2019    Gout     Hypercholesterolemia     Hypertension     LBP (low back pain)     Other ill-defined conditions(799.89)     LEFT SHOULDER PAINFUL, NEEDS REPLACEMENT    Paroxysmal SVT (supraventricular tachycardia) ([facility] 5/2/2017    Thromboembolus ([facility] 12/26/13    left leg     Pt admitted for pulmonary embolism, DVT & hypoxia. PMHx as above. Pt is eating well. Currently having low BP and POD #1 after left popliteal thrombectomy. No nutrition needs identified at this time. Wt has been stable during admission. Visited with patient & DW RN. Will follow as needs arise. Diet:  cardiac  Supplements: None  Intake: Excellent  Patient Vitals for the past 100 hrs:   % Diet Eaten   01/21/20 0925 95 %   01/19/20 0806 100 %   01/18/20 1029 100 %     Weight Changes:   Stable   Last 3 Recorded Weights in this Encounter    01/20/20 0646 01/21/20 0557 01/21/20 0845   Weight: 80.2 kg (176 lb 12.9 oz) 80.3 kg (177 lb 0.5 oz) 80.3 kg (177 lb 0.5 oz)     Wt Readings from Last 10 Encounters:   01/21/20 80.3 kg (177 lb 0.5 oz)   01/15/20 83.9 kg (185 lb)   12/18/19 81.6 kg (179 lb 14.3 oz)   12/14/19 81.6 kg (180 lb)   12/13/19 81.6 kg (180 lb)   01/25/18 81.3 kg (179 lb 3.7 oz)   01/22/18 81.6 kg (180 lb)   05/02/17 78.5 kg (173 lb)   04/21/17 77.1 kg (170 lb)   12/27/16 78.9 kg (174 lb)     Nutrition-Related Concerns Identified:  None    Estimated Nutrition Needs:   Kcals/day: 1561 Kcals/day  Protein: 81 g( - 96g (1-1.2 g/kg of current wt)  Fluid: 1606 ml(20 mL/kg)     Based On:  Tanner Sellers(AF 1.2)  Weight Used: Actual wt(80.3 kg)    PLAN:   - Continue current diet and - Rescreen per protocol    Rescreen:  []            At Nutrition Risk - will follow          [x]    Rescreen per screening protocol    Es Abraham RD, MS, CDE

## 2020-01-21 NOTE — PROGRESS NOTES
General Daily Progress Note    Plan:   Change to eliquis 10mg bid today then 5mg bid Wednesday  ?disposition      Assessment:     Principal Problem:    Pulmonary embolism ([facility] (1/15/2020)      Overview: High probability VQ lung scan - bilateral    Active Problems:    DVT (deep venous thrombosis) ([facility] (1/15/2020)      Hypoxia (1/15/2020)      Weakness of left lower extremity (1/15/2020)      Overview: Probably embolic      Stage 3 chronic kidney disease ([facility] (1/18/2020)          1/21/2020    Admit Date: 1/14/2020    Subjective:         Objective:     Patient Vitals for the past 8 hrs:   BP Temp Pulse Resp SpO2 Weight   01/21/20 0557      177 lb 0.5 oz (80.3 kg)   01/21/20 0412 98/60 98.6 °F (37 °C) 96 16 98 %    01/21/20 0157   75 16     01/21/20 0028 105/65 98.5 °F (36.9 °C) 86 16 95 %      No intake/output data recorded. 01/19 1901 - 01/21 0700  In: 961.3 [I.V.:961.3]  Out: 1075 [Urine:1000]    Physical Exam:  Lungs -    Heart -  Abdomen-  Extremities-left forefoot reperfusing    Data Review   Recent Results (from the past 24 hour(s))   PTT    Collection Time: 01/21/20  4:03 AM   Result Value Ref Range    aPTT 39.2 (H) 22.1 - 32.0 sec    aPTT, therapeutic range     58.0 - 77.0 SECS       CULTURES:    Lab Results   Component Value Date/Time    Specimen Description: MISC. WOUND PUS FROM BACK WOUND 03/19/2014 08:19 PM    Specimen Description: MISC. WOUND PUS FROM BACK WOUND 03/19/2014 08:19 PM    Lab Results   Component Value Date/Time    Culture result: MRSA NOT PRESENT 12/14/2019 08:35 PM    Culture result:  12/14/2019 08:35 PM         Screening of patient nares for MRSA is for surveillance purposes and, if positive, to facilitate isolation considerations in high risk settings. It is not intended for automatic decolonization interventions per se as regimens are not sufficiently effective to warrant routine use.                                                   Manisha Martinez

## 2020-01-21 NOTE — PROGRESS NOTES
Physical Therapy  1/21/2020    Order received, chart reviewed. Patient POD 1 from L popliteal thrombectomy. Received supine in bed, pain in L LE 8/10 with movement. Vitals taken and BP 77/41, repeat 80/41 at rest. Notified RN and session aborted. F/u later today as able/medically appropriate. Thank you.     Tere Pereira, PT, DPT

## 2020-01-21 NOTE — PROGRESS NOTES
Problem: Mobility Impaired (Adult and Pediatric)  Goal: *Acute Goals and Plan of Care (Insert Text)  Description  FUNCTIONAL STATUS PRIOR TO ADMISSION: Patient was modified independent using a rollator and single point cane for functional mobility. She was recently relying more on the rollator due to weakness following surgery around Christmas of 2018, when she had her gallbladder removed (had 10 day hospital stay). States she has had one fall in last month and admits to balance difficulty. Has only been able to leave the house once since surgery due to fatigue and weakness and was tired for the whole next day following her one trip out of the home. Prior to surgery in December she was actively going out in the community with her . HOME SUPPORT PRIOR TO ADMISSION: The patient lived with her  who has been assisting more with ADLs due to fatigue and weakness. Has 2 children in the [place] area as well. Physical Therapy Goals  Initiated 1/21/2020- Goals addressed after L thrombectomy   1. Patient will move from supine to sit and sit to supine , scoot up and down and roll side to side in bed with contact guard assist within 7 day(s). 2.  Patient will transfer from bed to chair and chair to bed with contact guard assist using the least restrictive device within 7 day(s). 3.  Patient will perform sit to stand with contact guard assist within 7 day(s). 4.  Patient will ambulate with minimal assistance for 50 feet with the least restrictive device within 7 day(s). 5.  Patient will ascend/descend 2 stairs with right handrail(s) with moderate assistance within 7 day(s). 6.  Patient will improve Tinetti score by 4-5 points within 7 days. Initiated 1/16/2020  1. Patient will move from supine to sit and sit to supine , scoot up and down and roll side to side in bed with supervision/set-up within 7 day(s).     2.  Patient will transfer from bed to chair and chair to bed with supervision/set-up using the least restrictive device within 7 day(s). 3.  Patient will perform sit to stand with supervision/set-up within 7 day(s). 4.  Patient will ambulate with minimal assistance/contact guard assist for 150 feet with the least restrictive device within 7 day(s). 5.  Patient will ascend/descend 2 stairs with right handrail(s) with minimal assistance/contact guard assist within 7 day(s). 6.  Patient will improve Tinetti score by 4-5 points within 7 days. Outcome: Progressing Towards Goal   PHYSICAL THERAPY REEVALUATION  Patient: Lilly Arnold (80 y.o. female)  Date: 1/21/2020  Primary Diagnosis: Weakness of left lower extremity [R29.898]  Hypoxia [R09.02]  Procedure(s) (LRB):  LEFT POPLITEAL THROMBECTOMY, LEFT ANTERIOR TIBIAL BALLOON ANGIOPLASTY (Left) 1 Day Post-Op   Precautions:   Fall, WBAT(no restrictions L LE s/p thrombectomy)      ASSESSMENT  Based on the objective data described below, the patient presents with L LE pain s/p thrombectomy POD 1, and asymptomatic hypotension at rest and with position changes. Overall min A with additional time for bed mobility and transfer to chair. O2 sats 87-90% throughout on 2L. BP as below and patient left with BP at 85/51, RN aware, and patient in chair with LEs elevated and slightly reclined. Provided patient with water and encouraged intake for improving BP, as well as incentive spirometer which she demonstrated appropriately. Will need rehab at d/c. Resting at 95/57 prior to mobility.    Vitals:    01/21/20 1419 01/21/20 1422 01/21/20 1430 01/21/20 1520   BP: (!) 78/48 (!) 84/48 (!) 85/51 (!) 85/48   BP 1 Location: Right arm Right arm Right arm    BP Patient Position: Sitting;Post activity Sitting;Post activity Sitting;Post activity  Comment: recliner chair    Pulse:  88  84   Resp:    18   Temp:    98.5 °F (36.9 °C)   SpO2:       Weight:       Height:             Current Level of Function Impacting Discharge (mobility/balance): min A with additional time, limited in gait due to hypotension and pain    Functional Outcome Measure: The patient scored 4/28 on the Tinetti outcome measure which is indicative of high fall risk. Other factors to consider for discharge: now requiring O2     Patient will benefit from skilled therapy intervention to address the above noted impairments. PLAN :  Recommendations and Planned Interventions: bed mobility training, transfer training, gait training, therapeutic exercises, neuromuscular re-education, edema management/control, patient and family training/education, and therapeutic activities      Frequency/Duration: Patient will be followed by physical therapy:  5 times a week to address goals. Recommendation for discharge: (in order for the patient to meet his/her long term goals)  Therapy 3 hours per day 5-7 days per week    This discharge recommendation:  Has been made in collaboration with the attending provider and/or case management    Equipment recommendations for successful discharge (if) home: TBD         SUBJECTIVE:   Patient stated I feel okay.     OBJECTIVE DATA SUMMARY:   HISTORY:    Past Medical History:   Diagnosis Date    Abscess 3/22/2014    Lumbar area - MRSA    Arthritis     Common bile duct calculus 12/14/2019    Gout     Hypercholesterolemia     Hypertension     LBP (low back pain)     Other ill-defined conditions(799.89)     LEFT SHOULDER PAINFUL, NEEDS REPLACEMENT    Paroxysmal SVT (supraventricular tachycardia) ([address] 5/2/2017    Thromboembolus ([address] 12/26/13    left leg     Past Surgical History:   Procedure Laterality Date    ABDOMEN SURGERY PROC UNLISTED      hernia repair    BREAST SURGERY PROCEDURE UNLISTED      EXCISION OF BREAST CYST    ENDOSCOPY, COLON, DIAGNOSTIC      ostomy reversal    HX BREAST BIOPSY Right 1970    Benign    HX COLOSTOMY      HX GI      COLOSTOMY REVERSAL    HX HEMORRHOIDECTOMY      HX ORTHOPAEDIC      right knee  bilateral knee replacement    HX ORTHOPAEDIC ORIF RIGHT ANKLE    [address] course since last seen and reason for reevaluation: POD 1 thrombectomy L LE popliteal    Personal factors and/or comorbidities impacting plan of care: PMH    Home Situation  Home Environment: Private residence  # Steps to Enter: 2  Rails to Enter: Yes  Hand Rails : Right  One/Two Story Residence: One story  Living Alone: No  Support Systems: Spouse/Significant Other/Partner  Patient Expects to be Discharged to[de-identified] Rehabilitation facility  Current DME Used/Available at Home: [facility] [employer] or Shower Type: Shower    EXAMINATION/PRESENTATION/DECISION MAKING:   Critical Behavior:  Neurologic State: Alert  Orientation Level: Oriented X4  Cognition: Appropriate decision making, Appropriate for age attention/concentration, Appropriate safety awareness  Safety/Judgement: Awareness of environment  Hearing: Auditory  Auditory Impairment: Hard of hearing, bilateral  Hearing Aids/Status: Bilateral, With patient  Range Of Motion:  AROM: Generally decreased, functional  Strength:    Strength: Generally decreased, functional  Tone & Sensation:   Tone: Normal  Sensation: Intact  Coordination:  Coordination: Within functional limits  Functional Mobility:  Bed Mobility:  Supine to Sit: Minimum assistance(L LE)  Scooting: Contact guard assistance  Transfers:  Sit to Stand: Minimum assistance  Stand to Sit: Minimum assistance  Stand Pivot Transfers: Minimum assistance     Balance:   Sitting: Intact; Without support  Standing: Impaired; With support  Standing - Static: Fair  Standing - Dynamic : Fair  Therapeutic Exercises:    Ankle pumps for BP recovery    Functional Measure:  Tinetti test:    Sitting Balance: 1  Arises: 0  Attempts to Rise: 0  Immediate Standing Balance: 1  Standing Balance: 1  Nudged: 0  Eyes Closed: 0  Turn 360 Degrees - Continuous/Discontinuous: 0  Turn 360 Degrees - Steady/Unsteady: 0  Sitting Down: 1  Balance Score: 4 Balance total score  Indication of Gait: 0  R Step Length/Height: 0  L Step Length/Height: 0  R Foot Clearance: 0  L Foot Clearance: 0  Step Symmetry: 0  Step Continuity: 0  Path: 0  Trunk: 0  Walking Time: 0  Gait Score: 0 Gait total score  Total Score: 4/28 Overall total score         Tinetti Tool Score Risk of Falls  <19 = High Fall Risk  19-24 = Moderate Fall Risk  25-28 = Low Fall Risk  Tinetti ME. Performance-Oriented Assessment of Mobility Problems in Elderly Patients. Garcia 64; [id number]. (Scoring Description: PT Bulletin Feb. 10, 1993)    Older adults: Annahaily Downs et al, 2009; n = 1000 [facility] elderly evaluated with ABC, DAPHNE, ADL, and IADL)  · Mean DAPHNE score for males aged 69-68 years = 26.21(3.40)  · Mean DAPHNE score for females age 69-68 years = 25.16(4.30)  · Mean DAPHNE score for males over 80 years = 23.29(6.02)  · Mean DAPHNE score for females over 80 years = 17.20(8.32)            Pain Ratin/10 L LE    Activity Tolerance:   Fair, requires rest breaks, and BP low throughout   Please refer to the flowsheet for vital signs taken during this treatment. After treatment patient left in no apparent distress:   Sitting in chair, Heels elevated for pressure relief, and Call bell within reach    COMMUNICATION/EDUCATION:   The patients plan of care was discussed with: Registered Nurse. Fall prevention education was provided and the patient/caregiver indicated understanding., Patient/family have participated as able in goal setting and plan of care. , and Patient/family agree to work toward stated goals and plan of care.     Thank you for this referral.  Anay Saxena, PT, DPT   Time Calculation: 48 mins

## 2020-01-21 NOTE — PROGRESS NOTES
Problem: Falls - Risk of  Goal: *Absence of Falls  Description  Document Karl Chi Fall Risk and appropriate interventions in the flowsheet.   Outcome: Progressing Towards Goal  Note: Fall Risk Interventions:  Mobility Interventions: Patient to call before getting OOB         Medication Interventions: Patient to call before getting OOB, Teach patient to arise slowly    Elimination Interventions: Patient to call for help with toileting needs    History of Falls Interventions: Door open when patient unattended         Problem: LE Bypass: Day of Surgery/Post-Op (Initiate SCIP Measures for Post-Op Care)  Goal: Off Pathway (Use only if patient is Off Pathway)  Outcome: Progressing Towards Goal

## 2020-01-21 NOTE — PROGRESS NOTES
Occupational Therapy  Noted OT order discontinued by MD. Please re-consult OT if medically indicated as pt was receiving acute OT prior to her L LE thrombectomy and may require rehab upon discharge. Thank you.  Rickey Eden OT

## 2020-01-21 NOTE — PROGRESS NOTES
Vascular:    Doing well POD #1 after left popliteal thrombectomy. Left foot warm with palpable DP pulse. Dressing intact    Back on heparin infusion. OK with me to restart PO anticoagulant. Will DC [place] and increase activity as tolerated.

## 2020-01-21 NOTE — PROGRESS NOTES
Bedside shift change report given to LISSETTE Carmen (oncoming nurse) by Catherine Ha RN (offgoing nurse).  Report included the following information SBAR, Intake/Output, MAR, Recent Results and Cardiac Rhythm SR.

## 2020-01-22 LAB
ANION GAP SERPL CALC-SCNC: 6 MMOL/L (ref 5–15)
APTT PPP: 29.4 SEC (ref 22.1–32)
BASOPHILS # BLD: 0 K/UL (ref 0–0.1)
BASOPHILS NFR BLD: 0 % (ref 0–1)
BUN SERPL-MCNC: 35 MG/DL (ref 6–20)
BUN/CREAT SERPL: 19 (ref 12–20)
CALCIUM SERPL-MCNC: 8.9 MG/DL (ref 8.5–10.1)
CHLORIDE SERPL-SCNC: 111 MMOL/L (ref 97–108)
CO2 SERPL-SCNC: 24 MMOL/L (ref 21–32)
CREAT SERPL-MCNC: 1.85 MG/DL (ref 0.55–1.02)
DIFFERENTIAL METHOD BLD: ABNORMAL
EOSINOPHIL # BLD: 0.1 K/UL (ref 0–0.4)
EOSINOPHIL NFR BLD: 1 % (ref 0–7)
ERYTHROCYTE [DISTWIDTH] IN BLOOD BY AUTOMATED COUNT: 13.7 % (ref 11.5–14.5)
GLUCOSE SERPL-MCNC: 106 MG/DL (ref 65–100)
HCT VFR BLD AUTO: 27.8 % (ref 35–47)
HGB BLD-MCNC: 8.3 G/DL (ref 11.5–16)
IMM GRANULOCYTES # BLD AUTO: 0.1 K/UL (ref 0–0.04)
IMM GRANULOCYTES NFR BLD AUTO: 1 % (ref 0–0.5)
LYMPHOCYTES # BLD: 1.3 K/UL (ref 0.8–3.5)
LYMPHOCYTES NFR BLD: 19 % (ref 12–49)
MCH RBC QN AUTO: 33.2 PG (ref 26–34)
MCHC RBC AUTO-ENTMCNC: 29.9 G/DL (ref 30–36.5)
MCV RBC AUTO: 111.2 FL (ref 80–99)
MONOCYTES # BLD: 0.6 K/UL (ref 0–1)
MONOCYTES NFR BLD: 9 % (ref 5–13)
NEUTS SEG # BLD: 4.9 K/UL (ref 1.8–8)
NEUTS SEG NFR BLD: 70 % (ref 32–75)
NRBC # BLD: 0 K/UL (ref 0–0.01)
NRBC BLD-RTO: 0 PER 100 WBC
PLATELET # BLD AUTO: 112 K/UL (ref 150–400)
PMV BLD AUTO: 12.1 FL (ref 8.9–12.9)
POTASSIUM SERPL-SCNC: 4.8 MMOL/L (ref 3.5–5.1)
RBC # BLD AUTO: 2.5 M/UL (ref 3.8–5.2)
RBC MORPH BLD: ABNORMAL
SODIUM SERPL-SCNC: 141 MMOL/L (ref 136–145)
THERAPEUTIC RANGE,PTTT: NORMAL SECS (ref 58–77)
WBC # BLD AUTO: 7 K/UL (ref 3.6–11)

## 2020-01-22 PROCEDURE — 97530 THERAPEUTIC ACTIVITIES: CPT

## 2020-01-22 PROCEDURE — 97535 SELF CARE MNGMENT TRAINING: CPT

## 2020-01-22 PROCEDURE — 36415 COLL VENOUS BLD VENIPUNCTURE: CPT

## 2020-01-22 PROCEDURE — 65660000000 HC RM CCU STEPDOWN

## 2020-01-22 PROCEDURE — 85730 THROMBOPLASTIN TIME PARTIAL: CPT

## 2020-01-22 PROCEDURE — 74011250637 HC RX REV CODE- 250/637

## 2020-01-22 PROCEDURE — 74011250637 HC RX REV CODE- 250/637: Performed by: INTERNAL MEDICINE

## 2020-01-22 PROCEDURE — 74011250636 HC RX REV CODE- 250/636: Performed by: INTERNAL MEDICINE

## 2020-01-22 PROCEDURE — 85025 COMPLETE CBC W/AUTO DIFF WBC: CPT

## 2020-01-22 PROCEDURE — 80048 BASIC METABOLIC PNL TOTAL CA: CPT

## 2020-01-22 RX ORDER — MORPHINE SULFATE 2 MG/ML
2 INJECTION, SOLUTION INTRAMUSCULAR; INTRAVENOUS ONCE
Status: COMPLETED | OUTPATIENT
Start: 2020-01-22 | End: 2020-01-22

## 2020-01-22 RX ADMIN — HYDROCODONE BITARTRATE AND ACETAMINOPHEN 1 TABLET: 7.5; 325 TABLET ORAL at 09:24

## 2020-01-22 RX ADMIN — LOVASTATIN 20 MG: 20 TABLET ORAL at 21:27

## 2020-01-22 RX ADMIN — APIXABAN 5 MG: 5 TABLET, FILM COATED ORAL at 21:27

## 2020-01-22 RX ADMIN — MORPHINE SULFATE 2 MG: 2 INJECTION, SOLUTION INTRAMUSCULAR; INTRAVENOUS at 17:09

## 2020-01-22 RX ADMIN — HYDROCODONE BITARTRATE AND ACETAMINOPHEN 1 TABLET: 7.5; 325 TABLET ORAL at 16:00

## 2020-01-22 RX ADMIN — ALLOPURINOL 100 MG: 100 TABLET ORAL at 09:24

## 2020-01-22 RX ADMIN — APIXABAN 5 MG: 5 TABLET, FILM COATED ORAL at 09:24

## 2020-01-22 RX ADMIN — HYDROCODONE BITARTRATE AND ACETAMINOPHEN 1 TABLET: 7.5; 325 TABLET ORAL at 18:41

## 2020-01-22 NOTE — PROGRESS NOTES
Problem: Self Care Deficits Care Plan (Adult)  Goal: *Acute Goals and Plan of Care (Insert Text)  Description    FUNCTIONAL STATUS PRIOR TO ADMISSION: Patient was modified independent using a rollator and SPC for functional mobility. Pt stated she was requiring more assistance from rollator. Recent decline in functional status after gall bladder sx 12/17/19 where pt reported she was in hospital for 10 days (with no OT/PT ordered) and discharged home. Pt requiring most assist from  at that time for her ADL. she has also been limited with her community outings since this sx, reporting increased fatigue day after. One fall within last month. HOME SUPPORT: The patient lived with  and has two children in live locally. Occupational Therapy Goals  Initiated 1/16/2020  1. Patient will perform standing ADLs at sink x 5 minutes with modified independence within 7 day(s). 2.  Patient will perform upper body dressing and lower body dressing with supervision/set-up within 7 day(s). 3.  Patient will perform bathing with supervision/set-up within 7 day(s). 4.  Patient will perform toilet transfers with supervision/set-up within 7 day(s). 5.  Patient will perform all aspects of toileting with supervision/set-up within 7 day(s). 6.  Patient will perform basic ADL while maintaining O2 >90% within 7 days. 7.  Patient will utilize energy conservation techniques during functional activities with verbal cues within 7 day(s).            Outcome: Progressing Towards Goal   OCCUPATIONAL THERAPY TREATMENT  Patient: Vijay Vera (67 y.o. female)  Date: 1/22/2020  Diagnosis: Weakness of left lower extremity [R29.898]  Hypoxia [R09.02]   Pulmonary embolism ([address]  Procedure(s) (LRB):  LEFT POPLITEAL THROMBECTOMY, LEFT ANTERIOR TIBIAL BALLOON ANGIOPLASTY (Left) 2 Days Post-Op  Precautions: Fall, WBAT(no restrictions L LE s/p thrombectomy)  Chart, occupational therapy assessment, plan of care, and goals were reviewed. ASSESSMENT  Patient continues with skilled OT services and is progressing towards goals. Patient continues to be limited by hypotension (MAPS 65-60 this session), generalized weakness, pain and decreased ROM in LLE, impaired balance, and patient hard of hearing. Noted patient with L popliteal thrombectomy 1/20 with residual LLE edema (RLE edema also, however L > R). Patient completed functional bed > chair transfer however patient with orthostatic hypotension when standing and completing grooming tasks seated. Recommend rehab at discharge to maximize patient safety and independence with ADL transfers and tasks. Current Level of Function Impacting Discharge (ADLs): MAX A LB ADLs; MOD A ADL transfers    Other factors to consider for discharge: patient with hypotension; DUVAL with activity          PLAN :  Patient continues to benefit from skilled intervention to address the above impairments. Continue treatment per established plan of care. to address goals. Recommend with staff: OOB x 3 to chair    Recommend next OT session: standing ADL    Recommendation for discharge: (in order for the patient to meet his/her long term goals)  Therapy up to 3 hours/day, 7 days/week    This discharge recommendation:  Has not yet been discussed the attending provider and/or case management    IF patient discharges home will need the following DME: TBD       SUBJECTIVE:   Patient stated I can't really hear you.     OBJECTIVE DATA SUMMARY:   Cognitive/Behavioral Status:  Neurologic State: Alert  Orientation Level: Oriented X4  Cognition: Appropriate for age attention/concentration  Perception: Appears intact  Perseveration: No perseveration noted  Safety/Judgement: Awareness of environment    Functional Mobility and Transfers for ADLs:  Bed Mobility:  Supine to Sit: Moderate assistance;Assist x1;Additional time(A for LLE and trunk; R lower back spasms per report)  Scooting: Minimum assistance;Assist x1;Additional time    Transfers:  Sit to Stand: Moderate assistance;Assist x1;Additional time; Adaptive equipment(RW)          Balance:  Sitting: Intact  Standing: Impaired; Without support  Standing - Static: Fair;Constant support  Standing - Dynamic : Fair;Constant support    ADL Intervention:       Grooming  Brushing Teeth: Stand-by assistance(seated in recliner)                   Lower Body Dressing Assistance  Socks: Total assistance (dependent)         Cognitive Retraining  Safety/Judgement: Awareness of environment      Activity Tolerance:   Fair, requires rest breaks, observed SOB with activity, and signs and symptoms of orthostatic hypotension  Please refer to the flowsheet for vital signs taken during this treatment.     After treatment patient left in no apparent distress:   Sitting in chair, Call bell within reach, and Caregiver / family present    COMMUNICATION/COLLABORATION:   The patients plan of care was discussed with: Physical Therapist and Registered Nurse    Silvia Pearce  Time Calculation: 32 mins

## 2020-01-22 NOTE — PROGRESS NOTES
Problem: Mobility Impaired (Adult and Pediatric)  Goal: *Acute Goals and Plan of Care (Insert Text)  Description  FUNCTIONAL STATUS PRIOR TO ADMISSION: Patient was modified independent using a rollator and single point cane for functional mobility. She was recently relying more on the rollator due to weakness following surgery around Christmas of 2018, when she had her gallbladder removed (had 10 day hospital stay). States she has had one fall in last month and admits to balance difficulty. Has only been able to leave the house once since surgery due to fatigue and weakness and was tired for the whole next day following her one trip out of the home. Prior to surgery in December she was actively going out in the community with her . HOME SUPPORT PRIOR TO ADMISSION: The patient lived with her  who has been assisting more with ADLs due to fatigue and weakness. Has 2 children in the [place] area as well. Physical Therapy Goals  Initiated 1/21/2020- Goals addressed after L thrombectomy   1. Patient will move from supine to sit and sit to supine , scoot up and down and roll side to side in bed with contact guard assist within 7 day(s). 2.  Patient will transfer from bed to chair and chair to bed with contact guard assist using the least restrictive device within 7 day(s). 3.  Patient will perform sit to stand with contact guard assist within 7 day(s). 4.  Patient will ambulate with minimal assistance for 50 feet with the least restrictive device within 7 day(s). 5.  Patient will ascend/descend 2 stairs with right handrail(s) with moderate assistance within 7 day(s). 6.  Patient will improve Tinetti score by 4-5 points within 7 days. Initiated 1/16/2020  1. Patient will move from supine to sit and sit to supine , scoot up and down and roll side to side in bed with supervision/set-up within 7 day(s).     2.  Patient will transfer from bed to chair and chair to bed with supervision/set-up using the least restrictive device within 7 day(s). 3.  Patient will perform sit to stand with supervision/set-up within 7 day(s). 4.  Patient will ambulate with minimal assistance/contact guard assist for 150 feet with the least restrictive device within 7 day(s). 5.  Patient will ascend/descend 2 stairs with right handrail(s) with minimal assistance/contact guard assist within 7 day(s). 6.  Patient will improve Tinetti score by 4-5 points within 7 days. Outcome: Progressing Towards Goal   PHYSICAL THERAPY TREATMENT  Patient: Roxi Salcido (15 y.o. female)  Date: 1/22/2020  Diagnosis: Weakness of left lower extremity [R29.898]  Hypoxia [R09.02]   Pulmonary embolism (HCC)  Procedure(s) (LRB):  LEFT POPLITEAL THROMBECTOMY, LEFT ANTERIOR TIBIAL BALLOON ANGIOPLASTY (Left) 2 Days Post-Op  Precautions: Fall, WBAT(no restrictions L LE s/p thrombectomy)  Chart, physical therapy assessment, plan of care and goals were reviewed. ASSESSMENT  Patient continues with skilled PT services and is slowly progressing towards goals. Pt supine in bed upon arrival. See below for orthostatics taken during position change. Pt required assistance with LLE to transfer from supine to sit. Once sitting, pt requested to use bedside commode. Pt required verbal cues for hand placement on bed instead of RW to stand from sit. Pt required Mod A to stand today and stated she was afraid her LLE would not be able to hold her up. During stand pivot transfer, pt had trouble clearing LLE from floor. Pt ambulated 3ft to [facility] and transferred back to supine with Min A. Pt did not c/o dizziness during treatment even though BP decreased during position change.  Pt BP at rest after treatment 101/57 and 90 BPM.     Vitals:    01/22/20 1011 01/22/20 1015 01/22/20 1022 01/22/20 1026   BP: 101/59 (!) 82/51 (!) 70/48 (!) 80/54   BP 1 Location:       BP Patient Position: Supine Sitting Comment: bed to BSC Comment: BSC to bed   Pulse: 83 97 92 99   Resp: Temp:       SpO2:       Weight:       Height:             Current Level of Function Impacting Discharge (mobility/balance): Min-Mod A with functional mobility    Other factors to consider for discharge: orthostatic hypotension, modified independent at baseline         PLAN :  Patient continues to benefit from skilled intervention to address the above impairments. Continue treatment per established plan of care. to address goals. Recommendation for discharge: (in order for the patient to meet his/her long term goals)  Therapy 3 hours per day 5-7 days per week    This discharge recommendation:  Has not yet been discussed the attending provider and/or case management    IF patient discharges home will need the following DME: patient owns DME required for discharge       SUBJECTIVE:   Patient stated I don't know if it's going to hold me up.  (referring to LLE)    OBJECTIVE DATA SUMMARY:   Critical Behavior:  Neurologic State: Alert  Orientation Level: Oriented X4  Cognition: Follows commands  Safety/Judgement: Awareness of environment  Functional Mobility Training:  Bed Mobility:     Supine to Sit: Minimum assistance(LLE management)     Scooting: Supervision        Transfers:  Sit to Stand: Moderate assistance  Stand to Sit: Minimum assistance                             Balance:  Sitting: Intact; Without support  Standing: Impaired  Standing - Static: Fair  Standing - Dynamic : Fair  Ambulation/Gait Training:  Distance (ft): 3 Feet (ft)(to [facility])  Assistive Device: Gait belt;Walker, rolling  Ambulation - Level of Assistance: Contact guard assistance        Gait Abnormalities: Antalgic;Decreased step clearance; Step to gait        Base of Support: Widened     Speed/Iveth: Slow  Step Length: Left shortened;Right shortened       Pain Rating:  No c/o pain during treatment     Activity Tolerance:   Limited, requires rest breaks and signs and symptoms of orthostatic hypotension  Please refer to the flowsheet for vital signs taken during this treatment. After treatment patient left in no apparent distress:   Supine in bed and Call bell within reach    COMMUNICATION/COLLABORATION:   The patients plan of care was discussed with: Occupational Therapist and Registered Nurse    AD Ryder    Regarding student involvement in patient care:  A student participated in this treatment session. Per CMS Medicare statements and APTA guidelines I certify that the following was true:  1. I was present and directly observed the entire session. 2. I made all skilled judgments and clinical decisions regarding care. 3. I am the practitioner responsible for assessment, treatment, and documentation.

## 2020-01-22 NOTE — PROGRESS NOTES
Vascular:    No new issues - awake, alert, comfortable at rest    Voiding OK, BP now better    Incision OK, foot warm    Hgb down from preop - intraop blood loss, creatinine up a bit - likely contrast related    Will DC IV fluids, continue PT etc. Off heparin and on eliquis

## 2020-01-22 NOTE — PROGRESS NOTES
Bedside shift change report given to [address] [place] [address] (oncoming nurse) by Rinku Mota (offgoing nurse). Report included the following information SBAR, Kardex, Intake/Output, MAR and Cardiac Rhythm NSR.

## 2020-01-22 NOTE — PROGRESS NOTES
PCP on call. After sitting up experienced pain rt hip and down leg despite Norco. nsg described palpable pedal pulse. Sciatica in DDX. P: Morphing 2 mg IV x 1.    F/u prn

## 2020-01-22 NOTE — PROGRESS NOTES
Bedside and Verbal shift change report given to Sumaya (oncoming nurse) by Arpita Cruz (offgoing nurse). Report included the following information SBAR, ED Summary, OR Summary, Intake/Output, MAR and Cardiac Rhythm Normal sinus.

## 2020-01-22 NOTE — PROGRESS NOTES
Samantha Ford, Chris Hartman, and Iirs Bird Date: 1/14/2020      Subjective:     Patient feeling OK today. BP improved from yesterday. .       Current Facility-Administered Medications   Medication Dose Route Frequency    apixaban (ELIQUIS) tablet 5 mg  5 mg Oral Q12H    HYDROcodone-acetaminophen (NORCO) 7.5-325 mg per tablet 1 Tab  1 Tab Oral Q4H PRN    morphine injection 4 mg  4 mg IntraVENous Q2H PRN    lovastatin (MEVACOR) tablet 20 mg  20 mg Oral QHS    allopurinol (ZYLOPRIM) tablet 100 mg  100 mg Oral DAILY          Objective:     Patient Vitals for the past 8 hrs:   BP Temp Pulse Resp SpO2   01/22/20 0344 121/66 97.9 °F (36.6 °C) 86 18 92 %   01/22/20 0051 108/66 97.9 °F (36.6 °C) 83 20 95 %     No intake/output data recorded. 01/20 1901 - 01/22 0700  In: 2356 [P.O.:300; I.V.:2056]  Out: 500 [Urine:500]    Physical Exam: Lungs: clear to auscultation bilaterally  Heart: regular rate and rhythm, S1, S2 normal, no murmur, click, rub or gallop  Abdomen: soft, non-tender.  Bowel sounds normal. No masses,  no organomegaly        Data Review   Recent Results (from the past 24 hour(s))   PTT    Collection Time: 01/22/20  3:56 AM   Result Value Ref Range    aPTT 29.4 22.1 - 32.0 sec    aPTT, therapeutic range     58.0 - 39.0 SECS   METABOLIC PANEL, BASIC    Collection Time: 01/22/20  3:56 AM   Result Value Ref Range    Sodium 141 136 - 145 mmol/L    Potassium 4.8 3.5 - 5.1 mmol/L    Chloride 111 (H) 97 - 108 mmol/L    CO2 24 21 - 32 mmol/L    Anion gap 6 5 - 15 mmol/L    Glucose 106 (H) 65 - 100 mg/dL    BUN 35 (H) 6 - 20 MG/DL    Creatinine 1.85 (H) 0.55 - 1.02 MG/DL    BUN/Creatinine ratio 19 12 - 20      GFR est AA 32 (L) >60 ml/min/1.73m2    GFR est non-AA 26 (L) >60 ml/min/1.73m2    Calcium 8.9 8.5 - 10.1 MG/DL   CBC WITH AUTOMATED DIFF    Collection Time: 01/22/20  3:56 AM   Result Value Ref Range    WBC 7.0 3.6 - 11.0 K/uL    RBC 2.50 (L) 3.80 - 5.20 M/uL    HGB 8.3 (L) 11.5 - 16.0 g/dL HCT 27.8 (L) 35.0 - 47.0 %    .2 (H) 80.0 - 99.0 FL    MCH 33.2 26.0 - 34.0 PG    MCHC 29.9 (L) 30.0 - 36.5 g/dL    RDW 13.7 11.5 - 14.5 %    PLATELET 711 (L) 534 - 400 K/uL    MPV 12.1 8.9 - 12.9 FL    NRBC 0.0 0  WBC    ABSOLUTE NRBC 0.00 0.00 - 0.01 K/uL    NEUTROPHILS 70 32 - 75 %    LYMPHOCYTES 19 12 - 49 %    MONOCYTES 9 5 - 13 %    EOSINOPHILS 1 0 - 7 %    BASOPHILS 0 0 - 1 %    IMMATURE GRANULOCYTES 1 (H) 0.0 - 0.5 %    ABS. NEUTROPHILS 4.9 1.8 - 8.0 K/UL    ABS. LYMPHOCYTES 1.3 0.8 - 3.5 K/UL    ABS. MONOCYTES 0.6 0.0 - 1.0 K/UL    ABS. EOSINOPHILS 0.1 0.0 - 0.4 K/UL    ABS. BASOPHILS 0.0 0.0 - 0.1 K/UL    ABS. IMM.  GRANS. 0.1 (H) 0.00 - 0.04 K/UL    DF SMEAR SCANNED      RBC COMMENTS MACROCYTOSIS  1+               Assessment:     Principal Problem:    Pulmonary embolism (HCC) (1/15/2020)      Overview: High probability VQ lung scan - bilateral    Active Problems:    Hypoxia (1/15/2020)      Weakness of left lower extremity (1/15/2020)      Overview: Probably embolic      DVT (deep venous thrombosis) ([facility] (1/15/2020)      Stage 3 chronic kidney disease ([facility] (1/18/2020)        Plan:     1) Continue Eliquis  2) Watch Hgb  3) PT  4) [facility] inpt consult

## 2020-01-23 LAB
HCT VFR BLD AUTO: 25.3 % (ref 35–47)
HGB BLD-MCNC: 7.7 G/DL (ref 11.5–16)

## 2020-01-23 PROCEDURE — 97116 GAIT TRAINING THERAPY: CPT

## 2020-01-23 PROCEDURE — 74011250637 HC RX REV CODE- 250/637

## 2020-01-23 PROCEDURE — 65660000000 HC RM CCU STEPDOWN

## 2020-01-23 PROCEDURE — 85018 HEMOGLOBIN: CPT

## 2020-01-23 PROCEDURE — 36415 COLL VENOUS BLD VENIPUNCTURE: CPT

## 2020-01-23 PROCEDURE — 97535 SELF CARE MNGMENT TRAINING: CPT

## 2020-01-23 PROCEDURE — 74011250637 HC RX REV CODE- 250/637: Performed by: INTERNAL MEDICINE

## 2020-01-23 RX ADMIN — ALLOPURINOL 100 MG: 100 TABLET ORAL at 08:47

## 2020-01-23 RX ADMIN — HYDROCODONE BITARTRATE AND ACETAMINOPHEN 1 TABLET: 7.5; 325 TABLET ORAL at 13:43

## 2020-01-23 RX ADMIN — HYDROCODONE BITARTRATE AND ACETAMINOPHEN 1 TABLET: 7.5; 325 TABLET ORAL at 08:47

## 2020-01-23 RX ADMIN — APIXABAN 5 MG: 5 TABLET, FILM COATED ORAL at 21:29

## 2020-01-23 RX ADMIN — APIXABAN 5 MG: 5 TABLET, FILM COATED ORAL at 08:47

## 2020-01-23 RX ADMIN — HYDROCODONE BITARTRATE AND ACETAMINOPHEN 1 TABLET: 7.5; 325 TABLET ORAL at 17:46

## 2020-01-23 RX ADMIN — HYDROCODONE BITARTRATE AND ACETAMINOPHEN 1 TABLET: 7.5; 325 TABLET ORAL at 21:29

## 2020-01-23 RX ADMIN — LOVASTATIN 20 MG: 20 TABLET ORAL at 21:29

## 2020-01-23 NOTE — PROGRESS NOTES
Patient is doing better today with pain; Walked with PT out in the hallway; getting up to bedside commode much better.   Renee Mcclain

## 2020-01-23 NOTE — PROGRESS NOTES
Bedside shift change report given to [address] [place] [address] (oncoming nurse) by David Dahl (offgoing nurse). Report included the following information SBAR, Kardex, Intake/Output, MAR and Cardiac Rhythm NSR.

## 2020-01-23 NOTE — PROGRESS NOTES
Problem: Falls - Risk of  Goal: *Absence of Falls  Description  Document Bishop Cano Fall Risk and appropriate interventions in the flowsheet. Outcome: Progressing Towards Goal  Note: Fall Risk Interventions:  Mobility Interventions: Patient to call before getting OOB         Medication Interventions: Patient to call before getting OOB    Elimination Interventions: Patient to call for help with toileting needs    History of Falls Interventions: Door open when patient unattended         Problem: Patient Education: Go to Patient Education Activity  Goal: Patient/Family Education  Outcome: Progressing Towards Goal     Problem: Risk for Spread of Infection  Goal: Prevent transmission of infectious organism to others  Description  Prevent the transmission of infectious organisms to other patients, staff members, and visitors.   Outcome: Progressing Towards Goal     Problem: Patient Education:  Go to Education Activity  Goal: Patient/Family Education  Outcome: Progressing Towards Goal     Problem: LE Bypass: Post-Op Day 2  Goal: Activity/Safety  Outcome: Progressing Towards Goal  Goal: Nutrition/Diet  Outcome: Progressing Towards Goal  Goal: Psychosocial  Outcome: Progressing Towards Goal  Goal: *Lungs clear or at baseline  Outcome: Progressing Towards Goal  Goal: *Optimal pain control at patient's stated goal  Outcome: Progressing Towards Goal

## 2020-01-23 NOTE — PROGRESS NOTES
NAVEED PLAN:  Patient accepted at [facility]. They will have a bed tomorrow. Awaiting to a call from Amanda Kumar from [facility].    11:30 am. CM called Amanda Kumar with [facility] regarding IP Rehab referral, she said she would to have therapy notes for today as patient was hypotensive during therapy yesterday. CM notified Jennifer Lindsay and Vicky Callejas OT of same. Gabby Narvaez MSA, RN, CRM    [de-identified] pm. PT note ready, awaiting OT note for today. Gabby Narvaez MSA, RN, CRM    3.20 pm. CM spoke with Amanda Kumar, she said she had discussed patient with their Medical Director and awaiting his decision.

## 2020-01-23 NOTE — PROGRESS NOTES
Problem: Mobility Impaired (Adult and Pediatric)  Goal: *Acute Goals and Plan of Care (Insert Text)  Description  FUNCTIONAL STATUS PRIOR TO ADMISSION: Patient was modified independent using a rollator and single point cane for functional mobility. She was recently relying more on the rollator due to weakness following surgery around Christmas of 2018, when she had her gallbladder removed (had 10 day hospital stay). States she has had one fall in last month and admits to balance difficulty. Has only been able to leave the house once since surgery due to fatigue and weakness and was tired for the whole next day following her one trip out of the home. Prior to surgery in December she was actively going out in the community with her . HOME SUPPORT PRIOR TO ADMISSION: The patient lived with her  who has been assisting more with ADLs due to fatigue and weakness. Has 2 children in the [place] area as well. Physical Therapy Goals  Initiated 1/21/2020- Goals addressed after L thrombectomy   1. Patient will move from supine to sit and sit to supine , scoot up and down and roll side to side in bed with contact guard assist within 7 day(s). 2.  Patient will transfer from bed to chair and chair to bed with contact guard assist using the least restrictive device within 7 day(s). 3.  Patient will perform sit to stand with contact guard assist within 7 day(s). 4.  Patient will ambulate with minimal assistance for 50 feet with the least restrictive device within 7 day(s). 5.  Patient will ascend/descend 2 stairs with right handrail(s) with moderate assistance within 7 day(s). 6.  Patient will improve Tinetti score by 4-5 points within 7 days. Initiated 1/16/2020  1. Patient will move from supine to sit and sit to supine , scoot up and down and roll side to side in bed with supervision/set-up within 7 day(s).     2.  Patient will transfer from bed to chair and chair to bed with supervision/set-up using the least restrictive device within 7 day(s). 3.  Patient will perform sit to stand with supervision/set-up within 7 day(s). 4.  Patient will ambulate with minimal assistance/contact guard assist for 150 feet with the least restrictive device within 7 day(s). 5.  Patient will ascend/descend 2 stairs with right handrail(s) with minimal assistance/contact guard assist within 7 day(s). 6.  Patient will improve Tinetti score by 4-5 points within 7 days. Outcome: Progressing Towards Goal   PHYSICAL THERAPY TREATMENT  Patient: Linda Castanon (88 y.o. female)  Date: 1/23/2020  Diagnosis: Weakness of left lower extremity [R29.898]  Hypoxia [R09.02]   Pulmonary embolism (HCC)  Procedure(s) (LRB):  LEFT POPLITEAL THROMBECTOMY, LEFT ANTERIOR TIBIAL BALLOON ANGIOPLASTY (Left) 3 Days Post-Op  Precautions: Fall, WBAT(no restrictions L LE s/p thrombectomy)  Chart, physical therapy assessment, plan of care and goals were reviewed. ASSESSMENT  Patient continues with skilled PT services and is progressing towards goals. Pt sitting in chair upon arrival. Pt agreed to gait training today, but requested to use bedside commode before session. Pt was able to stand for some time for assistance with toileting hygiene. Pt was able to stand for some time while receiving assistance with toileting hygiene. Pt had no c/o dizziness or nausea throughout treatment session . Pt ambulated 75ft with CGA and RW. Observed antalgic gait, however pt reported she is working on step clearance and increased weight bearing on LLE. Pt using UE heavily for ambulation. Pt will most likely be able to reach modified independent with DME after inpatient rehab stay. Current Level of Function Impacting Discharge (mobility/balance): CGA with functional mobility    Other factors to consider for discharge: mod I with rollator          PLAN :  Patient continues to benefit from skilled intervention to address the above impairments.   Continue treatment per established plan of care. to address goals. Recommendation for discharge: (in order for the patient to meet his/her long term goals)  Therapy 3 hours per day 5-7 days per week    This discharge recommendation:  Has not yet been discussed the attending provider and/or case management    IF patient discharges home will need the following DME: patient owns DME required for discharge       SUBJECTIVE:   Patient stated I definitely feel it more in my arms than my legs.     OBJECTIVE DATA SUMMARY:   Critical Behavior:  Neurologic State: Alert  Orientation Level: Oriented X4  Cognition: Appropriate decision making, Appropriate for age attention/concentration, Appropriate safety awareness  Safety/Judgement: Awareness of environment  Functional Mobility Training:  Bed Mobility:     Supine to Sit: (Sitting in chair pre/post treatment)              Transfers:  Sit to Stand: Contact guard assistance; Additional time;Assist x1  Stand to Sit: Contact guard assistance; Additional time;Assist x1                             Balance:  Sitting: Intact  Standing: Impaired  Standing - Static: Constant support; Fair  Standing - Dynamic : Constant support; Fair  Ambulation/Gait Training:  Distance (ft): 75 Feet (ft)  Assistive Device: Gait belt;Walker, rolling  Ambulation - Level of Assistance: Contact guard assistance        Gait Abnormalities: Antalgic;Decreased step clearance; Step to gait        Base of Support: Widened     Speed/Iveth: Pace decreased (<100 feet/min)  Step Length: Left shortened;Right shortened       Pain Rating:  C/o pain in LLE during ambulation but did not give a rating    Activity Tolerance:   Limited and requires rest breaks  Please refer to the flowsheet for vital signs taken during this treatment.     After treatment patient left in no apparent distress:   Sitting in chair, Call bell within reach, and Caregiver / family present    COMMUNICATION/COLLABORATION:   The patients plan of care was discussed with: Occupational Therapist and Registered Nurse    AD García    Regarding student involvement in patient care:  A student participated in this treatment session. Per CMS Medicare statements and APTA guidelines I certify that the following was true:  1. I was present and directly observed the entire session. 2. I made all skilled judgments and clinical decisions regarding care. 3. I am the practitioner responsible for assessment, treatment, and documentation.

## 2020-01-23 NOTE — PROGRESS NOTES
Vascular:    Doing well POD # after left popliteal embolectomy. Feet well perfused, incision dressed. Getting around better with PT. Can go to rehab from my standpoint when medically stable. Will follow.

## 2020-01-23 NOTE — PROGRESS NOTES
Bedside and Verbal shift change report given to Cecelia Medina (oncoming nurse) by Valerio Grove (offgoing nurse). Report included the following information SBAR, ED Summary, OR Summary, Procedure Summary, Intake/Output, MAR and Cardiac Rhythm normal sinus.

## 2020-01-23 NOTE — PROGRESS NOTES
Problem: Self Care Deficits Care Plan (Adult)  Goal: *Acute Goals and Plan of Care (Insert Text)  Description    FUNCTIONAL STATUS PRIOR TO ADMISSION: Patient was modified independent using a rollator and SPC for functional mobility. Pt stated she was requiring more assistance from rollator. Recent decline in functional status after gall bladder sx 12/17/19 where pt reported she was in hospital for 10 days (with no OT/PT ordered) and discharged home. Pt requiring most assist from  at that time for her ADL. she has also been limited with her community outings since this sx, reporting increased fatigue day after. One fall within last month. HOME SUPPORT: The patient lived with  and has two children in live locally. Occupational Therapy Goals    Goals reviewed and continued 1/23/2020  Initiated 1/16/2020  1. Patient will perform standing ADLs at sink x 5 minutes with modified independence within 7 day(s). 2.  Patient will perform upper body dressing and lower body dressing with supervision/set-up within 7 day(s). 3.  Patient will perform bathing with supervision/set-up within 7 day(s). 4.  Patient will perform toilet transfers with supervision/set-up within 7 day(s). 5.  Patient will perform all aspects of toileting with supervision/set-up within 7 day(s). 6.  Patient will perform basic ADL while maintaining O2 >90% within 7 days. 7.  Patient will utilize energy conservation techniques during functional activities with verbal cues within 7 day(s).            Outcome: Progressing Towards Goal   OCCUPATIONAL THERAPY TREATMENT/WEEKLY RE-EVALUATION  Patient: Kayley Salazar (67 y.o. female)  Date: 1/23/2020  Diagnosis: Weakness of left lower extremity [R29.898]  Hypoxia [R09.02]   Pulmonary embolism (HCC)  Procedure(s) (LRB):  LEFT POPLITEAL THROMBECTOMY, LEFT ANTERIOR TIBIAL BALLOON ANGIOPLASTY (Left) 3 Days Post-Op  Precautions: Fall, WBAT(no restrictions L LE s/p thrombectomy)  Chart, occupational therapy assessment, plan of care, and goals were reviewed. ASSESSMENT  Based on the objective data described below, the patient presents with generalized weakness, decreased endurance, and decreased LLE ROM s/p LLE popliteal thrombectomy POD 3. Patient completed functional mobility and ADL transfers x CGA however patient continues to require MOD A-MAX A for LB ADLs due to decreased LLE ROM, c/o LLE pain, and c/o R sided sciatic pain. Patient with VSS throughout session today. Patient can tolerate 3 hours of therapy a day. Continue to recommend IPR to maximize patient safety and independence with ADL transfers and tasks. Current Level of Function Impacting Discharge (ADLs): MOD A-MAX A LB ADLs    Other factors to consider for discharge: DME needs; bilateral shoulder arthritis requiring AAROM for functional reach high (MOD A)         PLAN :  Goals have been updated based on progression since last assessment. Patient continues to benefit from skilled intervention to address the above impairments. Continue to follow patient 5 times a week to address goals. Recommend with staff: OOB x 3 to chair    Recommend next OT session: standing ADLs    Recommendation for discharge: (in order for the patient to meet his/her long term goals)  Therapy 3 hours per day 5-7 days per week    This discharge recommendation:  Has been made in collaboration with the attending provider and/or case management    Equipment recommendations for successful discharge (if) home: TBD       SUBJECTIVE:   Patient stated I remember you from yesterday.     OBJECTIVE DATA SUMMARY:   Cognitive/Behavioral Status:  Neurologic State: Alert  Orientation Level: Oriented X4  Cognition: Appropriate for age attention/concentration  Perception: Appears intact  Perseveration: No perseveration noted  Safety/Judgement: Decreased insight into deficits; Decreased awareness of need for safety;Decreased awareness of need for assistance    Functional Mobility and Transfers for ADLs:  Bed Mobility:  Supine to Sit: (Sitting in chair pre/post treatment)    Transfers:  Sit to Stand: Contact guard assistance          Balance:  Sitting: Intact  Standing: Impaired; Without support  Standing - Static: Fair;Constant support  Standing - Dynamic : Fair;Constant support    ADL Intervention:       Grooming  Washing Face: Stand-by assistance  Washing Hands: Stand-by assistance  Brushing Teeth: Stand-by assistance  Cues: Verbal cues provided          Patient completed functional mobility <> bathroom x CGA and required CGA for toilet transfers using grab bars and walker. Patient completed functional reach high x MOD A due to bilateral shoulder arthritis with AAROM provided for placing items in upper cabinetry. Cognitive Retraining  Safety/Judgement: Decreased insight into deficits; Decreased awareness of need for safety;Decreased awareness of need for assistance      Activity Tolerance:   Fair, requires rest breaks, and observed SOB with activity  Please refer to the flowsheet for vital signs taken during this treatment.     After treatment patient left in no apparent distress:   Sitting in chair, Call bell within reach, and Caregiver / family present    COMMUNICATION/COLLABORATION:   The patients plan of care was discussed with: Physical Therapist and Registered Nurse    Anna Agent  Time Calculation: 16 mins

## 2020-01-23 NOTE — PROGRESS NOTES
Samantha Ritchie, Jeffrey Villeda, and Burton Garcia Date: 1/14/2020      Subjective:     Patient improving. More capable with PT. Leg less painful. .       Current Facility-Administered Medications   Medication Dose Route Frequency    apixaban (ELIQUIS) tablet 5 mg  5 mg Oral Q12H    HYDROcodone-acetaminophen (NORCO) 7.5-325 mg per tablet 1 Tab  1 Tab Oral Q4H PRN    morphine injection 4 mg  4 mg IntraVENous Q2H PRN    lovastatin (MEVACOR) tablet 20 mg  20 mg Oral QHS    allopurinol (ZYLOPRIM) tablet 100 mg  100 mg Oral DAILY          Objective:     Patient Vitals for the past 8 hrs:   BP Temp Pulse Resp SpO2 Weight   01/23/20 0636      174 lb 6.1 oz (79.1 kg)   01/23/20 0314 136/77 98.8 °F (37.1 °C) 87 18 90 %      No intake/output data recorded. 01/21 1901 - 01/23 0700  In: 2311 [I.V.:2311]  Out: 300 [Urine:300]    Physical Exam: Lungs: clear to auscultation bilaterally  Heart: regular rate and rhythm, S1, S2 normal, no murmur, click, rub or gallop  Abdomen: soft, non-tender.  Bowel sounds normal. No masses,  no organomegaly        Data Review   Recent Results (from the past 24 hour(s))   HGB & HCT    Collection Time: 01/23/20  3:22 AM   Result Value Ref Range    HGB 7.7 (L) 11.5 - 16.0 g/dL    HCT 25.3 (L) 35.0 - 47.0 %           Assessment:     Principal Problem:    Pulmonary embolism (HCC) (1/15/2020)      Overview: High probability VQ lung scan - bilateral    Active Problems:    Hypoxia (1/15/2020)      Weakness of left lower extremity (1/15/2020)      Overview: Probably embolic      DVT (deep venous thrombosis) ([facility] (1/15/2020)      Stage 3 chronic kidney disease ([facility] (1/18/2020)        Plan:     1) [facility]- inpt consult  2) Cont PT  3) Eliquis

## 2020-01-24 ENCOUNTER — HOSPITAL ENCOUNTER (OUTPATIENT)
Dept: REHABILITATION | Age: 80
End: 2020-02-06
Attending: PHYSICAL MEDICINE & REHABILITATION | Admitting: PHYSICAL MEDICINE & REHABILITATION

## 2020-01-24 VITALS
TEMPERATURE: 98.5 F | HEIGHT: 67 IN | BODY MASS INDEX: 27.37 KG/M2 | WEIGHT: 174.38 LBS | HEART RATE: 100 BPM | RESPIRATION RATE: 18 BRPM | DIASTOLIC BLOOD PRESSURE: 79 MMHG | SYSTOLIC BLOOD PRESSURE: 119 MMHG | OXYGEN SATURATION: 92 %

## 2020-01-24 DIAGNOSIS — R53.81 OTHER MALAISE: ICD-10-CM

## 2020-01-24 PROBLEM — I74.3 ARTERIAL EMBOLISM AND THROMBOSIS OF LOWER EXTREMITY (HCC): Status: ACTIVE | Noted: 2020-01-24

## 2020-01-24 PROBLEM — I95.81 POSTPROCEDURAL HYPOTENSION: Status: ACTIVE | Noted: 2020-01-24

## 2020-01-24 LAB
APPEARANCE UR: CLEAR
BACTERIA URNS QL MICRO: NEGATIVE /HPF
BILIRUB UR QL: NEGATIVE
COLOR UR: YELLOW
EPITH CASTS URNS QL MICRO: ABNORMAL /LPF
GLUCOSE UR STRIP.AUTO-MCNC: NEGATIVE MG/DL
HCT VFR BLD AUTO: 26.5 % (ref 35–47)
HGB BLD-MCNC: 8.2 G/DL (ref 11.5–16)
HGB UR QL STRIP: NEGATIVE
KETONES UR QL STRIP.AUTO: ABNORMAL MG/DL
LEUKOCYTE ESTERASE UR QL STRIP.AUTO: ABNORMAL
NITRITE UR QL STRIP.AUTO: NEGATIVE
PH UR STRIP: 5.5 [PH] (ref 5–8)
PROT UR STRIP-MCNC: 30 MG/DL
RBC #/AREA URNS HPF: ABNORMAL /HPF (ref 0–5)
SP GR UR REFRACTOMETRY: 1.02 (ref 1–1.03)
UROBILINOGEN UR QL STRIP.AUTO: 0.2 EU/DL (ref 0.2–1)
WBC URNS QL MICRO: ABNORMAL /HPF (ref 0–4)

## 2020-01-24 PROCEDURE — 74011250637 HC RX REV CODE- 250/637

## 2020-01-24 PROCEDURE — 87077 CULTURE AEROBIC IDENTIFY: CPT

## 2020-01-24 PROCEDURE — 87186 SC STD MICRODIL/AGAR DIL: CPT

## 2020-01-24 PROCEDURE — 36415 COLL VENOUS BLD VENIPUNCTURE: CPT

## 2020-01-24 PROCEDURE — 87086 URINE CULTURE/COLONY COUNT: CPT

## 2020-01-24 PROCEDURE — 81001 URINALYSIS AUTO W/SCOPE: CPT

## 2020-01-24 PROCEDURE — 85018 HEMOGLOBIN: CPT

## 2020-01-24 PROCEDURE — 74011250637 HC RX REV CODE- 250/637: Performed by: INTERNAL MEDICINE

## 2020-01-24 RX ORDER — HYDROCODONE BITARTRATE AND ACETAMINOPHEN 7.5; 325 MG/1; MG/1
1 TABLET ORAL
Qty: 20 TAB | Refills: 0 | Status: SHIPPED | OUTPATIENT
Start: 2020-01-24 | End: 2020-01-27

## 2020-01-24 RX ADMIN — HYDROCODONE BITARTRATE AND ACETAMINOPHEN 1 TABLET: 7.5; 325 TABLET ORAL at 08:56

## 2020-01-24 RX ADMIN — ALLOPURINOL 100 MG: 100 TABLET ORAL at 08:56

## 2020-01-24 RX ADMIN — APIXABAN 5 MG: 5 TABLET, FILM COATED ORAL at 08:56

## 2020-01-24 NOTE — PROGRESS NOTES
Bedside shift change report given to Rajwinder RN (oncoming nurse) by Mia Wright RN (offgoing nurse). Report included the following information SBAR, Kardex, ED Summary, OR Summary, Recent Results and Cardiac Rhythm NSR.

## 2020-01-24 NOTE — DISCHARGE INSTRUCTIONS
Patient Discharge Instructions    Roxi Salcido / [id number] : 1940    Admitted 2020 Discharged: 2020     Take Home Medications            · It is important that you take the medication exactly as they are prescribed. · Keep your medication in the bottles provided by the pharmacist and keep a list of the medication names, dosages, and times to be taken in your wallet. · Do not take other medications without consulting your doctor. What to do at Home    Recommended diet: Regular Diet,     Recommended activity: Activity as tolerated, PT/OT    If you experience any of the following symptoms increased SOB, leg pain, please follow up with Dr Elva Christine. Follow-up Appointments   Procedures    FOLLOW UP VISIT Appointment in: Other (Specify) 1 week after rehab discharge     1 week after rehab discharge     Standing Status:   Standing     Number of Occurrences:   1     Order Specific Question:   Appointment in     Answer: Other (Specify)            Information obtained by :  I understand that if any problems occur once I am at home I am to contact my physician. I understand and acknowledge receipt of the instructions indicated above.                                                                                                                                            Physician's or R.N.'s Signature                                                                  Date/Time                                                                                                                                              Patient or Representative Signature                                                          Date/Time

## 2020-01-24 NOTE — PROGRESS NOTES
Samantha Ling, Chevy Juarez, and Laura Davis Date: 1/14/2020      Subjective:     Patient looks OK today. Hgb stable- 8.2. Pain adequately controlled. ..       Current Facility-Administered Medications   Medication Dose Route Frequency    apixaban (ELIQUIS) tablet 5 mg  5 mg Oral Q12H    HYDROcodone-acetaminophen (NORCO) 7.5-325 mg per tablet 1 Tab  1 Tab Oral Q4H PRN    morphine injection 4 mg  4 mg IntraVENous Q2H PRN    lovastatin (MEVACOR) tablet 20 mg  20 mg Oral QHS    allopurinol (ZYLOPRIM) tablet 100 mg  100 mg Oral DAILY          Objective:     Patient Vitals for the past 8 hrs:   BP Temp Pulse Resp SpO2 Weight   01/24/20 0753 144/74 98.8 °F (37.1 °C) 84 18 92 %    01/24/20 0336 134/67 97.3 °F (36.3 °C) 100 18 96 %    01/24/20 0147      174 lb 6.1 oz (79.1 kg)   01/23/20 2358 124/69 98.7 °F (37.1 °C) 82 18 94 %      No intake/output data recorded. 01/22 1901 - 01/24 0700  In: 300 [P.O.:300]  Out: 300 [Urine:300]    Physical Exam:   Visit Vitals  /74   Pulse 84   Temp 98.8 °F (37.1 °C)   Resp 18   Ht 5' 7\" (1.702 m)   Wt 174 lb 6.1 oz (79.1 kg)   SpO2 92%   BMI 27.31 kg/m²     Lungs: clear to auscultation bilaterally  Heart: regular rate and rhythm, S1, S2 normal, no murmur, click, rub or gallop  Abdomen: soft, non-tender.  Bowel sounds normal. No masses,  no organomegaly        Data Review   Recent Results (from the past 24 hour(s))   HGB & HCT    Collection Time: 01/24/20  3:44 AM   Result Value Ref Range    HGB 8.2 (L) 11.5 - 16.0 g/dL    HCT 26.5 (L) 35.0 - 47.0 %           Assessment:     Principal Problem:    Pulmonary embolism (HCC) (1/15/2020)      Overview: High probability VQ lung scan - bilateral    Active Problems:    Hypoxia (1/15/2020)      Weakness of left lower extremity (1/15/2020)      Overview: Probably embolic      DVT [facility] ([facility] (1/15/2020)      Stage 3 chronic kidney disease ([facility] (1/18/2020)        Plan:     1) Ready for discharge to [facility] today

## 2020-01-24 NOTE — PROGRESS NOTES
CM received a call from Roopa Arciniega from [facility], they can accept patient after lunch today. The admitting physician is Dr. Courtney Jacinto, nurse to call report to [phone] and will admit no later than 6 pm today. CM met with patient to discuss discharge planning. Her  will provide transportation there. CM received a verbal consent for IM Letter and the [place] of Choice as patient is in contact isolation. Both forms were  placed in patient's chart.     Rafael Velasquez MSA, RN, CRM

## 2020-01-24 NOTE — PROGRESS NOTES
Problem: Falls - Risk of  Goal: *Absence of Falls  Description  Document Paty Hart Fall Risk and appropriate interventions in the flowsheet. Outcome: Progressing Towards Goal  Note: Fall Risk Interventions:  Mobility Interventions: Patient to call before getting OOB         Medication Interventions: Patient to call before getting OOB, Teach patient to arise slowly    Elimination Interventions: Call light in reach    History of Falls Interventions: Door open when patient unattended         Problem: Patient Education: Go to Patient Education Activity  Goal: Patient/Family Education  Outcome: Progressing Towards Goal     Problem: Risk for Spread of Infection  Goal: Prevent transmission of infectious organism to others  Description  Prevent the transmission of infectious organisms to other patients, staff members, and visitors.   Outcome: Progressing Towards Goal     Problem: Patient Education:  Go to Education Activity  Goal: Patient/Family Education  Outcome: Progressing Towards Goal     Problem: Patient Education: Go to Patient Education Activity  Goal: Patient/Family Education  Outcome: Progressing Towards Goal     Problem: Patient Education: Go to Patient Education Activity  Goal: Patient/Family Education  Outcome: Progressing Towards Goal     Problem: Discharge Planning  Goal: *Discharge to safe environment  Outcome: Progressing Towards Goal     Problem: Patient Education: Go to Patient Education Activity  Goal: Patient/Family Education  Outcome: Progressing Towards Goal     Problem: LE Bypass: Day of Surgery/Post-Op (Initiate SCIP Measures for Post-Op Care)  Goal: Off Pathway (Use only if patient is Off Pathway)  Outcome: Progressing Towards Goal  Goal: Activity/Safety  Outcome: Progressing Towards Goal  Goal: Consults, if ordered  Outcome: Progressing Towards Goal  Goal: Nutrition/Diet  Outcome: Progressing Towards Goal  Goal: Medications  Outcome: Progressing Towards Goal  Goal: Respiratory  Outcome: Progressing Towards Goal  Goal: Treatments/Interventions/Procedures  Outcome: Progressing Towards Goal  Goal: Psychosocial  Outcome: Progressing Towards Goal  Goal: *Lungs clear or at baseline  Outcome: Progressing Towards Goal  Goal: *Hemodynamically stable  Outcome: Progressing Towards Goal  Goal: *Optimal pain control at patient's stated goal  Outcome: Progressing Towards Goal  Goal: *Tolerating diet  Outcome: Progressing Towards Goal  Goal: *Demonstrates progressive activity  Outcome: Progressing Towards Goal     Problem: LE Bypass: Post-Op Day 1  Goal: Off Pathway (Use only if patient is Off Pathway)  Outcome: Progressing Towards Goal  Goal: Activity/Safety  Outcome: Progressing Towards Goal  Goal: Consults, if ordered  Outcome: Progressing Towards Goal  Goal: Diagnostic Test/Procedures  Outcome: Progressing Towards Goal  Goal: Nutrition/Diet  Outcome: Progressing Towards Goal  Goal: Discharge Planning  Outcome: Progressing Towards Goal  Goal: Medications  Outcome: Progressing Towards Goal  Goal: Respiratory  Outcome: Progressing Towards Goal  Goal: Treatments/Interventions/Procedures  Outcome: Progressing Towards Goal  Goal: Psychosocial  Outcome: Progressing Towards Goal  Goal: *Lungs clear or at baseline  Outcome: Progressing Towards Goal  Goal: *Hemodynamically stable  Outcome: Progressing Towards Goal  Goal: *Optimal pain control at patient's stated goal  Outcome: Progressing Towards Goal  Goal: *Tolerating diet  Outcome: Progressing Towards Goal  Goal: *Demonstrates progressive activity  Outcome: Progressing Towards Goal  Goal: *Adequate urinary output (equal to or greater than 30 milliliters/hour)  Outcome: Progressing Towards Goal     Problem: LE Bypass: Post-Op Day 2  Goal: Off Pathway (Use only if patient is Off Pathway)  Outcome: Progressing Towards Goal  Goal: Activity/Safety  Outcome: Progressing Towards Goal  Goal: Nutrition/Diet  Outcome: Progressing Towards Goal  Goal: Discharge Planning  Outcome: Progressing Towards Goal  Goal: Medications  Outcome: Progressing Towards Goal  Goal: Respiratory  Outcome: Progressing Towards Goal  Goal: Treatments/Interventions/Procedures  Outcome: Progressing Towards Goal  Goal: Psychosocial  Outcome: Progressing Towards Goal  Goal: *Lungs clear or at baseline  Outcome: Progressing Towards Goal  Goal: *Hemodynamically stable  Outcome: Progressing Towards Goal  Goal: *Optimal pain control at patient's stated goal  Outcome: Progressing Towards Goal  Goal: *Tolerating diet  Outcome: Progressing Towards Goal  Goal: *Demonstrates progressive activity  Outcome: Progressing Towards Goal     Problem: Pressure Injury - Risk of  Goal: *Prevention of pressure injury  Description  Document Domingo Scale and appropriate interventions in the flowsheet.   Outcome: Progressing Towards Goal  Note: Pressure Injury Interventions:  Sensory Interventions: Discuss PT/OT consult with provider, Float heels         Activity Interventions: Increase time out of bed, Pressure redistribution bed/mattress(bed type)    Mobility Interventions: Float heels, HOB 30 degrees or less    Nutrition Interventions: Document food/fluid/supplement intake                     Problem: Patient Education: Go to Patient Education Activity  Goal: Patient/Family Education  Outcome: Progressing Towards Goal

## 2020-01-24 NOTE — PROGRESS NOTES
Vascular:    Doing well.  Incision and foot OK    Possible to rehab today - she should follow up with me 2 weeks for staples

## 2020-01-25 LAB
ALBUMIN SERPL-MCNC: 2.5 G/DL (ref 3.5–5)
ALBUMIN/GLOB SERPL: 0.7 {RATIO} (ref 1.1–2.2)
ALP SERPL-CCNC: 56 U/L (ref 45–117)
ALT SERPL-CCNC: 11 U/L (ref 12–78)
ANION GAP SERPL CALC-SCNC: 4 MMOL/L (ref 5–15)
AST SERPL-CCNC: 13 U/L (ref 15–37)
BILIRUB SERPL-MCNC: 0.5 MG/DL (ref 0.2–1)
BUN SERPL-MCNC: 27 MG/DL (ref 6–20)
BUN/CREAT SERPL: 21 (ref 12–20)
CALCIUM SERPL-MCNC: 9.9 MG/DL (ref 8.5–10.1)
CHLORIDE SERPL-SCNC: 110 MMOL/L (ref 97–108)
CO2 SERPL-SCNC: 27 MMOL/L (ref 21–32)
CREAT SERPL-MCNC: 1.3 MG/DL (ref 0.55–1.02)
ERYTHROCYTE [DISTWIDTH] IN BLOOD BY AUTOMATED COUNT: 13.5 % (ref 11.5–14.5)
GLOBULIN SER CALC-MCNC: 3.6 G/DL (ref 2–4)
GLUCOSE SERPL-MCNC: 96 MG/DL (ref 65–100)
HCT VFR BLD AUTO: 28.7 % (ref 35–47)
HGB BLD-MCNC: 8.9 G/DL (ref 11.5–16)
MAGNESIUM SERPL-MCNC: 1.7 MG/DL (ref 1.6–2.4)
MCH RBC QN AUTO: 33.6 PG (ref 26–34)
MCHC RBC AUTO-ENTMCNC: 31 G/DL (ref 30–36.5)
MCV RBC AUTO: 108.3 FL (ref 80–99)
NRBC # BLD: 0 K/UL (ref 0–0.01)
NRBC BLD-RTO: 0 PER 100 WBC
PLATELET # BLD AUTO: 174 K/UL (ref 150–400)
PMV BLD AUTO: 12 FL (ref 8.9–12.9)
POTASSIUM SERPL-SCNC: 4.6 MMOL/L (ref 3.5–5.1)
PROT SERPL-MCNC: 6.1 G/DL (ref 6.4–8.2)
RBC # BLD AUTO: 2.65 M/UL (ref 3.8–5.2)
SODIUM SERPL-SCNC: 141 MMOL/L (ref 136–145)
WBC # BLD AUTO: 7.8 K/UL (ref 3.6–11)

## 2020-01-25 PROCEDURE — 85027 COMPLETE CBC AUTOMATED: CPT

## 2020-01-25 PROCEDURE — 80053 COMPREHEN METABOLIC PANEL: CPT

## 2020-01-25 PROCEDURE — 36415 COLL VENOUS BLD VENIPUNCTURE: CPT

## 2020-01-25 PROCEDURE — 83735 ASSAY OF MAGNESIUM: CPT

## 2020-01-27 LAB
BACTERIA SPEC CULT: ABNORMAL
BACTERIA SPEC CULT: ABNORMAL
CC UR VC: ABNORMAL
SERVICE CMNT-IMP: ABNORMAL

## 2020-01-28 NOTE — CDMP QUERY
RETRO QUERY Pt admitted with PE/Pt noted to have DVTs via [address]. If possible, please document acuity of DVTs in the progress notes and d/c summary if you are evaluating and / or treating any of the following: ? Acute DVTs Right leg (popliteal/gastrocnemius/tibial) POA ? Acute DVTs Left leg (left common femoral,proximal femoral) POA ? Chronic DVTs Right leg (popliteal/gastrocnemius/tibial) POA ? ChronicDVTs Left leg (left common femoral,proximal femoral) POA ? Acute on chronic DVTs Right leg (popliteal/gastrocnemius/tibial) POA ? Acute on chronic DVTsLeft leg (left common femoral,proximal femoral) POA 
? Other, please specify ? Clinically unable to determine The medical record reflects the following: 
  Risk Factors: DVTs Clinical Indicators:  
1/15  Right Lower Venous Age indeterminate non-occlusive thrombus present in the right popliteal vein. Age indeterminate non-occlusive thrombus present in the right gastrocnemius vein. Age indeterminate non-occlusive thrombus present in the right posterior tibial vein. The common femoral, greater saphenous, profunda femoral, femoral and saphenous femoral junction vein(s) were imaged in the transverse and longitudinal planes. The vessels showed normal color filling and compressibility. Doppler interrogation of the veins showed phasic and spontaneous flow. Left Lower Venous Age indeterminate non-occlusive thrombus present in the left common femoral vein. Age indeterminate non-occlusive thrombus present in the left proximal femoral vein. Chronic thrombus present in the left mid femoral vein. Chronic thrombus present in the left distal femoral vein. Age indeterminate non-occlusive thrombus present in the left popliteal vein. Treatment: thrombectomy Thank you, 
       Annalisa Valdez [facility]0 [facility], [address]

## 2020-02-03 NOTE — DISCHARGE SUMMARY
Physician Discharge Summary     Patient ID:  Renan Chavis  [id number]  16 y.o.  1940    Admit date: 1/14/2020    Discharge date and time: 2/3/2020    Admission Diagnoses: Weakness of left lower extremity [R29.898]; Hypoxia [R09.02]    Discharge Diagnoses:  Principal Diagnosis Pulmonary embolism ([address]                                            Principal Problem:    Pulmonary embolism ([address] (1/15/2020)      Overview: High probability VQ lung scan - bilateral    Active Problems:    Blood loss anemia (7/20/2014)      Hypoxia (1/15/2020)      Weakness of left lower extremity (1/15/2020)      Overview: Probably embolic      DVT (deep venous thrombosis) ([address] (1/15/2020)      Stage 3 chronic kidney disease ([facility] (1/18/2020)      Arterial embolism and thrombosis of lower extremity ([facility] (1/24/2020)      Postprocedural hypotension (1/24/2020)           Hospital Course: Admitted with leg weakness to the point of giving out , dyspnea, and malaise. She was found to have a L popliteal DVT, probable PE on VQ scan, and was placed on a heparin drip. Improved over the next few days , had sudden L leg loss of pulse on 1/17. Arterial studies done and seen by Vascular- Dr Yessi Santana. [place] had an acute thrombus. Was taken for successful thrombectomy in the next 48 hours. She was placed on oral blood thinner and she did well subsequently. Her Hgb dipped but stablized after surgery. Accepted and sent to [facility] for inpatient rehab. Discharged in much improved condition. PCP:     Consults: Vascular Surgery, Pulmonary        Discharge Exam:  Visit Vitals  /79 (BP 1 Location: Left arm, BP Patient Position: Sitting)   Pulse 100   Temp 98.5 °F (36.9 °C)   Resp 18   Ht 5' 7\" (1.702 m)   Wt 174 lb 6.1 oz (79.1 kg)   SpO2 92%   BMI 27.31 kg/m²     Lungs: clear to auscultation bilaterally  Heart: regular rate and rhythm, S1, S2 normal, no murmur, click, rub or gallop  Abdomen: soft, non-tender.  Bowel sounds normal. No masses,  no organomegaly  Extremities: 1-2+ edema LLE    Disposition: [facility] rehab    Patient Instructions: This patient is currently admitted, however, discharge medications can only be displayed within the current admission encounter. Activity: Activity as tolerated  Diet: Regular Diet  Wound Care: Keep wound clean and dry    Follow-up Appointments   Procedures    FOLLOW UP VISIT Appointment in: Other (Specify) 1 week after rehab discharge     1 week after rehab discharge     Standing Status:   Standing     Number of Occurrences:   1     Order Specific Question:   Appointment in     Answer:    Other (Specify)          Signed:  Clementine Sen MD  2/3/2020  8:12 AM

## 2020-02-04 ENCOUNTER — APPOINTMENT (OUTPATIENT)
Dept: GENERAL RADIOLOGY | Age: 80
End: 2020-02-04
Attending: PHYSICAL MEDICINE & REHABILITATION

## 2020-02-04 LAB
ANION GAP SERPL CALC-SCNC: 5 MMOL/L (ref 5–15)
APPEARANCE UR: CLEAR
BACTERIA URNS QL MICRO: NEGATIVE /HPF
BASOPHILS # BLD: 0 K/UL (ref 0–0.1)
BASOPHILS NFR BLD: 0 % (ref 0–1)
BILIRUB UR QL: NEGATIVE
BUN SERPL-MCNC: 19 MG/DL (ref 6–20)
BUN/CREAT SERPL: 15 (ref 12–20)
CALCIUM SERPL-MCNC: 9.7 MG/DL (ref 8.5–10.1)
CHLORIDE SERPL-SCNC: 106 MMOL/L (ref 97–108)
CO2 SERPL-SCNC: 29 MMOL/L (ref 21–32)
COLOR UR: ABNORMAL
CREAT SERPL-MCNC: 1.24 MG/DL (ref 0.55–1.02)
DIFFERENTIAL METHOD BLD: ABNORMAL
EOSINOPHIL # BLD: 0.1 K/UL (ref 0–0.4)
EOSINOPHIL NFR BLD: 1 % (ref 0–7)
EPITH CASTS URNS QL MICRO: ABNORMAL /LPF
ERYTHROCYTE [DISTWIDTH] IN BLOOD BY AUTOMATED COUNT: 13.4 % (ref 11.5–14.5)
GLUCOSE SERPL-MCNC: 116 MG/DL (ref 65–100)
GLUCOSE UR STRIP.AUTO-MCNC: NEGATIVE MG/DL
HCT VFR BLD AUTO: 32.2 % (ref 35–47)
HGB BLD-MCNC: 10 G/DL (ref 11.5–16)
HGB UR QL STRIP: ABNORMAL
HYALINE CASTS URNS QL MICRO: ABNORMAL /LPF (ref 0–5)
IMM GRANULOCYTES # BLD AUTO: 0.1 K/UL (ref 0–0.04)
IMM GRANULOCYTES NFR BLD AUTO: 1 % (ref 0–0.5)
KETONES UR QL STRIP.AUTO: NEGATIVE MG/DL
LEUKOCYTE ESTERASE UR QL STRIP.AUTO: ABNORMAL
LYMPHOCYTES # BLD: 1 K/UL (ref 0.8–3.5)
LYMPHOCYTES NFR BLD: 8 % (ref 12–49)
MCH RBC QN AUTO: 32.4 PG (ref 26–34)
MCHC RBC AUTO-ENTMCNC: 31.1 G/DL (ref 30–36.5)
MCV RBC AUTO: 104.2 FL (ref 80–99)
MONOCYTES # BLD: 0.7 K/UL (ref 0–1)
MONOCYTES NFR BLD: 5 % (ref 5–13)
NEUTS SEG # BLD: 10.6 K/UL (ref 1.8–8)
NEUTS SEG NFR BLD: 85 % (ref 32–75)
NITRITE UR QL STRIP.AUTO: NEGATIVE
NRBC # BLD: 0 K/UL (ref 0–0.01)
NRBC BLD-RTO: 0 PER 100 WBC
PH UR STRIP: 7 [PH] (ref 5–8)
PLATELET # BLD AUTO: 153 K/UL (ref 150–400)
PMV BLD AUTO: 11.8 FL (ref 8.9–12.9)
POTASSIUM SERPL-SCNC: 4.8 MMOL/L (ref 3.5–5.1)
PROT UR STRIP-MCNC: 100 MG/DL
RBC # BLD AUTO: 3.09 M/UL (ref 3.8–5.2)
RBC #/AREA URNS HPF: ABNORMAL /HPF (ref 0–5)
SODIUM SERPL-SCNC: 140 MMOL/L (ref 136–145)
SP GR UR REFRACTOMETRY: 1.02 (ref 1–1.03)
UROBILINOGEN UR QL STRIP.AUTO: 1 EU/DL (ref 0.2–1)
WBC # BLD AUTO: 12.5 K/UL (ref 3.6–11)
WBC URNS QL MICRO: ABNORMAL /HPF (ref 0–4)

## 2020-02-04 PROCEDURE — 85025 COMPLETE CBC W/AUTO DIFF WBC: CPT

## 2020-02-04 PROCEDURE — 80048 BASIC METABOLIC PNL TOTAL CA: CPT

## 2020-02-04 PROCEDURE — 87086 URINE CULTURE/COLONY COUNT: CPT

## 2020-02-04 PROCEDURE — 36415 COLL VENOUS BLD VENIPUNCTURE: CPT

## 2020-02-04 PROCEDURE — 87040 BLOOD CULTURE FOR BACTERIA: CPT

## 2020-02-04 PROCEDURE — 81001 URINALYSIS AUTO W/SCOPE: CPT

## 2020-02-04 PROCEDURE — 71046 X-RAY EXAM CHEST 2 VIEWS: CPT

## 2020-02-05 LAB
BACTERIA SPEC CULT: NORMAL
CC UR VC: NORMAL
SERVICE CMNT-IMP: NORMAL

## 2020-02-06 LAB
ANION GAP SERPL CALC-SCNC: 2 MMOL/L (ref 5–15)
BASOPHILS # BLD: 0 K/UL (ref 0–0.1)
BASOPHILS NFR BLD: 0 % (ref 0–1)
BUN SERPL-MCNC: 24 MG/DL (ref 6–20)
BUN/CREAT SERPL: 19 (ref 12–20)
CALCIUM SERPL-MCNC: 9.3 MG/DL (ref 8.5–10.1)
CHLORIDE SERPL-SCNC: 106 MMOL/L (ref 97–108)
CO2 SERPL-SCNC: 30 MMOL/L (ref 21–32)
CREAT SERPL-MCNC: 1.27 MG/DL (ref 0.55–1.02)
DIFFERENTIAL METHOD BLD: ABNORMAL
EOSINOPHIL # BLD: 0.2 K/UL (ref 0–0.4)
EOSINOPHIL NFR BLD: 2 % (ref 0–7)
ERYTHROCYTE [DISTWIDTH] IN BLOOD BY AUTOMATED COUNT: 13.4 % (ref 11.5–14.5)
GLUCOSE SERPL-MCNC: 99 MG/DL (ref 65–100)
HCT VFR BLD AUTO: 26.8 % (ref 35–47)
HGB BLD-MCNC: 8.4 G/DL (ref 11.5–16)
IMM GRANULOCYTES # BLD AUTO: 0.1 K/UL (ref 0–0.04)
IMM GRANULOCYTES NFR BLD AUTO: 1 % (ref 0–0.5)
LYMPHOCYTES # BLD: 1.5 K/UL (ref 0.8–3.5)
LYMPHOCYTES NFR BLD: 16 % (ref 12–49)
MCH RBC QN AUTO: 32.4 PG (ref 26–34)
MCHC RBC AUTO-ENTMCNC: 31.3 G/DL (ref 30–36.5)
MCV RBC AUTO: 103.5 FL (ref 80–99)
MONOCYTES # BLD: 0.7 K/UL (ref 0–1)
MONOCYTES NFR BLD: 7 % (ref 5–13)
NEUTS SEG # BLD: 7.1 K/UL (ref 1.8–8)
NEUTS SEG NFR BLD: 74 % (ref 32–75)
NRBC # BLD: 0 K/UL (ref 0–0.01)
NRBC BLD-RTO: 0 PER 100 WBC
PLATELET # BLD AUTO: 152 K/UL (ref 150–400)
PMV BLD AUTO: 11.8 FL (ref 8.9–12.9)
POTASSIUM SERPL-SCNC: 4.1 MMOL/L (ref 3.5–5.1)
RBC # BLD AUTO: 2.59 M/UL (ref 3.8–5.2)
SODIUM SERPL-SCNC: 138 MMOL/L (ref 136–145)
WBC # BLD AUTO: 9.6 K/UL (ref 3.6–11)

## 2020-02-06 PROCEDURE — 85025 COMPLETE CBC W/AUTO DIFF WBC: CPT

## 2020-02-06 PROCEDURE — 80048 BASIC METABOLIC PNL TOTAL CA: CPT

## 2020-02-06 PROCEDURE — 36415 COLL VENOUS BLD VENIPUNCTURE: CPT

## 2020-02-09 LAB
BACTERIA SPEC CULT: NORMAL
SERVICE CMNT-IMP: NORMAL

## 2020-05-04 PROBLEM — N17.9 ACUTE RENAL FAILURE SUPERIMPOSED ON STAGE 4 CHRONIC KIDNEY DISEASE (HCC): Status: RESOLVED | Noted: 2018-01-25 | Resolved: 2020-05-04

## 2020-05-04 PROBLEM — N18.4 ACUTE RENAL FAILURE SUPERIMPOSED ON STAGE 4 CHRONIC KIDNEY DISEASE (HCC): Status: RESOLVED | Noted: 2018-01-25 | Resolved: 2020-05-04

## 2020-06-05 ENCOUNTER — APPOINTMENT (OUTPATIENT)
Dept: CT IMAGING | Age: 80
DRG: 920 | End: 2020-06-05
Attending: PHYSICIAN ASSISTANT
Payer: MEDICARE

## 2020-06-05 ENCOUNTER — HOSPITAL ENCOUNTER (INPATIENT)
Age: 80
LOS: 2 days | Discharge: HOME | DRG: 920 | End: 2020-06-09
Attending: EMERGENCY MEDICINE | Admitting: SURGERY
Payer: MEDICARE

## 2020-06-05 DIAGNOSIS — K56.600 PARTIAL INTESTINAL OBSTRUCTION, UNSPECIFIED CAUSE (HCC): Primary | ICD-10-CM

## 2020-06-05 LAB
ALBUMIN SERPL-MCNC: 4 G/DL (ref 3.5–5)
ALBUMIN/GLOB SERPL: 1.1 {RATIO} (ref 1.1–2.2)
ALP SERPL-CCNC: 70 U/L (ref 45–117)
ALT SERPL-CCNC: 24 U/L (ref 12–78)
ANION GAP SERPL CALC-SCNC: 9 MMOL/L (ref 5–15)
AST SERPL-CCNC: 15 U/L (ref 15–37)
BASOPHILS # BLD: 0 K/UL (ref 0–0.1)
BASOPHILS NFR BLD: 0 % (ref 0–1)
BILIRUB SERPL-MCNC: 0.4 MG/DL (ref 0.2–1)
BUN SERPL-MCNC: 35 MG/DL (ref 6–20)
BUN/CREAT SERPL: 24 (ref 12–20)
CALCIUM SERPL-MCNC: 10.8 MG/DL (ref 8.5–10.1)
CHLORIDE SERPL-SCNC: 110 MMOL/L (ref 97–108)
CO2 SERPL-SCNC: 24 MMOL/L (ref 21–32)
COMMENT, HOLDF: NORMAL
CREAT SERPL-MCNC: 1.44 MG/DL (ref 0.55–1.02)
DIFFERENTIAL METHOD BLD: ABNORMAL
EOSINOPHIL # BLD: 0.1 K/UL (ref 0–0.4)
EOSINOPHIL NFR BLD: 1 % (ref 0–7)
ERYTHROCYTE [DISTWIDTH] IN BLOOD BY AUTOMATED COUNT: 14.3 % (ref 11.5–14.5)
GLOBULIN SER CALC-MCNC: 3.5 G/DL (ref 2–4)
GLUCOSE SERPL-MCNC: 115 MG/DL (ref 65–100)
HCT VFR BLD AUTO: 40 % (ref 35–47)
HGB BLD-MCNC: 12.4 G/DL (ref 11.5–16)
IMM GRANULOCYTES # BLD AUTO: 0 K/UL (ref 0–0.04)
IMM GRANULOCYTES NFR BLD AUTO: 0 % (ref 0–0.5)
LIPASE SERPL-CCNC: 356 U/L (ref 73–393)
LYMPHOCYTES # BLD: 1.1 K/UL (ref 0.8–3.5)
LYMPHOCYTES NFR BLD: 14 % (ref 12–49)
MCH RBC QN AUTO: 32.5 PG (ref 26–34)
MCHC RBC AUTO-ENTMCNC: 31 G/DL (ref 30–36.5)
MCV RBC AUTO: 105 FL (ref 80–99)
MONOCYTES # BLD: 0.4 K/UL (ref 0–1)
MONOCYTES NFR BLD: 5 % (ref 5–13)
NEUTS SEG # BLD: 6.2 K/UL (ref 1.8–8)
NEUTS SEG NFR BLD: 80 % (ref 32–75)
NRBC # BLD: 0 K/UL (ref 0–0.01)
NRBC BLD-RTO: 0 PER 100 WBC
PLATELET # BLD AUTO: 145 K/UL (ref 150–400)
PMV BLD AUTO: 11.5 FL (ref 8.9–12.9)
POTASSIUM SERPL-SCNC: 4.2 MMOL/L (ref 3.5–5.1)
PROT SERPL-MCNC: 7.5 G/DL (ref 6.4–8.2)
RBC # BLD AUTO: 3.81 M/UL (ref 3.8–5.2)
SAMPLES BEING HELD,HOLD: NORMAL
SODIUM SERPL-SCNC: 143 MMOL/L (ref 136–145)
WBC # BLD AUTO: 7.8 K/UL (ref 3.6–11)

## 2020-06-05 PROCEDURE — 83690 ASSAY OF LIPASE: CPT

## 2020-06-05 PROCEDURE — 74011250636 HC RX REV CODE- 250/636: Performed by: PHYSICIAN ASSISTANT

## 2020-06-05 PROCEDURE — 99285 EMERGENCY DEPT VISIT HI MDM: CPT

## 2020-06-05 PROCEDURE — 96374 THER/PROPH/DIAG INJ IV PUSH: CPT

## 2020-06-05 PROCEDURE — 85025 COMPLETE CBC W/AUTO DIFF WBC: CPT

## 2020-06-05 PROCEDURE — 80053 COMPREHEN METABOLIC PANEL: CPT

## 2020-06-05 PROCEDURE — 74176 CT ABD & PELVIS W/O CONTRAST: CPT

## 2020-06-05 PROCEDURE — 36415 COLL VENOUS BLD VENIPUNCTURE: CPT

## 2020-06-05 PROCEDURE — 96375 TX/PRO/DX INJ NEW DRUG ADDON: CPT

## 2020-06-05 PROCEDURE — 93005 ELECTROCARDIOGRAM TRACING: CPT

## 2020-06-05 RX ORDER — ACETAMINOPHEN 325 MG/1
650 TABLET ORAL
Status: DISCONTINUED | OUTPATIENT
Start: 2020-06-05 | End: 2020-06-09 | Stop reason: HOSPADM

## 2020-06-05 RX ORDER — DIPHENHYDRAMINE HYDROCHLORIDE 50 MG/ML
12.5 INJECTION, SOLUTION INTRAMUSCULAR; INTRAVENOUS
Status: DISCONTINUED | OUTPATIENT
Start: 2020-06-05 | End: 2020-06-09 | Stop reason: HOSPADM

## 2020-06-05 RX ORDER — MORPHINE SULFATE 2 MG/ML
2-4 INJECTION, SOLUTION INTRAMUSCULAR; INTRAVENOUS
Status: DISCONTINUED | OUTPATIENT
Start: 2020-06-05 | End: 2020-06-09 | Stop reason: HOSPADM

## 2020-06-05 RX ORDER — ONDANSETRON 2 MG/ML
4 INJECTION INTRAMUSCULAR; INTRAVENOUS
Status: DISCONTINUED | OUTPATIENT
Start: 2020-06-05 | End: 2020-06-09 | Stop reason: HOSPADM

## 2020-06-05 RX ORDER — MORPHINE SULFATE 2 MG/ML
2 INJECTION, SOLUTION INTRAMUSCULAR; INTRAVENOUS
Status: COMPLETED | OUTPATIENT
Start: 2020-06-05 | End: 2020-06-05

## 2020-06-05 RX ORDER — NALOXONE HYDROCHLORIDE 0.4 MG/ML
0.4 INJECTION, SOLUTION INTRAMUSCULAR; INTRAVENOUS; SUBCUTANEOUS AS NEEDED
Status: DISCONTINUED | OUTPATIENT
Start: 2020-06-05 | End: 2020-06-09 | Stop reason: HOSPADM

## 2020-06-05 RX ORDER — SODIUM CHLORIDE 0.9 % (FLUSH) 0.9 %
10 SYRINGE (ML) INJECTION
Status: DISCONTINUED | OUTPATIENT
Start: 2020-06-05 | End: 2020-06-05

## 2020-06-05 RX ORDER — SODIUM CHLORIDE, SODIUM LACTATE, POTASSIUM CHLORIDE, CALCIUM CHLORIDE 600; 310; 30; 20 MG/100ML; MG/100ML; MG/100ML; MG/100ML
100 INJECTION, SOLUTION INTRAVENOUS CONTINUOUS
Status: DISCONTINUED | OUTPATIENT
Start: 2020-06-05 | End: 2020-06-09 | Stop reason: HOSPADM

## 2020-06-05 RX ORDER — SODIUM CHLORIDE 0.9 % (FLUSH) 0.9 %
5-40 SYRINGE (ML) INJECTION AS NEEDED
Status: DISCONTINUED | OUTPATIENT
Start: 2020-06-05 | End: 2020-06-09 | Stop reason: HOSPADM

## 2020-06-05 RX ORDER — ONDANSETRON 2 MG/ML
4 INJECTION INTRAMUSCULAR; INTRAVENOUS
Status: COMPLETED | OUTPATIENT
Start: 2020-06-05 | End: 2020-06-05

## 2020-06-05 RX ORDER — HEPARIN SODIUM 10000 [USP'U]/100ML
18-36 INJECTION, SOLUTION INTRAVENOUS
Status: DISCONTINUED | OUTPATIENT
Start: 2020-06-05 | End: 2020-06-09 | Stop reason: SDUPTHER

## 2020-06-05 RX ORDER — SODIUM CHLORIDE 0.9 % (FLUSH) 0.9 %
5-40 SYRINGE (ML) INJECTION EVERY 8 HOURS
Status: DISCONTINUED | OUTPATIENT
Start: 2020-06-05 | End: 2020-06-09 | Stop reason: HOSPADM

## 2020-06-05 RX ADMIN — ONDANSETRON 4 MG: 2 INJECTION INTRAMUSCULAR; INTRAVENOUS at 22:07

## 2020-06-05 RX ADMIN — SODIUM CHLORIDE 1000 ML: 900 INJECTION, SOLUTION INTRAVENOUS at 22:06

## 2020-06-05 RX ADMIN — MORPHINE SULFATE 2 MG: 2 INJECTION, SOLUTION INTRAMUSCULAR; INTRAVENOUS at 22:07

## 2020-06-05 NOTE — ED TRIAGE NOTES
Pt arrives c/o abdominal pain that started around 2:30pm today. Pt reports vomiting en route greenish/brown.

## 2020-06-06 LAB
ANION GAP SERPL CALC-SCNC: 5 MMOL/L (ref 5–15)
APTT PPP: 25.1 SEC (ref 22.1–32)
APTT PPP: 67.8 SEC (ref 22.1–32)
APTT PPP: 73.2 SEC (ref 22.1–32)
BASOPHILS # BLD: 0 K/UL (ref 0–0.1)
BASOPHILS NFR BLD: 0 % (ref 0–1)
BUN SERPL-MCNC: 30 MG/DL (ref 6–20)
BUN/CREAT SERPL: 24 (ref 12–20)
CALCIUM SERPL-MCNC: 9.3 MG/DL (ref 8.5–10.1)
CHLORIDE SERPL-SCNC: 113 MMOL/L (ref 97–108)
CO2 SERPL-SCNC: 25 MMOL/L (ref 21–32)
CREAT SERPL-MCNC: 1.26 MG/DL (ref 0.55–1.02)
DIFFERENTIAL METHOD BLD: ABNORMAL
EOSINOPHIL # BLD: 0.1 K/UL (ref 0–0.4)
EOSINOPHIL NFR BLD: 2 % (ref 0–7)
ERYTHROCYTE [DISTWIDTH] IN BLOOD BY AUTOMATED COUNT: 14.2 % (ref 11.5–14.5)
GLUCOSE SERPL-MCNC: 121 MG/DL (ref 65–100)
HCT VFR BLD AUTO: 32.7 % (ref 35–47)
HGB BLD-MCNC: 10.2 G/DL (ref 11.5–16)
IMM GRANULOCYTES # BLD AUTO: 0 K/UL (ref 0–0.04)
IMM GRANULOCYTES NFR BLD AUTO: 0 % (ref 0–0.5)
LYMPHOCYTES # BLD: 0.6 K/UL (ref 0.8–3.5)
LYMPHOCYTES NFR BLD: 13 % (ref 12–49)
MAGNESIUM SERPL-MCNC: 1.6 MG/DL (ref 1.6–2.4)
MCH RBC QN AUTO: 32.6 PG (ref 26–34)
MCHC RBC AUTO-ENTMCNC: 31.2 G/DL (ref 30–36.5)
MCV RBC AUTO: 104.5 FL (ref 80–99)
MONOCYTES # BLD: 0.6 K/UL (ref 0–1)
MONOCYTES NFR BLD: 13 % (ref 5–13)
NEUTS SEG # BLD: 3.3 K/UL (ref 1.8–8)
NEUTS SEG NFR BLD: 72 % (ref 32–75)
NRBC # BLD: 0 K/UL (ref 0–0.01)
NRBC BLD-RTO: 0 PER 100 WBC
PHOSPHATE SERPL-MCNC: 4 MG/DL (ref 2.6–4.7)
PLATELET # BLD AUTO: 113 K/UL (ref 150–400)
PMV BLD AUTO: 11.8 FL (ref 8.9–12.9)
POTASSIUM SERPL-SCNC: 4.3 MMOL/L (ref 3.5–5.1)
RBC # BLD AUTO: 3.13 M/UL (ref 3.8–5.2)
RBC MORPH BLD: ABNORMAL
SODIUM SERPL-SCNC: 143 MMOL/L (ref 136–145)
THERAPEUTIC RANGE,PTTT: ABNORMAL SECS (ref 58–77)
THERAPEUTIC RANGE,PTTT: ABNORMAL SECS (ref 58–77)
THERAPEUTIC RANGE,PTTT: NORMAL SECS (ref 58–77)
WBC # BLD AUTO: 4.6 K/UL (ref 3.6–11)

## 2020-06-06 PROCEDURE — 84100 ASSAY OF PHOSPHORUS: CPT

## 2020-06-06 PROCEDURE — 96366 THER/PROPH/DIAG IV INF ADDON: CPT

## 2020-06-06 PROCEDURE — 83735 ASSAY OF MAGNESIUM: CPT

## 2020-06-06 PROCEDURE — 80048 BASIC METABOLIC PNL TOTAL CA: CPT

## 2020-06-06 PROCEDURE — 99218 HC RM OBSERVATION: CPT

## 2020-06-06 PROCEDURE — 85730 THROMBOPLASTIN TIME PARTIAL: CPT

## 2020-06-06 PROCEDURE — 96365 THER/PROPH/DIAG IV INF INIT: CPT

## 2020-06-06 PROCEDURE — 74011250636 HC RX REV CODE- 250/636: Performed by: SURGERY

## 2020-06-06 PROCEDURE — 85025 COMPLETE CBC W/AUTO DIFF WBC: CPT

## 2020-06-06 PROCEDURE — 74011250637 HC RX REV CODE- 250/637: Performed by: SURGERY

## 2020-06-06 PROCEDURE — 96376 TX/PRO/DX INJ SAME DRUG ADON: CPT

## 2020-06-06 PROCEDURE — 36415 COLL VENOUS BLD VENIPUNCTURE: CPT

## 2020-06-06 RX ORDER — DILTIAZEM HYDROCHLORIDE 240 MG/1
240 CAPSULE, COATED, EXTENDED RELEASE ORAL DAILY
Status: DISCONTINUED | OUTPATIENT
Start: 2020-06-06 | End: 2020-06-09 | Stop reason: HOSPADM

## 2020-06-06 RX ORDER — LORATADINE 10 MG/1
10 TABLET ORAL DAILY
Status: DISCONTINUED | OUTPATIENT
Start: 2020-06-06 | End: 2020-06-09 | Stop reason: HOSPADM

## 2020-06-06 RX ORDER — ATORVASTATIN CALCIUM 10 MG/1
10 TABLET, FILM COATED ORAL DAILY
Status: DISCONTINUED | OUTPATIENT
Start: 2020-06-06 | End: 2020-06-09 | Stop reason: HOSPADM

## 2020-06-06 RX ORDER — ALBUTEROL SULFATE 0.83 MG/ML
2.5 SOLUTION RESPIRATORY (INHALATION)
Status: DISCONTINUED | OUTPATIENT
Start: 2020-06-06 | End: 2020-06-09 | Stop reason: HOSPADM

## 2020-06-06 RX ORDER — ALLOPURINOL 100 MG/1
100 TABLET ORAL DAILY
Status: DISCONTINUED | OUTPATIENT
Start: 2020-06-06 | End: 2020-06-09 | Stop reason: HOSPADM

## 2020-06-06 RX ADMIN — SODIUM CHLORIDE, SODIUM LACTATE, POTASSIUM CHLORIDE, AND CALCIUM CHLORIDE 100 ML/HR: 600; 310; 30; 20 INJECTION, SOLUTION INTRAVENOUS at 10:20

## 2020-06-06 RX ADMIN — MORPHINE SULFATE 2 MG: 2 INJECTION, SOLUTION INTRAMUSCULAR; INTRAVENOUS at 05:21

## 2020-06-06 RX ADMIN — SODIUM CHLORIDE, SODIUM LACTATE, POTASSIUM CHLORIDE, AND CALCIUM CHLORIDE 100 ML/HR: 600; 310; 30; 20 INJECTION, SOLUTION INTRAVENOUS at 00:49

## 2020-06-06 RX ADMIN — SODIUM CHLORIDE, SODIUM LACTATE, POTASSIUM CHLORIDE, AND CALCIUM CHLORIDE 100 ML/HR: 600; 310; 30; 20 INJECTION, SOLUTION INTRAVENOUS at 20:20

## 2020-06-06 RX ADMIN — ATORVASTATIN CALCIUM 10 MG: 10 TABLET, FILM COATED ORAL at 09:44

## 2020-06-06 RX ADMIN — Medication 10 ML: at 05:21

## 2020-06-06 RX ADMIN — HEPARIN SODIUM AND DEXTROSE 18 UNITS/KG/HR: 10000; 5 INJECTION INTRAVENOUS at 01:35

## 2020-06-06 RX ADMIN — LORATADINE 10 MG: 10 TABLET ORAL at 09:44

## 2020-06-06 RX ADMIN — HEPARIN SODIUM AND DEXTROSE 18 UNITS/KG/HR: 10000; 5 INJECTION INTRAVENOUS at 20:20

## 2020-06-06 RX ADMIN — MORPHINE SULFATE 2 MG: 2 INJECTION, SOLUTION INTRAMUSCULAR; INTRAVENOUS at 01:04

## 2020-06-06 NOTE — ROUTINE PROCESS
TRANSFER - OUT REPORT: 
 
Verbal report given to LISSETTE aMck(name) on [employer]  being transferred to 6E(unit) for routine progression of care Report consisted of patients Situation, Background, Assessment and  
Recommendations(SBAR). Information from the following report(s) SBAR, ED Summary, [facility] and Recent Results was reviewed with the receiving nurse. Lines:  
Peripheral IV 06/05/20 Left Antecubital (Active) Site Assessment Clean, dry, & intact 6/5/2020  8:02 PM  
Phlebitis Assessment 0 6/5/2020  8:02 PM  
Infiltration Assessment 0 6/5/2020  8:02 PM  
Dressing Status Clean, dry, & intact 6/5/2020  8:02 PM  
Dressing Type Transparent 6/5/2020  8:02 PM  
Hub Color/Line Status Patent; Flushed;Capped;Pink 6/5/2020  8:02 PM  
Action Taken Blood drawn 6/5/2020  8:02 PM  
  
 
Opportunity for questions and clarification was provided. Patient transported with: 
 [employer]

## 2020-06-06 NOTE — PROGRESS NOTES
Progress Note    Patient: Kody Roy MRN: [id number]  SSN: [id number]    YOB: 1940  Age: 78 y.o. Sex: female      Admit Date: 2020    * No surgery found *    Procedure:      Subjective:     Patient states that her belly feels back to normal . She is passing gas and having BM. Objective:     Visit Vitals  /61   Pulse 81   Temp 99.1 °F (37.3 °C)   Resp 18   Ht 5' 7\" (1.702 m)   Wt 162 lb (73.5 kg)   SpO2 97%   BMI 25.37 kg/m²       Temp (24hrs), Av.5 °F (36.9 °C), Min:97.3 °F (36.3 °C), Max:99.1 °F (37.3 °C)      Physical Exam:    Gen- Alert in NAD  Lungs- CTA  H- RRR   Abd- S/nt. nd    Data Review: images and reports reviewed    Lab Review: All lab results for the last 24 hours reviewed. Recent Results (from the past 24 hour(s))   EKG, 12 LEAD, INITIAL    Collection Time: 20  7:52 PM   Result Value Ref Range    Ventricular Rate 93 BPM    Atrial Rate 90 BPM    QRS Duration 82 ms    Q-T Interval 328 ms    QTC Calculation (Bezet) 407 ms    Calculated R Axis 4 degrees    Calculated T Axis 30 degrees    Diagnosis       Undetermined rhythm  When compared with ECG of 15-GISSELL-2020 07:35,  Current undetermined rhythm precludes rhythm comparison, needs review     CBC WITH AUTOMATED DIFF    Collection Time: 20  7:55 PM   Result Value Ref Range    WBC 7.8 3.6 - 11.0 K/uL    RBC 3.81 3.80 - 5.20 M/uL    HGB 12.4 11.5 - 16.0 g/dL    HCT 40.0 35.0 - 47.0 %    .0 (H) 80.0 - 99.0 FL    MCH 32.5 26.0 - 34.0 PG    MCHC 31.0 30.0 - 36.5 g/dL    RDW 14.3 11.5 - 14.5 %    PLATELET 665 (L) 471 - 400 K/uL    MPV 11.5 8.9 - 12.9 FL    NRBC 0.0 0  WBC    ABSOLUTE NRBC 0.00 0.00 - 0.01 K/uL    NEUTROPHILS 80 (H) 32 - 75 %    LYMPHOCYTES 14 12 - 49 %    MONOCYTES 5 5 - 13 %    EOSINOPHILS 1 0 - 7 %    BASOPHILS 0 0 - 1 %    IMMATURE GRANULOCYTES 0 0.0 - 0.5 %    ABS. NEUTROPHILS 6.2 1.8 - 8.0 K/UL    ABS. LYMPHOCYTES 1.1 0.8 - 3.5 K/UL    ABS. MONOCYTES 0.4 0.0 - 1.0 K/UL    ABS. EOSINOPHILS 0.1 0.0 - 0.4 K/UL    ABS. BASOPHILS 0.0 0.0 - 0.1 K/UL    ABS. IMM. GRANS. 0.0 0.00 - 0.04 K/UL    DF AUTOMATED     SAMPLES BEING HELD    Collection Time: 06/05/20  7:55 PM   Result Value Ref Range    SAMPLES BEING HELD 1blue,1pst,1red     COMMENT        Add-on orders for these samples will be processed based on acceptable specimen integrity and analyte stability, which may vary by analyte. METABOLIC PANEL, COMPREHENSIVE    Collection Time: 06/05/20  7:55 PM   Result Value Ref Range    Sodium 143 136 - 145 mmol/L    Potassium 4.2 3.5 - 5.1 mmol/L    Chloride 110 (H) 97 - 108 mmol/L    CO2 24 21 - 32 mmol/L    Anion gap 9 5 - 15 mmol/L    Glucose 115 (H) 65 - 100 mg/dL    BUN 35 (H) 6 - 20 MG/DL    Creatinine 1.44 (H) 0.55 - 1.02 MG/DL    BUN/Creatinine ratio 24 (H) 12 - 20      GFR est AA 42 (L) >60 ml/min/1.73m2    GFR est non-AA 35 (L) >60 ml/min/1.73m2    Calcium 10.8 (H) 8.5 - 10.1 MG/DL    Bilirubin, total 0.4 0.2 - 1.0 MG/DL    ALT (SGPT) 24 12 - 78 U/L    AST (SGOT) 15 15 - 37 U/L    Alk.  phosphatase 70 45 - 117 U/L    Protein, total 7.5 6.4 - 8.2 g/dL    Albumin 4.0 3.5 - 5.0 g/dL    Globulin 3.5 2.0 - 4.0 g/dL    A-G Ratio 1.1 1.1 - 2.2     LIPASE    Collection Time: 06/05/20  7:55 PM   Result Value Ref Range    Lipase 356 73 - 393 U/L   PTT    Collection Time: 06/06/20 12:58 AM   Result Value Ref Range    aPTT 25.1 22.1 - 32.0 sec    aPTT, therapeutic range     58.0 - 43.8 SECS   METABOLIC PANEL, BASIC    Collection Time: 06/06/20  7:01 AM   Result Value Ref Range    Sodium 143 136 - 145 mmol/L    Potassium 4.3 3.5 - 5.1 mmol/L    Chloride 113 (H) 97 - 108 mmol/L    CO2 25 21 - 32 mmol/L    Anion gap 5 5 - 15 mmol/L    Glucose 121 (H) 65 - 100 mg/dL    BUN 30 (H) 6 - 20 MG/DL    Creatinine 1.26 (H) 0.55 - 1.02 MG/DL    BUN/Creatinine ratio 24 (H) 12 - 20      GFR est AA 49 (L) >60 ml/min/1.73m2    GFR est non-AA 41 (L) >60 ml/min/1.73m2    Calcium 9.3 8.5 - 10.1 MG/DL   MAGNESIUM Collection Time: 06/06/20  7:01 AM   Result Value Ref Range    Magnesium 1.6 1.6 - 2.4 mg/dL   PHOSPHORUS    Collection Time: 06/06/20  7:01 AM   Result Value Ref Range    Phosphorus 4.0 2.6 - 4.7 MG/DL   CBC WITH AUTOMATED DIFF    Collection Time: 06/06/20  7:01 AM   Result Value Ref Range    WBC 4.6 3.6 - 11.0 K/uL    RBC 3.13 (L) 3.80 - 5.20 M/uL    HGB 10.2 (L) 11.5 - 16.0 g/dL    HCT 32.7 (L) 35.0 - 47.0 %    .5 (H) 80.0 - 99.0 FL    MCH 32.6 26.0 - 34.0 PG    MCHC 31.2 30.0 - 36.5 g/dL    RDW 14.2 11.5 - 14.5 %    PLATELET 662 (L) 357 - 400 K/uL    MPV 11.8 8.9 - 12.9 FL    NRBC 0.0 0  WBC    ABSOLUTE NRBC 0.00 0.00 - 0.01 K/uL    NEUTROPHILS 72 32 - 75 %    LYMPHOCYTES 13 12 - 49 %    MONOCYTES 13 5 - 13 %    EOSINOPHILS 2 0 - 7 %    BASOPHILS 0 0 - 1 %    IMMATURE GRANULOCYTES 0 0.0 - 0.5 %    ABS. NEUTROPHILS 3.3 1.8 - 8.0 K/UL    ABS. LYMPHOCYTES 0.6 (L) 0.8 - 3.5 K/UL    ABS. MONOCYTES 0.6 0.0 - 1.0 K/UL    ABS. EOSINOPHILS 0.1 0.0 - 0.4 K/UL    ABS. BASOPHILS 0.0 0.0 - 0.1 K/UL    ABS. IMM. GRANS. 0.0 0.00 - 0.04 K/UL    DF SMEAR SCANNED      RBC COMMENTS MACROCYTOSIS  1+       PTT    Collection Time: 06/06/20  7:01 AM   Result Value Ref Range    aPTT 67.8 (H) 22.1 - 32.0 sec    aPTT, therapeutic range     58.0 - 77.0 SECS   PTT    Collection Time: 06/06/20 12:01 PM   Result Value Ref Range    aPTT 73.2 (H) 22.1 - 32.0 sec    aPTT, therapeutic range     58.0 - 77.0 SECS       Assessment:     Hospital Problems  Date Reviewed: 6/5/2020          Codes Class Noted POA    SBO (small bowel obstruction) ([facility] ICD-10-CM: L91.125  ICD-9-CM: 560.9  8/18/2016 Unknown              Plan/Recommendations/Medical Decision Making: Will start clear liquid diet. Hopefully be able to advance tomorrow. Will keep her on heparin gtt for now even though operative intervention seems less likely- GI will see patient today. Will await their decision regarding removing stent.  If they do not want to do it this admission may be able to restart eliquis  PCP consult

## 2020-06-06 NOTE — H&P
Surgery History and Physical    Subjective:      Vincent He is a 78 y.o. female who presents complaining of abdominal pain with nausea and vomiting. This began today after she ate lunch. She states she has never had anything similar to this in the past.  She has been passing gas and her last bowel movement was this morning. Her past medical history is significant for a total colectomy for GI bleed. She usually has multiple bowel movements a day. She has also undergone a laparoscopic cholecystectomy that required an ERCP and stent afterwards. She still has a stent in place. Also  significant the patient has a history of leg clots and she is on Eliquis for this.   Patient Active Problem List    Diagnosis Date Noted    Arterial embolism and thrombosis of lower extremity ([address] 01/24/2020    Postprocedural hypotension 01/24/2020    Stage 3 chronic kidney disease ([address] 01/18/2020    Hypoxia 01/15/2020    Weakness of left lower extremity 01/15/2020    Pulmonary embolism ([address] 01/15/2020    DVT (deep venous thrombosis) ([address] 01/15/2020    Gallstone pancreatitis 12/27/2019    Acute cholecystitis 12/21/2019    Gallstone 12/14/2019    JOSE (acute kidney injury) ([address] 12/14/2019    Common bile duct calculus 12/14/2019    Abdominal pain 12/14/2019    Dehydration 01/29/2018    Bronchitis 01/29/2018    Paroxysmal SVT (supraventricular tachycardia) ([address] 05/02/2017    Obstruction of bowel ([address] 04/21/2017    Small bowel obstruction ([address] 04/21/2017    SBO (small bowel obstruction) ([address] 08/18/2016    Acute post-hemorrhagic anemia 09/08/2015    Postoperative hypovolemic shock 09/08/2015    Tachycardia 09/03/2015    Blood loss anemia 07/20/2014    GI bleed 07/20/2014    Abscess 03/22/2014    Cerebral thrombosis with cerebral infarction ([address] 03/18/2014    Wound dehiscence 03/16/2014    Hypertension     Hypercholesterolemia      Past Medical History:   Diagnosis Date    Abscess 3/22/2014 Lumbar area - MRSA    Arthritis     Common bile duct calculus 2019    Gout     Hypercholesterolemia     Hypertension     LBP (low back pain)     Other ill-defined conditions(799.89)     LEFT SHOULDER PAINFUL, NEEDS REPLACEMENT    Paroxysmal SVT (supraventricular tachycardia) ([facility] 2017    Thromboembolus ([facility] 13    left leg      Past Surgical History:   Procedure Laterality Date    ABDOMEN SURGERY PROC UNLISTED      hernia repair    BREAST SURGERY PROCEDURE UNLISTED      EXCISION OF BREAST CYST    ENDOSCOPY, COLON, DIAGNOSTIC      ostomy reversal    HX BREAST BIOPSY Right 1970    Benign    HX COLOSTOMY      HX GI      COLOSTOMY REVERSAL    HX HEMORRHOIDECTOMY      HX ORTHOPAEDIC      right knee  bilateral knee replacement    HX ORTHOPAEDIC      ORIF RIGHT ANKLE    HX ORTHOPAEDIC      KNEE ARTHROSCOPY      Social History     Tobacco Use    Smoking status: Former Smoker     Packs/day: 1.00     Years: 20.00     Pack years: 20.00     Last attempt to quit: 1972     Years since quittin.1    Smokeless tobacco: Never Used   Substance Use Topics    Alcohol use: Yes     Alcohol/week: 1.7 standard drinks     Types: 2 Shots of liquor per week     Comment: 2 DAY      Family History   Problem Relation Age of Onset    Hypertension Father     Anesth Problems Neg Hx       Prior to Admission medications    Medication Sig Start Date End Date Taking? Authorizing Provider   cyanocobalamin (VITAMIN B12) 1,000 mcg/mL injection inject 1 milliliter subcutaneously EVERY 2 MONTHS 20   Jarad oTm MD   apixaban (ELIQUIS) 5 mg tablet Take 1 Tab by mouth every twelve (12) hours. 20   Jarad Tom MD   dilTIAZem ER (DILACOR XR) 240 mg capsule Take 1 Cap by mouth daily.  20   Jarad Tom MD   allopurinoL (ZYLOPRIM) 100 mg tablet TAKE 1 TABLET BY MOUTH EVERY DAY 3/27/20   Jarad Tom MD   loratadine (CLARITIN) 10 mg tablet TAKE 1 TABLET BY MOUTH EVERY DAY AS NEEDED FOR ALLERGY SYMPTOMS 3/4/20   Jessica Mason MD   acetaminophen (TYLENOL EXTRA STRENGTH) 500 mg tablet Take 500 mg by mouth two (2) times daily as needed for Pain. Provider, Historical   lovastatin (MEVACOR) 20 mg tablet TAKE 1 TABLET BY MOUTH AT BEDTIME 12/9/19   Jessica Mason MD   albuterol (PROVENTIL HFA, VENTOLIN HFA, PROAIR HFA) 90 mcg/actuation inhaler Take 2 Puffs by inhalation every four (4) hours as needed for Wheezing. 1/26/18   Jessica Mason MD   calcium-vitamin D (OYSTER SHELL) 500 mg(1,250mg) -200 unit per tablet Take 1 Tab by mouth daily. Provider, Historical   multivit-min-iron-FA-lutein (CENTRUM SILVER WOMEN) 8 mg iron-400 mcg-300 mcg tab Take 1 Tab by mouth daily. Provider, Historical   cholecalciferol (VITAMIN D3) 1,000 unit tablet Take 1,000 Units by mouth daily. Provider, Historical     Allergies   Allergen Reactions    Shellfish Derived Angioedema        Review of Systems:    A comprehensive review of systems was negative except for that written in the History of Present Illness. Objective:     Visit Vitals  /87   Pulse 87   Temp 97.3 °F (36.3 °C)   Resp 18   Ht 5' 7\" (1.702 m)   Wt 162 lb (73.5 kg)   SpO2 100%   BMI 25.37 kg/m²       Physical Exam:    GENERAL: alert, cooperative, no distress, appears stated age, EYE: negative, THROAT & NECK: normal, LUNG: clear to auscultation bilaterally, HEART: regular rate and rhythm, ABDOMEN: Soft nontender nondistended normal bowel sounds there are multiple abdominal wall hernias nonobstructing, EXTREMITIES:  no edema, SKIN: Normal., NEUROLOGIC: negative, PSYCH: non focal    Imaging:  images and reports reviewed-discussed directly with radiology  CT- Partial small bowel obstruction secondary to ventral hernia  New pancreatic ductal dilatation. Opacified common bile duct stent. Recommend  ultrasound or MRCP to further evaluate, to exclude a mass.    Lab Review:    Recent Results (from the past 24 hour(s))   EKG, 12 LEAD, INITIAL    Collection Time: 06/05/20  7:52 PM   Result Value Ref Range    Ventricular Rate 93 BPM    Atrial Rate 90 BPM    QRS Duration 82 ms    Q-T Interval 328 ms    QTC Calculation (Bezet) 407 ms    Calculated R Axis 4 degrees    Calculated T Axis 30 degrees    Diagnosis       Undetermined rhythm  When compared with ECG of 15-GISSELL-2020 07:35,  Current undetermined rhythm precludes rhythm comparison, needs review     CBC WITH AUTOMATED DIFF    Collection Time: 06/05/20  7:55 PM   Result Value Ref Range    WBC 7.8 3.6 - 11.0 K/uL    RBC 3.81 3.80 - 5.20 M/uL    HGB 12.4 11.5 - 16.0 g/dL    HCT 40.0 35.0 - 47.0 %    .0 (H) 80.0 - 99.0 FL    MCH 32.5 26.0 - 34.0 PG    MCHC 31.0 30.0 - 36.5 g/dL    RDW 14.3 11.5 - 14.5 %    PLATELET 751 (L) 685 - 400 K/uL    MPV 11.5 8.9 - 12.9 FL    NRBC 0.0 0  WBC    ABSOLUTE NRBC 0.00 0.00 - 0.01 K/uL    NEUTROPHILS 80 (H) 32 - 75 %    LYMPHOCYTES 14 12 - 49 %    MONOCYTES 5 5 - 13 %    EOSINOPHILS 1 0 - 7 %    BASOPHILS 0 0 - 1 %    IMMATURE GRANULOCYTES 0 0.0 - 0.5 %    ABS. NEUTROPHILS 6.2 1.8 - 8.0 K/UL    ABS. LYMPHOCYTES 1.1 0.8 - 3.5 K/UL    ABS. MONOCYTES 0.4 0.0 - 1.0 K/UL    ABS. EOSINOPHILS 0.1 0.0 - 0.4 K/UL    ABS. BASOPHILS 0.0 0.0 - 0.1 K/UL    ABS. IMM. GRANS. 0.0 0.00 - 0.04 K/UL    DF AUTOMATED     SAMPLES BEING HELD    Collection Time: 06/05/20  7:55 PM   Result Value Ref Range    SAMPLES BEING HELD 1blue,1pst,1red     COMMENT        Add-on orders for these samples will be processed based on acceptable specimen integrity and analyte stability, which may vary by analyte.    METABOLIC PANEL, COMPREHENSIVE    Collection Time: 06/05/20  7:55 PM   Result Value Ref Range    Sodium 143 136 - 145 mmol/L    Potassium 4.2 3.5 - 5.1 mmol/L    Chloride 110 (H) 97 - 108 mmol/L    CO2 24 21 - 32 mmol/L    Anion gap 9 5 - 15 mmol/L    Glucose 115 (H) 65 - 100 mg/dL    BUN 35 (H) 6 - 20 MG/DL    Creatinine 1.44 (H) 0.55 - 1.02 MG/DL    BUN/Creatinine ratio 24 (H) 12 - 20      GFR est AA 42 (L) >60 ml/min/1.73m2    GFR est non-AA 35 (L) >60 ml/min/1.73m2    Calcium 10.8 (H) 8.5 - 10.1 MG/DL    Bilirubin, total 0.4 0.2 - 1.0 MG/DL    ALT (SGPT) 24 12 - 78 U/L    AST (SGOT) 15 15 - 37 U/L    Alk. phosphatase 70 45 - 117 U/L    Protein, total 7.5 6.4 - 8.2 g/dL    Albumin 4.0 3.5 - 5.0 g/dL    Globulin 3.5 2.0 - 4.0 g/dL    A-G Ratio 1.1 1.1 - 2.2     LIPASE    Collection Time: 06/05/20  7:55 PM   Result Value Ref Range    Lipase 356 73 - 393 U/L       Assessment: Plan   It appears as though the patient small bowel dilatation is chronic in nature. I suspect she may have got some sort of food obstructed in this that hopefully will pass with conservative measures. It does not appear as though this dilatation is due to a obstructing hernia.   We will keep n.p.o. for now, IV fluids  Antiemetics  Pain control  Will place on heparin drip in case surgery should be necessary   will allow home medications  We will consult GI in regards to the biliary stent that has been there for 6 months possibly causing an obstruction of the pancreatic duct

## 2020-06-06 NOTE — PROGRESS NOTES
Patient examined, consult dictated. NPO after MN Sunday. Stop heparin at 4 AM Monday. Dr Waleska Madrid will resume care on Monday.

## 2020-06-06 NOTE — ED PROVIDER NOTES
78year old female history of hypertension, high cholesterol, DVT on Eliquis, subtotal colectomy for a GI bleed, SBO, presenting for vomiting. Pt note that she ate lunch and then about 30 minutes later started with moderately severe sharp abdominal pains that have been constant, worsening.  + N/V that started later, notes 3-4 episodes. Last BM this AM, normal.  No modifying factors noted.     Past medical history: As above  Past surgical history: As above, also had a cholecystectomy in 2019  Social: Non-smoker           Past Medical History:   Diagnosis Date    Abscess 3/22/2014    Lumbar area - MRSA    Arthritis     Common bile duct calculus 12/14/2019    Gout     Hypercholesterolemia     Hypertension     LBP (low back pain)     Other ill-defined conditions(799.89)     LEFT SHOULDER PAINFUL, NEEDS REPLACEMENT    Paroxysmal SVT (supraventricular tachycardia) ([address] 5/2/2017    Thromboembolus ([address] 12/26/13    left leg       Past Surgical History:   Procedure Laterality Date    ABDOMEN SURGERY PROC UNLISTED      hernia repair    BREAST SURGERY PROCEDURE UNLISTED      EXCISION OF BREAST CYST    ENDOSCOPY, COLON, DIAGNOSTIC      ostomy reversal    HX BREAST BIOPSY Right 1970    Benign    HX COLOSTOMY      HX GI      COLOSTOMY REVERSAL    HX HEMORRHOIDECTOMY      HX ORTHOPAEDIC      right knee  bilateral knee replacement    HX ORTHOPAEDIC      ORIF RIGHT ANKLE    HX ORTHOPAEDIC      KNEE ARTHROSCOPY         Family History:   Problem Relation Age of Onset    Hypertension Father     Anesth Problems Neg Hx        Social History     Socioeconomic History    Marital status:      Spouse name: Not on file    Number of children: Not on file    Years of education: Not on file    Highest education level: Not on file   Occupational History    Not on file   Social Needs    Financial resource strain: Not on file    Food insecurity     Worry: Not on file     Inability: Not on file   [facility] Transportation needs     Medical: Not on file     Non-medical: Not on file   Tobacco Use    Smoking status: Former Smoker     Packs/day: 1.00     Years: 20.00     Pack years: 20.00     Last attempt to quit: 1972     Years since quittin.1    Smokeless tobacco: Never Used   Substance and Sexual Activity    Alcohol use: Yes     Alcohol/week: 1.7 standard drinks     Types: 2 Shots of liquor per week     Comment: 2 DAY    Drug use: No    Sexual activity: Not on file   Lifestyle    Physical activity     Days per week: Not on file     Minutes per session: Not on file    Stress: Not on file   Relationships    Social connections     Talks on phone: Not on file     Gets together: Not on file     Attends [employer] service: Not on file     Active member of club or organization: Not on file     Attends meetings of clubs or organizations: Not on file     Relationship status: Not on file    Intimate partner violence     Fear of current or ex partner: Not on file     Emotionally abused: Not on file     Physically abused: Not on file     Forced sexual activity: Not on file   Other Topics Concern    Not on file   Social History Narrative    Not on file         ALLERGIES: Shellfish derived    Review of Systems   Constitutional: Negative for fever. HENT: Negative for facial swelling. Respiratory: Negative for shortness of breath. Cardiovascular: Negative for chest pain. Gastrointestinal: Positive for abdominal pain, nausea and vomiting. Skin: Negative for wound. Neurological: Negative for syncope. All other systems reviewed and are negative. Vitals:    20 2147 20 2149 20 2245 20 2300   BP: 143/90  144/85 146/87   Pulse:       Resp:       Temp:       SpO2:  92% 100% 100%   Weight:       Height:                Physical Exam  Vitals signs and nursing note reviewed. Constitutional:       General: She is not in acute distress. Appearance: She is well-developed. Comments: Pleasant, well-appearing elderly white female   HENT:      Head: Normocephalic and atraumatic. Right Ear: External ear normal.      Left Ear: External ear normal.   Eyes:      General: No scleral icterus. Conjunctiva/sclera: Conjunctivae normal.   Neck:      Musculoskeletal: Neck supple. Trachea: No tracheal deviation. Cardiovascular:      Rate and Rhythm: Normal rate and regular rhythm. Heart sounds: Normal heart sounds. No murmur. No friction rub. No gallop. Pulmonary:      Effort: Pulmonary effort is normal. No respiratory distress. Breath sounds: Normal breath sounds. No stridor. No wheezing. Abdominal:      General: There is no distension. Palpations: Abdomen is soft. Comments: Mild midline tenderness   Musculoskeletal: Normal range of motion. Skin:     General: Skin is warm and dry. Neurological:      Mental Status: She is alert and oriented to person, place, and time. Psychiatric:         Behavior: Behavior normal.          MDM  Number of Diagnoses or Management Options  Diagnosis management comments: 24-year-old female with somewhat extensive abdominal surgical history presenting to the ER for acute onset of abdominal pain with vomiting after eating. Also has a history of common bile duct stent. Differential diagnosis is broad, includes gastric outlet obstruction, pancreatitis, common bile duct obstruction, SBO, colitis, etc.  Will order labs, CT, treat symptoms and reassess. Labs largely within normal limits, CT remarkable for partial bowel obstruction at the level of ventral hernia, will consult general surgery.        Amount and/or Complexity of Data Reviewed  Clinical lab tests: ordered and reviewed  Tests in the radiology section of CPT®: ordered and reviewed  Discuss the patient with other providers: yes (Dr. Katherin Enrique ED attending)           Procedures

## 2020-06-06 NOTE — PROGRESS NOTES
Roxi Gill is a 78 y.o. wf , our group is asked to attend     CC: abdomen now feels fine     HPI   events noted. Admitted yesterday w/ PSBO by Gen Queen and also noted to new pancreatic  duct dilatation and opacified CBD stent on CT. She denies indigestion, nausea, or abd pain now.  Bowels have moved w/o rectal bleeding   Past Medical History:   Diagnosis Date    Abscess 3/22/2014    Lumbar area - MRSA    Arthritis     Common bile duct calculus 12/14/2019    Gout     Hypercholesterolemia     Hypertension     LBP (low back pain)     Other ill-defined conditions(799.89)     LEFT SHOULDER PAINFUL, NEEDS REPLACEMENT    Paroxysmal SVT (supraventricular tachycardia) ([address] 5/2/2017    Thromboembolus ([address] 12/26/13    left leg     Past Surgical History:   Procedure Laterality Date    ABDOMEN SURGERY PROC UNLISTED      hernia repair    BREAST SURGERY PROCEDURE UNLISTED      EXCISION OF BREAST CYST    ENDOSCOPY, COLON, DIAGNOSTIC      ostomy reversal    HX BREAST BIOPSY Right 1970    Benign    HX COLOSTOMY      HX GI      COLOSTOMY REVERSAL    HX HEMORRHOIDECTOMY      HX ORTHOPAEDIC      right knee  bilateral knee replacement    HX ORTHOPAEDIC      ORIF RIGHT ANKLE    HX ORTHOPAEDIC      KNEE ARTHROSCOPY     Current Facility-Administered Medications   Medication Dose Route Frequency Provider Last Rate Last Dose    albuterol (PROVENTIL VENTOLIN) nebulizer solution 2.5 mg  2.5 mg Nebulization Q4H PRN Trini Han MD        allopurinoL (ZYLOPRIM) tablet 100 mg  100 mg Oral DAILY Trini Han MD        dilTIAZem ER (CARDIZEM CD) capsule 240 mg  240 mg Oral DAILY Trini Han MD   Stopped at 06/06/20 1113    loratadine (CLARITIN) tablet 10 mg  10 mg Oral DAILY Trini Han MD   10 mg at 06/06/20 0944    atorvastatin (LIPITOR) tablet 10 mg  10 mg Oral DAILY Trini Han MD   10 mg at 06/06/20 0944    sodium chloride (NS) flush 5-40 mL  5-40 mL IntraVENous Bernie Butler MD   10 mL at 20 0521    sodium chloride (NS) flush 5-40 mL  5-40 mL IntraVENous PRN Dread Alexis MD        lactated Ringers infusion  100 mL/hr IntraVENous CONTINUOUS Dread Alexis  mL/hr at 20 1020 100 mL/hr at 20 1020    acetaminophen (TYLENOL) tablet 650 mg  650 mg Oral Q6H PRN Dread Alexis MD        morphine injection 2-4 mg  2-4 mg IntraVENous Q3H PRN Dread Alexis MD   2 mg at 20 0521    naloxone (NARCAN) injection 0.4 mg  0.4 mg IntraVENous PRN Dread Alexis MD        ondansetron [facility]) injection 4 mg  4 mg IntraVENous Q4H PRN Dread Alexis MD        diphenhydrAMINE (BENADRYL) injection 12.5 mg  12.5 mg IntraVENous Q6H PRN Dread Alexis MD        heparin 25,000 units in D5W 250 ml infusion  18-36 Units/kg/hr IntraVENous TITRATE Dread Alexis MD 13.2 mL/hr at 20 0809 18 Units/kg/hr at 20 0809     Allergies   Allergen Reactions    Shellfish Derived Angioedema     Family History   Problem Relation Age of Onset    Hypertension Father     Anesth Problems Neg Hx      Social History     Tobacco Use   Smoking Status Former Smoker    Packs/day: 1.00    Years: 20.00    Pack years: 20.00    Last attempt to quit: 1972    Years since quittin.1   Smokeless Tobacco Never Used       Review of Systems  Denies dyspnea. Cardiac:  Denies chest pain, palpitations, dizziness   Earlier today bp low nl this am so Cardizem held. bp has been labile in the past     Physical Examination:  Visit Vitals  /75   Pulse 83   Temp 97.3 °F (36.3 °C)   Resp 16   Ht 5' 7\" (1.702 m)   Wt 162 lb (73.5 kg)   SpO2 96%   BMI 25.37 kg/m²     Physical Exam  General:   Appears in no acute distress.    HEENT:   sclera anicteric                    Lungs:   Clear        Heart:  Regular without murmur, gallop or rub       Abdomen:   Benign exam without organomegaly or mass palpable  BS+ soft, no HSM, nt                 Rectal:     Extremities: No edema  Pulses: r/l DP 1+/2+  PT 2+/2+   Neurologic: Grossly nonfocal        Recent Results (from the past 8 hour(s))   PTT    Collection Time: 06/06/20 12:01 PM   Result Value Ref Range    aPTT 73.2 (H) 22.1 - 32.0 sec    aPTT, therapeutic range     58.0 - 77.0 SECS                 Results for orders placed or performed during the hospital encounter of 06/05/20   CBC WITH AUTOMATED DIFF   Result Value Ref Range    WBC 7.8 3.6 - 11.0 K/uL    RBC 3.81 3.80 - 5.20 M/uL    HGB 12.4 11.5 - 16.0 g/dL    HCT 40.0 35.0 - 47.0 %    .0 (H) 80.0 - 99.0 FL    MCH 32.5 26.0 - 34.0 PG    MCHC 31.0 30.0 - 36.5 g/dL    RDW 14.3 11.5 - 14.5 %    PLATELET 725 (L) 588 - 400 K/uL    MPV 11.5 8.9 - 12.9 FL    NRBC 0.0 0  WBC    ABSOLUTE NRBC 0.00 0.00 - 0.01 K/uL    NEUTROPHILS 80 (H) 32 - 75 %    LYMPHOCYTES 14 12 - 49 %    MONOCYTES 5 5 - 13 %    EOSINOPHILS 1 0 - 7 %    BASOPHILS 0 0 - 1 %    IMMATURE GRANULOCYTES 0 0.0 - 0.5 %    ABS. NEUTROPHILS 6.2 1.8 - 8.0 K/UL    ABS. LYMPHOCYTES 1.1 0.8 - 3.5 K/UL    ABS. MONOCYTES 0.4 0.0 - 1.0 K/UL    ABS. EOSINOPHILS 0.1 0.0 - 0.4 K/UL    ABS. BASOPHILS 0.0 0.0 - 0.1 K/UL    ABS. IMM. GRANS. 0.0 0.00 - 0.04 K/UL    DF AUTOMATED     SAMPLES BEING HELD   Result Value Ref Range    SAMPLES BEING HELD 1blue,1pst,1red     COMMENT        Add-on orders for these samples will be processed based on acceptable specimen integrity and analyte stability, which may vary by analyte.    METABOLIC PANEL, COMPREHENSIVE   Result Value Ref Range    Sodium 143 136 - 145 mmol/L    Potassium 4.2 3.5 - 5.1 mmol/L    Chloride 110 (H) 97 - 108 mmol/L    CO2 24 21 - 32 mmol/L    Anion gap 9 5 - 15 mmol/L    Glucose 115 (H) 65 - 100 mg/dL    BUN 35 (H) 6 - 20 MG/DL    Creatinine 1.44 (H) 0.55 - 1.02 MG/DL    BUN/Creatinine ratio 24 (H) 12 - 20      GFR est AA 42 (L) >60 ml/min/1.73m2    GFR est non-AA 35 (L) >60 ml/min/1.73m2    Calcium 10.8 (H) 8.5 - 10.1 MG/DL    Bilirubin, total 0.4 0.2 - 1.0 MG/DL    ALT (SGPT) 24 12 - 78 U/L    AST (SGOT) 15 15 - 37 U/L    Alk. phosphatase 70 45 - 117 U/L    Protein, total 7.5 6.4 - 8.2 g/dL    Albumin 4.0 3.5 - 5.0 g/dL    Globulin 3.5 2.0 - 4.0 g/dL    A-G Ratio 1.1 1.1 - 2.2     LIPASE   Result Value Ref Range    Lipase 356 73 - 241 U/L   METABOLIC PANEL, BASIC   Result Value Ref Range    Sodium 143 136 - 145 mmol/L    Potassium 4.3 3.5 - 5.1 mmol/L    Chloride 113 (H) 97 - 108 mmol/L    CO2 25 21 - 32 mmol/L    Anion gap 5 5 - 15 mmol/L    Glucose 121 (H) 65 - 100 mg/dL    BUN 30 (H) 6 - 20 MG/DL    Creatinine 1.26 (H) 0.55 - 1.02 MG/DL    BUN/Creatinine ratio 24 (H) 12 - 20      GFR est AA 49 (L) >60 ml/min/1.73m2    GFR est non-AA 41 (L) >60 ml/min/1.73m2    Calcium 9.3 8.5 - 10.1 MG/DL   MAGNESIUM   Result Value Ref Range    Magnesium 1.6 1.6 - 2.4 mg/dL   PHOSPHORUS   Result Value Ref Range    Phosphorus 4.0 2.6 - 4.7 MG/DL   CBC WITH AUTOMATED DIFF   Result Value Ref Range    WBC 4.6 3.6 - 11.0 K/uL    RBC 3.13 (L) 3.80 - 5.20 M/uL    HGB 10.2 (L) 11.5 - 16.0 g/dL    HCT 32.7 (L) 35.0 - 47.0 %    .5 (H) 80.0 - 99.0 FL    MCH 32.6 26.0 - 34.0 PG    MCHC 31.2 30.0 - 36.5 g/dL    RDW 14.2 11.5 - 14.5 %    PLATELET 495 (L) 830 - 400 K/uL    MPV 11.8 8.9 - 12.9 FL    NRBC 0.0 0  WBC    ABSOLUTE NRBC 0.00 0.00 - 0.01 K/uL    NEUTROPHILS 72 32 - 75 %    LYMPHOCYTES 13 12 - 49 %    MONOCYTES 13 5 - 13 %    EOSINOPHILS 2 0 - 7 %    BASOPHILS 0 0 - 1 %    IMMATURE GRANULOCYTES 0 0.0 - 0.5 %    ABS. NEUTROPHILS 3.3 1.8 - 8.0 K/UL    ABS. LYMPHOCYTES 0.6 (L) 0.8 - 3.5 K/UL    ABS. MONOCYTES 0.6 0.0 - 1.0 K/UL    ABS. EOSINOPHILS 0.1 0.0 - 0.4 K/UL    ABS. BASOPHILS 0.0 0.0 - 0.1 K/UL    ABS. IMM.  GRANS. 0.0 0.00 - 0.04 K/UL    DF SMEAR SCANNED      RBC COMMENTS MACROCYTOSIS  1+       PTT   Result Value Ref Range    aPTT 25.1 22.1 - 32.0 sec    aPTT, therapeutic range     58.0 - 77.0 SECS   PTT   Result Value Ref Range    aPTT 67.8 (H) 22.1 - 32.0 sec    aPTT, therapeutic range 58.0 - 77.0 SECS   PTT   Result Value Ref Range    aPTT 73.2 (H) 22.1 - 32.0 sec    aPTT, therapeutic range     58.0 - 77.0 SECS   EKG, 12 LEAD, INITIAL   Result Value Ref Range    Ventricular Rate 93 BPM    Atrial Rate 90 BPM    QRS Duration 82 ms    Q-T Interval 328 ms    QTC Calculation (Bezet) 407 ms    Calculated R Axis 4 degrees    Calculated T Axis 30 degrees    Diagnosis       Undetermined rhythm  When compared with ECG of 15-GISSELL-2020 07:35,  Current undetermined rhythm precludes rhythm comparison, needs review       Assessment/Plan    ICD-10-CM ICD-9-CM    1. Partial intestinal obstruction, unspecified cause (HCC) K56.600 560.9    2. Opacified CBD stent   3. New pancreatic duct dilatation  4. HTN with varying bp   5. CKD stage 3. Cr baseline   6. Hx LLE arterial embolus previously. In the hospital , she is off Eliquis and is on Heparin drip as CBD stent removal is being considered for 6/8/2020    P: continue IVF, monitor bp , labs. GI consult and Gen sgy appreciated.      I spoke w/  [phone]    Author:  Radha Amanda MD 7:47 [phone]

## 2020-06-07 PROBLEM — D69.6 THROMBOCYTOPENIA (HCC): Status: ACTIVE | Noted: 2020-06-07

## 2020-06-07 LAB
ANION GAP SERPL CALC-SCNC: 4 MMOL/L (ref 5–15)
APTT PPP: 109.5 SEC (ref 22.1–32)
APTT PPP: 47.8 SEC (ref 22.1–32)
APTT PPP: 65.5 SEC (ref 22.1–32)
ATRIAL RATE: 90 BPM
BASOPHILS # BLD: 0 K/UL (ref 0–0.1)
BASOPHILS NFR BLD: 0 % (ref 0–1)
BUN SERPL-MCNC: 23 MG/DL (ref 6–20)
BUN/CREAT SERPL: 20 (ref 12–20)
CALCIUM SERPL-MCNC: 8.8 MG/DL (ref 8.5–10.1)
CALCULATED R AXIS, ECG10: 4 DEGREES
CALCULATED T AXIS, ECG11: 30 DEGREES
CHLORIDE SERPL-SCNC: 114 MMOL/L (ref 97–108)
CO2 SERPL-SCNC: 24 MMOL/L (ref 21–32)
CREAT SERPL-MCNC: 1.16 MG/DL (ref 0.55–1.02)
DIAGNOSIS, 93000: NORMAL
DIFFERENTIAL METHOD BLD: ABNORMAL
EOSINOPHIL # BLD: 0.1 K/UL (ref 0–0.4)
EOSINOPHIL NFR BLD: 4 % (ref 0–7)
ERYTHROCYTE [DISTWIDTH] IN BLOOD BY AUTOMATED COUNT: 14.5 % (ref 11.5–14.5)
GLUCOSE SERPL-MCNC: 93 MG/DL (ref 65–100)
HCT VFR BLD AUTO: 31.9 % (ref 35–47)
HGB BLD-MCNC: 9.8 G/DL (ref 11.5–16)
IMM GRANULOCYTES # BLD AUTO: 0 K/UL (ref 0–0.04)
IMM GRANULOCYTES NFR BLD AUTO: 0 % (ref 0–0.5)
LYMPHOCYTES # BLD: 1.7 K/UL (ref 0.8–3.5)
LYMPHOCYTES NFR BLD: 45 % (ref 12–49)
MAGNESIUM SERPL-MCNC: 1.4 MG/DL (ref 1.6–2.4)
MCH RBC QN AUTO: 32.8 PG (ref 26–34)
MCHC RBC AUTO-ENTMCNC: 30.7 G/DL (ref 30–36.5)
MCV RBC AUTO: 106.7 FL (ref 80–99)
MONOCYTES # BLD: 0.5 K/UL (ref 0–1)
MONOCYTES NFR BLD: 13 % (ref 5–13)
NEUTS SEG # BLD: 1.4 K/UL (ref 1.8–8)
NEUTS SEG NFR BLD: 38 % (ref 32–75)
NRBC # BLD: 0 K/UL (ref 0–0.01)
NRBC BLD-RTO: 0 PER 100 WBC
PHOSPHATE SERPL-MCNC: 3.3 MG/DL (ref 2.6–4.7)
PLATELET # BLD AUTO: 97 K/UL (ref 150–400)
PMV BLD AUTO: 11.9 FL (ref 8.9–12.9)
POTASSIUM SERPL-SCNC: 4.3 MMOL/L (ref 3.5–5.1)
Q-T INTERVAL, ECG07: 328 MS
QRS DURATION, ECG06: 82 MS
QTC CALCULATION (BEZET), ECG08: 407 MS
RBC # BLD AUTO: 2.99 M/UL (ref 3.8–5.2)
RBC MORPH BLD: ABNORMAL
RBC MORPH BLD: ABNORMAL
SODIUM SERPL-SCNC: 142 MMOL/L (ref 136–145)
THERAPEUTIC RANGE,PTTT: ABNORMAL SECS (ref 58–77)
VENTRICULAR RATE, ECG03: 93 BPM
WBC # BLD AUTO: 3.7 K/UL (ref 3.6–11)

## 2020-06-07 PROCEDURE — 36415 COLL VENOUS BLD VENIPUNCTURE: CPT

## 2020-06-07 PROCEDURE — 74011250636 HC RX REV CODE- 250/636: Performed by: INTERNAL MEDICINE

## 2020-06-07 PROCEDURE — 74011250636 HC RX REV CODE- 250/636: Performed by: SURGERY

## 2020-06-07 PROCEDURE — 74011250637 HC RX REV CODE- 250/637: Performed by: SURGERY

## 2020-06-07 PROCEDURE — 83735 ASSAY OF MAGNESIUM: CPT

## 2020-06-07 PROCEDURE — 96366 THER/PROPH/DIAG IV INF ADDON: CPT

## 2020-06-07 PROCEDURE — 74011250636 HC RX REV CODE- 250/636: Performed by: FAMILY MEDICINE

## 2020-06-07 PROCEDURE — 84100 ASSAY OF PHOSPHORUS: CPT

## 2020-06-07 PROCEDURE — 85025 COMPLETE CBC W/AUTO DIFF WBC: CPT

## 2020-06-07 PROCEDURE — 85730 THROMBOPLASTIN TIME PARTIAL: CPT

## 2020-06-07 PROCEDURE — 80048 BASIC METABOLIC PNL TOTAL CA: CPT

## 2020-06-07 PROCEDURE — 96375 TX/PRO/DX INJ NEW DRUG ADDON: CPT

## 2020-06-07 PROCEDURE — 65270000029 HC RM PRIVATE

## 2020-06-07 PROCEDURE — 99218 HC RM OBSERVATION: CPT

## 2020-06-07 RX ORDER — SODIUM CHLORIDE, SODIUM LACTATE, POTASSIUM CHLORIDE, CALCIUM CHLORIDE 600; 310; 30; 20 MG/100ML; MG/100ML; MG/100ML; MG/100ML
25 INJECTION, SOLUTION INTRAVENOUS CONTINUOUS
Status: DISCONTINUED | OUTPATIENT
Start: 2020-06-08 | End: 2020-06-09 | Stop reason: SDUPTHER

## 2020-06-07 RX ORDER — MAGNESIUM SULFATE HEPTAHYDRATE 40 MG/ML
2 INJECTION, SOLUTION INTRAVENOUS ONCE
Status: COMPLETED | OUTPATIENT
Start: 2020-06-07 | End: 2020-06-07

## 2020-06-07 RX ORDER — HEPARIN SODIUM 5000 [USP'U]/ML
2940 INJECTION, SOLUTION INTRAVENOUS; SUBCUTANEOUS ONCE
Status: COMPLETED | OUTPATIENT
Start: 2020-06-07 | End: 2020-06-07

## 2020-06-07 RX ADMIN — HEPARIN SODIUM AND DEXTROSE 15 UNITS/KG/HR: 10000; 5 INJECTION INTRAVENOUS at 19:42

## 2020-06-07 RX ADMIN — HEPARIN SODIUM AND DEXTROSE 15 UNITS/KG/HR: 10000; 5 INJECTION INTRAVENOUS at 16:06

## 2020-06-07 RX ADMIN — SODIUM CHLORIDE, SODIUM LACTATE, POTASSIUM CHLORIDE, AND CALCIUM CHLORIDE 100 ML/HR: 600; 310; 30; 20 INJECTION, SOLUTION INTRAVENOUS at 06:00

## 2020-06-07 RX ADMIN — Medication 10 ML: at 16:08

## 2020-06-07 RX ADMIN — LORATADINE 10 MG: 10 TABLET ORAL at 08:56

## 2020-06-07 RX ADMIN — Medication 10 ML: at 21:35

## 2020-06-07 RX ADMIN — DILTIAZEM HYDROCHLORIDE 240 MG: 240 CAPSULE, COATED, EXTENDED RELEASE ORAL at 09:17

## 2020-06-07 RX ADMIN — ATORVASTATIN CALCIUM 10 MG: 10 TABLET, FILM COATED ORAL at 08:56

## 2020-06-07 RX ADMIN — HEPARIN SODIUM 2940 UNITS: 5000 INJECTION, SOLUTION INTRAVENOUS; SUBCUTANEOUS at 22:33

## 2020-06-07 RX ADMIN — MAGNESIUM SULFATE IN WATER 2 G: 40 INJECTION, SOLUTION INTRAVENOUS at 09:18

## 2020-06-07 RX ADMIN — SODIUM CHLORIDE, SODIUM LACTATE, POTASSIUM CHLORIDE, AND CALCIUM CHLORIDE 100 ML/HR: 600; 310; 30; 20 INJECTION, SOLUTION INTRAVENOUS at 17:14

## 2020-06-07 NOTE — PROGRESS NOTES
Progress Note    Patient: Benson Gann MRN: [id number]  SSN: [id number]    YOB: 1940  Age: 78 y.o. Sex: female      Admit Date: 2020    * No surgery found *    Procedure:      Subjective:     Patient without complaints. She is on full liquids and tolerating it. Objective:     Visit Vitals  /65   Pulse 88   Temp 98 °F (36.7 °C)   Resp 18   Ht 5' 7\" (1.702 m)   Wt 162 lb (73.5 kg)   SpO2 91%   BMI 25.37 kg/m²       Temp (24hrs), Av °F (36.7 °C), Min:97.3 °F (36.3 °C), Max:98.9 °F (37.2 °C)      Physical Exam:    Gen- Alert in NAD  Lungs- CTA  H-RRR  Abd- S/nt/nd      Data Review: images and reports reviewed    Lab Review: All lab results for the last 24 hours reviewed.   Recent Results (from the past 24 hour(s))   METABOLIC PANEL, BASIC    Collection Time: 20  3:13 AM   Result Value Ref Range    Sodium 142 136 - 145 mmol/L    Potassium 4.3 3.5 - 5.1 mmol/L    Chloride 114 (H) 97 - 108 mmol/L    CO2 24 21 - 32 mmol/L    Anion gap 4 (L) 5 - 15 mmol/L    Glucose 93 65 - 100 mg/dL    BUN 23 (H) 6 - 20 MG/DL    Creatinine 1.16 (H) 0.55 - 1.02 MG/DL    BUN/Creatinine ratio 20 12 - 20      GFR est AA 54 (L) >60 ml/min/1.73m2    GFR est non-AA 45 (L) >60 ml/min/1.73m2    Calcium 8.8 8.5 - 10.1 MG/DL   MAGNESIUM    Collection Time: 20  3:13 AM   Result Value Ref Range    Magnesium 1.4 (L) 1.6 - 2.4 mg/dL   PHOSPHORUS    Collection Time: 20  3:13 AM   Result Value Ref Range    Phosphorus 3.3 2.6 - 4.7 MG/DL   CBC WITH AUTOMATED DIFF    Collection Time: 20  3:13 AM   Result Value Ref Range    WBC 3.7 3.6 - 11.0 K/uL    RBC 2.99 (L) 3.80 - 5.20 M/uL    HGB 9.8 (L) 11.5 - 16.0 g/dL    HCT 31.9 (L) 35.0 - 47.0 %    .7 (H) 80.0 - 99.0 FL    MCH 32.8 26.0 - 34.0 PG    MCHC 30.7 30.0 - 36.5 g/dL    RDW 14.5 11.5 - 14.5 %    PLATELET 97 (L) 614 - 400 K/uL    MPV 11.9 8.9 - 12.9 FL    NRBC 0.0 0  WBC    ABSOLUTE NRBC 0.00 0.00 - 0.01 K/uL    NEUTROPHILS 38 32 - 75 %    LYMPHOCYTES 45 12 - 49 %    MONOCYTES 13 5 - 13 %    EOSINOPHILS 4 0 - 7 %    BASOPHILS 0 0 - 1 %    IMMATURE GRANULOCYTES 0 0.0 - 0.5 %    ABS. NEUTROPHILS 1.4 (L) 1.8 - 8.0 K/UL    ABS. LYMPHOCYTES 1.7 0.8 - 3.5 K/UL    ABS. MONOCYTES 0.5 0.0 - 1.0 K/UL    ABS. EOSINOPHILS 0.1 0.0 - 0.4 K/UL    ABS. BASOPHILS 0.0 0.0 - 0.1 K/UL    ABS. IMM. GRANS. 0.0 0.00 - 0.04 K/UL    DF SMEAR SCANNED      RBC COMMENTS MACROCYTOSIS  1+        RBC COMMENTS NORMOCYTIC, NORMOCHROMIC     PTT    Collection Time: 06/07/20 11:47 AM   Result Value Ref Range    aPTT 109.5 (HH) 22.1 - 32.0 sec    aPTT, therapeutic range     58.0 - 77.0 SECS       Assessment:     Hospital Problems  Date Reviewed: 6/5/2020          Codes Class Noted POA    Thrombocytopenia ([facility] ICD-10-CM: D69.6  ICD-9-CM: 287.5  6/7/2020 Unknown        Stage 3 chronic kidney disease ([facility] ICD-10-CM: N18.3  ICD-9-CM: 585.3  1/18/2020 Yes        SBO (small bowel obstruction) ([facility] ICD-10-CM: W00.665  ICD-9-CM: 560.9  8/18/2016 Unknown        Hypertension ICD-10-CM: I27  ICD-9-CM: 401.9  Unknown Yes              Plan/Recommendations/Medical Decision Making:   SBO seems to have resolved. Ok to start [employer] after procedure tomorrow. Should be able to transition back to eliquis after stent removal as it does not seem as though she will need operative intervention.

## 2020-06-07 NOTE — PROGRESS NOTES
Patient's ptt was 109.5. Stopped heparin drip and contacted Dr Nikhil Plaza. Patient is asymptomatic. Will recheck ptt at 1500 per the heparin drip protocol. No new orders received from Dr Nikhil Plaza.

## 2020-06-07 NOTE — PROGRESS NOTES
Medical Progress Note      NAME: Lazaro Ashley   :  1940  MRM:  [id number]    Date/Time: 2020           Problem List:     Active Problems:    Hypertension ()      SBO (small bowel obstruction) ([facility] (2016)      Stage 3 chronic kidney disease ([facility] (2020)      Thrombocytopenia ([facility] (2020)             Subjective:     Denies abd pain or nausea. No bm since last evening     Past Medical History:   Diagnosis Date    Abscess 3/22/2014    Lumbar area - MRSA    Arthritis     Common bile duct calculus 2019    Gout     Hypercholesterolemia     Hypertension     LBP (low back pain)     Other ill-defined conditions(799.89)     LEFT SHOULDER PAINFUL, NEEDS REPLACEMENT    Paroxysmal SVT (supraventricular tachycardia) ([facility] 2017    Thromboembolus ([facility] 13    left leg            Objective:         Vitals:      Last 24hrs VS reviewed since prior progress note. Most recent are:    Visit Vitals  /88 (BP 1 Location: Right arm, BP Patient Position: At rest)   Pulse 90   Temp 98.5 °F (36.9 °C)   Resp 18   Ht 5' 7\" (1.702 m)   Wt 162 lb (73.5 kg)   SpO2 90%   BMI 25.37 kg/m²     SpO2 Readings from Last 6 Encounters:   20 90%   20 92%   19 91%   19 95%   18 92%   17 94%        No intake or output data in the 24 hours ending 20 0837               Exam:      General:  Alert, cooperative, no distress, appears stated age. Lungs:   Clear to auscultation bilaterally. Heart:  Regular rate and rhythm, S1, S2 normal, no murmur, click, rub or gallop. Abdomen:   Soft, non-tender. Bowel sounds normal. No masses,  No organomegaly. + easily reducible Ventral Hernia    Extremities: No pedal  edema.      Lab Data Reviewed: (see below)  Recent Results (from the past 24 hour(s))   PTT    Collection Time: 20 12:01 PM   Result Value Ref Range    aPTT 73.2 (H) 22.1 - 32.0 sec    aPTT, therapeutic range     58.0 - 22.5 SECS   METABOLIC PANEL, BASIC Collection Time: 06/07/20  3:13 AM   Result Value Ref Range    Sodium 142 136 - 145 mmol/L    Potassium 4.3 3.5 - 5.1 mmol/L    Chloride 114 (H) 97 - 108 mmol/L    CO2 24 21 - 32 mmol/L    Anion gap 4 (L) 5 - 15 mmol/L    Glucose 93 65 - 100 mg/dL    BUN 23 (H) 6 - 20 MG/DL    Creatinine 1.16 (H) 0.55 - 1.02 MG/DL    BUN/Creatinine ratio 20 12 - 20      GFR est AA 54 (L) >60 ml/min/1.73m2    GFR est non-AA 45 (L) >60 ml/min/1.73m2    Calcium 8.8 8.5 - 10.1 MG/DL   MAGNESIUM    Collection Time: 06/07/20  3:13 AM   Result Value Ref Range    Magnesium 1.4 (L) 1.6 - 2.4 mg/dL   PHOSPHORUS    Collection Time: 06/07/20  3:13 AM   Result Value Ref Range    Phosphorus 3.3 2.6 - 4.7 MG/DL   CBC WITH AUTOMATED DIFF    Collection Time: 06/07/20  3:13 AM   Result Value Ref Range    WBC 3.7 3.6 - 11.0 K/uL    RBC 2.99 (L) 3.80 - 5.20 M/uL    HGB 9.8 (L) 11.5 - 16.0 g/dL    HCT 31.9 (L) 35.0 - 47.0 %    .7 (H) 80.0 - 99.0 FL    MCH 32.8 26.0 - 34.0 PG    MCHC 30.7 30.0 - 36.5 g/dL    RDW 14.5 11.5 - 14.5 %    PLATELET 97 (L) 065 - 400 K/uL    MPV 11.9 8.9 - 12.9 FL    NRBC 0.0 0  WBC    ABSOLUTE NRBC 0.00 0.00 - 0.01 K/uL    NEUTROPHILS 38 32 - 75 %    LYMPHOCYTES 45 12 - 49 %    MONOCYTES 13 5 - 13 %    EOSINOPHILS 4 0 - 7 %    BASOPHILS 0 0 - 1 %    IMMATURE GRANULOCYTES 0 0.0 - 0.5 %    ABS. NEUTROPHILS 1.4 (L) 1.8 - 8.0 K/UL    ABS. LYMPHOCYTES 1.7 0.8 - 3.5 K/UL    ABS. MONOCYTES 0.5 0.0 - 1.0 K/UL    ABS. EOSINOPHILS 0.1 0.0 - 0.4 K/UL    ABS. BASOPHILS 0.0 0.0 - 0.1 K/UL    ABS. IMM.  GRANS. 0.0 0.00 - 0.04 K/UL    DF SMEAR SCANNED      RBC COMMENTS MACROCYTOSIS  1+        RBC COMMENTS NORMOCYTIC, NORMOCHROMIC         Medications Reviewed: (see below)    ______________________________________________________________________    Medications:     Current Facility-Administered Medications   Medication Dose Route Frequency    magnesium sulfate 2 g/50 ml IVPB (premix or compounded)  2 g IntraVENous ONCE    albuterol (PROVENTIL VENTOLIN) nebulizer solution 2.5 mg  2.5 mg Nebulization Q4H PRN    allopurinoL (ZYLOPRIM) tablet 100 mg  100 mg Oral DAILY    dilTIAZem ER (CARDIZEM CD) capsule 240 mg  240 mg Oral DAILY    loratadine (CLARITIN) tablet 10 mg  10 mg Oral DAILY    atorvastatin (LIPITOR) tablet 10 mg  10 mg Oral DAILY    sodium chloride (NS) flush 5-40 mL  5-40 mL IntraVENous Q8H    sodium chloride (NS) flush 5-40 mL  5-40 mL IntraVENous PRN    lactated Ringers infusion  100 mL/hr IntraVENous CONTINUOUS    acetaminophen (TYLENOL) tablet 650 mg  650 mg Oral Q6H PRN    morphine injection 2-4 mg  2-4 mg IntraVENous Q3H PRN    naloxone (NARCAN) injection 0.4 mg  0.4 mg IntraVENous PRN    ondansetron (ZOFRAN) injection 4 mg  4 mg IntraVENous Q4H PRN    diphenhydrAMINE (BENADRYL) injection 12.5 mg  12.5 mg IntraVENous Q6H PRN    heparin 25,000 units in D5W 250 ml infusion  18-36 Units/kg/hr IntraVENous TITRATE                   Assessment:   PSBO POA much better. Tolerating clear liquids . Will advance to full liquids. Opacified CBD stent. Plans noted for removal tomorrow. Hx left leg arterial embolus in past.  In hospital , she is on Heparin drip which is to be stopped at 4 am tomorrow before CBD stent removal    Mild Thrombocytopenia. Pt on Heparin drip . No bleeding noted. F/u CBC in am . Heparin to be stopped in am 6/8/2020     HTN bp up this am. Pt to receive Cardizem CD     CKD . Serum Cr improved. Hypomagnesemia .  Will replete          Patient Active Problem List   Diagnosis Code    Hypertension I10    Hypercholesterolemia E78.00    Wound dehiscence T81.30XA    Cerebral thrombosis with cerebral infarction ([facility] I63.30    Abscess L02.91    Blood loss anemia D50.0    GI bleed K92.2    Tachycardia R00.0    Acute post-hemorrhagic anemia D62    Postoperative hypovolemic shock T81.19XA    SBO (small bowel obstruction) (HCC) K56.609    Obstruction of bowel (HCC) K56.609    Small bowel obstruction (HCC) K56.609    Paroxysmal SVT (supraventricular tachycardia) (HCC) I47.1    Dehydration E86.0    Bronchitis J40    Gallstone K80.20    JOSE (acute kidney injury) ([facility] N17.9    Common bile duct calculus K80.50    Abdominal pain R10.9    Acute cholecystitis K81.0    Gallstone pancreatitis K85.10    Hypoxia R09.02    Weakness of left lower extremity R29.898    Pulmonary embolism (HCC) I26.99    DVT (deep venous thrombosis) (HCC) I82.409    Stage 3 chronic kidney disease (HCC) N18.3    Arterial embolism and thrombosis of lower extremity (HCC) I74.3    Postprocedural hypotension I95.81    Thrombocytopenia (HCC) D69.6          Plan:     F/u labs in am   I left vm for  at [phone]                                                       ___________________________________________________      Attending Physician: Brie Gamble MD

## 2020-06-07 NOTE — CONSULTS
[address]    Name:  Sukumar Graham  MR#:  [id number]  :  1940  ACCOUNT #:  [de-identified]  DATE OF SERVICE:  2020    CHIEF COMPLAINT:  Abdominal pain for one day. REASON FOR CONSULTATION:  Dr. Nydia Hung asked us to evaluate the patient for dilated pancreatic duct. HISTORY OF PRESENT ILLNESS:  The patient is an 49-year-old  lady who presented to the hospital with complaints of abrupt-onset abdominal pain with nausea and vomiting. Symptoms began yesterday after she ate lunch. She had multiple episodes of vomiting, associated with periumbilical and right upper quadrant abdominal pain. Pain was crampy and severe. She has a history of total colectomy and laparoscopic cholecystectomy, followed by an ERCP and metal biliary stenting for ampullary stenosis. Dr. Richard See had performed the ERCP about six months ago, which revealed severe papillary stenosis and a metal stent was placed in the common bile duct in an attempt to dilate the ampullary stenosis. The patient states that her pain and vomiting seems to have subsided this morning. She has been started on a clear liquid diet. A CT scan has revealed dilated small bile up to the level of a ventral hernia and decompressed bowel distal to the narrowing. PAST MEDICAL HISTORY:  Significant for,  1. DVT. 2.  Arterial embolism and thrombosis. 3.  Stage III chronic kidney disease. 4.  Hypoxia. 5.  Pulmonary embolism. 6.  Gallstone and pancreatitis. 7.  Acute cholecystitis. 8.  Dehydration. 9.  Small-bowel obstruction. 10.  Acute posthemorrhagic anemia. 11.  Tachycardia. 12.  Cerebral thrombosis. 13.  Abscess with MRSA in the lumbar area. 14.  Gout. 15.  Hyperlipidemia. 16.  Hypertension. 17.  Hernia repair. 18.  Ostomy reversal.  19.  Bilateral knee replacement. SOCIAL HISTORY:  She is an ex-smoker and she quit several years ago. She drinks one to two drinks every week.   She does not use recreational drugs. FAMILY HISTORY:  Father has hypertension. MEDICATIONS PRIOR TO ADMISSION:  Include,  1. Vitamin B12.  2.  Eliquis. 3.  Dilacor. 4.  Zyloprim. 5.  Claritin. 6.  Tylenol Extra Strength. 7.  Mevacor. 8.  Proventil. 9.  Centrum Silver for wound. ALLERGIES:  TO SHELLFISH. REVIEW OF SYSTEMS:  CONSTITUTIONAL:  Negative for fever, weight loss, anorexia. HEAD, EYES, AND ENT:  No blurred vision or dysphagia. CARDIOVASCULAR:   No chest pain, syncope, or palpitations. GASTROINTESTINAL:  Positive for abdominal pain, vomiting. CENTRAL NERVOUS SYSTEM:  No history of seizures or loss of consciousness. Review of lymphatic, hematologic, musculoskeletal, genitourinary, endocrine, metabolic, psychiatric and other systems revealed no abnormalities. All other systems are negative. PHYSICAL EXAMINATION:  GENERAL:  She is alert, comfortable. VITAL SIGNS:  Blood pressure 146/87, pulse of 87, temperature 97.3. HEAD, EYES, AND ENT:  Pupils are equal, reactive to light and accommodation. Eye movements are normal.  NECK:  Supple. There is no carotid bruit or jugular venous distention. LUNGS:  Chest is clear to auscultation. No crackles or wheeze. CARDIOVASCULAR:  Regular rate and rhythm. First and second sounds are normal.  No murmurs. ABDOMEN:  Soft. There are multiple healthy scars over the abdomen. There appears to be a loop of bowel in the epigastrium, which is nontender. Bowel sounds are present. CENTRAL NERVOUS SYSTEM:  She is alert and oriented x3. There is no gross sensory, motor, or neurological deficit. EXTREMITIES:  Negative for cyanosis, clubbing, or edema. LABORATORY DATA:  White count is 4.6, hemoglobin 10.2, platelets 753, and MCV is 104.5.   BUN is 30, creatinine is 1.26.  CT scan of the abdomen and pelvis reveals partial small-bowel obstruction secondary to a ventral hernia and dilated small bowel loops are up to the level of the hernia, and a new dilated pancreatic duct and common bile duct metallic stent. ASSESSMENT:  An 49-year-old lady who has presented with history of abrupt onset of nausea, vomiting, and abdominal pain, consistent with a partial small-bowel obstruction secondary to a ventral hernia. She does have a dilated pancreatic duct which may be secondary to the compression from the metallic common bile duct stent that was placed in 12/2019. She states that her pain and vomiting have resolved and she is able to tolerate liquids. We will start her on a clear liquid diet and advance the diet as tolerated. We will keep her NPO after midnight on Sunday for possible endoscopic retrograde cholangiopancreatography and stent removal on Monday. Dr. Waleska Madrid will resume care on Monday and will decide on the exact time of endoscopic retrograde cholangiopancreatography. RECOMMENDATIONS:  1. Clear liquids p.o.  2.  Advance diet as tolerated. 3.  NPO after midnight on Sunday for possible ERCP and stent removal on Monday. Thank you for consultation of this patient.       Beronica Monahan MD      [email or web address]  D:  06/06/2020 16:57  T:  06/06/2020 21:04  JOB #:  [id number]  CC:  MD Sony Moya MD

## 2020-06-07 NOTE — PROGRESS NOTES
Reason for Admission:   ABD pain and nausea                  RUR Score:     25             PCP: First and Last name:  Eduardo Andrade M.D. Name of Practice:    Are you a current patient: Yes/No: Yes   Approximate date of last visit: 3 Months ago   Can you participate in a virtual visit if needed: Yes    Do you (patient/family) have any concerns for transition/discharge? No concerns voice by patient               Plan for utilizing home health:   None as patient is not home bound    Current Advanced Directive/Advance Care Plan:  Patient has an advance directive. Advised of mychart and ability to upload the advance directive from home. Transition of Care Plan:        * Disposition- Home with spouse- Independent and drives  * PCP F/U    Upon interview patient verified demographics, NOK and PCP. Patient reports full independence and drives. Patient voiced no concerns and plans to return home at discharge. Care Management Interventions  PCP Verified by CM:  Yes  MyChart Signup: Yes  Discharge Durable Medical Equipment: No  Physical Therapy Consult: No  Occupational Therapy Consult: No  Speech Therapy Consult: No  Current Support Network: Lives with Spouse      Desireeshaw Moore, MADYSONW, [facility]-  Complex Care Coordinator/Leobardo  C: [phone]

## 2020-06-07 NOTE — PROGRESS NOTES
Problem: Falls - Risk of  Goal: *Absence of Falls  Description: Document Charanjit Yung Fall Risk and appropriate interventions in the flowsheet. Outcome: Progressing Towards Goal  Note: Fall Risk Interventions:  Mobility Interventions: Patient to call before getting OOB         Medication Interventions: Patient to call before getting OOB                   Problem: Pain  Goal: *Control of Pain  Outcome: Progressing Towards Goal     Problem: Risk for Spread of Infection  Goal: Prevent transmission of infectious organism to others  Description: Prevent the transmission of infectious organisms to other patients, staff members, and visitors.   Outcome: Progressing Towards Goal

## 2020-06-07 NOTE — PHYSICIAN ADVISORY
UPGRADE LETTER  
 
upgraded from OBSERVATION  to INPATIENT  Status Letter of Status Determination:  
Recommend hospitalization status upgraded from OBSERVATION  to INPATIENT Status Pt Name:  Addison Lofton MR#  
GEORGINA # [id number] / 
[id number] Payor: Liza Paulson / Plan: [address] / Product Type: Medicare /   
[facility]#  [id number] Room and Hospital  601/01  @ [place] [address] hospital  
Hospitalization date  6/5/2020  8:45 PM  
Current Attending Physician  Alix Maier MD  
Principal diagnosis  SBO (small bowel obstruction) ([facility] [F12.264] Clinicals  79 y/o female with a PMH significant for Essential hypertension, Dyslipidemia, LE DVT on Eliquis,  SVT, Gout and arthritis was brought in for nausea, vomiting and abdominal pain. Initial evaluation was consistent with a Partial small bowel obstruction due to a ventral hernia necessitating ongoing close monitoring and management including with NPO status, IVFs, IV analgesics, IV anti-emetics, Heparin drip in case surgery is indicated, Gastroenterology and General surgical consults. At increased risk of decompensation. [place] (MCG) criteria Does  NOT apply STATUS DETERMINATION  Based on documented presenting clinical data, comorbid conditions, high risk of adverse events and deterioration, it is our recommendation that the patient's status be upgraded from OBSERVATION to INPATIENT status. The final decision of the patient's hospitalization status depends on the Attending Physician's judgement. Additional comments Payor: [place] MED[employer]ARE / Plan: [address] / Product Type: Medicare / The information in this document is a recommendation to be used for utilization review and utilization management purposes only. This recommendation is not an order.   
The recommendation is made based on the information reviewed at the time of the referral, is pursuant to the [facility] Conditions of Participation (42 CFR Part 482), and is neither a judgment nor an assessment with regard to the appropriateness or quality of clinical care. For all Managed Care patients: The Criteria are intended solely for use as screening guidelines with respect to the medical appropriateness of healthcare services and not for final clinical or payment determinations concerning the type or level of medical care provided, or proposed to be provided, to a patient. They help the reviewers determine whether a patient is appropriate for observation or inpatient admission at the time a decision to admit the patient is being made. All efforts are made to apply the pertinent payor criteria (MCG or [employer]) as well as the clinical judgements based on the information reviewed at the time of the referral.\" Nothing in this document may be used to limit, alter, or affect clinical services provided to the patient named. Shawn Orellana MD FACP Physician Advisor [facility] [phone] [email or web address] 
 
 
8:00 AM 6/7/2020

## 2020-06-07 NOTE — PROGRESS NOTES
Notified Dr. Alban Jansen of platelet value of 97 as instructed in the orders. No changes were made to treatment plan.

## 2020-06-08 ENCOUNTER — ANESTHESIA EVENT (OUTPATIENT)
Dept: ENDOSCOPY | Age: 80
DRG: 920 | End: 2020-06-08
Payer: MEDICARE

## 2020-06-08 ENCOUNTER — ANESTHESIA (OUTPATIENT)
Dept: ENDOSCOPY | Age: 80
DRG: 920 | End: 2020-06-08
Payer: MEDICARE

## 2020-06-08 ENCOUNTER — APPOINTMENT (OUTPATIENT)
Dept: GENERAL RADIOLOGY | Age: 80
DRG: 920 | End: 2020-06-08
Attending: INTERNAL MEDICINE
Payer: MEDICARE

## 2020-06-08 LAB
ANION GAP SERPL CALC-SCNC: 4 MMOL/L (ref 5–15)
APTT PPP: 73.7 SEC (ref 22.1–32)
BASOPHILS # BLD: 0 K/UL (ref 0–0.1)
BASOPHILS NFR BLD: 1 % (ref 0–1)
BUN SERPL-MCNC: 16 MG/DL (ref 6–20)
BUN/CREAT SERPL: 16 (ref 12–20)
CALCIUM SERPL-MCNC: 9 MG/DL (ref 8.5–10.1)
CHLORIDE SERPL-SCNC: 110 MMOL/L (ref 97–108)
CO2 SERPL-SCNC: 27 MMOL/L (ref 21–32)
CREAT SERPL-MCNC: 0.98 MG/DL (ref 0.55–1.02)
DIFFERENTIAL METHOD BLD: ABNORMAL
EOSINOPHIL # BLD: 0.1 K/UL (ref 0–0.4)
EOSINOPHIL NFR BLD: 3 % (ref 0–7)
ERYTHROCYTE [DISTWIDTH] IN BLOOD BY AUTOMATED COUNT: 14 % (ref 11.5–14.5)
GLUCOSE SERPL-MCNC: 99 MG/DL (ref 65–100)
HCT VFR BLD AUTO: 34.4 % (ref 35–47)
HGB BLD-MCNC: 10.3 G/DL (ref 11.5–16)
IMM GRANULOCYTES # BLD AUTO: 0 K/UL (ref 0–0.04)
IMM GRANULOCYTES NFR BLD AUTO: 0 % (ref 0–0.5)
LYMPHOCYTES # BLD: 1.6 K/UL (ref 0.8–3.5)
LYMPHOCYTES NFR BLD: 40 % (ref 12–49)
MAGNESIUM SERPL-MCNC: 1.7 MG/DL (ref 1.6–2.4)
MCH RBC QN AUTO: 32.1 PG (ref 26–34)
MCHC RBC AUTO-ENTMCNC: 29.9 G/DL (ref 30–36.5)
MCV RBC AUTO: 107.2 FL (ref 80–99)
MONOCYTES # BLD: 0.5 K/UL (ref 0–1)
MONOCYTES NFR BLD: 13 % (ref 5–13)
NEUTS SEG # BLD: 1.8 K/UL (ref 1.8–8)
NEUTS SEG NFR BLD: 43 % (ref 32–75)
NRBC # BLD: 0 K/UL (ref 0–0.01)
NRBC BLD-RTO: 0 PER 100 WBC
PHOSPHATE SERPL-MCNC: 2.6 MG/DL (ref 2.6–4.7)
PLATELET # BLD AUTO: 91 K/UL (ref 150–400)
PMV BLD AUTO: 11.8 FL (ref 8.9–12.9)
POTASSIUM SERPL-SCNC: 4.1 MMOL/L (ref 3.5–5.1)
RBC # BLD AUTO: 3.21 M/UL (ref 3.8–5.2)
RBC MORPH BLD: ABNORMAL
RBC MORPH BLD: ABNORMAL
SODIUM SERPL-SCNC: 141 MMOL/L (ref 136–145)
THERAPEUTIC RANGE,PTTT: ABNORMAL SECS (ref 58–77)
WBC # BLD AUTO: 4 K/UL (ref 3.6–11)

## 2020-06-08 PROCEDURE — 74011250636 HC RX REV CODE- 250/636: Performed by: NURSE ANESTHETIST, CERTIFIED REGISTERED

## 2020-06-08 PROCEDURE — 74011250636 HC RX REV CODE- 250/636: Performed by: SURGERY

## 2020-06-08 PROCEDURE — 77030040361 HC SLV COMPR DVT MDII -B: Performed by: INTERNAL MEDICINE

## 2020-06-08 PROCEDURE — 36415 COLL VENOUS BLD VENIPUNCTURE: CPT

## 2020-06-08 PROCEDURE — 76060000033 HC ANESTHESIA 1 TO 1.5 HR: Performed by: INTERNAL MEDICINE

## 2020-06-08 PROCEDURE — 74011250637 HC RX REV CODE- 250/637: Performed by: SURGERY

## 2020-06-08 PROCEDURE — 84100 ASSAY OF PHOSPHORUS: CPT

## 2020-06-08 PROCEDURE — 74011000250 HC RX REV CODE- 250: Performed by: NURSE ANESTHETIST, CERTIFIED REGISTERED

## 2020-06-08 PROCEDURE — 77030026438 HC STYL ET INTUB CARD -A: Performed by: ANESTHESIOLOGY

## 2020-06-08 PROCEDURE — 77030007288 HC DEV LOK BILI BSC -A: Performed by: INTERNAL MEDICINE

## 2020-06-08 PROCEDURE — 85730 THROMBOPLASTIN TIME PARTIAL: CPT

## 2020-06-08 PROCEDURE — 0FC98ZZ EXTIRPATION OF MATTER FROM COMMON BILE DUCT, VIA NATURAL OR ARTIFICIAL OPENING ENDOSCOPIC: ICD-10-PCS | Performed by: INTERNAL MEDICINE

## 2020-06-08 PROCEDURE — 77030008684 HC TU ET CUF COVD -B: Performed by: ANESTHESIOLOGY

## 2020-06-08 PROCEDURE — BF131ZZ FLUOROSCOPY OF GALLBLADDER AND BILE DUCTS USING LOW OSMOLAR CONTRAST: ICD-10-PCS | Performed by: INTERNAL MEDICINE

## 2020-06-08 PROCEDURE — 76040000008: Performed by: INTERNAL MEDICINE

## 2020-06-08 PROCEDURE — 0FPB8DZ REMOVAL OF INTRALUMINAL DEVICE FROM HEPATOBILIARY DUCT, VIA NATURAL OR ARTIFICIAL OPENING ENDOSCOPIC: ICD-10-PCS | Performed by: INTERNAL MEDICINE

## 2020-06-08 PROCEDURE — 85025 COMPLETE CBC W/AUTO DIFF WBC: CPT

## 2020-06-08 PROCEDURE — 74011636320 HC RX REV CODE- 636/320: Performed by: INTERNAL MEDICINE

## 2020-06-08 PROCEDURE — 80048 BASIC METABOLIC PNL TOTAL CA: CPT

## 2020-06-08 PROCEDURE — 65270000029 HC RM PRIVATE

## 2020-06-08 PROCEDURE — 83735 ASSAY OF MAGNESIUM: CPT

## 2020-06-08 PROCEDURE — 77030031656 HC FCPS ENDO GRSP DISP BSC -B: Performed by: INTERNAL MEDICINE

## 2020-06-08 PROCEDURE — 77030009038 HC CATH BILI STN RTVR BSC -C: Performed by: INTERNAL MEDICINE

## 2020-06-08 RX ORDER — NEOSTIGMINE METHYLSULFATE 1 MG/ML
INJECTION, SOLUTION INTRAVENOUS AS NEEDED
Status: DISCONTINUED | OUTPATIENT
Start: 2020-06-08 | End: 2020-06-08 | Stop reason: HOSPADM

## 2020-06-08 RX ORDER — PROPOFOL 10 MG/ML
INJECTION, EMULSION INTRAVENOUS AS NEEDED
Status: DISCONTINUED | OUTPATIENT
Start: 2020-06-08 | End: 2020-06-08 | Stop reason: HOSPADM

## 2020-06-08 RX ORDER — MIDAZOLAM HYDROCHLORIDE 1 MG/ML
.25-5 INJECTION, SOLUTION INTRAMUSCULAR; INTRAVENOUS
Status: DISCONTINUED | OUTPATIENT
Start: 2020-06-08 | End: 2020-06-08 | Stop reason: HOSPADM

## 2020-06-08 RX ORDER — ONDANSETRON 2 MG/ML
INJECTION INTRAMUSCULAR; INTRAVENOUS AS NEEDED
Status: DISCONTINUED | OUTPATIENT
Start: 2020-06-08 | End: 2020-06-08 | Stop reason: HOSPADM

## 2020-06-08 RX ORDER — DEXTROMETHORPHAN/PSEUDOEPHED 2.5-7.5/.8
1.2 DROPS ORAL
Status: DISCONTINUED | OUTPATIENT
Start: 2020-06-08 | End: 2020-06-08 | Stop reason: HOSPADM

## 2020-06-08 RX ORDER — SODIUM CHLORIDE 0.9 % (FLUSH) 0.9 %
5-40 SYRINGE (ML) INJECTION AS NEEDED
Status: DISCONTINUED | OUTPATIENT
Start: 2020-06-08 | End: 2020-06-08 | Stop reason: SDUPTHER

## 2020-06-08 RX ORDER — NALOXONE HYDROCHLORIDE 0.4 MG/ML
0.4 INJECTION, SOLUTION INTRAMUSCULAR; INTRAVENOUS; SUBCUTANEOUS
Status: DISCONTINUED | OUTPATIENT
Start: 2020-06-08 | End: 2020-06-08 | Stop reason: HOSPADM

## 2020-06-08 RX ORDER — ATROPINE SULFATE 1 MG/ML
1 INJECTION, SOLUTION INTRAVENOUS ONCE
Status: DISCONTINUED | OUTPATIENT
Start: 2020-06-08 | End: 2020-06-08

## 2020-06-08 RX ORDER — EPINEPHRINE 0.1 MG/ML
1 INJECTION INTRACARDIAC; INTRAVENOUS
Status: DISCONTINUED | OUTPATIENT
Start: 2020-06-08 | End: 2020-06-08 | Stop reason: SDUPTHER

## 2020-06-08 RX ORDER — FLUMAZENIL 0.1 MG/ML
0.2 INJECTION INTRAVENOUS
Status: DISCONTINUED | OUTPATIENT
Start: 2020-06-08 | End: 2020-06-08 | Stop reason: HOSPADM

## 2020-06-08 RX ORDER — SUCCINYLCHOLINE CHLORIDE 20 MG/ML
INJECTION INTRAMUSCULAR; INTRAVENOUS AS NEEDED
Status: DISCONTINUED | OUTPATIENT
Start: 2020-06-08 | End: 2020-06-08 | Stop reason: HOSPADM

## 2020-06-08 RX ORDER — ATROPINE SULFATE 0.1 MG/ML
0.5 INJECTION INTRAVENOUS
Status: DISCONTINUED | OUTPATIENT
Start: 2020-06-08 | End: 2020-06-08 | Stop reason: SDUPTHER

## 2020-06-08 RX ORDER — ROCURONIUM BROMIDE 10 MG/ML
INJECTION, SOLUTION INTRAVENOUS AS NEEDED
Status: DISCONTINUED | OUTPATIENT
Start: 2020-06-08 | End: 2020-06-08 | Stop reason: HOSPADM

## 2020-06-08 RX ORDER — GLYCOPYRROLATE 0.2 MG/ML
INJECTION INTRAMUSCULAR; INTRAVENOUS AS NEEDED
Status: DISCONTINUED | OUTPATIENT
Start: 2020-06-08 | End: 2020-06-08 | Stop reason: HOSPADM

## 2020-06-08 RX ORDER — ATROPINE SULFATE 1 MG/ML
0.4 INJECTION, SOLUTION INTRAVENOUS ONCE
Status: DISCONTINUED | OUTPATIENT
Start: 2020-06-08 | End: 2020-06-08 | Stop reason: HOSPADM

## 2020-06-08 RX ORDER — EPINEPHRINE 0.1 MG/ML
1 INJECTION INTRACARDIAC; INTRAVENOUS ONCE
Status: DISCONTINUED | OUTPATIENT
Start: 2020-06-08 | End: 2020-06-08 | Stop reason: HOSPADM

## 2020-06-08 RX ORDER — FENTANYL CITRATE 50 UG/ML
25-200 INJECTION, SOLUTION INTRAMUSCULAR; INTRAVENOUS
Status: DISCONTINUED | OUTPATIENT
Start: 2020-06-08 | End: 2020-06-08 | Stop reason: HOSPADM

## 2020-06-08 RX ORDER — SODIUM CHLORIDE 9 MG/ML
INJECTION, SOLUTION INTRAVENOUS
Status: DISCONTINUED | OUTPATIENT
Start: 2020-06-08 | End: 2020-06-08 | Stop reason: HOSPADM

## 2020-06-08 RX ORDER — PHENYLEPHRINE HCL IN 0.9% NACL 0.4MG/10ML
SYRINGE (ML) INTRAVENOUS AS NEEDED
Status: DISCONTINUED | OUTPATIENT
Start: 2020-06-08 | End: 2020-06-08 | Stop reason: HOSPADM

## 2020-06-08 RX ORDER — LIDOCAINE HYDROCHLORIDE 20 MG/ML
INJECTION, SOLUTION EPIDURAL; INFILTRATION; INTRACAUDAL; PERINEURAL AS NEEDED
Status: DISCONTINUED | OUTPATIENT
Start: 2020-06-08 | End: 2020-06-08 | Stop reason: HOSPADM

## 2020-06-08 RX ORDER — SODIUM CHLORIDE 0.9 % (FLUSH) 0.9 %
5-40 SYRINGE (ML) INJECTION EVERY 8 HOURS
Status: DISCONTINUED | OUTPATIENT
Start: 2020-06-08 | End: 2020-06-08 | Stop reason: SDUPTHER

## 2020-06-08 RX ADMIN — ROCURONIUM BROMIDE 5 MG: 10 SOLUTION INTRAVENOUS at 15:36

## 2020-06-08 RX ADMIN — PHENYLEPHRINE HYDROCHLORIDE 80 MCG: 10 INJECTION INTRAVENOUS at 16:02

## 2020-06-08 RX ADMIN — SODIUM CHLORIDE, SODIUM LACTATE, POTASSIUM CHLORIDE, AND CALCIUM CHLORIDE 100 ML/HR: 600; 310; 30; 20 INJECTION, SOLUTION INTRAVENOUS at 14:52

## 2020-06-08 RX ADMIN — PROPOFOL 100 MG: 10 INJECTION, EMULSION INTRAVENOUS at 15:23

## 2020-06-08 RX ADMIN — ROCURONIUM BROMIDE 5 MG: 10 SOLUTION INTRAVENOUS at 15:23

## 2020-06-08 RX ADMIN — GLYCOPYRROLATE 0.2 MG: 0.2 INJECTION, SOLUTION INTRAMUSCULAR; INTRAVENOUS at 16:15

## 2020-06-08 RX ADMIN — SODIUM CHLORIDE, SODIUM LACTATE, POTASSIUM CHLORIDE, AND CALCIUM CHLORIDE 100 ML/HR: 600; 310; 30; 20 INJECTION, SOLUTION INTRAVENOUS at 03:31

## 2020-06-08 RX ADMIN — LIDOCAINE HYDROCHLORIDE 100 MG: 20 INJECTION, SOLUTION EPIDURAL; INFILTRATION; INTRACAUDAL; PERINEURAL at 15:23

## 2020-06-08 RX ADMIN — PROPOFOL 20 MG: 10 INJECTION, EMULSION INTRAVENOUS at 15:36

## 2020-06-08 RX ADMIN — Medication 10 ML: at 05:36

## 2020-06-08 RX ADMIN — SODIUM CHLORIDE: 900 INJECTION, SOLUTION INTRAVENOUS at 15:20

## 2020-06-08 RX ADMIN — ONDANSETRON HYDROCHLORIDE 4 MG: 2 INJECTION, SOLUTION INTRAMUSCULAR; INTRAVENOUS at 16:13

## 2020-06-08 RX ADMIN — PHENYLEPHRINE HYDROCHLORIDE 40 MCG: 10 INJECTION INTRAVENOUS at 15:26

## 2020-06-08 RX ADMIN — DILTIAZEM HYDROCHLORIDE 240 MG: 240 CAPSULE, COATED, EXTENDED RELEASE ORAL at 17:54

## 2020-06-08 RX ADMIN — Medication 1.5 MG: at 16:15

## 2020-06-08 RX ADMIN — SUCCINYLCHOLINE CHLORIDE 120 MG: 20 INJECTION, SOLUTION INTRAMUSCULAR; INTRAVENOUS at 15:23

## 2020-06-08 RX ADMIN — PHENYLEPHRINE HYDROCHLORIDE 40 MCG: 10 INJECTION INTRAVENOUS at 15:24

## 2020-06-08 RX ADMIN — Medication 10 ML: at 21:25

## 2020-06-08 RX ADMIN — Medication 10 ML: at 17:51

## 2020-06-08 NOTE — PROCEDURES
[address]  [address] [facility], [address] [place] [address]       NAME:  Josh Castellanos   :   1940   MRN:   [id number]       Procedure Type:   ERCP     Indications: biliary sludge, biliary stent removal  Pre-operative Diagnosis: see indication above  Post-operative Diagnosis:  See findings below  : Liv Madrid MD    Referring Provider:     Cherise Lemon MD, Sony Guan MD      Sedation:  General anesthesia    Procedure Details:  After informed consent was obtained with all risks and benefits of procedure explained, the patient was taken to the fluoroscopy suite and placed in the prone position. Upon sequential sedation as per above, the Olympus duodenoscope [id number]   was inserted via the mouthpiece and carefully advanced to the second portion of the duodenum. The quality of visualization was adequate. The duodenoscope was withdrawn into a short position. Findings:   Esophagus:normal  Stomach: normal   Duodenum: The previously placed fully covered metal stent was not seen endoscopically    ERCP: A  film was taken. Surgical clips consistent with prior cholecystectomy were seen. The previously placed fully covered metal stent had migrated into the bile duct. The ampulla was consistent with prior biliary sphincterotomy. Endoscopically, the distal margin of the stent was partially visualized inside the duct. A rat tooth forceps was used to grasp the stent. This was not successful in pulling the stent distally transpapillary. Next, a 9 mm to 12 mm biliary extraction balloon was used to sweep the stent. Biliary sludge was expelled. Next, the balloon was sized to 12 mm upstream from the stent to see if this would mechanically assist in extracting the stent. This was not successful. Next, the 12 mm sized balloon was used to sweep the stent. This exposed slightly more of the distal margin of the stent.  This was grasped and after multiple tries, the distal margin of the stent was exposed transpapillary. Then, the stent was removed through the scope with a rat tooth forceps. Next, using a [address] VW50 Jagtome preloaded with a 0.025 inch Jagwire, the bile duct was cannulated with the guidewire. The Louanna Bentley was then advanced over the guidewire. Limited contrast was injected under fluoroscopic visualization. The bile duct was dilated to approximately 12 mm. Next, multiple balloon sweeps were made with the balloon sized at 12 mm. Biliary sludge was expelled. Next, an occlusion cholangiogram was performed and there were no upstream filling defects. There was adequate drainage of contrast and bile. The pancreatic duct was neither entered or injected during this procedure. I performed all immediate radiologic interpretation during this procedure. Specimen Removed: none    Complications: None. EBL:  None. Interventions:    Pancreatic: see above  Biliary: see above    Impression:   1. Complete internal migration of previously placed fully covered metal stent - removed as described above  2. Biliary sludge- extracted    Recommendations:      1. Watch for complications, including cholangitis, pancreatitis, bleeding, and perforation. 2. IV  cc/h overnight   3. PRN pain medication and anti-emetics  4. NPO for now. Clear liquid diet if patient is pain free  5. If patient has progressive abdominal pain and/or change in abdominal exam, please check amylase, lipase, CBC, CMP, and upright KUB. 6. Monitor LFT's  7. Would continue to hold anticoagulation at this time  8. Will follow along with you          Discharge Disposition:  Hospital floor following recovery in Endoscopy    Signed By: Elvira Escobar.  Larry Stroud MD     6/8/2020  4:25 PM

## 2020-06-08 NOTE — ANESTHESIA POSTPROCEDURE EVALUATION
Post-Anesthesia Evaluation and Assessment    Patient: Roxi Gill MRN: [id number]  SSN: [id number]    YOB: 1940  Age: 78 y.o. Sex: female      I have evaluated the patient and they are stable and ready for discharge from the PACU. Cardiovascular Function/Vital Signs  Visit Vitals  /60   Pulse 81   Temp 36.9 °C (98.5 °F)   Resp 14   Ht 5' 7\" (1.702 m)   Wt 73.5 kg (162 lb)   SpO2 93%   BMI 25.37 kg/m²       Patient is status post General anesthesia for Procedure(s):  ENDOSCOPIC RETROGRADE CHOLANGIOPANCREATOGRAPHY (ERCP)  ENDOSCOPY WITH PROSTHESIS OR STENT REMOVAL  ENDOSCOPIC STONE EXTRACTION/BALLOON SWEEP. Nausea/Vomiting: None    Postoperative hydration reviewed and adequate. Pain:  Pain Scale 1: Numeric (0 - 10) (06/08/20 0325)  Pain Intensity 1: 0 (06/08/20 0325)   Managed    Neurological Status:   Neuro  Neurologic State: Alert (06/08/20 0931)  Orientation Level: Oriented X4 (06/08/20 0931)  Speech: Clear (06/08/20 0931)  LUE Motor Response: Purposeful (06/08/20 0931)  LLE Motor Response: Purposeful (06/08/20 0931)  RUE Motor Response: Purposeful (06/08/20 0931)  RLE Motor Response: Purposeful (06/08/20 0931)   At baseline    Mental Status, Level of Consciousness: Alert and  oriented to person, place, and time    Pulmonary Status:   O2 Device: Nasal cannula (06/08/20 1625)   Adequate oxygenation and airway patent    Complications related to anesthesia: None    Post-anesthesia assessment completed. No concerns    Signed By: Elvia Cartagena MD     June 8, 2020              Procedure(s):  ENDOSCOPIC RETROGRADE CHOLANGIOPANCREATOGRAPHY (ERCP)  ENDOSCOPY WITH PROSTHESIS OR STENT REMOVAL  ENDOSCOPIC STONE EXTRACTION/BALLOON SWEEP. general    <BSHSIANPOST>    INITIAL Post-op Vital signs: No vitals data found for the desired time range.

## 2020-06-08 NOTE — PROGRESS NOTES
Lazaro Ashley  1940  [id number]    Situation:    Scheduled Procedure: Procedure(s):  ENDOSCOPIC RETROGRADE CHOLANGIOPANCREATOGRAPHY (ERCP)  Verbal report received from: Jeanette Pacheco RN  Preoperative diagnosis: biliary stent removal    Background:    Procedure: Procedure(s):  ENDOSCOPIC RETROGRADE CHOLANGIOPANCREATOGRAPHY (ERCP)  Physician performing procedure; Dr. Aj Smart RN    NPO Status/Last PO Intake: since midnight    Pregnancy Test:Not applicable     Is the patient taking Blood Thinners: YES If yes, list: Heparin drip  and stopped per floor nurse at [phone]  Is the patient diabetic:no       Does the patient need antibiotics before/during/after procedure: no       Assessment:  Are the vital signs stable prior to patient coming to ENDO?  yes  Is the patient alert/oriented and able to sign consent for the procedures:yes  How does the patient's abdomen feel prior to coming to ENDO? round and soft yes   Does the patient have a patient IV in place?  yes

## 2020-06-08 NOTE — PROGRESS NOTES
Problem: Falls - Risk of  Goal: *Absence of Falls  Description: Document Luana Amezquita Fall Risk and appropriate interventions in the flowsheet. Outcome: Progressing Towards Goal  Note: Fall Risk Interventions:  Mobility Interventions: Communicate number of staff needed for ambulation/transfer, Patient to call before getting OOB, Utilize walker, cane, or other assistive device         Medication Interventions: Evaluate medications/consider consulting pharmacy, Patient to call before getting OOB, Teach patient to arise slowly    Elimination Interventions: Call light in reach, Patient to call for help with toileting needs, Toileting schedule/hourly rounds, Stay With Me (per policy)    Problem: Pain  Goal: *Control of Pain  Outcome: Progressing Towards Goal     Problem: Risk for Spread of Infection  Goal: Prevent transmission of infectious organism to others  Description: Prevent the transmission of infectious organisms to other patients, staff members, and visitors.   Outcome: Progressing Towards Goal     Problem: Patient Education:  Go to Education Activity  Goal: Patient/Family Education  Outcome: Progressing Towards Goal

## 2020-06-08 NOTE — PROGRESS NOTES
TRANSFER - OUT REPORT:    Verbal report given to [address] [facility], RN(name) on [employer]  being transferred to [facility](unit) for routine progression of care       Report consisted of patients Situation, Background, Assessment and   Recommendations(SBAR). Information from the following report(s) SBAR and Procedure Summary was reviewed with the receiving nurse. Lines:   Peripheral IV 06/06/20 Upper Forearm (Active)   Site Assessment Clean, dry, & intact 6/8/2020  4:18 AM   Phlebitis Assessment 0 6/8/2020  4:18 AM   Infiltration Assessment 0 6/8/2020  4:18 AM   Dressing Status Clean, dry, & intact 6/8/2020  4:18 AM   Dressing Type Transparent 6/8/2020  4:18 AM   Hub Color/Line Status Infusing 6/8/2020  4:18 AM   Alcohol Cap Used Yes 6/8/2020  4:18 AM       Peripheral IV 06/07/20 Right Wrist (Active)   Site Assessment Clean, dry, & intact 6/8/2020  4:18 AM   Phlebitis Assessment 0 6/8/2020  4:18 AM   Infiltration Assessment 0 6/8/2020  4:18 AM   Dressing Status Clean, dry, & intact 6/8/2020  4:18 AM   Dressing Type Transparent 6/8/2020  4:18 AM   Hub Color/Line Status Patent 6/8/2020  4:18 AM   Alcohol Cap Used Yes 6/8/2020  4:18 AM       Peripheral IV 06/08/20 Left Wrist (Active)        Opportunity for questions and clarification was provided.

## 2020-06-08 NOTE — PROGRESS NOTES
Admit Date: 2020    POD * No surgery date entered *    Procedure:  Procedure(s):  ENDOSCOPIC RETROGRADE CHOLANGIOPANCREATOGRAPHY (ERCP)    Subjective:     Patient small bowel obstructive symptoms seem to be resolved, tolerating diet well, good lower GI function, no abdominal pain, being planned for biliary stent removal later this afternoon but from surgical standpoint can start GI light diet after stent removal          Review of Systems   Respiratory: Negative. Cardiovascular: Negative. Gastrointestinal: Negative. All other systems reviewed and are negative. Objective:     Blood pressure 153/89, pulse 74, temperature 98.5 °F (36.9 °C), resp. rate 18, height 5' 7\" (1.702 m), weight 162 lb (73.5 kg), SpO2 92 %. Temp (24hrs), Av.5 °F (36.9 °C), Min:98 °F (36.7 °C), Max:99.2 °F (37.3 °C)          Physical Exam  Vitals signs and nursing note reviewed. Constitutional:       General: She is not in acute distress. Appearance: Normal appearance. HENT:      Head: Normocephalic. Eyes:      Conjunctiva/sclera: Conjunctivae normal.   Cardiovascular:      Rate and Rhythm: Normal rate. Pulmonary:      Effort: Pulmonary effort is normal.   Abdominal:      General: Abdomen is flat. Bowel sounds are normal. There is no distension. Palpations: Abdomen is soft. Tenderness: There is no abdominal tenderness. Neurological:      General: No focal deficit present. Mental Status: She is alert and oriented to person, place, and time. Psychiatric:         Mood and Affect: Mood normal.         Behavior: Behavior normal.         Thought Content:  Thought content normal.         Judgment: Judgment normal.            Labs:   Recent Results (from the past 24 hour(s))   PTT    Collection Time: 20 11:47 AM   Result Value Ref Range    aPTT 109.5 (HH) 22.1 - 32.0 sec    aPTT, therapeutic range     58.0 - 77.0 SECS   PTT    Collection Time: 20  3:02 PM   Result Value Ref Range    aPTT 65.5 (H) 22.1 - 32.0 sec    aPTT, therapeutic range     58.0 - 77.0 SECS   PTT    Collection Time: 06/07/20  9:25 PM   Result Value Ref Range    aPTT 47.8 (H) 22.1 - 32.0 sec    aPTT, therapeutic range     58.0 - 30.3 SECS   METABOLIC PANEL, BASIC    Collection Time: 06/08/20  3:38 AM   Result Value Ref Range    Sodium 141 136 - 145 mmol/L    Potassium 4.1 3.5 - 5.1 mmol/L    Chloride 110 (H) 97 - 108 mmol/L    CO2 27 21 - 32 mmol/L    Anion gap 4 (L) 5 - 15 mmol/L    Glucose 99 65 - 100 mg/dL    BUN 16 6 - 20 MG/DL    Creatinine 0.98 0.55 - 1.02 MG/DL    BUN/Creatinine ratio 16 12 - 20      GFR est AA >60 >60 ml/min/1.73m2    GFR est non-AA 54 (L) >60 ml/min/1.73m2    Calcium 9.0 8.5 - 10.1 MG/DL   MAGNESIUM    Collection Time: 06/08/20  3:38 AM   Result Value Ref Range    Magnesium 1.7 1.6 - 2.4 mg/dL   PHOSPHORUS    Collection Time: 06/08/20  3:38 AM   Result Value Ref Range    Phosphorus 2.6 2.6 - 4.7 MG/DL   CBC WITH AUTOMATED DIFF    Collection Time: 06/08/20  3:38 AM   Result Value Ref Range    WBC 4.0 3.6 - 11.0 K/uL    RBC 3.21 (L) 3.80 - 5.20 M/uL    HGB 10.3 (L) 11.5 - 16.0 g/dL    HCT 34.4 (L) 35.0 - 47.0 %    .2 (H) 80.0 - 99.0 FL    MCH 32.1 26.0 - 34.0 PG    MCHC 29.9 (L) 30.0 - 36.5 g/dL    RDW 14.0 11.5 - 14.5 %    PLATELET 91 (L) 832 - 400 K/uL    MPV 11.8 8.9 - 12.9 FL    NRBC 0.0 0  WBC    ABSOLUTE NRBC 0.00 0.00 - 0.01 K/uL    NEUTROPHILS 43 32 - 75 %    LYMPHOCYTES 40 12 - 49 %    MONOCYTES 13 5 - 13 %    EOSINOPHILS 3 0 - 7 %    BASOPHILS 1 0 - 1 %    IMMATURE GRANULOCYTES 0 0.0 - 0.5 %    ABS. NEUTROPHILS 1.8 1.8 - 8.0 K/UL    ABS. LYMPHOCYTES 1.6 0.8 - 3.5 K/UL    ABS. MONOCYTES 0.5 0.0 - 1.0 K/UL    ABS. EOSINOPHILS 0.1 0.0 - 0.4 K/UL    ABS. BASOPHILS 0.0 0.0 - 0.1 K/UL    ABS. IMM.  GRANS. 0.0 0.00 - 0.04 K/UL    DF SMEAR SCANNED      RBC COMMENTS ANISOCYTOSIS  1+        RBC COMMENTS MACROCYTOSIS  1+       PTT    Collection Time: 06/08/20  3:38 AM   Result Value Ref Range aPTT 73.7 (H) 22.1 - 32.0 sec    aPTT, therapeutic range     58.0 - 77.0 SECS       Data Review images and reports reviewed    Assessment:     Active Problems:    Hypertension ()      SBO (small bowel obstruction) ([facility] (8/18/2016)      Stage 3 chronic kidney disease ([facility] (1/18/2020)      Thrombocytopenia ([facility] (6/7/2020)        Plan/Recommendations/Medical Decision Making:     Continue present treatment  Okay to advance to GI light diet after biliary stent removal, discharge home in the next 12-24 hours if tolerating diet after biliary stent removal      Roxi Gates MD , [facility]

## 2020-06-08 NOTE — ANESTHESIA PREPROCEDURE EVALUATION
Relevant Problems   No relevant active problems       Anesthetic History   No history of anesthetic complications            Review of Systems / Medical History  Patient summary reviewed, nursing notes reviewed and pertinent labs reviewed    Pulmonary  Within defined limits              Comments: Hypoxia early in adm.    Neuro/Psych   Within defined limits           Cardiovascular    Hypertension: well controlled        Dysrhythmias : PVC  Hyperlipidemia    Exercise tolerance: >4 METS  Comments: PE sec to fem thrombus   GI/Hepatic/Renal         Renal disease: CRI       Endo/Other        Arthritis     Other Findings   Comments: [place]           Physical Exam    Airway  Mallampati: II  TM Distance: 4 - 6 cm  Neck ROM: normal range of motion   Mouth opening: Normal     Cardiovascular  Regular rate and rhythm,  S1 and S2 normal,  no murmur, click, rub, or gallop             Dental  No notable dental hx       Pulmonary  Breath sounds clear to auscultation               Abdominal  GI exam deferred       Other Findings            Anesthetic Plan    ASA: 3  Anesthesia type: general          Induction: Intravenous  Anesthetic plan and risks discussed with: Patient

## 2020-06-08 NOTE — PROGRESS NOTES
Samantha Erickson, Dhara Donaldson, and Milan Romance    Admit Date: 6/5/2020      Subjective:     Patient feeling OK this AM. No nausea. .       Current Facility-Administered Medications   Medication Dose Route Frequency    iopamidoL (ISOVUE 300) 61 % contrast injection 30 mL  30 mL IntraCATHeter RAD ONCE    lactated Ringers infusion  25 mL/hr IntraVENous CONTINUOUS    albuterol (PROVENTIL VENTOLIN) nebulizer solution 2.5 mg  2.5 mg Nebulization Q4H PRN    allopurinoL (ZYLOPRIM) tablet 100 mg  100 mg Oral DAILY    dilTIAZem ER (CARDIZEM CD) capsule 240 mg  240 mg Oral DAILY    loratadine (CLARITIN) tablet 10 mg  10 mg Oral DAILY    atorvastatin (LIPITOR) tablet 10 mg  10 mg Oral DAILY    sodium chloride (NS) flush 5-40 mL  5-40 mL IntraVENous Q8H    sodium chloride (NS) flush 5-40 mL  5-40 mL IntraVENous PRN    lactated Ringers infusion  100 mL/hr IntraVENous CONTINUOUS    acetaminophen (TYLENOL) tablet 650 mg  650 mg Oral Q6H PRN    morphine injection 2-4 mg  2-4 mg IntraVENous Q3H PRN    naloxone (NARCAN) injection 0.4 mg  0.4 mg IntraVENous PRN    ondansetron (ZOFRAN) injection 4 mg  4 mg IntraVENous Q4H PRN    diphenhydrAMINE (BENADRYL) injection 12.5 mg  12.5 mg IntraVENous Q6H PRN    heparin 25,000 units in D5W 250 ml infusion  18-36 Units/kg/hr IntraVENous TITRATE          Objective:     Patient Vitals for the past 8 hrs:   BP Temp Pulse Resp SpO2   06/08/20 0325 148/75 99.2 °F (37.3 °C) 85 18 99 %     No intake/output data recorded. No intake/output data recorded. Physical Exam: Lungs: clear to auscultation bilaterally  Heart: regular rate and rhythm, S1, S2 normal, no murmur, click, rub or gallop  Abdomen: soft, non-tender.  Bowel sounds normal. No masses,  no organomegaly        Data Review   Recent Results (from the past 24 hour(s))   PTT    Collection Time: 06/07/20 11:47 AM   Result Value Ref Range    aPTT 109.5 (HH) 22.1 - 32.0 sec    aPTT, therapeutic range     58.0 - 77.0 SECS   PTT Collection Time: 06/07/20  3:02 PM   Result Value Ref Range    aPTT 65.5 (H) 22.1 - 32.0 sec    aPTT, therapeutic range     58.0 - 77.0 SECS   PTT    Collection Time: 06/07/20  9:25 PM   Result Value Ref Range    aPTT 47.8 (H) 22.1 - 32.0 sec    aPTT, therapeutic range     58.0 - 24.9 SECS   METABOLIC PANEL, BASIC    Collection Time: 06/08/20  3:38 AM   Result Value Ref Range    Sodium 141 136 - 145 mmol/L    Potassium 4.1 3.5 - 5.1 mmol/L    Chloride 110 (H) 97 - 108 mmol/L    CO2 27 21 - 32 mmol/L    Anion gap 4 (L) 5 - 15 mmol/L    Glucose 99 65 - 100 mg/dL    BUN 16 6 - 20 MG/DL    Creatinine 0.98 0.55 - 1.02 MG/DL    BUN/Creatinine ratio 16 12 - 20      GFR est AA >60 >60 ml/min/1.73m2    GFR est non-AA 54 (L) >60 ml/min/1.73m2    Calcium 9.0 8.5 - 10.1 MG/DL   MAGNESIUM    Collection Time: 06/08/20  3:38 AM   Result Value Ref Range    Magnesium 1.7 1.6 - 2.4 mg/dL   PHOSPHORUS    Collection Time: 06/08/20  3:38 AM   Result Value Ref Range    Phosphorus 2.6 2.6 - 4.7 MG/DL   CBC WITH AUTOMATED DIFF    Collection Time: 06/08/20  3:38 AM   Result Value Ref Range    WBC 4.0 3.6 - 11.0 K/uL    RBC 3.21 (L) 3.80 - 5.20 M/uL    HGB 10.3 (L) 11.5 - 16.0 g/dL    HCT 34.4 (L) 35.0 - 47.0 %    .2 (H) 80.0 - 99.0 FL    MCH 32.1 26.0 - 34.0 PG    MCHC 29.9 (L) 30.0 - 36.5 g/dL    RDW 14.0 11.5 - 14.5 %    PLATELET 91 (L) 291 - 400 K/uL    MPV 11.8 8.9 - 12.9 FL    NRBC 0.0 0  WBC    ABSOLUTE NRBC 0.00 0.00 - 0.01 K/uL    NEUTROPHILS 43 32 - 75 %    LYMPHOCYTES 40 12 - 49 %    MONOCYTES 13 5 - 13 %    EOSINOPHILS 3 0 - 7 %    BASOPHILS 1 0 - 1 %    IMMATURE GRANULOCYTES 0 0.0 - 0.5 %    ABS. NEUTROPHILS 1.8 1.8 - 8.0 K/UL    ABS. LYMPHOCYTES 1.6 0.8 - 3.5 K/UL    ABS. MONOCYTES 0.5 0.0 - 1.0 K/UL    ABS. EOSINOPHILS 0.1 0.0 - 0.4 K/UL    ABS. BASOPHILS 0.0 0.0 - 0.1 K/UL    ABS. IMM.  GRANS. 0.0 0.00 - 0.04 K/UL    DF SMEAR SCANNED      RBC COMMENTS ANISOCYTOSIS  1+        RBC COMMENTS MACROCYTOSIS  1+ PTT    Collection Time: 06/08/20  3:38 AM   Result Value Ref Range    aPTT 73.7 (H) 22.1 - 32.0 sec    aPTT, therapeutic range     58.0 - 77.0 SECS           Assessment:     Active Problems:    Hypertension ()      SBO (small bowel obstruction) ([facility] (8/18/2016)      Stage 3 chronic kidney disease ([facility] [address] [facility] (1/18/2020)      Thrombocytopenia ([facility] (6/7/2020)        Plan:     1) for stent removal today  2) Consider discharge afterward depending on time and tolerance of the procedure

## 2020-06-08 NOTE — PROGRESS NOTES
Elisa Douglas [address] [facility], [address] [facility]       GI PROGRESS NOTE  Will Jerome Rodas  [phone] office  [phone] NP/PA in-hospital cell phone M-F until 4:30PM  After 5PM or on weekends, please call  for physician on call      NAME: Burak Arteaga   :  1940   MRN:  [id number]       Subjective:   Patient denies nausea, vomiting, or abdominal pain. She reports having multiple bowel movements yesterday. She is NPO. Objective:     VITALS:   Last 24hrs VS reviewed since prior progress note. Most recent are:  Visit Vitals  /89 (BP Patient Position: At rest)   Pulse 74   Temp 98.5 °F (36.9 °C)   Resp 18   Ht 5' 7\" (1.702 m)   Wt 73.5 kg (162 lb)   SpO2 92%   BMI 25.37 kg/m²       PHYSICAL EXAM:  General: Cooperative, no acute distress    Neurologic:  Alert and oriented  HEENT: EOMI, no scleral icterus   Lungs:  No respiratory distress  Heart:  S1 S2  Abdomen: Soft, non-distended, no tenderness, no guarding, no rebound.   Extremities: Warm  Psych:   Not anxious or agitated    Lab Data Reviewed:     Recent Results (from the past 24 hour(s))   PTT    Collection Time: 20 11:47 AM   Result Value Ref Range    aPTT 109.5 (HH) 22.1 - 32.0 sec    aPTT, therapeutic range     58.0 - 77.0 SECS   PTT    Collection Time: 20  3:02 PM   Result Value Ref Range    aPTT 65.5 (H) 22.1 - 32.0 sec    aPTT, therapeutic range     58.0 - 77.0 SECS   PTT    Collection Time: 20  9:25 PM   Result Value Ref Range    aPTT 47.8 (H) 22.1 - 32.0 sec    aPTT, therapeutic range     58.0 - 53.0 SECS   METABOLIC PANEL, BASIC    Collection Time: 20  3:38 AM   Result Value Ref Range    Sodium 141 136 - 145 mmol/L    Potassium 4.1 3.5 - 5.1 mmol/L    Chloride 110 (H) 97 - 108 mmol/L    CO2 27 21 - 32 mmol/L    Anion gap 4 (L) 5 - 15 mmol/L    Glucose 99 65 - 100 mg/dL    BUN 16 6 - 20 MG/DL    Creatinine 0.98 0.55 - 1.02 MG/DL    BUN/Creatinine ratio 16 12 - 20      GFR est AA >60 >60 ml/min/1.73m2    GFR est non-AA 54 (L) >60 ml/min/1.73m2    Calcium 9.0 8.5 - 10.1 MG/DL   MAGNESIUM    Collection Time: 06/08/20  3:38 AM   Result Value Ref Range    Magnesium 1.7 1.6 - 2.4 mg/dL   PHOSPHORUS    Collection Time: 06/08/20  3:38 AM   Result Value Ref Range    Phosphorus 2.6 2.6 - 4.7 MG/DL   CBC WITH AUTOMATED DIFF    Collection Time: 06/08/20  3:38 AM   Result Value Ref Range    WBC 4.0 3.6 - 11.0 K/uL    RBC 3.21 (L) 3.80 - 5.20 M/uL    HGB 10.3 (L) 11.5 - 16.0 g/dL    HCT 34.4 (L) 35.0 - 47.0 %    .2 (H) 80.0 - 99.0 FL    MCH 32.1 26.0 - 34.0 PG    MCHC 29.9 (L) 30.0 - 36.5 g/dL    RDW 14.0 11.5 - 14.5 %    PLATELET 91 (L) 494 - 400 K/uL    MPV 11.8 8.9 - 12.9 FL    NRBC 0.0 0  WBC    ABSOLUTE NRBC 0.00 0.00 - 0.01 K/uL    NEUTROPHILS 43 32 - 75 %    LYMPHOCYTES 40 12 - 49 %    MONOCYTES 13 5 - 13 %    EOSINOPHILS 3 0 - 7 %    BASOPHILS 1 0 - 1 %    IMMATURE GRANULOCYTES 0 0.0 - 0.5 %    ABS. NEUTROPHILS 1.8 1.8 - 8.0 K/UL    ABS. LYMPHOCYTES 1.6 0.8 - 3.5 K/UL    ABS. MONOCYTES 0.5 0.0 - 1.0 K/UL    ABS. EOSINOPHILS 0.1 0.0 - 0.4 K/UL    ABS. BASOPHILS 0.0 0.0 - 0.1 K/UL    ABS. IMM. GRANS. 0.0 0.00 - 0.04 K/UL    DF SMEAR SCANNED      RBC COMMENTS ANISOCYTOSIS  1+        RBC COMMENTS MACROCYTOSIS  1+       PTT    Collection Time: 06/08/20  3:38 AM   Result Value Ref Range    aPTT 73.7 (H) 22.1 - 32.0 sec    aPTT, therapeutic range     58.0 - 77.0 SECS        Assessment:   · Status post ERCP with biliary sphincterotomy, biliary stone removal, and biliary stent placement (12/18/19): normal LFTs and lipase. · Partial small-bowel obstruction secondary to ventral hernia: surgery following  · Nausea, vomiting, and abdominal pain: normal LFTs and lipase. CT abdomen/pelvis without contrast (6/5/20): partial small bowel obstruction secondary to ventral hernia; new pancreatic ductal dilatation (6.2 mm), opacified common bile duct stent.   · Thrombocytopenia: platelets 91     Patient Active Problem List   Diagnosis Code    Hypertension I10    Hypercholesterolemia E78.00    Wound dehiscence T81.30XA    Cerebral thrombosis with cerebral infarction (HCC) I63.30    Abscess L02.91    Blood loss anemia D50.0    GI bleed K92.2    Tachycardia R00.0    Acute post-hemorrhagic anemia D62    Postoperative hypovolemic shock T81.19XA    SBO (small bowel obstruction) (HCC) K56.609    Obstruction of bowel (HCC) K56.609    Small bowel obstruction (HCC) K56.609    Paroxysmal SVT (supraventricular tachycardia) (HCC) I47.1    Dehydration E86.0    Bronchitis J40    Gallstone K80.20    JOSE (acute kidney injury) ([facility] [address] N17.9    Common bile duct calculus K80.50    Abdominal pain R10.9    Acute cholecystitis K81.0    Gallstone pancreatitis K85.10    Hypoxia R09.02    Weakness of left lower extremity R29.898    Pulmonary embolism (HCC) I26.99    DVT (deep venous thrombosis) (HCC) I82.409    Stage 3 chronic kidney disease (HCC) N18.3    Arterial embolism and thrombosis of lower extremity (HCC) I74.3    Postprocedural hypotension I95.81    Thrombocytopenia (HCC) D69.6     Plan:   · NPO  · Supportive measures  · Monitor LFTs  · Surgery following  · Plan for ERCP with Dr. Jignesh Ortez this afternoon. Details and risks of the procedure were discussed with the patient. Patient understands and is in agreement. Signed By: Tennille Mosqueda     6/8/2020  9:36 AM          This patient was seen and examined by me in a face-to-face visit today. I reviewed the medical record including lab work, imaging and other provider notes. I confirmed the interval history as described above. I spoke to the patient, discussing our findings and plans. I discussed this case in detail with Tennille Rivera. I formulated an updated  assessment of this patient and guided our treatment plan. I agree with the above progress note. I agree with the history, exam and assessment and plan as outlined in the note.   I would like to add the following: We will plan for ERCP with biliary stent removal. We discussed procedure details and risks, which include, but are not limited to, risks of GA, bleeding, infection, perforation, and pancreatitis. She expressed understanding and agreed to proceed. Reyes Salmeron MD

## 2020-06-08 NOTE — PROGRESS NOTES

## 2020-06-09 VITALS
HEIGHT: 67 IN | TEMPERATURE: 97.3 F | WEIGHT: 162 LBS | RESPIRATION RATE: 16 BRPM | DIASTOLIC BLOOD PRESSURE: 78 MMHG | BODY MASS INDEX: 25.43 KG/M2 | SYSTOLIC BLOOD PRESSURE: 115 MMHG | OXYGEN SATURATION: 93 % | HEART RATE: 75 BPM

## 2020-06-09 LAB
ANION GAP SERPL CALC-SCNC: 6 MMOL/L (ref 5–15)
BASOPHILS # BLD: 0 K/UL (ref 0–0.1)
BASOPHILS NFR BLD: 0 % (ref 0–1)
BUN SERPL-MCNC: 10 MG/DL (ref 6–20)
BUN/CREAT SERPL: 10 (ref 12–20)
CALCIUM SERPL-MCNC: 9.4 MG/DL (ref 8.5–10.1)
CHLORIDE SERPL-SCNC: 106 MMOL/L (ref 97–108)
CO2 SERPL-SCNC: 29 MMOL/L (ref 21–32)
CREAT SERPL-MCNC: 1.01 MG/DL (ref 0.55–1.02)
DIFFERENTIAL METHOD BLD: ABNORMAL
EOSINOPHIL # BLD: 0.1 K/UL (ref 0–0.4)
EOSINOPHIL NFR BLD: 2 % (ref 0–7)
ERYTHROCYTE [DISTWIDTH] IN BLOOD BY AUTOMATED COUNT: 13.5 % (ref 11.5–14.5)
GLUCOSE SERPL-MCNC: 90 MG/DL (ref 65–100)
HCT VFR BLD AUTO: 35 % (ref 35–47)
HGB BLD-MCNC: 10.7 G/DL (ref 11.5–16)
IMM GRANULOCYTES # BLD AUTO: 0 K/UL (ref 0–0.04)
IMM GRANULOCYTES NFR BLD AUTO: 1 % (ref 0–0.5)
LYMPHOCYTES # BLD: 0.8 K/UL (ref 0.8–3.5)
LYMPHOCYTES NFR BLD: 23 % (ref 12–49)
MAGNESIUM SERPL-MCNC: 1.4 MG/DL (ref 1.6–2.4)
MCH RBC QN AUTO: 32.2 PG (ref 26–34)
MCHC RBC AUTO-ENTMCNC: 30.6 G/DL (ref 30–36.5)
MCV RBC AUTO: 105.4 FL (ref 80–99)
MONOCYTES # BLD: 0.4 K/UL (ref 0–1)
MONOCYTES NFR BLD: 12 % (ref 5–13)
NEUTS SEG # BLD: 2.3 K/UL (ref 1.8–8)
NEUTS SEG NFR BLD: 63 % (ref 32–75)
NRBC # BLD: 0 K/UL (ref 0–0.01)
NRBC BLD-RTO: 0 PER 100 WBC
PHOSPHATE SERPL-MCNC: 2.8 MG/DL (ref 2.6–4.7)
PLATELET # BLD AUTO: 101 K/UL (ref 150–400)
PMV BLD AUTO: 11.7 FL (ref 8.9–12.9)
POTASSIUM SERPL-SCNC: 4.2 MMOL/L (ref 3.5–5.1)
RBC # BLD AUTO: 3.32 M/UL (ref 3.8–5.2)
SODIUM SERPL-SCNC: 141 MMOL/L (ref 136–145)
WBC # BLD AUTO: 3.7 K/UL (ref 3.6–11)

## 2020-06-09 PROCEDURE — 85025 COMPLETE CBC W/AUTO DIFF WBC: CPT

## 2020-06-09 PROCEDURE — 74011250636 HC RX REV CODE- 250/636: Performed by: SURGERY

## 2020-06-09 PROCEDURE — 74011250637 HC RX REV CODE- 250/637: Performed by: SURGERY

## 2020-06-09 PROCEDURE — 74011250637 HC RX REV CODE- 250/637: Performed by: FAMILY MEDICINE

## 2020-06-09 PROCEDURE — 84100 ASSAY OF PHOSPHORUS: CPT

## 2020-06-09 PROCEDURE — 80048 BASIC METABOLIC PNL TOTAL CA: CPT

## 2020-06-09 PROCEDURE — 74011250636 HC RX REV CODE- 250/636: Performed by: INTERNAL MEDICINE

## 2020-06-09 PROCEDURE — 83735 ASSAY OF MAGNESIUM: CPT

## 2020-06-09 PROCEDURE — 36415 COLL VENOUS BLD VENIPUNCTURE: CPT

## 2020-06-09 RX ORDER — MAGNESIUM SULFATE HEPTAHYDRATE 40 MG/ML
2 INJECTION, SOLUTION INTRAVENOUS ONCE
Status: COMPLETED | OUTPATIENT
Start: 2020-06-09 | End: 2020-06-09

## 2020-06-09 RX ADMIN — APIXABAN 5 MG: 5 TABLET, FILM COATED ORAL at 09:20

## 2020-06-09 RX ADMIN — LORATADINE 10 MG: 10 TABLET ORAL at 09:19

## 2020-06-09 RX ADMIN — ATORVASTATIN CALCIUM 10 MG: 10 TABLET, FILM COATED ORAL at 09:20

## 2020-06-09 RX ADMIN — DILTIAZEM HYDROCHLORIDE 240 MG: 240 CAPSULE, COATED, EXTENDED RELEASE ORAL at 09:19

## 2020-06-09 RX ADMIN — Medication 10 ML: at 06:08

## 2020-06-09 RX ADMIN — ALLOPURINOL 100 MG: 100 TABLET ORAL at 09:20

## 2020-06-09 RX ADMIN — MAGNESIUM SULFATE IN WATER 2 G: 40 INJECTION, SOLUTION INTRAVENOUS at 05:20

## 2020-06-09 RX ADMIN — SODIUM CHLORIDE, SODIUM LACTATE, POTASSIUM CHLORIDE, AND CALCIUM CHLORIDE 100 ML/HR: 600; 310; 30; 20 INJECTION, SOLUTION INTRAVENOUS at 02:09

## 2020-06-09 NOTE — PROGRESS NOTES
Transition of Care Notes:    RUR 22 %    1. This CM met with patien to review any needs prior to dc. Patient indicated that she has no needs and will have supportive family involved at discharge. 2.  There are no therapy notes indicating the needs for pre- discharge planning for HH/ rehab/ SNF. 3.   is [facility] and is reachable at [phone]. Per wife, her  or family can transport at discharge. 4.  CM to follow for needs.     Stacey Ivory  9:42 AM

## 2020-06-09 NOTE — PROGRESS NOTES
Samantha Valenzuela, Dhara Ambrosio, and Bard Roberts Date: 6/5/2020      Subjective:     Patient feeling OK after stent removal. No abd pain or nausea this AM. . Current Facility-Administered Medications   Medication Dose Route Frequency    lactated Ringers infusion  25 mL/hr IntraVENous CONTINUOUS    albuterol (PROVENTIL VENTOLIN) nebulizer solution 2.5 mg  2.5 mg Nebulization Q4H PRN    allopurinoL (ZYLOPRIM) tablet 100 mg  100 mg Oral DAILY    dilTIAZem ER (CARDIZEM CD) capsule 240 mg  240 mg Oral DAILY    loratadine (CLARITIN) tablet 10 mg  10 mg Oral DAILY    atorvastatin (LIPITOR) tablet 10 mg  10 mg Oral DAILY    sodium chloride (NS) flush 5-40 mL  5-40 mL IntraVENous Q8H    sodium chloride (NS) flush 5-40 mL  5-40 mL IntraVENous PRN    lactated Ringers infusion  100 mL/hr IntraVENous CONTINUOUS    acetaminophen (TYLENOL) tablet 650 mg  650 mg Oral Q6H PRN    morphine injection 2-4 mg  2-4 mg IntraVENous Q3H PRN    naloxone (NARCAN) injection 0.4 mg  0.4 mg IntraVENous PRN    ondansetron (ZOFRAN) injection 4 mg  4 mg IntraVENous Q4H PRN    diphenhydrAMINE (BENADRYL) injection 12.5 mg  12.5 mg IntraVENous Q6H PRN    heparin 25,000 units in D5W 250 ml infusion  18-36 Units/kg/hr IntraVENous TITRATE          Objective:     Patient Vitals for the past 8 hrs:   BP Temp Pulse Resp SpO2   06/09/20 0207 154/83 98.8 °F (37.1 °C) 82 16 94 %   06/09/20 0031 (!) 166/92 99 °F (37.2 °C) 76 16 96 %     No intake/output data recorded. No intake/output data recorded. Physical Exam: Lungs: clear to auscultation bilaterally  Heart: regular rate and rhythm, S1, S2 normal, no murmur, click, rub or gallop  Abdomen: soft, non-tender.  Bowel sounds normal. No masses,  no organomegaly        Data Review   Recent Results (from the past 24 hour(s))   METABOLIC PANEL, BASIC    Collection Time: 06/09/20  3:12 AM   Result Value Ref Range    Sodium 141 136 - 145 mmol/L    Potassium 4.2 3.5 - 5.1 mmol/L Chloride 106 97 - 108 mmol/L    CO2 29 21 - 32 mmol/L    Anion gap 6 5 - 15 mmol/L    Glucose 90 65 - 100 mg/dL    BUN 10 6 - 20 MG/DL    Creatinine 1.01 0.55 - 1.02 MG/DL    BUN/Creatinine ratio 10 (L) 12 - 20      GFR est AA >60 >60 ml/min/1.73m2    GFR est non-AA 53 (L) >60 ml/min/1.73m2    Calcium 9.4 8.5 - 10.1 MG/DL   MAGNESIUM    Collection Time: 06/09/20  3:12 AM   Result Value Ref Range    Magnesium 1.4 (L) 1.6 - 2.4 mg/dL   PHOSPHORUS    Collection Time: 06/09/20  3:12 AM   Result Value Ref Range    Phosphorus 2.8 2.6 - 4.7 MG/DL   CBC WITH AUTOMATED DIFF    Collection Time: 06/09/20  3:12 AM   Result Value Ref Range    WBC 3.7 3.6 - 11.0 K/uL    RBC 3.32 (L) 3.80 - 5.20 M/uL    HGB 10.7 (L) 11.5 - 16.0 g/dL    HCT 35.0 35.0 - 47.0 %    .4 (H) 80.0 - 99.0 FL    MCH 32.2 26.0 - 34.0 PG    MCHC 30.6 30.0 - 36.5 g/dL    RDW 13.5 11.5 - 14.5 %    PLATELET 546 (L) 444 - 400 K/uL    MPV 11.7 8.9 - 12.9 FL    NRBC 0.0 0  WBC    ABSOLUTE NRBC 0.00 0.00 - 0.01 K/uL    NEUTROPHILS 63 32 - 75 %    LYMPHOCYTES 23 12 - 49 %    MONOCYTES 12 5 - 13 %    EOSINOPHILS 2 0 - 7 %    BASOPHILS 0 0 - 1 %    IMMATURE GRANULOCYTES 1 (H) 0.0 - 0.5 %    ABS. NEUTROPHILS 2.3 1.8 - 8.0 K/UL    ABS. LYMPHOCYTES 0.8 0.8 - 3.5 K/UL    ABS. MONOCYTES 0.4 0.0 - 1.0 K/UL    ABS. EOSINOPHILS 0.1 0.0 - 0.4 K/UL    ABS. BASOPHILS 0.0 0.0 - 0.1 K/UL    ABS. IMM.  GRANS. 0.0 0.00 - 0.04 K/UL    DF AUTOMATED             Assessment:     Active Problems:    Hypertension ()      SBO (small bowel obstruction) ([facility] (8/18/2016)      Stage 3 chronic kidney disease ([facility] (1/18/2020)      Thrombocytopenia ([facility] (6/7/2020)        Plan:     1) GI lite diet  2) replace Magnesium IV  3) OOB as дмитрий  4) restart Eliquis

## 2020-06-09 NOTE — PROGRESS NOTES
Paged MD Cross of pt PLT value of 101,000 and magnesium value of 1.4 this AM. MD Vo gave verbal orders to discontinue the order to page physician if plt less than 150,000. No interventions needed. Will page MD Lorenzana office of pt magnesium level of 1.4    [id number]: MD Yaneli Mooney paged for magnesium level of 1.4 this AM. MD gave verbal orders for 2g of IV Magnesium.

## 2020-06-09 NOTE — PROGRESS NOTES
[address] [facility], [facility]       GI PROGRESS NOTE  Will Viva Peabody  [phone] office  [phone] NP/PA in-hospital cell phone M-F until 4:30PM  After 5PM or on weekends, please call  for physician on call      NAME: Melecio Shaikh   :  1940   MRN:  [id number]       Subjective:   Patient is sitting upright in bed and appears well. No nausea, vomiting, or abdominal pain. She is tolerating a GI lite diet. ERCP was done yesterday. Objective:     VITALS:   Last 24hrs VS reviewed since prior progress note. Most recent are:  Visit Vitals  /88   Pulse 67   Temp 98.6 °F (37 °C)   Resp 16   Ht 5' 7\" (1.702 m)   Wt 73.5 kg (162 lb)   SpO2 96%   BMI 25.37 kg/m²       PHYSICAL EXAM:  General: Cooperative, no acute distress    Neurologic:  Alert and oriented  HEENT: EOMI, no scleral icterus   Lungs:  No respiratory distress  Abdomen: Not examined as patient is on contact precautions and no PPE was available  Psych:   Good insight. Not anxious or agitated.     Lab Data Reviewed:     Recent Results (from the past 24 hour(s))   METABOLIC PANEL, BASIC    Collection Time: 20  3:12 AM   Result Value Ref Range    Sodium 141 136 - 145 mmol/L    Potassium 4.2 3.5 - 5.1 mmol/L    Chloride 106 97 - 108 mmol/L    CO2 29 21 - 32 mmol/L    Anion gap 6 5 - 15 mmol/L    Glucose 90 65 - 100 mg/dL    BUN 10 6 - 20 MG/DL    Creatinine 1.01 0.55 - 1.02 MG/DL    BUN/Creatinine ratio 10 (L) 12 - 20      GFR est AA >60 >60 ml/min/1.73m2    GFR est non-AA 53 (L) >60 ml/min/1.73m2    Calcium 9.4 8.5 - 10.1 MG/DL   MAGNESIUM    Collection Time: 20  3:12 AM   Result Value Ref Range    Magnesium 1.4 (L) 1.6 - 2.4 mg/dL   PHOSPHORUS    Collection Time: 20  3:12 AM   Result Value Ref Range    Phosphorus 2.8 2.6 - 4.7 MG/DL   CBC WITH AUTOMATED DIFF    Collection Time: 20  3:12 AM   Result Value Ref Range    WBC 3.7 3.6 - 11.0 K/uL    RBC 3.32 (L) 3.80 - 5.20 M/uL    HGB 10.7 (L) 11.5 - 16.0 g/dL    HCT 35.0 35.0 - 47.0 %    .4 (H) 80.0 - 99.0 FL    MCH 32.2 26.0 - 34.0 PG    MCHC 30.6 30.0 - 36.5 g/dL    RDW 13.5 11.5 - 14.5 %    PLATELET 202 (L) 882 - 400 K/uL    MPV 11.7 8.9 - 12.9 FL    NRBC 0.0 0  WBC    ABSOLUTE NRBC 0.00 0.00 - 0.01 K/uL    NEUTROPHILS 63 32 - 75 %    LYMPHOCYTES 23 12 - 49 %    MONOCYTES 12 5 - 13 %    EOSINOPHILS 2 0 - 7 %    BASOPHILS 0 0 - 1 %    IMMATURE GRANULOCYTES 1 (H) 0.0 - 0.5 %    ABS. NEUTROPHILS 2.3 1.8 - 8.0 K/UL    ABS. LYMPHOCYTES 0.8 0.8 - 3.5 K/UL    ABS. MONOCYTES 0.4 0.0 - 1.0 K/UL    ABS. EOSINOPHILS 0.1 0.0 - 0.4 K/UL    ABS. BASOPHILS 0.0 0.0 - 0.1 K/UL    ABS. IMM. GRANS. 0.0 0.00 - 0.04 K/UL    DF AUTOMATED         Assessment:   · Status post ERCP with biliary sphincterotomy, biliary stone removal, and biliary stent placement (12/18/19). Status post ERCP with biliary stent removal and extraction of biliary sludge (6/8/20). Hgb stable. · Partial small-bowel obstruction secondary to ventral hernia: surgery following  · Nausea, vomiting, and abdominal pain: normal LFTs and lipase. CT abdomen/pelvis without contrast (6/5/20): partial small bowel obstruction secondary to ventral hernia; new pancreatic ductal dilatation (6.2 mm), opacified common bile duct stent.      Patient Active Problem List   Diagnosis Code    Hypertension I10    Hypercholesterolemia E78.00    Wound dehiscence T81.30XA    Cerebral thrombosis with cerebral infarction ([facility] I63.30    Abscess L02.91    Blood loss anemia D50.0    GI bleed K92.2    Tachycardia R00.0    Acute post-hemorrhagic anemia D62    Postoperative hypovolemic shock T81.19XA    SBO (small bowel obstruction) (HCC) K56.609    Obstruction of bowel (HCC) K56.609    Small bowel obstruction (HCC) K56.609    Paroxysmal SVT (supraventricular tachycardia) (HCC) I47.1    Dehydration E86.0    Bronchitis J40    Gallstone K80.20    JOSE (acute kidney injury) ([facility] N17.9  Common bile duct calculus K80.50    Abdominal pain R10.9    Acute cholecystitis K81.0    Gallstone pancreatitis K85.10    Hypoxia R09.02    Weakness of left lower extremity R29.898    Pulmonary embolism (HCC) I26.99    DVT (deep venous thrombosis) (HCC) I82.409    Stage 3 chronic kidney disease (HCC) N18.3    Arterial embolism and thrombosis of lower extremity (HCC) I74.3    Postprocedural hypotension I95.81    Thrombocytopenia (HCC) D69.6     Plan:   · Diet as tolerated  · Eliquis was restarted today  · We will be available again as needed. Please call us with any questions. Thank you. Signed By: Tennille Thompson     6/9/2020  2:13 PM         GI Attending: Agree with above plan. Will sign off for now. Please call with any questions. Reyes Gonzáles MD

## 2020-06-09 NOTE — PROGRESS NOTES
Problem: Falls - Risk of  Goal: *Absence of Falls  Description: Document Francisco Gonzalez Fall Risk and appropriate interventions in the flowsheet. Outcome: Progressing Towards Goal  Note: Fall Risk Interventions:  Mobility Interventions: Communicate number of staff needed for ambulation/transfer         Medication Interventions: Evaluate medications/consider consulting pharmacy    Elimination Interventions: Call light in reach              Problem: Patient Education: Go to Patient Education Activity  Goal: Patient/Family Education  Outcome: Progressing Towards Goal     Problem: Pain  Goal: *Control of Pain  Outcome: Progressing Towards Goal     Problem: Risk for Spread of Infection  Goal: Prevent transmission of infectious organism to others  Description: Prevent the transmission of infectious organisms to other patients, staff members, and visitors. Outcome: Progressing Towards Goal     Problem: Patient Education:  Go to Education Activity  Goal: Patient/Family Education  Outcome: Progressing Towards Goal     Problem: Pressure Injury - Risk of  Goal: *Prevention of pressure injury  Description: Document Domingo Scale and appropriate interventions in the flowsheet.   Outcome: Progressing Towards Goal  Note: Pressure Injury Interventions:       Moisture Interventions: Absorbent underpads    Activity Interventions: Increase time out of bed    Mobility Interventions: Float heels, Pressure redistribution bed/mattress (bed type)    Nutrition Interventions: Document food/fluid/supplement intake                     Problem: Patient Education: Go to Patient Education Activity  Goal: Patient/Family Education  Outcome: Progressing Towards Goal

## 2020-06-28 NOTE — DISCHARGE SUMMARY
Physician Discharge Summary     Patient ID:  Mitzy Maguire  [id number]  59 y.o.  1940    Admit date: 6/5/2020    Discharge date and time: 6/28/2020    Admission Diagnoses: SBO (small bowel obstruction) ([facility]) [K56.609];SBO (small bowel obstruction) ([address] [K56.609]    Discharge Diagnoses:  Principal Diagnosis SBO (small bowel obstruction) ([facility]                                            Principal Problem:    SBO (small bowel obstruction) ([address] (8/18/2016)    Active Problems:    Hypertension ()      Stage 3 chronic kidney disease ([address] (1/18/2020)      Thrombocytopenia ([facility] (6/7/2020)           Hospital Course: Admitted with nausea and vomiting and abdominal pain c/w SBO. She also had dilated pancreatic duct proximal to stent. Had ERCP done with stent removal and removal of significant sludge as well. Abdominal pain and nausea resolved. Patient was eating and ambulating well by the time of discharge. Sent home in much improved condition. PCP: NELIDA Lorenzana    Consults: General Surgery and GI        Discharge Exam:  Visit Vitals  /78 (BP 1 Location: Right arm, BP Patient Position: At rest)   Pulse 75   Temp 97.3 °F (36.3 °C)   Resp 16   Ht 5' 7\" (1.702 m)   Wt 162 lb (73.5 kg)   SpO2 93%   BMI 25.37 kg/m²     Neck: supple, symmetrical, trachea midline, no adenopathy, thyroid: not enlarged, symmetric, no tenderness/mass/nodules, no carotid bruit and no JVD  Lungs: clear to auscultation bilaterally  Heart: regular rate and rhythm, S1, S2 normal, no murmur, click, rub or gallop  Abdomen: soft, non-tender. Bowel sounds normal. No masses,  no organomegaly  Extremities: extremities normal, atraumatic, no cyanosis or edema  Neurologic: Grossly normal    Disposition: home    Patient Instructions:   Cannot display discharge medications since this patient is not currently admitted.     Activity: Activity as tolerated  Diet: GI lite  Wound Care: None needed    Follow-up Appointments   Procedures    FOLLOW UP VISIT Appointment in: One Week     Standing Status:   Standing     Number of Occurrences:   1     Order Specific Question:   Appointment in     Answer:    One Week          Signed:  Reina Castro MD  6/28/2020  12:00 PM

## 2022-01-24 ENCOUNTER — APPOINTMENT (OUTPATIENT)
Dept: GENERAL RADIOLOGY | Age: 82
DRG: 389 | End: 2022-01-24
Attending: STUDENT IN AN ORGANIZED HEALTH CARE EDUCATION/TRAINING PROGRAM
Payer: MEDICARE

## 2022-01-24 ENCOUNTER — APPOINTMENT (OUTPATIENT)
Dept: CT IMAGING | Age: 82
DRG: 389 | End: 2022-01-24
Attending: STUDENT IN AN ORGANIZED HEALTH CARE EDUCATION/TRAINING PROGRAM
Payer: MEDICARE

## 2022-01-24 ENCOUNTER — HOSPITAL ENCOUNTER (INPATIENT)
Age: 82
LOS: 6 days | Discharge: NURSING, REHAB OR LONG-TERM CARE | DRG: 389 | End: 2022-01-31
Attending: STUDENT IN AN ORGANIZED HEALTH CARE EDUCATION/TRAINING PROGRAM | Admitting: STUDENT IN AN ORGANIZED HEALTH CARE EDUCATION/TRAINING PROGRAM
Payer: MEDICARE

## 2022-01-24 DIAGNOSIS — K52.9 GASTROENTERITIS: ICD-10-CM

## 2022-01-24 DIAGNOSIS — R10.84 ABDOMINAL PAIN, GENERALIZED: Primary | ICD-10-CM

## 2022-01-24 LAB
ALBUMIN SERPL-MCNC: 3.3 G/DL (ref 3.5–5)
ALBUMIN/GLOB SERPL: 0.9 {RATIO} (ref 1.1–2.2)
ALP SERPL-CCNC: 64 U/L (ref 45–117)
ALT SERPL-CCNC: 21 U/L (ref 12–78)
ANION GAP SERPL CALC-SCNC: 10 MMOL/L (ref 5–15)
APPEARANCE UR: ABNORMAL
AST SERPL-CCNC: 14 U/L (ref 15–37)
BACTERIA URNS QL MICRO: NEGATIVE /HPF
BASOPHILS # BLD: 0 K/UL (ref 0–0.1)
BASOPHILS NFR BLD: 0 % (ref 0–1)
BILIRUB SERPL-MCNC: 0.4 MG/DL (ref 0.2–1)
BILIRUB UR QL: NEGATIVE
BNP SERPL-MCNC: 596 PG/ML (ref 0–450)
BUN SERPL-MCNC: 47 MG/DL (ref 6–20)
BUN/CREAT SERPL: 24 (ref 12–20)
CALCIUM SERPL-MCNC: 10.2 MG/DL (ref 8.5–10.1)
CHLORIDE SERPL-SCNC: 108 MMOL/L (ref 97–108)
CO2 SERPL-SCNC: 26 MMOL/L (ref 21–32)
COLOR UR: ABNORMAL
CREAT SERPL-MCNC: 1.99 MG/DL (ref 0.55–1.02)
CRP SERPL-MCNC: <0.29 MG/DL
DIFFERENTIAL METHOD BLD: ABNORMAL
EOSINOPHIL # BLD: 0 K/UL (ref 0–0.4)
EOSINOPHIL NFR BLD: 0 % (ref 0–7)
EPITH CASTS URNS QL MICRO: ABNORMAL /LPF
ERYTHROCYTE [DISTWIDTH] IN BLOOD BY AUTOMATED COUNT: 13.4 % (ref 11.5–14.5)
GLOBULIN SER CALC-MCNC: 3.7 G/DL (ref 2–4)
GLUCOSE SERPL-MCNC: 133 MG/DL (ref 65–100)
GLUCOSE UR STRIP.AUTO-MCNC: NEGATIVE MG/DL
HCT VFR BLD AUTO: 39 % (ref 35–47)
HGB BLD-MCNC: 12.2 G/DL (ref 11.5–16)
HGB UR QL STRIP: NEGATIVE
HYALINE CASTS URNS QL MICRO: ABNORMAL /LPF (ref 0–5)
IMM GRANULOCYTES # BLD AUTO: 0 K/UL (ref 0–0.04)
IMM GRANULOCYTES NFR BLD AUTO: 0 % (ref 0–0.5)
KETONES UR QL STRIP.AUTO: NEGATIVE MG/DL
LEUKOCYTE ESTERASE UR QL STRIP.AUTO: ABNORMAL
LYMPHOCYTES # BLD: 0.6 K/UL (ref 0.8–3.5)
LYMPHOCYTES NFR BLD: 6 % (ref 12–49)
MCH RBC QN AUTO: 33.1 PG (ref 26–34)
MCHC RBC AUTO-ENTMCNC: 31.3 G/DL (ref 30–36.5)
MCV RBC AUTO: 105.7 FL (ref 80–99)
MONOCYTES # BLD: 0.8 K/UL (ref 0–1)
MONOCYTES NFR BLD: 8 % (ref 5–13)
NEUTS SEG # BLD: 8.2 K/UL (ref 1.8–8)
NEUTS SEG NFR BLD: 86 % (ref 32–75)
NITRITE UR QL STRIP.AUTO: NEGATIVE
NRBC # BLD: 0 K/UL (ref 0–0.01)
NRBC BLD-RTO: 0 PER 100 WBC
PH UR STRIP: 5 [PH] (ref 5–8)
PLATELET # BLD AUTO: 168 K/UL (ref 150–400)
PMV BLD AUTO: 11.7 FL (ref 8.9–12.9)
POTASSIUM SERPL-SCNC: 5.2 MMOL/L (ref 3.5–5.1)
PROT SERPL-MCNC: 7 G/DL (ref 6.4–8.2)
PROT UR STRIP-MCNC: 100 MG/DL
RBC # BLD AUTO: 3.69 M/UL (ref 3.8–5.2)
RBC #/AREA URNS HPF: ABNORMAL /HPF (ref 0–5)
RBC MORPH BLD: ABNORMAL
SODIUM SERPL-SCNC: 144 MMOL/L (ref 136–145)
SP GR UR REFRACTOMETRY: 1.02 (ref 1–1.03)
TROPONIN-HIGH SENSITIVITY: 19 NG/L (ref 0–51)
UROBILINOGEN UR QL STRIP.AUTO: 0.2 EU/DL (ref 0.2–1)
WBC # BLD AUTO: 9.6 K/UL (ref 3.6–11)
WBC URNS QL MICRO: ABNORMAL /HPF (ref 0–4)

## 2022-01-24 PROCEDURE — 74176 CT ABD & PELVIS W/O CONTRAST: CPT

## 2022-01-24 PROCEDURE — 81001 URINALYSIS AUTO W/SCOPE: CPT

## 2022-01-24 PROCEDURE — 99285 EMERGENCY DEPT VISIT HI MDM: CPT

## 2022-01-24 PROCEDURE — 36415 COLL VENOUS BLD VENIPUNCTURE: CPT

## 2022-01-24 PROCEDURE — 83880 ASSAY OF NATRIURETIC PEPTIDE: CPT

## 2022-01-24 PROCEDURE — 71045 X-RAY EXAM CHEST 1 VIEW: CPT

## 2022-01-24 PROCEDURE — 83605 ASSAY OF LACTIC ACID: CPT

## 2022-01-24 PROCEDURE — 84484 ASSAY OF TROPONIN QUANT: CPT

## 2022-01-24 PROCEDURE — 85025 COMPLETE CBC W/AUTO DIFF WBC: CPT

## 2022-01-24 PROCEDURE — 96375 TX/PRO/DX INJ NEW DRUG ADDON: CPT

## 2022-01-24 PROCEDURE — 86140 C-REACTIVE PROTEIN: CPT

## 2022-01-24 PROCEDURE — 80053 COMPREHEN METABOLIC PANEL: CPT

## 2022-01-24 PROCEDURE — 96374 THER/PROPH/DIAG INJ IV PUSH: CPT

## 2022-01-24 RX ORDER — ONDANSETRON 2 MG/ML
4 INJECTION INTRAMUSCULAR; INTRAVENOUS ONCE
Status: COMPLETED | OUTPATIENT
Start: 2022-01-25 | End: 2022-01-25

## 2022-01-24 RX ORDER — MORPHINE SULFATE 4 MG/ML
4 INJECTION INTRAVENOUS
Status: COMPLETED | OUTPATIENT
Start: 2022-01-25 | End: 2022-01-25

## 2022-01-24 RX ORDER — SODIUM CHLORIDE 9 MG/ML
1000 INJECTION, SOLUTION INTRAVENOUS CONTINUOUS
Status: DISCONTINUED | OUTPATIENT
Start: 2022-01-25 | End: 2022-01-26

## 2022-01-25 PROBLEM — K52.9 GASTROENTERITIS: Status: ACTIVE | Noted: 2022-01-25

## 2022-01-25 LAB — LACTATE SERPL-SCNC: 0.6 MMOL/L (ref 0.4–2)

## 2022-01-25 PROCEDURE — 74011250636 HC RX REV CODE- 250/636: Performed by: STUDENT IN AN ORGANIZED HEALTH CARE EDUCATION/TRAINING PROGRAM

## 2022-01-25 PROCEDURE — 65660000000 HC RM CCU STEPDOWN

## 2022-01-25 RX ADMIN — ONDANSETRON HYDROCHLORIDE 4 MG: 2 SOLUTION INTRAMUSCULAR; INTRAVENOUS at 00:09

## 2022-01-25 RX ADMIN — SODIUM CHLORIDE 1000 ML: 9 INJECTION, SOLUTION INTRAVENOUS at 00:09

## 2022-01-25 RX ADMIN — MORPHINE SULFATE 4 MG: 4 INJECTION INTRAVENOUS at 00:09

## 2022-01-25 NOTE — ED NOTES
Bedside and Verbal shift change report given to Leydi Mchugh RN (oncoming nurse) by Heber Bailey RN (offgoing nurse). Report included the following information SBAR and ED Summary.

## 2022-01-25 NOTE — ED PROVIDER NOTES
HPI     Patient is an 57-year-old female with history of small bowel obstruction who presents today with abdominal pain. Symptoms started earlier today. Associate symptoms includes vomiting and diarrhea. No fevers. No aggravating or alleviating factors. Patient arrives to the ER for further evaluation. Past Medical History:   Diagnosis Date    Abscess 3/22/2014    Lumbar area - MRSA    Arthritis     Common bile duct calculus 2019    Gout     Hypercholesterolemia     Hypertension     LBP (low back pain)     Other ill-defined conditions(799.89)     LEFT SHOULDER PAINFUL, NEEDS REPLACEMENT    Paroxysmal SVT (supraventricular tachycardia) ([address] 2017    Thromboembolus ([address] 13    left leg       Past Surgical History:   Procedure Laterality Date    ENDOSCOPY, COLON, DIAGNOSTIC      ostomy reversal    HX BREAST BIOPSY Right 1970    Benign    HX COLOSTOMY      HX GI      COLOSTOMY REVERSAL    HX HEMORRHOIDECTOMY      HX ORTHOPAEDIC      right knee  bilateral knee replacement    HX ORTHOPAEDIC      ORIF RIGHT ANKLE    HX ORTHOPAEDIC      KNEE ARTHROSCOPY    NE ABDOMEN SURGERY PROC UNLISTED      hernia repair    NE BREAST SURGERY PROCEDURE UNLISTED      EXCISION OF BREAST CYST         Family History:   Problem Relation Age of Onset    Hypertension Father     Anesth Problems Neg Hx        Social History     Socioeconomic History    Marital status:      Spouse name: Not on file    Number of children: Not on file    Years of education: Not on file    Highest education level: Not on file   Occupational History    Not on file   Tobacco Use    Smoking status: Former Smoker     Packs/day: 1.00     Years: 20.00     Pack years: 20.00     Quit date: 1972     Years since quittin.7    Smokeless tobacco: Never Used   Substance and Sexual Activity    Alcohol use:  Yes     Alcohol/week: 1.7 standard drinks     Types: 2 Shots of liquor per week     Comment: 2 DAY    Drug use: No    Sexual activity: Not on file   Other Topics Concern    Not on file   Social History Narrative    Not on file     Social Determinants of Health     Financial Resource Strain:     Difficulty of Paying Living Expenses: Not on file   Food Insecurity:     Worried About Running Out of Food in the Last Year: Not on file    Bull of Food in the Last Year: Not on file   Transportation Needs:     Lack of Transportation (Medical): Not on file    Lack of Transportation (Non-Medical): Not on file   Physical Activity:     Days of Exercise per Week: Not on file    Minutes of Exercise per Session: Not on file   Stress:     Feeling of Stress : Not on file   Social Connections:     Frequency of Communication with Friends and Family: Not on file    Frequency of Social Gatherings with Friends and Family: Not on file    Attends Synagogue Services: Not on file    Active Member of [address] [place] [address] "[employer] or Organizations: Not on file    Attends Club or Organization Meetings: Not on file    Marital Status: Not on file   Intimate Partner Violence:     Fear of Current or Ex-Partner: Not on file    Emotionally Abused: Not on file    Physically Abused: Not on file    Sexually Abused: Not on file   Housing Stability:     Unable to Pay for Housing in the Last Year: Not on file    Number of [place] in the Last Year: Not on file    Unstable Housing in the Last Year: Not on file         ALLERGIES: Shellfish derived    Review of Systems   Constitutional: Negative for appetite change and fever. HENT: Negative for congestion and rhinorrhea. Eyes: Negative for discharge and redness. Respiratory: Negative for cough and shortness of breath. Cardiovascular: Negative for chest pain. Gastrointestinal: Positive for abdominal pain, diarrhea, nausea and vomiting. Genitourinary: Negative for decreased urine volume and dysuria. Musculoskeletal: Negative for back pain. Skin: Negative for rash and wound.    Neurological: Negative for seizures and headaches. Hematological: Does not bruise/bleed easily. Psychiatric/Behavioral: Negative for agitation. All other systems reviewed and are negative. Vitals:    22 1242 22 1250 22 1300 22 1400   BP:       Pulse:    76   Resp:       Temp:       SpO2: 93% 94% 90%    Weight:       Height:                Physical Exam  Vitals and nursing note reviewed. Constitutional:       General: She is not in acute distress. Appearance: Normal appearance. HENT:      Head: Normocephalic and atraumatic. Nose: Nose normal.      Mouth/Throat:      Mouth: Mucous membranes are moist.      Pharynx: Oropharynx is clear. Eyes:      Extraocular Movements: Extraocular movements intact. Conjunctiva/sclera: Conjunctivae normal.      Pupils: Pupils are equal, round, and reactive to light. Cardiovascular:      Rate and Rhythm: Normal rate and regular rhythm. Pulses: Normal pulses. Heart sounds: Normal heart sounds. No murmur heard. No friction rub. No gallop. Pulmonary:      Effort: Pulmonary effort is normal.      Breath sounds: Normal breath sounds. Abdominal:      General: Bowel sounds are normal. There is no distension. Palpations: Abdomen is soft. Tenderness: There is generalized abdominal tenderness and tenderness in the right lower quadrant and left upper quadrant. Musculoskeletal:         General: Normal range of motion. Cervical back: Normal range of motion. Skin:     General: Skin is warm and dry. Capillary Refill: Capillary refill takes less than 2 seconds. Neurological:      General: No focal deficit present. Mental Status: She is alert and oriented to person, place, and time. Mental status is at baseline.    Psychiatric:         Mood and Affect: Mood normal.          [facility]        MEDICAL DECISION MAKIN y.o. female presents with Abdominal Pain and Vomiting    Differential diagnosis includes but not limited to: obstruction vs diverticulitis vs gastroenteritis vs cholecystitis    Patient is an 75-year-old female who presents today with abdominal pain, vomiting, and diarrhea. Labs reviewed. CT scan with concern for mildly dilated loops of bowel that could indicate gastroenteritis or partial small bowel obstruction. Dr. Dixon Velasco, surgeon, who reviewed the CT, and feels this is less likely surgical process. Given fluids, and plan to admit to the hospital service for further management    [address] [place] [address] for Admission  11:46 PM    ED Room Number: 36/18  Patient Name and age:  Anton Mcadams 78 y.o.  female  Working Diagnosis:   1. Abdominal pain, generalized    2. Gastroenteritis        COVID-19 Suspicion:  no  Sepsis present:  no  Reassessment needed: no  Code Status:  Full Code  Readmission: yes  Isolation Requirements:  no  Recommended Level of Care:  step down  Department:[facility] - [phone]  Other: Patient is a 75-year-old female with history of bowel obstruction who presents today with abdominal pain, nausea, vomiting, and diarrhea. Patient is diffuse tenderness on her abdomen most present to the right and left lower quadrant. CT scan reviewed, and I spoke with Dr. Dixon Velasco with general surgery who reviewed the images. He feels this is less likely a partial bowel obstruction. Labs with JOSE. Admitted to hospitalist for hydration and nausea control. LABORATORY TESTS:  Labs Reviewed   CBC WITH AUTOMATED DIFF - Abnormal; Notable for the following components:       Result Value    RBC 3.69 (*)     .7 (*)     NEUTROPHILS 86 (*)     LYMPHOCYTES 6 (*)     ABS. NEUTROPHILS 8.2 (*)     ABS.  LYMPHOCYTES 0.6 (*)     All other components within normal limits   METABOLIC PANEL, COMPREHENSIVE - Abnormal; Notable for the following components:    Potassium 5.2 (*)     Glucose 133 (*)     BUN 47 (*)     Creatinine 1.99 (*)     BUN/Creatinine ratio 24 (*)     GFR est AA 29 (*)     GFR est non-AA 24 (*) Calcium 10.2 (*)     AST (SGOT) 14 (*)     Albumin 3.3 (*)     A-G Ratio 0.9 (*)     All other components within normal limits   URINALYSIS W/MICROSCOPIC - Abnormal; Notable for the following components:    Appearance HAZY (*)     Protein 100 (*)     Leukocyte Esterase TRACE (*)     All other components within normal limits   NT-PRO BNP - Abnormal; Notable for the following components:    NT pro- (*)     All other components within normal limits   METABOLIC PANEL, COMPREHENSIVE - Abnormal; Notable for the following components:    Chloride 118 (*)     Anion gap 3 (*)     BUN 45 (*)     Creatinine 1.72 (*)     BUN/Creatinine ratio 26 (*)     GFR est AA 34 (*)     GFR est non-AA 28 (*)     Protein, total 6.3 (*)     Albumin 2.8 (*)     A-G Ratio 0.8 (*)     All other components within normal limits   CBC WITH AUTOMATED DIFF - Abnormal; Notable for the following components:    RBC 3.17 (*)     HGB 10.1 (*)     HCT 34.3 (*)     .2 (*)     MCHC 29.4 (*)     All other components within normal limits   URINE CULTURE HOLD SAMPLE   TROPONIN-HIGH SENSITIVITY   LACTIC ACID   C REACTIVE PROTEIN, QT   TSH 3RD GENERATION   MAGNESIUM   PHOSPHORUS   LIPASE   HEMOGLOBIN A1C WITH EAG   SAMPLES BEING HELD   TROPONIN-HIGH SENSITIVITY       IMAGING RESULTS:  CT ABD PELV WO CONT   Final Result   Mildly dilated small bowel loops could indicate gastroenteritis or partial small   bowel obstruction. Nonobstructing small bowel containing left ventral hernia. XR CHEST PORT   Final Result   No evidence of acute cardiopulmonary process. MEDICATIONS GIVEN:  Medications   morphine injection 4 mg (4 mg IntraVENous Given 1/25/22 0009)   ondansetron (ZOFRAN) injection 4 mg (4 mg IntraVENous Given 1/25/22 0009)        CONSULTS:  General surgery    IMPRESSION:  1. Abdominal pain, generalized    2.  Gastroenteritis        PLAN:  - Admit to hospitalist    Lorraine Marcum MD            Procedures

## 2022-01-26 PROBLEM — K56.600 PARTIAL SMALL BOWEL OBSTRUCTION (HCC): Status: ACTIVE | Noted: 2017-04-21

## 2022-01-26 LAB
ALBUMIN SERPL-MCNC: 2.8 G/DL (ref 3.5–5)
ALBUMIN/GLOB SERPL: 0.8 {RATIO} (ref 1.1–2.2)
ALP SERPL-CCNC: 51 U/L (ref 45–117)
ALT SERPL-CCNC: 16 U/L (ref 12–78)
ANION GAP SERPL CALC-SCNC: 3 MMOL/L (ref 5–15)
AST SERPL-CCNC: 15 U/L (ref 15–37)
BASOPHILS # BLD: 0 K/UL (ref 0–0.1)
BASOPHILS NFR BLD: 0 % (ref 0–1)
BILIRUB SERPL-MCNC: 0.4 MG/DL (ref 0.2–1)
BUN SERPL-MCNC: 45 MG/DL (ref 6–20)
BUN/CREAT SERPL: 26 (ref 12–20)
CALCIUM SERPL-MCNC: 9.8 MG/DL (ref 8.5–10.1)
CHLORIDE SERPL-SCNC: 118 MMOL/L (ref 97–108)
CO2 SERPL-SCNC: 24 MMOL/L (ref 21–32)
CREAT SERPL-MCNC: 1.72 MG/DL (ref 0.55–1.02)
DIFFERENTIAL METHOD BLD: ABNORMAL
EOSINOPHIL # BLD: 0.1 K/UL (ref 0–0.4)
EOSINOPHIL NFR BLD: 2 % (ref 0–7)
ERYTHROCYTE [DISTWIDTH] IN BLOOD BY AUTOMATED COUNT: 13.2 % (ref 11.5–14.5)
EST. AVERAGE GLUCOSE BLD GHB EST-MCNC: 108 MG/DL
GLOBULIN SER CALC-MCNC: 3.5 G/DL (ref 2–4)
GLUCOSE SERPL-MCNC: 70 MG/DL (ref 65–100)
HBA1C MFR BLD: 5.4 % (ref 4–5.6)
HCT VFR BLD AUTO: 34.3 % (ref 35–47)
HGB BLD-MCNC: 10.1 G/DL (ref 11.5–16)
IMM GRANULOCYTES # BLD AUTO: 0 K/UL (ref 0–0.04)
IMM GRANULOCYTES NFR BLD AUTO: 0 % (ref 0–0.5)
LIPASE SERPL-CCNC: 96 U/L (ref 73–393)
LYMPHOCYTES # BLD: 1.6 K/UL (ref 0.8–3.5)
LYMPHOCYTES NFR BLD: 23 % (ref 12–49)
MAGNESIUM SERPL-MCNC: 2.2 MG/DL (ref 1.6–2.4)
MCH RBC QN AUTO: 31.9 PG (ref 26–34)
MCHC RBC AUTO-ENTMCNC: 29.4 G/DL (ref 30–36.5)
MCV RBC AUTO: 108.2 FL (ref 80–99)
MONOCYTES # BLD: 0.6 K/UL (ref 0–1)
MONOCYTES NFR BLD: 9 % (ref 5–13)
NEUTS SEG # BLD: 4.6 K/UL (ref 1.8–8)
NEUTS SEG NFR BLD: 66 % (ref 32–75)
NRBC # BLD: 0 K/UL (ref 0–0.01)
NRBC BLD-RTO: 0 PER 100 WBC
PHOSPHATE SERPL-MCNC: 4.2 MG/DL (ref 2.6–4.7)
PLATELET # BLD AUTO: 164 K/UL (ref 150–400)
PMV BLD AUTO: 11.7 FL (ref 8.9–12.9)
POTASSIUM SERPL-SCNC: 4.9 MMOL/L (ref 3.5–5.1)
PROT SERPL-MCNC: 6.3 G/DL (ref 6.4–8.2)
RBC # BLD AUTO: 3.17 M/UL (ref 3.8–5.2)
RBC MORPH BLD: ABNORMAL
SODIUM SERPL-SCNC: 145 MMOL/L (ref 136–145)
TSH SERPL DL<=0.05 MIU/L-ACNC: 0.91 UIU/ML (ref 0.36–3.74)
WBC # BLD AUTO: 6.9 K/UL (ref 3.6–11)

## 2022-01-26 PROCEDURE — 97161 PT EVAL LOW COMPLEX 20 MIN: CPT

## 2022-01-26 PROCEDURE — 84100 ASSAY OF PHOSPHORUS: CPT

## 2022-01-26 PROCEDURE — 74011000250 HC RX REV CODE- 250: Performed by: INTERNAL MEDICINE

## 2022-01-26 PROCEDURE — 80053 COMPREHEN METABOLIC PANEL: CPT

## 2022-01-26 PROCEDURE — C9113 INJ PANTOPRAZOLE SODIUM, VIA: HCPCS | Performed by: INTERNAL MEDICINE

## 2022-01-26 PROCEDURE — 65660000000 HC RM CCU STEPDOWN

## 2022-01-26 PROCEDURE — 74011250636 HC RX REV CODE- 250/636: Performed by: INTERNAL MEDICINE

## 2022-01-26 PROCEDURE — 97530 THERAPEUTIC ACTIVITIES: CPT

## 2022-01-26 PROCEDURE — 83735 ASSAY OF MAGNESIUM: CPT

## 2022-01-26 PROCEDURE — 36415 COLL VENOUS BLD VENIPUNCTURE: CPT

## 2022-01-26 PROCEDURE — 83036 HEMOGLOBIN GLYCOSYLATED A1C: CPT

## 2022-01-26 PROCEDURE — 83690 ASSAY OF LIPASE: CPT

## 2022-01-26 PROCEDURE — 84443 ASSAY THYROID STIM HORMONE: CPT

## 2022-01-26 PROCEDURE — 99221 1ST HOSP IP/OBS SF/LOW 40: CPT | Performed by: NURSE PRACTITIONER

## 2022-01-26 PROCEDURE — 85025 COMPLETE CBC W/AUTO DIFF WBC: CPT

## 2022-01-26 PROCEDURE — 74011250637 HC RX REV CODE- 250/637: Performed by: INTERNAL MEDICINE

## 2022-01-26 RX ORDER — ALLOPURINOL 100 MG/1
100 TABLET ORAL DAILY
Status: DISCONTINUED | OUTPATIENT
Start: 2022-01-26 | End: 2022-01-31 | Stop reason: HOSPADM

## 2022-01-26 RX ORDER — ONDANSETRON 2 MG/ML
4 INJECTION INTRAMUSCULAR; INTRAVENOUS
Status: DISCONTINUED | OUTPATIENT
Start: 2022-01-26 | End: 2022-01-31 | Stop reason: HOSPADM

## 2022-01-26 RX ORDER — IPRATROPIUM BROMIDE AND ALBUTEROL SULFATE 2.5; .5 MG/3ML; MG/3ML
3 SOLUTION RESPIRATORY (INHALATION)
Status: DISCONTINUED | OUTPATIENT
Start: 2022-01-26 | End: 2022-01-31 | Stop reason: HOSPADM

## 2022-01-26 RX ORDER — SODIUM CHLORIDE 0.9 % (FLUSH) 0.9 %
5-40 SYRINGE (ML) INJECTION AS NEEDED
Status: DISCONTINUED | OUTPATIENT
Start: 2022-01-26 | End: 2022-01-31 | Stop reason: HOSPADM

## 2022-01-26 RX ORDER — SODIUM CHLORIDE 0.9 % (FLUSH) 0.9 %
5-40 SYRINGE (ML) INJECTION EVERY 8 HOURS
Status: DISCONTINUED | OUTPATIENT
Start: 2022-01-26 | End: 2022-01-31 | Stop reason: HOSPADM

## 2022-01-26 RX ORDER — ONDANSETRON 4 MG/1
4 TABLET, ORALLY DISINTEGRATING ORAL
Status: DISCONTINUED | OUTPATIENT
Start: 2022-01-26 | End: 2022-01-31 | Stop reason: HOSPADM

## 2022-01-26 RX ORDER — CYANOCOBALAMIN (VITAMIN B-12) 500 MCG
1000 TABLET ORAL DAILY
Status: DISCONTINUED | OUTPATIENT
Start: 2022-01-26 | End: 2022-01-31 | Stop reason: HOSPADM

## 2022-01-26 RX ORDER — ATORVASTATIN CALCIUM 10 MG/1
10 TABLET, FILM COATED ORAL DAILY
Status: DISCONTINUED | OUTPATIENT
Start: 2022-01-26 | End: 2022-01-31 | Stop reason: HOSPADM

## 2022-01-26 RX ORDER — MORPHINE SULFATE 2 MG/ML
2 INJECTION, SOLUTION INTRAMUSCULAR; INTRAVENOUS
Status: DISCONTINUED | OUTPATIENT
Start: 2022-01-26 | End: 2022-01-31 | Stop reason: HOSPADM

## 2022-01-26 RX ORDER — FERROUS SULFATE, DRIED 160(50) MG
1 TABLET, EXTENDED RELEASE ORAL DAILY
Status: DISCONTINUED | OUTPATIENT
Start: 2022-01-26 | End: 2022-01-31 | Stop reason: HOSPADM

## 2022-01-26 RX ORDER — ACETAMINOPHEN 325 MG/1
650 TABLET ORAL
Status: DISCONTINUED | OUTPATIENT
Start: 2022-01-26 | End: 2022-01-31 | Stop reason: HOSPADM

## 2022-01-26 RX ORDER — DILTIAZEM HYDROCHLORIDE 120 MG/1
240 CAPSULE, COATED, EXTENDED RELEASE ORAL DAILY
Status: DISCONTINUED | OUTPATIENT
Start: 2022-01-26 | End: 2022-01-31 | Stop reason: HOSPADM

## 2022-01-26 RX ORDER — SODIUM CHLORIDE 9 MG/ML
75 INJECTION, SOLUTION INTRAVENOUS CONTINUOUS
Status: DISCONTINUED | OUTPATIENT
Start: 2022-01-26 | End: 2022-01-31

## 2022-01-26 RX ADMIN — SODIUM CHLORIDE, PRESERVATIVE FREE 40 MG: 5 INJECTION INTRAVENOUS at 10:44

## 2022-01-26 RX ADMIN — SODIUM CHLORIDE 75 ML/HR: 9 INJECTION, SOLUTION INTRAVENOUS at 03:41

## 2022-01-26 RX ADMIN — CALCIUM CARBONATE-VITAMIN D TAB 500 MG-200 UNIT 1 TABLET: 500-200 TAB at 10:44

## 2022-01-26 RX ADMIN — ATORVASTATIN CALCIUM 10 MG: 10 TABLET, FILM COATED ORAL at 10:43

## 2022-01-26 RX ADMIN — ALLOPURINOL 100 MG: 100 TABLET ORAL at 10:44

## 2022-01-26 RX ADMIN — CHOLECALCIFEROL (VITAMIN D3) 10 MCG (400 UNIT) TABLET 1000 UNITS: at 10:44

## 2022-01-26 RX ADMIN — DILTIAZEM HYDROCHLORIDE 240 MG: 120 CAPSULE, COATED, EXTENDED RELEASE ORAL at 10:43

## 2022-01-26 RX ADMIN — SODIUM CHLORIDE, PRESERVATIVE FREE 5 ML: 5 INJECTION INTRAVENOUS at 22:00

## 2022-01-26 NOTE — PROGRESS NOTES
Transition of Care: TBD; possibly home with f/u with specialist/pcp; patient normally lives at [facility] in independent living    Transport Plan: likely in car with family    RUR: 14%    Main contact is Alisa Duncan- [phone]    Discharge pending:  -pending KUB test (to be done today 1/26)  -pending general surgery consult and recs  -pending progress with PT  -pending medical progress and care recommendations    [phone]: this CM attempted to meet with patient; she is sleeping; this CM called ; called the home number- [phone]- this number no longer in service; called husbands cell number- [phone]; no answer- left VM for callback to complete assessment    1615: this CM got phone call from [facility]- she stated that patient and  live at [facility] in [place] [phone]; she is requesting updated clinical notes; faxed to [phone]    Reason for Admission:  gastroenteritis                     RUR Score:     14%                Plan for utilizing home health:    Likely none      PCP: First and Last name:  Abimael Cage MD     Name of Practice:    Are you a current patient: Yes/No: yes   Approximate date of last visit:    Can you participate in a virtual visit with your PCP: unknown                    Current Advanced Directive/Advance Care Plan: Full Code      Healthcare Decision Maker:   Click here to complete [address] including [address] Relationship (ie \"Primary\")                             Transition of Care Plan:     TBD; [facility] with f[facility]u with specialist/pcp; transport in car with   Family    Care Management Interventions  PCP Verified by CM: Yes Yung Villarreal MD)  Mode of Transport at Discharge:  Other (see comment) (TBD)  Support Systems: Spouse/Significant Other  Discharge Location  Patient Expects to be Discharged to[de-identified] Other: (TBD)     CM following  Fabian Petty RN, CRM

## 2022-01-26 NOTE — H&P
[address] [facility]  HISTORY AND PHYSICAL    Name:  Doron Dela Cruz  MR#:  [id number]  :  1940  ACCOUNT #:  [de-identified]  ADMIT DATE:  2022      The patient was seen, evaluated, and admitted by me on 2022. PRIMARY CARE PHYSICIAN:  Jennifer Lang MD    SOURCE OF INFORMATION:  The patient and review of ED and old electronic medical record. CHIEF COMPLAINT:  Abdominal pain. HISTORY OF PRESENT ILLNESS:  This is an 58-year-old woman with a past medical history significant for hypertension, dyslipidemia, and thromboembolism, was in her usual state of health until the day of her presentation at the emergency room when the patient developed abdominal pain. The abdominal pain is diffuse in location, constant, dull ache. The pain started after eating lunch. No known aggravating or relieving factors. Associated with nausea and vomiting, 5/10 in severity. The patient was taken to the [facility] Emergency Room at [place]. When the patient arrived at the emergency room, CT scan of the abdomen and pelvis was obtained. The CT scan shows mildly dilated small bowel loops, which could be due to gastroenteritis or partial small bowel obstruction. The patient was discussed with the general surgeon on-call by the emergency room physician. The surgeon advised admission and hydration. The patient was subsequently referred to the hospitalist service for evaluation for admission. The patient was last admitted to the hospital from 2020 to 2020. The patient was admitted and treated for small bowel obstruction. The patient was treated conservatively. PAST MEDICAL HISTORY:  Hypertension, dyslipidemia, thromboembolism, total colectomy secondary to GI bleed. ALLERGIES:  THE PATIENT IS ALLERGIC TO SHELLFISH.     MEDICATIONS: Tylenol Extra Strength 500 mg twice daily as needed for pain, albuterol 90 mcg 2 puffs by inhalation every 4 hours as needed for wheezing, allopurinol 100 mg daily, Eliquis 5 mg every 12 hours, Lipitor 10 mg daily, Cardizem  mg daily, Claritin 10 mg daily as needed, Mevacor 10 mg daily. FAMILY HISTORY:  This was reviewed. Her father had hypertension. PAST SURGICAL HISTORY:  This is significant for total colectomy secondary to GI bleed, laparoscopic cholecystectomy, ORIF of the right ankle, right knee replacement. SOCIAL HISTORY:  The patient is a former smoker. No history of alcohol abuse. REVIEW OF SYSTEMS:  HEAD, EYES, EARS, NOSE AND THROAT:  No headache, no dizziness, no blurring of vision, no photophobia. RESPIRATORY SYSTEM:  No cough, no shortness of breath, no hemoptysis. CARDIOVASCULAR SYSTEM:  No chest pain, no orthopnea, no palpitation. GASTROINTESTINAL SYSTEM:  This is positive for nausea and vomiting, abdominal pain. No diarrhea. No constipation. GENITOURINARY SYSTEM:  No dysuria, no urgency and no frequency. All other systems are reviewed and they are negative. PHYSICAL EXAMINATION:  GENERAL APPEARANCE:  The patient appeared ill, in moderate distress. VITAL SIGNS:  On arrival at the emergency room, temperature 97.7, pulse 85, respiratory rate of 17, blood pressure 174/92, oxygen saturation 93% on room air. HEENT:  Head:  Normocephalic, atraumatic. Eyes:  Normal eye movement. No redness, no drainage, no discharge. Ears:  Normal external ears with no evidence of drainage. Nose:  No deformity and no drainage. Mouth and Throat:  No visible oral lesion. Dry oral mucosa. NECK:  Neck is supple. No JVD, no thyromegaly. CHEST:  Clear breath sounds. No wheezing, no crackles. HEART:  Normal S1 and S2, regular. No clinically appreciable murmur. ABDOMEN:  Soft and nontender. Normal bowel sounds. CNS:  Alert and oriented x3. No gross focal neurological deficit. EXTREMITIES:  No edema. Pulses 2+ bilaterally. MUSCULOSKELETAL SYSTEM:  No evidence of joint deformity and swelling.   SKIN:  No active skin lesions seen in the exposed part of the body. PSYCHIATRY:  Normal mood and affect. LYMPHATIC SYSTEM:  No cervical lymphadenopathy. DIAGNOSTIC DATA:  Chest x-ray, no evidence of acute cardiopulmonary process. CT scan of the abdomen and pelvis shows mildly dilated small bowel loops could indicate gastroenteritis or partial small bowel obstruction, non-obstructing small bowel containing left ventral hernia. LABORATORY DATA:  Chemistry:  Sodium 144, potassium 5.2, chloride 108, CO2 of 26, glucose 133, BUN 47, creatinine 1.99, calcium 10.2, total bilirubin 0.4, ALT 21, AST 14, alkaline phosphatase 64, total protein at 7.0, albumin level 3.3, globulin 3.7. Cardiac profile:  Troponin 19. Hematology:  WBC 9.6, hemoglobin at 12.2, hematocrit 39.0, platelets 529. Urinalysis: This is significant for negative blood, negative nitrite, trace leukocyte esterase, negative bacteria. Pro-BNP level 596. Lactic acid level 0.6. ASSESSMENT:  1. Suspected partial small-bowel obstruction. 2.  Acute gastroenteritis. 3.  Hypertension. 4.  Dyslipidemia. 5.  Thromboembolism. 6.  Elevated BNP level. 7.  Acute kidney injury. 8.  Hyperkalemia. 9.  Hyperglycemia. PLAN:  1. Suspected partial small-bowel obstruction. We will admit the patient for further evaluation and treatment. We will carry out conservative treatment including pain control. General Surgery consult will be requested to assist in further evaluation and treatment. We will monitor the patient closely. 2.  Gastroenteritis. This is based on the CT scan of the abdomen and pelvis result. The patient is afebrile, no leukocytosis. Lactic acid is within normal limits. We will carry out conservative treatment, which include fluid therapy. We will hold off on antibiotic therapy. This does not appear to be infectious gastroenteritis. Consider Gastroenterology consult if there is no improvement in the next 24-48 hours. 3.  Hypertension.   We will resume preadmission medication. We will monitor the patient's blood pressure closely. 4.  Dyslipidemia. We will continue with preadmission medication. 5.  Thromboembolism. We will hold Eliquis because of potential for surgical intervention. If it is determined that absolutely that the patient will not require surgical intervention, Eliquis should be continued. 6.  Elevated BNP level. Chest x-ray did not show vascular congestion. The patient did not have shortness of breath, or clinical signs of heart failure. We will obtain echocardiogram to determine whether the elevated BNP level is due to congestive heart failure. We will continue to monitor. 7.  Acute kidney injury. This is most likely due to volume depletion. We will carry out gentle fluid hydration and monitor the patient's renal function. CT scan of the abdomen and pelvis did not suggest any evidence of obstructive uropathy. 8.  Hyperkalemia. This is mild. We will repeat potassium level. 9.  Hyperglycemia. The patient has no history of diabetes. We will check hemoglobin A1c level. OTHER ISSUES:  Code Status: The patient is a full code. We will request SCD for DVT prophylaxis. FUNCTIONAL STATUS PRIOR TO ADMISSION:  The patient came from home. The patient is ambulatory with assistive device. COVID PRECAUTION:  The patient was wearing a facemask. I was wearing a facemask and gloves for this patient's encounter.         Jose Cornejo MD      [id number]  D:  01/26/2022 2:17  T:  01/26/2022 3:34  JOB #:  [id number]  CC:  Chuck Vera MD

## 2022-01-26 NOTE — PROGRESS NOTES
PHYSICAL THERAPY EVALUATION/DISCHARGE  Patient: Carissa Perdue (39 y.o. female)  Date: 1/26/2022  Primary Diagnosis: Gastroenteritis [K52.9]       Precautions:          ASSESSMENT  Based on the objective data described below, the patient presents with baseline mobility demonstrating ability to manage bed mobility and gait with RW. She is very hard of hearing but reports no pain and states feeling better. Has good support of spouse and lives in independent living. Believe she is baseline and no PT needs. She did present on room air with saturation 86%; provided supplemental oxygen and increased to 93% and then reassessed on room air with activity and did well. Left on room air and made nursing aware. .    Functional Outcome Measure: The patient scored 21/28 on the Tinetti outcome measure which is indicative of moderate fall risk. Other factors to consider for discharge:      Further skilled acute physical therapy is not indicated at this time. PLAN :  Recommendation for discharge: (in order for the patient to meet his/her long term goals)  No skilled physical therapy/ follow up rehabilitation needs identified at this time. This discharge recommendation:  Has not yet been discussed the attending provider and/or case management    IF patient discharges home will need the following DME: patient owns DME required for discharge       SUBJECTIVE:   Patient stated Since they gave me that  Morphine, I have been fine.     OBJECTIVE DATA SUMMARY:   HISTORY:    Past Medical History:   Diagnosis Date    Abscess 3/22/2014    Lumbar area - MRSA    Arthritis     Common bile duct calculus 12/14/2019    Gout     Hypercholesterolemia     Hypertension     LBP (low back pain)     Other ill-defined conditions(799.89)     LEFT SHOULDER PAINFUL, NEEDS REPLACEMENT    Paroxysmal SVT (supraventricular tachycardia) ([address] 5/2/2017    Thromboembolus ([facility] 12/26/13    left leg     Past Surgical History: Procedure Laterality Date    ENDOSCOPY, COLON, DIAGNOSTIC      ostomy reversal    HX BREAST BIOPSY Right 1970    Benign    HX COLOSTOMY      HX GI      COLOSTOMY REVERSAL    HX HEMORRHOIDECTOMY      HX ORTHOPAEDIC      right knee  bilateral knee replacement    HX ORTHOPAEDIC      ORIF RIGHT ANKLE    HX ORTHOPAEDIC      KNEE ARTHROSCOPY    CA ABDOMEN SURGERY PROC UNLISTED      hernia repair    CA BREAST SURGERY PROCEDURE UNLISTED      EXCISION OF BREAST CYST       Prior level of function: modified independent with rolling walker; lives with spouse in apartment at [address] factors and/or comorbidities impacting plan of care:     Home Situation  Home Environment: Apartment  One/Two Story Residence: One story  Living Alone: No  Support Systems: Spouse/Significant Other  Patient Expects to be Discharged to[de-identified] Home with family assistance  Current DME Used/Available at Home: Walker, rolling    EXAMINATION/PRESENTATION/DECISION MAKING:   Critical Behavior:      alert ;appropriate        Hearing: Auditory  Auditory Impairment: Hard of hearing, bilateral  Range Of Motion:  AROM: Generally decreased, functional (bilateral shoulder flex <90; chronic)                       Strength:    Strength: Generally decreased, functional                    Tone & Sensation:   Tone: Normal              Sensation: Intact               Coordination:  Coordination: Within functional limits  Functional Mobility:  Bed Mobility:     Supine to Sit: Modified independent     Scooting: Modified independent  Transfers:  Sit to Stand: Modified independent  Stand to Sit: Modified independent                       Balance:   Sitting: Intact  Standing: Impaired; With support  Standing - Static: Good  Standing - Dynamic : Good  Ambulation/Gait Training:  Distance (ft): 80 Feet (ft)  Assistive Device: Gait belt;Walker, rolling  Ambulation - Level of Assistance: Modified independent     Gait Description (WDL): Exceptions to [facility] Base of Support: Widened     Speed/Iveth: Pace decreased (<100 feet/min)           Functional Measure:  Tinetti test:    Sitting Balance: 1  Arises: 1  Attempts to Rise: 2  Immediate Standing Balance: 1  Standing Balance: 1  Nudged: 2  Eyes Closed: 1  Turn 360 Degrees - Continuous/Discontinuous: 1  Turn 360 Degrees - Steady/Unsteady: 1  Sitting Down: 2  Balance Score: 13 Balance total score  Indication of Gait: 1  R Step Length/Height: 1  L Step Length/Height: 1  R Foot Clearance: 1  L Foot Clearance: 1  Step Symmetry: 1  Step Continuity: 1  Path: 1  Trunk: 0  Walking Time: 0  Gait Score: 8 Gait total score  Total Score: 21/28 Overall total score         Tinetti Tool Score Risk of Falls  <19 = High Fall Risk  19-24 = Moderate Fall Risk  25-28 = Low Fall Risk  Tinetti ME. Performance-Oriented Assessment of Mobility Problems in Elderly Patients. [facility] 66; [id number].  (Scoring Description: PT Bulletin Feb. 10, 1993)    Older adults: Joana Dickson et al, 2009; n = 1000 [facility] elderly evaluated with ABC, DAPHNE, ADL, and IADL)  · Mean DAPHNE score for males aged 69-68 years = 26.21(3.40)  · Mean DAPHNE score for females age 69-68 years = 25.16(4.30)  · Mean DAPHNE score for males over 80 years = 23.29(6.02)  · Mean DAPHNE score for females over 80 years = 17.20(8.32)            Physical Therapy Evaluation Charge Determination   History Examination Presentation Decision-Making   MEDIUM  Complexity : 1-2 comorbidities / personal factors will impact the outcome/ POC  LOW Complexity : 1-2 Standardized tests and measures addressing body structure, function, activity limitation and / or participation in recreation  LOW Complexity : Stable, uncomplicated        Based on the above components, the patient evaluation is determined to be of the following complexity level: LOW     Pain Ratin    Activity Tolerance:   SpO2 stable on RA      After treatment patient left in no apparent distress:   Call bell within reach and sitting EOB eating lunch    COMMUNICATION/EDUCATION:   The patients plan of care was discussed with: Registered nurse.          Thank you for this referral.  Beatriz Brumfield, PT   Time Calculation: 25 mins

## 2022-01-26 NOTE — CONSULTS
[facility]  Inpatient Consultation        Admit Date: 1/24/2022  Reason for Consultation: partial SBO    HPI:  Sahil Torres is a 78 y.o. female w/ hx total colectomy, cholecystectomy (Ena) whom we are asked to see in consultation by Dr. Fariba Rocha for the above complaint. Pt presented to the [place] ER late evening on 1/24 w/ c/o mid abdominal pain a/w vomiting that started that morning. She was transferred to [facility] yesterday afternoon after CT done and showed partial SBO. The vomiting has subsided and she is passing gas. She also had a small BM yesterday. Pt is very [place]. CT 1/24  Mildly dilated small bowel loops could indicate gastroenteritis or partial small  bowel obstruction. Nonobstructing small bowel containing left ventral hernia.       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) 01/24/2020    Postprocedural hypotension 01/24/2020    Stage 3 chronic kidney disease ([address] 01/18/2020    Hypoxia 01/15/2020    Weakness of left lower extremity 01/15/2020    Pulmonary embolism ([address] 01/15/2020    DVT (deep venous thrombosis) ([address] 01/15/2020    Gallstone pancreatitis 12/27/2019    Acute cholecystitis 12/21/2019    Gallstone 12/14/2019    JOSE (acute kidney injury) ([address] 12/14/2019    Common bile duct calculus 12/14/2019    Abdominal pain 12/14/2019    Dehydration 01/29/2018    Bronchitis 01/29/2018    Paroxysmal SVT (supraventricular tachycardia) ([address] 05/02/2017    Obstruction of bowel ([address] 04/21/2017    Partial small bowel obstruction ([address] 04/21/2017    SBO (small bowel obstruction) ([address] 08/18/2016    Acute post-hemorrhagic anemia 09/08/2015    Postoperative hypovolemic shock 09/08/2015    Tachycardia 09/03/2015    Blood loss anemia 07/20/2014    GI bleed 07/20/2014    Abscess 03/22/2014    Cerebral thrombosis with cerebral infarction ([facility] 2014    Wound dehiscence 2014    Hypertension     Hypercholesterolemia      Past Medical History:   Diagnosis Date    Abscess 3/22/2014    Lumbar area - MRSA    Arthritis     Common bile duct calculus 2019    Gout     Hypercholesterolemia     Hypertension     LBP (low back pain)     Other ill-defined conditions(799.89)     LEFT SHOULDER PAINFUL, NEEDS REPLACEMENT    Paroxysmal SVT (supraventricular tachycardia) ([facility] 2017    Thromboembolus ([facility] 13    left leg      Past Surgical History:   Procedure Laterality Date    ENDOSCOPY, COLON, DIAGNOSTIC      ostomy reversal    HX BREAST BIOPSY Right 1970    Benign    HX COLOSTOMY      HX GI      COLOSTOMY REVERSAL    HX HEMORRHOIDECTOMY      HX ORTHOPAEDIC      right knee  bilateral knee replacement    HX ORTHOPAEDIC      ORIF RIGHT ANKLE    HX ORTHOPAEDIC      KNEE ARTHROSCOPY    LA ABDOMEN SURGERY PROC UNLISTED      hernia repair    LA BREAST SURGERY PROCEDURE UNLISTED      EXCISION OF BREAST CYST      Social History     Tobacco Use    Smoking status: Former Smoker     Packs/day: 1.00     Years: 20.00     Pack years: 20.00     Quit date: 1972     Years since quittin.7    Smokeless tobacco: Never Used   Substance Use Topics    Alcohol use: Yes     Alcohol/week: 1.7 standard drinks     Types: 2 Shots of liquor per week     Comment: 2 DAY      Family History   Problem Relation Age of Onset    Hypertension Father     Anesth Problems Neg Hx       Prior to Admission medications    Medication Sig Start Date End Date Taking?  Authorizing Provider   loratadine (CLARITIN) 10 mg tablet TAKE ONE TABLET BY MOUTH EVERY DAY AS NEEDED FOR ALLERGY SYMPTOMS 10/4/21   Vidhya White MD   apixaban (Eliquis) 5 mg tablet TAKE 1 TABLET BY MOUTH EVERY 12 HOURS 10/1/21   Vidhya White MD   atorvastatin (LIPITOR) 10 mg tablet TAKE ONE TABLET BY MOUTH EVERY DAY 21   Vidhya White MD   dilTIAZem ER (CARDIZEM CD) 240 mg capsule TAKE ONE CAPSULE BY MOUTH EVERY DAY 7/6/21   Lucie Cox MD   cyanocobalamin (VITAMIN B12) 1,000 mcg/mL injection inject 1 milliliter subcutaneously EVERY 2 MONTHS 12/8/20   Lucie Cox MD   dilTIAZem ER (DILACOR XR) 240 mg capsule Take 1 Cap by mouth daily. 8/19/20   Lucie Cox MD   allopurinoL (ZYLOPRIM) 100 mg tablet TAKE 1 TABLET BY MOUTH EVERY DAY 3/27/20   Lucie Cox MD   acetaminophen (TYLENOL EXTRA STRENGTH) 500 mg tablet Take 500 mg by mouth two (2) times daily as needed for Pain. Provider, Historical   lovastatin (MEVACOR) 20 mg tablet TAKE 1 TABLET BY MOUTH AT BEDTIME 12/9/19   Lucie Cox MD   albuterol (PROVENTIL HFA, VENTOLIN HFA, PROAIR HFA) 90 mcg/actuation inhaler Take 2 Puffs by inhalation every four (4) hours as needed for Wheezing. 1/26/18   Lucie Cox MD   calcium-vitamin D (OYSTER SHELL) 500 mg(1,250mg) -200 unit per tablet Take 1 Tab by mouth daily. Provider, Historical   multivit-min-iron-FA-lutein (CENTRUM SILVER WOMEN) 8 mg iron-400 mcg-300 mcg tab Take 1 Tab by mouth daily. Provider, Historical   cholecalciferol (VITAMIN D3) 1,000 unit tablet Take 1,000 Units by mouth daily.     Provider, Historical     Current Facility-Administered Medications   Medication Dose Route Frequency    albuterol-ipratropium (DUO-NEB) 2.5 MG-0.5 MG/3 ML  3 mL Nebulization Q4H PRN    allopurinoL (ZYLOPRIM) tablet 100 mg  100 mg Oral DAILY    atorvastatin (LIPITOR) tablet 10 mg  10 mg Oral DAILY    calcium-vitamin D (OS-HUNTER +D3) 500 mg-200 unit per tablet 1 Tablet  1 Tablet Oral DAILY    cholecalciferol (VITAMIN D3) (400 Units /10 mcg) tablet 1,000 Units  1,000 Units Oral DAILY    dilTIAZem ER (CARDIZEM CD) capsule 240 mg  240 mg Oral DAILY    sodium chloride (NS) flush 5-40 mL  5-40 mL IntraVENous Q8H    sodium chloride (NS) flush 5-40 mL  5-40 mL IntraVENous PRN    ondansetron (ZOFRAN ODT) tablet 4 mg  4 mg Oral Q8H PRN    Or    ondansetron (ZOFRAN) injection 4 mg  4 mg IntraVENous Q6H PRN    acetaminophen (TYLENOL) tablet 650 mg  650 mg Oral Q4H PRN    morphine injection 2 mg  2 mg IntraVENous Q4H PRN    0.9% sodium chloride infusion  75 mL/hr IntraVENous CONTINUOUS    pantoprazole (PROTONIX) 40 mg in 0.9% sodium chloride 10 mL injection  40 mg IntraVENous DAILY     Allergies   Allergen Reactions    Shellfish Derived Angioedema          Subjective:     Review of Systems:    A comprehensive review of systems was negative except for that written in the History of Present Illness. Objective:     Blood pressure (!) 146/84, pulse 95, temperature 99.9 °F (37.7 °C), resp. rate 14, height 5' 7\" (1.702 m), weight 168 lb 10.4 oz (76.5 kg), SpO2 96 %. Temp (24hrs), Av.9 °F (37.2 °C), Min:97.9 °F (36.6 °C), Max:99.9 °F (37.7 °C)      Recent Labs     22  0358 22  2101   WBC 6.9 9.6   HGB 10.1* 12.2   HCT 34.3* 39.0    168     Recent Labs     22  0358 22  2101    144   K 4.9 5.2*   * 108   CO2 24 26   GLU 70 133*   BUN 45* 47*   CREA 1.72* 1.99*   CA 9.8 10.2*   MG 2.2  --    PHOS 4.2  --    ALB 2.8* 3.3*   TBILI 0.4 0.4   ALT 16 21     Recent Labs     22  0358   LPSE 96       No intake or output data in the 24 hours ending 22 0854    _____________________  Physical Exam:     General:  Alert, cooperative, no distress, appears stated age. Eyes:   Sclera clear. Throat: Lips, mucosa, and tongue normal.   Neck: Supple, symmetrical, trachea midline. Lungs:   Clear to auscultation bilaterally. Heart:  Regular rate and rhythm. Abdomen:   Normal BS, flat, Soft, non-tender. No masses,  No organomegaly. Extremities: Extremities normal, atraumatic, no cyanosis or edema. Skin: Skin color, texture, turgor normal. No rashes or lesions.              Assessment:   Principal Problem:    Partial small bowel obstruction ([address] (2017)    Active Problems:    Gastroenteritis (1/25/2022)            Plan: Will start clear liquids  Cont to monitor bowel function  KUB today  Cont IVF for JOSE  PPI  Daily labs    Thank you for allowing us to participate in the care of this patient. Total time spent with patient: 30 minutes. Signed By: Ortiz Razo NP     January 26, 2022        ADDENDUM:  Artemus Halsted, MD  Pt seen and examined. Agree with NP assessment and plan. Pt had total colectomy by Dr. Keanu Sandhu for GI bleed and lap cholecystectomy by myself consulted for partial bowel obstruction. Pt reported diffuse abdominal pain that has resolved since receiving morphine yesterday. Currently denied any nausea or vomiting. Passing flatus. Abdominal exam is soft, nontender and non-distended. Advance to clear liquids. AXR. Out of bed. Encourage ambulation.   PT as tolerated

## 2022-01-26 NOTE — PROGRESS NOTES
[facility] Adult  Hospitalist Group                                                                                          Hospitalist Progress Note  Atif Teague NP  Answering service: [phone] or [phone] from in house phone        Date of Service:  2022  NAME:  Dickson Dubin  :  1940  MRN:  [id number]      Admission Summary: This is an 68-year-old woman with a past medical history significant for hypertension, dyslipidemia, and thromboembolism, was in her usual state of health until the day of her presentation at the emergency room when the patient developed abdominal pain. The abdominal pain is diffuse in location, constant, dull ache. The pain started after eating lunch. No known aggravating or relieving factors. Associated with nausea and vomiting, 5/10 in severity. The patient was taken to the [facility] Emergency Room at [place]. When the patient arrived at the emergency room, CT scan of the abdomen and pelvis was obtained. The CT scan shows mildly dilated small bowel loops, which could be due to gastroenteritis or partial small bowel obstruction. The patient was discussed with the general surgeon on-call by the emergency room physician. The surgeon advised admission and hydration. The patient was subsequently referred to the hospitalist service for evaluation for admission. The patient was last admitted to the hospital from 2020 to 2020. The patient was admitted and treated for small bowel obstruction. The patient was treated conservatively. Interval history / Subjective:   Seen and examined patient sitting up in bed. Very hard of hearing, states she lost her hearing aids.  at bedside. States she has had some improvement with abdominal pain. Denies any nausea or vomiting. No new complaints no overnight events noted.      Assessment & Plan:     Partial small bowel obstruction  -CT abdomen pelvis: Mildly dilated small bowel loops could indicate gastroenteritis or partial small bowel obstruction. Nonobstructing small bowel containing left ventral hernia  -Pain control and antiemetics as needed  -Continue PPI  -Continue IV fluids  -General surgery consulted  -KUB pending    Hypertension  -Stable  -Continue diltiazem  -Monitor vital signs per unit routine    Hyperlipidemia  -Continue home dose atorvastatin    Hx of thromboembolism  -Restart home dose Eliquis when cleared by surgery    Acute kidney injury  -Creatinine 1.72, baseline~1  -Continue IV fluids  -Monitor labs in a.m.  -Avoid nephrotoxins and renal dose medications    Hyperkalemia  -Resolved  -Monitor labs    Code status: None   DVT prophylaxis: Restart home dose Eliquis    Care Plan discussed with: Patient/Family and Nurse  Anticipated Disposition: Home w/Family  Anticipated Discharge: 24 hours to 48 hours     Hospital Problems  Date Reviewed: 1/26/2022          Codes Class Noted POA    Gastroenteritis ICD-10-CM: K52.9  ICD-9-CM: 558.9  1/25/2022 Unknown        * (Principal) Partial small bowel obstruction ([address] ICD-10-CM: K56.600  ICD-9-CM: 560.9  4/21/2017 Yes                Review of Systems:   A comprehensive review of systems was negative except for that written in the HPI. Vital Signs:    Last 24hrs VS reviewed since prior progress note. Most recent are:  Visit Vitals  /67   Pulse 91   Temp 98 °F (36.7 °C)   Resp 14   Ht 5' 7\" (1.702 m)   Wt 76.5 kg (168 lb 10.4 oz)   SpO2 94%   BMI 26.41 kg/m²       No intake or output data in the 24 hours ending 01/26/22 1149     Physical Examination:             Constitutional:  No acute distress, cooperative, pleasant    ENT:  Oral mucosa moist, oropharynx benign. Resp:  CTA bilaterally. No wheezing/rhonchi/rales. No accessory muscle use   CV:  Regular rhythm, normal rate, no murmurs, gallops, rubs    GI:  Soft, non distended, non tender.  normoactive bowel sounds    Musculoskeletal:  No edema, warm, 2+ pulses throughout    Neurologic:  Moves all extremities. AAOx3, CN II-XII reviewed     Psych:  Not anxious or agitated       Data Review:    Review and/or order of clinical lab test  Review and/or order of tests in the medicine section of [facility]      Labs:     Recent Labs     01/26/22 0358 01/24/22 2101   WBC 6.9 9.6   HGB 10.1* 12.2   HCT 34.3* 39.0    168     Recent Labs     01/26/22 0358 01/24/22 2101    144   K 4.9 5.2*   * 108   CO2 24 26   BUN 45* 47*   CREA 1.72* 1.99*   GLU 70 133*   CA 9.8 10.2*   MG 2.2  --    PHOS 4.2  --      Recent Labs     01/26/22 0358 01/24/22 2101   ALT 16 21   AP 51 64   TBILI 0.4 0.4   TP 6.3* 7.0   ALB 2.8* 3.3*   GLOB 3.5 3.7   LPSE 96  --      No results for input(s): INR, PTP, APTT, INREXT in the last 72 hours. No results for input(s): FE, TIBC, PSAT, FERR in the last 72 hours. Lab Results   Component Value Date/Time    Folate 9.2 07/20/2014 11:37 AM      No results for input(s): PH, PCO2, PO2 in the last 72 hours. No results for input(s): CPK, CKNDX, TROIQ in the last 72 hours.     No lab exists for component: CPKMB  Lab Results   Component Value Date/Time    Cholesterol, total 161 01/26/2018 04:28 AM    HDL Cholesterol 69 01/26/2018 04:28 AM    LDL, calculated 75.2 01/26/2018 04:28 AM    Triglyceride 84 01/26/2018 04:28 AM    CHOL/HDL Ratio 2.3 01/26/2018 04:28 AM     Lab Results   Component Value Date/Time    Glucose (POC) 97 01/18/2020 05:53 AM    Glucose (POC) 123 (H) 01/14/2020 10:45 PM    Glucose (POC) 105 (H) 09/07/2015 03:49 PM    Glucose (POC) 125 (H) 09/07/2015 12:17 PM    Glucose (POC) 93 09/06/2015 09:53 PM     Lab Results   Component Value Date/Time    Color YELLOW/STRAW 01/24/2022 09:52 PM    Appearance HAZY (A) 01/24/2022 09:52 PM    Specific gravity 1.025 01/24/2022 09:52 PM    Specific gravity 1.017 02/04/2020 07:50 AM    pH (UA) 5.0 01/24/2022 09:52 PM    Protein 100 (A) 01/24/2022 09:52 PM    Glucose Negative 01/24/2022 09:52 PM Ketone Negative 01/24/2022 09:52 PM    Bilirubin Negative 01/24/2022 09:52 PM    Urobilinogen 0.2 01/24/2022 09:52 PM    Nitrites Negative 01/24/2022 09:52 PM    Leukocyte Esterase TRACE (A) 01/24/2022 09:52 PM    Epithelial cells FEW 01/24/2022 09:52 PM    Bacteria Negative 01/24/2022 09:52 PM    WBC 5-10 01/24/2022 09:52 PM    RBC 0-5 01/24/2022 09:52 PM         Medications Reviewed:     Current Facility-Administered Medications   Medication Dose Route Frequency    albuterol-ipratropium (DUO-NEB) 2.5 MG-0.5 MG/3 ML  3 mL Nebulization Q4H PRN    allopurinoL (ZYLOPRIM) tablet 100 mg  100 mg Oral DAILY    atorvastatin (LIPITOR) tablet 10 mg  10 mg Oral DAILY    calcium-vitamin D (OS-HUNTER +D3) 500 mg-200 unit per tablet 1 Tablet  1 Tablet Oral DAILY    cholecalciferol (VITAMIN D3) (400 Units /10 mcg) tablet 1,000 Units  1,000 Units Oral DAILY    dilTIAZem ER (CARDIZEM CD) capsule 240 mg  240 mg Oral DAILY    sodium chloride (NS) flush 5-40 mL  5-40 mL IntraVENous Q8H    sodium chloride (NS) flush 5-40 mL  5-40 mL IntraVENous PRN    ondansetron (ZOFRAN ODT) tablet 4 mg  4 mg Oral Q8H PRN    Or    ondansetron (ZOFRAN) injection 4 mg  4 mg IntraVENous Q6H PRN    acetaminophen (TYLENOL) tablet 650 mg  650 mg Oral Q4H PRN    morphine injection 2 mg  2 mg IntraVENous Q4H PRN    0.9% sodium chloride infusion  75 mL/hr IntraVENous CONTINUOUS    pantoprazole (PROTONIX) 40 mg in 0.9% sodium chloride 10 mL injection  40 mg IntraVENous DAILY     ______________________________________________________________________  EXPECTED LENGTH OF STAY: - - -  ACTUAL LENGTH OF STAY:          1                 Eve Ivey NP

## 2022-01-27 PROCEDURE — 65660000000 HC RM CCU STEPDOWN

## 2022-01-27 PROCEDURE — 94760 N-INVAS EAR/PLS OXIMETRY 1: CPT

## 2022-01-27 PROCEDURE — 74011250637 HC RX REV CODE- 250/637: Performed by: INTERNAL MEDICINE

## 2022-01-27 PROCEDURE — 77010033678 HC OXYGEN DAILY

## 2022-01-27 PROCEDURE — 74011000250 HC RX REV CODE- 250: Performed by: INTERNAL MEDICINE

## 2022-01-27 PROCEDURE — 74011250636 HC RX REV CODE- 250/636: Performed by: INTERNAL MEDICINE

## 2022-01-27 PROCEDURE — 99231 SBSQ HOSP IP/OBS SF/LOW 25: CPT | Performed by: SURGERY

## 2022-01-27 PROCEDURE — C9113 INJ PANTOPRAZOLE SODIUM, VIA: HCPCS | Performed by: INTERNAL MEDICINE

## 2022-01-27 RX ADMIN — ALLOPURINOL 100 MG: 100 TABLET ORAL at 09:40

## 2022-01-27 RX ADMIN — CALCIUM CARBONATE-VITAMIN D TAB 500 MG-200 UNIT 1 TABLET: 500-200 TAB at 09:40

## 2022-01-27 RX ADMIN — SODIUM CHLORIDE, PRESERVATIVE FREE 40 MG: 5 INJECTION INTRAVENOUS at 10:50

## 2022-01-27 RX ADMIN — ATORVASTATIN CALCIUM 10 MG: 10 TABLET, FILM COATED ORAL at 09:40

## 2022-01-27 RX ADMIN — CHOLECALCIFEROL (VITAMIN D3) 10 MCG (400 UNIT) TABLET 1000 UNITS: at 09:40

## 2022-01-27 RX ADMIN — DILTIAZEM HYDROCHLORIDE 240 MG: 120 CAPSULE, COATED, EXTENDED RELEASE ORAL at 09:40

## 2022-01-27 NOTE — PROGRESS NOTES
Transition of Care: likely back to  [facility] care unit for rehab (patient normally lives at [facility] in independent living)     Transport Plan: possibly in car with family vs. [facility] transport     RUR: 17%     Main contact is Igor Ann- [phone]     [address] [facility]- [phone] fax- [phone]    Discharge pending:  -pending repeat xray (to be done on 1/27)  -pending diet advancement  -pending final general surgery consult and recs  -pending progress with PT  -pending medical progress and care recommendations  -dc is possible on Monday 1/31    1630: faxed updated clinicals to [facility] and left update on Emi LEE CM following  Chau Platt RN, CRM

## 2022-01-27 NOTE — PROGRESS NOTES
Progress Note    Patient: Jim Turner MRN: [id number]  SSN: [id number]    YOB: 1940  Age: 78 y.o. Sex: female      Admit Date: 2022    Partial small bowel obstruction    Subjective:     No acute surgical issues. Pt is doing well. Passing flatus and having BM. Tolerating clears. She denied any abdominal pain    Objective:     Visit Vitals  /70   Pulse 69   Temp 98.5 °F (36.9 °C)   Resp 16   Ht 5' 7\" (1.702 m)   Wt 168 lb 10.4 oz (76.5 kg)   SpO2 93%   BMI 26.41 kg/m²       Temp (24hrs), Av.7 °F (37.1 °C), Min:98.4 °F (36.9 °C), Max:99.4 °F (37.4 °C)      Physical Exam:    Gen:  NAD  Pulm:  Unlabored  Abd:  S/ND/Non-TTP    No results found for this or any previous visit (from the past 24 hour(s)). Assessment:     Hospital Problems  Date Reviewed: 2022          Codes Class Noted POA    Gastroenteritis ICD-10-CM: K52.9  ICD-9-CM: 558.9  2022 Unknown        * (Principal) Partial small bowel obstruction ([facility] ICD-10-CM: K56.600  ICD-9-CM: 560.9  2017 Yes              Plan/Recommendations/Medical Decision Making:     - Partial small bowel obstruction:  No acute indication for operation. Resolving  - Advance to full liquid diet  - Repeat AXR in am  - If AXR is okay tomorrow then advance to solid diet and discharge back to [address] [place] [address].

## 2022-01-27 NOTE — PROGRESS NOTES
[facility] [facility] Adult  Hospitalist Group                                                                                          Hospitalist Progress Note  Chip Marquez MD  Answering service: [phone] from in house phone        Date of Service:  2022  NAME:  Steve Ndiaye  :  1940  MRN:  [id number]      Admission Summary: This is an 75-year-old woman with a past medical history significant for hypertension, dyslipidemia, and thromboembolism, was in her usual state of health until the day of her presentation at the emergency room when the patient developed abdominal pain. The abdominal pain is diffuse in location, constant, dull ache. The pain started after eating lunch. No known aggravating or relieving factors. Associated with nausea and vomiting, 5/10 in severity. The patient was taken to the [facility] Emergency Room at [place]. When the patient arrived at the emergency room, CT scan of the abdomen and pelvis was obtained. The CT scan shows mildly dilated small bowel loops, which could be due to gastroenteritis or partial small bowel obstruction. The patient was discussed with the general surgeon on-call by the emergency room physician. The surgeon advised admission and hydration. The patient was subsequently referred to the hospitalist service for evaluation for admission. The patient was last admitted to the hospital from 2020 to 2020. The patient was admitted and treated for small bowel obstruction. The patient was treated conservatively. Interval history / Subjective:     Patient with very hard of hearing, states she lost her hearing aids. She said she is feeling better, no abdominal pain, had small bowel movement today      Assessment & Plan:     Partial small bowel obstruction  -CT abdomen pelvis: Mildly dilated small bowel loops could indicate gastroenteritis or partial small bowel obstruction.   Nonobstructing small bowel containing left ventral hernia  -Pain control and antiemetics as needed  -Continue PPI and  IV fluids  -on clear liquid diet  -KUB in am  -General surgeon on board    HTN  -BP normal, on diltiazem, monitor BP    Hyperlipidemia  -Continue home dose atorvastatin    Hx of thromboembolism  -Restart home dose Eliquis when cleared by surgery    JOSE likely prerenal   -Creatinine 1.72, baseline~1  -Continue IV fluids  -Avoid nephrotoxins and renal dose medications  -monitor renal function    Hyperkalemia  -Resolved  -Monitor labs    Code status: None   DVT prophylaxis: Restart home dose Eliquis    Care Plan discussed with: Patient/Family and Nurse  Anticipated Disposition: Home w/Family  Anticipated Discharge: 24 hours to 48 hours     Hospital Problems  Date Reviewed: 1/26/2022          Codes Class Noted POA    Gastroenteritis ICD-10-CM: K52.9  ICD-9-CM: 558.9  1/25/2022 Unknown        * (Principal) Partial small bowel obstruction ([facility] ICD-10-CM: K56.600  ICD-9-CM: 560.9  4/21/2017 Yes                Vital Signs:    Last 24hrs VS reviewed since prior progress note. Most recent are:  Visit Vitals  /70   Pulse 69   Temp 98.5 °F (36.9 °C)   Resp 16   Ht 5' 7\" (1.702 m)   Wt 76.5 kg (168 lb 10.4 oz)   SpO2 93%   BMI 26.41 kg/m²       No intake or output data in the 24 hours ending 01/27/22 1440     Physical Examination:             Constitutional:  No acute distress, cooperative, pleasant    ENT:  Oral mucosa moist, oropharynx benign. Resp:  CTA bilaterally. No wheezing/rhonchi/rales. No accessory muscle use   CV:  Regular rhythm, normal rate, no murmurs, gallops, rubs    GI:  Soft, non distended, non tender. normoactive bowel sounds    Musculoskeletal:  No edema,      Neurologic:  Moves all extremities.   AAOx3, CN II-XII reviewed     Psych:  Not anxious or agitated       Data Review:    Review and/or order of clinical lab test  Review and/or order of tests in the medicine section of [facility]      Labs:     Recent Labs     01/26/22 0358 01/24/22 2101   WBC 6.9 9.6   HGB 10.1* 12.2   HCT 34.3* 39.0    168     Recent Labs     01/26/22 0358 01/24/22 2101    144   K 4.9 5.2*   * 108   CO2 24 26   BUN 45* 47*   CREA 1.72* 1.99*   GLU 70 133*   CA 9.8 10.2*   MG 2.2  --    PHOS 4.2  --      Recent Labs     01/26/22 0358 01/24/22 2101   ALT 16 21   AP 51 64   TBILI 0.4 0.4   TP 6.3* 7.0   ALB 2.8* 3.3*   GLOB 3.5 3.7   LPSE 96  --      No results for input(s): INR, PTP, APTT, INREXT, INREXT in the last 72 hours. No results for input(s): FE, TIBC, PSAT, FERR in the last 72 hours. Lab Results   Component Value Date/Time    Folate 9.2 07/20/2014 11:37 AM      No results for input(s): PH, PCO2, PO2 in the last 72 hours. No results for input(s): CPK, CKNDX, TROIQ in the last 72 hours.     No lab exists for component: CPKMB  Lab Results   Component Value Date/Time    Cholesterol, total 161 01/26/2018 04:28 AM    HDL Cholesterol 69 01/26/2018 04:28 AM    LDL, calculated 75.2 01/26/2018 04:28 AM    Triglyceride 84 01/26/2018 04:28 AM    CHOL/HDL Ratio 2.3 01/26/2018 04:28 AM     Lab Results   Component Value Date/Time    Glucose (POC) 97 01/18/2020 05:53 AM    Glucose (POC) 123 (H) 01/14/2020 10:45 PM    Glucose (POC) 105 (H) 09/07/2015 03:49 PM    Glucose (POC) 125 (H) 09/07/2015 12:17 PM    Glucose (POC) 93 09/06/2015 09:53 PM     Lab Results   Component Value Date/Time    Color YELLOW/STRAW 01/24/2022 09:52 PM    Appearance HAZY (A) 01/24/2022 09:52 PM    Specific gravity 1.025 01/24/2022 09:52 PM    Specific gravity 1.017 02/04/2020 07:50 AM    pH (UA) 5.0 01/24/2022 09:52 PM    Protein 100 (A) 01/24/2022 09:52 PM    Glucose Negative 01/24/2022 09:52 PM    Ketone Negative 01/24/2022 09:52 PM    Bilirubin Negative 01/24/2022 09:52 PM    Urobilinogen 0.2 01/24/2022 09:52 PM    Nitrites Negative 01/24/2022 09:52 PM    Leukocyte Esterase TRACE (A) 01/24/2022 09:52 PM    Epithelial cells FEW 01/24/2022 09:52 PM Bacteria Negative 01/24/2022 09:52 PM    WBC 5-10 01/24/2022 09:52 PM    RBC 0-5 01/24/2022 09:52 PM         Medications Reviewed:     Current Facility-Administered Medications   Medication Dose Route Frequency    albuterol-ipratropium (DUO-NEB) 2.5 MG-0.5 MG/3 ML  3 mL Nebulization Q4H PRN    allopurinoL (ZYLOPRIM) tablet 100 mg  100 mg Oral DAILY    atorvastatin (LIPITOR) tablet 10 mg  10 mg Oral DAILY    calcium-vitamin D (OS-HUNTER +D3) 500 mg-200 unit per tablet 1 Tablet  1 Tablet Oral DAILY    cholecalciferol (VITAMIN D3) (400 Units /10 mcg) tablet 1,000 Units  1,000 Units Oral DAILY    dilTIAZem ER (CARDIZEM CD) capsule 240 mg  240 mg Oral DAILY    sodium chloride (NS) flush 5-40 mL  5-40 mL IntraVENous Q8H    sodium chloride (NS) flush 5-40 mL  5-40 mL IntraVENous PRN    ondansetron (ZOFRAN ODT) tablet 4 mg  4 mg Oral Q8H PRN    Or    ondansetron (ZOFRAN) injection 4 mg  4 mg IntraVENous Q6H PRN    acetaminophen (TYLENOL) tablet 650 mg  650 mg Oral Q4H PRN    morphine injection 2 mg  2 mg IntraVENous Q4H PRN    0.9% sodium chloride infusion  75 mL/hr IntraVENous CONTINUOUS    pantoprazole (PROTONIX) 40 mg in 0.9% sodium chloride 10 mL injection  40 mg IntraVENous DAILY     ______________________________________________________________________  EXPECTED LENGTH OF STAY: 3d 4h  ACTUAL LENGTH OF STAY:          2                 Kiley Morales MD

## 2022-01-27 NOTE — PROGRESS NOTES
Physician Progress Note      PATIENT:               Lucia Bonner  CSN #:                  [id number]  :                       1940  ADMIT DATE:       2022 8:52 PM  [address] [place] DATE:  RESPONDING  PROVIDER #:        Meek Watt NP          QUERY TEXT:    Patient admitted with PSBO and JOSE noted to have baseline sCr approximately 1.0 per [facility] NP PN . Consultant Note indicates a history of CKD. If possible, please document in progress notes and discharge summary if you are evaluating and/or treating any of the following: The medical record reflects the following:  Risk Factors: 81F being treated for JOSE pmhx HTN  Clinical Indicators:    [facility] NP PN   Acute kidney injury  -Creatinine 1.72, baseline  1  -Continue IV fluids  -Monitor labs in a.m.  -Avoid nephrotoxins and renal dose medications    Ena WHITFIELD Consult General Surgery   Active Problem List  Stage 3 chronic kidney disease Date Noted 2020 sCr 1.72 GFR 28   sCr 1.99 GFR 24  HISTORICAL DATA  2020 sCr 1.01 GFR 53  2020 sCr 0.98 GFR 54    Treatment: IVF, Lab monitoring, Avoid Nephrotoxins, Renally dosing medications    Thank you    Furman Goodell BSN RN CRCR  Clinical Documentation Improvement Specialist  [phone]  Options provided:  -- CKD Stage 1 GFR>90  -- CKD Stage 2 GFR 60-90  -- CKD Stage 3a GFR 45-59  -- CKD Stage 3b GFR 30-44  -- CKD Stage 4 GFR 15-29  -- CKD Stage 5 GFR<15  -- ESRD  -- Other - I will add my own diagnosis  -- Disagree - Not applicable / Not valid  -- Disagree - Clinically unable to determine / Unknown  -- Refer to Clinical Documentation Reviewer    PROVIDER RESPONSE TEXT:    This patient has CKD Stage 4.     Query created by: Nheal Cesar on 2022 12:55 PM      Electronically signed by:  Meek Watt NP 2022 1:39 PM

## 2022-01-28 ENCOUNTER — APPOINTMENT (OUTPATIENT)
Dept: GENERAL RADIOLOGY | Age: 82
DRG: 389 | End: 2022-01-28
Attending: SURGERY
Payer: MEDICARE

## 2022-01-28 LAB
ALBUMIN SERPL-MCNC: 2.5 G/DL (ref 3.5–5)
ALBUMIN/GLOB SERPL: 0.8 {RATIO} (ref 1.1–2.2)
ALP SERPL-CCNC: 48 U/L (ref 45–117)
ALT SERPL-CCNC: 14 U/L (ref 12–78)
ANION GAP SERPL CALC-SCNC: 2 MMOL/L (ref 5–15)
AST SERPL-CCNC: 8 U/L (ref 15–37)
BILIRUB SERPL-MCNC: 0.4 MG/DL (ref 0.2–1)
BUN SERPL-MCNC: 32 MG/DL (ref 6–20)
BUN/CREAT SERPL: 21 (ref 12–20)
CALCIUM SERPL-MCNC: 9.2 MG/DL (ref 8.5–10.1)
CHLORIDE SERPL-SCNC: 116 MMOL/L (ref 97–108)
CO2 SERPL-SCNC: 25 MMOL/L (ref 21–32)
CREAT SERPL-MCNC: 1.52 MG/DL (ref 0.55–1.02)
ERYTHROCYTE [DISTWIDTH] IN BLOOD BY AUTOMATED COUNT: 13.1 % (ref 11.5–14.5)
GLOBULIN SER CALC-MCNC: 3 G/DL (ref 2–4)
GLUCOSE SERPL-MCNC: 102 MG/DL (ref 65–100)
HCT VFR BLD AUTO: 29.2 % (ref 35–47)
HGB BLD-MCNC: 8.8 G/DL (ref 11.5–16)
MAGNESIUM SERPL-MCNC: 1.7 MG/DL (ref 1.6–2.4)
MCH RBC QN AUTO: 32 PG (ref 26–34)
MCHC RBC AUTO-ENTMCNC: 30.1 G/DL (ref 30–36.5)
MCV RBC AUTO: 106.2 FL (ref 80–99)
NRBC # BLD: 0 K/UL (ref 0–0.01)
NRBC BLD-RTO: 0 PER 100 WBC
PHOSPHATE SERPL-MCNC: 3.4 MG/DL (ref 2.6–4.7)
PLATELET # BLD AUTO: 108 K/UL (ref 150–400)
PMV BLD AUTO: 11.9 FL (ref 8.9–12.9)
POTASSIUM SERPL-SCNC: 4.7 MMOL/L (ref 3.5–5.1)
PROT SERPL-MCNC: 5.5 G/DL (ref 6.4–8.2)
RBC # BLD AUTO: 2.75 M/UL (ref 3.8–5.2)
SODIUM SERPL-SCNC: 143 MMOL/L (ref 136–145)
TROPONIN-HIGH SENSITIVITY: 128 NG/L (ref 0–51)
WBC # BLD AUTO: 5.3 K/UL (ref 3.6–11)

## 2022-01-28 PROCEDURE — 85027 COMPLETE CBC AUTOMATED: CPT

## 2022-01-28 PROCEDURE — 84484 ASSAY OF TROPONIN QUANT: CPT

## 2022-01-28 PROCEDURE — 74011250636 HC RX REV CODE- 250/636: Performed by: INTERNAL MEDICINE

## 2022-01-28 PROCEDURE — 74019 RADEX ABDOMEN 2 VIEWS: CPT

## 2022-01-28 PROCEDURE — 80053 COMPREHEN METABOLIC PANEL: CPT

## 2022-01-28 PROCEDURE — 84100 ASSAY OF PHOSPHORUS: CPT

## 2022-01-28 PROCEDURE — 83735 ASSAY OF MAGNESIUM: CPT

## 2022-01-28 PROCEDURE — 99231 SBSQ HOSP IP/OBS SF/LOW 25: CPT | Performed by: NURSE PRACTITIONER

## 2022-01-28 PROCEDURE — 65660000000 HC RM CCU STEPDOWN

## 2022-01-28 PROCEDURE — 74011000250 HC RX REV CODE- 250: Performed by: INTERNAL MEDICINE

## 2022-01-28 PROCEDURE — C9113 INJ PANTOPRAZOLE SODIUM, VIA: HCPCS | Performed by: INTERNAL MEDICINE

## 2022-01-28 PROCEDURE — 74011250637 HC RX REV CODE- 250/637: Performed by: INTERNAL MEDICINE

## 2022-01-28 PROCEDURE — 36415 COLL VENOUS BLD VENIPUNCTURE: CPT

## 2022-01-28 RX ADMIN — CHOLECALCIFEROL (VITAMIN D3) 10 MCG (400 UNIT) TABLET 1000 UNITS: at 10:12

## 2022-01-28 RX ADMIN — SODIUM CHLORIDE, PRESERVATIVE FREE 10 ML: 5 INJECTION INTRAVENOUS at 06:00

## 2022-01-28 RX ADMIN — SODIUM CHLORIDE 75 ML/HR: 9 INJECTION, SOLUTION INTRAVENOUS at 13:43

## 2022-01-28 RX ADMIN — CALCIUM CARBONATE-VITAMIN D TAB 500 MG-200 UNIT 1 TABLET: 500-200 TAB at 10:11

## 2022-01-28 RX ADMIN — DILTIAZEM HYDROCHLORIDE 240 MG: 120 CAPSULE, COATED, EXTENDED RELEASE ORAL at 10:11

## 2022-01-28 RX ADMIN — ATORVASTATIN CALCIUM 10 MG: 10 TABLET, FILM COATED ORAL at 10:12

## 2022-01-28 RX ADMIN — SODIUM CHLORIDE, PRESERVATIVE FREE 40 MG: 5 INJECTION INTRAVENOUS at 10:14

## 2022-01-28 RX ADMIN — ALLOPURINOL 100 MG: 100 TABLET ORAL at 10:10

## 2022-01-28 NOTE — PROGRESS NOTES
Transition of Care: likely back to  [facility] care unit for rehab (patient normally lives at [facility] in independent living)     Transport Plan: [facility] transport wheelchair van     RUR: 17%     Main contact is Justine Smiley- [phone]     [address] - [phone] fax- [phone]     Discharge pending:  -pending repeat xray (to be done on 1/27)  -pending diet advancement  -pending final general surgery consult and recs  -pending progress with PT  -pending medical progress and care recommendations  -dc is likely now on Sunday 1/30/Monday 1/31     1135: this CM called Uyen Mcgarry with update and faxed clinicals to [phone]; no answer; left VM with update    8290: this CM called Uyen Mcgarry again to update her on possible dc on Sunday 1/30; no answer left VM    1500: this CM got call from [facility]; they cannot accept patients over the weekend; this CM updated attending who verbalized understanding     CM following  Lashae Dominguez, RN, CRM

## 2022-01-28 NOTE — PROGRESS NOTES
[facility]  Hospitalist Group                                                                                          Hospitalist Progress Note  Michael Santos MD  Answering service: [phone] from in house phone        Date of Service:  2022  NAME:  Karli Saleh  :  1940  MRN:  [id number]      Admission Summary: This is an 10-year-old woman with a past medical history significant for hypertension, dyslipidemia, and thromboembolism, was in her usual state of health until the day of her presentation at the emergency room when the patient developed abdominal pain. The abdominal pain is diffuse in location, constant, dull ache. The pain started after eating lunch. No known aggravating or relieving factors. Associated with nausea and vomiting, 5/10 in severity. The patient was taken to the [facility] Emergency Room at [place]. When the patient arrived at the emergency room, CT scan of the abdomen and pelvis was obtained. The CT scan shows mildly dilated small bowel loops, which could be due to gastroenteritis or partial small bowel obstruction. The patient was discussed with the general surgeon on-call by the emergency room physician. The surgeon advised admission and hydration. The patient was subsequently referred to the hospitalist service for evaluation for admission. The patient was last admitted to the hospital from 2020 to 2020. The patient was admitted and treated for small bowel obstruction. The patient was treated conservatively. Interval history / Subjective:     Patient with very hard of hearing, states she lost her hearing aids. She said she is feeling better, no abdominal pain, had small bowel movement      Assessment & Plan:     Partial small bowel obstruction  -CT abdomen pelvis: Mildly dilated small bowel loops could indicate gastroenteritis or partial small bowel obstruction.   Nonobstructing small bowel containing left ventral hernia  -Pain control and antiemetics as needed  -Continue PPI and  IV fluids  -KUB 1/28 no plain film evidence of small bowel obstruction or other acute findings. -on full liquid diet  -General surgeon on board    HTN  -BP normal, on diltiazem, monitor BP    Hyperlipidemia  -Continue home dose atorvastatin    Hx of thromboembolism  -Restart home dose Eliquis when cleared by surgery    JOSE likely prerenal   -Creatinine improving  1.52 , baseline~1  -Continue IV fluids  -Avoid nephrotoxins and renal dose medications  -monitor renal function    Hyperkalemia  -Resolved  -Monitor labs    Code status: None   DVT prophylaxis: Restart home dose Eliquis    Care Plan discussed with: Patient/Family and Nurse  Anticipated Disposition: Home w/Family  Anticipated Discharge: 24 hours to 48 hours, possible on 1/30    Discussed with patient and her  at bedside, questions answered     Hospital Problems  Date Reviewed: 1/26/2022          Codes Class Noted POA    Gastroenteritis ICD-10-CM: K52.9  ICD-9-CM: 558.9  1/25/2022 Unknown        * (Principal) Partial small bowel obstruction ([facility] [address] ICD-10-CM: K56.600  ICD-9-CM: 560.9  4/21/2017 Yes                Vital Signs:    Last 24hrs VS reviewed since prior progress note. Most recent are:  Visit Vitals  /69   Pulse 69   Temp 97.9 °F (36.6 °C)   Resp 16   Ht 5' 7\" (1.702 m)   Wt 76.5 kg (168 lb 10.4 oz)   SpO2 99%   BMI 26.41 kg/m²       No intake or output data in the 24 hours ending 01/28/22 1320     Physical Examination:             Constitutional:  No acute distress, cooperative, pleasant    ENT:  Oral mucosa moist, oropharynx benign. Resp:  CTA bilaterally. No wheezing/rhonchi/rales. No accessory muscle use   CV:  Regular rhythm, normal rate, no murmurs, gallops, rubs    GI:  Soft, non distended, non tender. normoactive bowel sounds    Musculoskeletal:  No edema,      Neurologic:  Moves all extremities.   AAOx3, CN II-XII reviewed     Psych: Not anxious or agitated       Data Review:    Review and/or order of clinical lab test  Review and/or order of tests in the medicine section of [facility]      Labs:     Recent Labs     01/28/22  0610 01/26/22  0358   WBC 5.3 6.9   HGB 8.8* 10.1*   HCT 29.2* 34.3*   * 164     Recent Labs     01/28/22  0610 01/26/22  0358    145   K 4.7 4.9   * 118*   CO2 25 24   BUN 32* 45*   CREA 1.52* 1.72*   * 70   CA 9.2 9.8   MG 1.7 2.2   PHOS 3.4 4.2     Recent Labs     01/28/22  0610 01/26/22 0358   ALT 14 16   AP 48 51   TBILI 0.4 0.4   TP 5.5* 6.3*   ALB 2.5* 2.8*   GLOB 3.0 3.5   LPSE  --  96     No results for input(s): INR, PTP, APTT, INREXT, INREXT in the last 72 hours. No results for input(s): FE, TIBC, PSAT, FERR in the last 72 hours. Lab Results   Component Value Date/Time    Folate 9.2 07/20/2014 11:37 AM      No results for input(s): PH, PCO2, PO2 in the last 72 hours. No results for input(s): CPK, CKNDX, TROIQ in the last 72 hours.     No lab exists for component: CPKMB  Lab Results   Component Value Date/Time    Cholesterol, total 161 01/26/2018 04:28 AM    HDL Cholesterol 69 01/26/2018 04:28 AM    LDL, calculated 75.2 01/26/2018 04:28 AM    Triglyceride 84 01/26/2018 04:28 AM    CHOL/HDL Ratio 2.3 01/26/2018 04:28 AM     Lab Results   Component Value Date/Time    Glucose (POC) 97 01/18/2020 05:53 AM    Glucose (POC) 123 (H) 01/14/2020 10:45 PM    Glucose (POC) 105 (H) 09/07/2015 03:49 PM    Glucose (POC) 125 (H) 09/07/2015 12:17 PM    Glucose (POC) 93 09/06/2015 09:53 PM     Lab Results   Component Value Date/Time    Color YELLOW/STRAW 01/24/2022 09:52 PM    Appearance HAZY (A) 01/24/2022 09:52 PM    Specific gravity 1.025 01/24/2022 09:52 PM    Specific gravity 1.017 02/04/2020 07:50 AM    pH (UA) 5.0 01/24/2022 09:52 PM    Protein 100 (A) 01/24/2022 09:52 PM    Glucose Negative 01/24/2022 09:52 PM    Ketone Negative 01/24/2022 09:52 PM    Bilirubin Negative 01/24/2022 09:52 PM Urobilinogen 0.2 01/24/2022 09:52 PM    Nitrites Negative 01/24/2022 09:52 PM    Leukocyte Esterase TRACE (A) 01/24/2022 09:52 PM    Epithelial cells FEW 01/24/2022 09:52 PM    Bacteria Negative 01/24/2022 09:52 PM    WBC 5-10 01/24/2022 09:52 PM    RBC 0-5 01/24/2022 09:52 PM         Medications Reviewed:     Current Facility-Administered Medications   Medication Dose Route Frequency    albuterol-ipratropium (DUO-NEB) 2.5 MG-0.5 MG/3 ML  3 mL Nebulization Q4H PRN    allopurinoL (ZYLOPRIM) tablet 100 mg  100 mg Oral DAILY    atorvastatin (LIPITOR) tablet 10 mg  10 mg Oral DAILY    calcium-vitamin D (OS-HUNTER +D3) 500 mg-200 unit per tablet 1 Tablet  1 Tablet Oral DAILY    cholecalciferol (VITAMIN D3) (400 Units /10 mcg) tablet 1,000 Units  1,000 Units Oral DAILY    dilTIAZem ER (CARDIZEM CD) capsule 240 mg  240 mg Oral DAILY    sodium chloride (NS) flush 5-40 mL  5-40 mL IntraVENous Q8H    sodium chloride (NS) flush 5-40 mL  5-40 mL IntraVENous PRN    ondansetron (ZOFRAN ODT) tablet 4 mg  4 mg Oral Q8H PRN    Or    ondansetron (ZOFRAN) injection 4 mg  4 mg IntraVENous Q6H PRN    acetaminophen (TYLENOL) tablet 650 mg  650 mg Oral Q4H PRN    morphine injection 2 mg  2 mg IntraVENous Q4H PRN    0.9% sodium chloride infusion  75 mL/hr IntraVENous CONTINUOUS    pantoprazole (PROTONIX) 40 mg in 0.9% sodium chloride 10 mL injection  40 mg IntraVENous DAILY     ______________________________________________________________________  EXPECTED LENGTH OF STAY: 3d 4h  ACTUAL LENGTH OF STAY:          3                 Kody Lopez MD

## 2022-01-28 NOTE — PROGRESS NOTES
General Surgery Daily Progress Note    Admit Date: 2022  Post-Operative Day: * No surgery found * from * No surgery found *     Subjective:     Last 24 hrs: pt is tolerating full liquids and having BMs, no pain, no n/v       Objective:     Blood pressure 128/69, pulse 69, temperature 97.9 °F (36.6 °C), resp. rate 16, height 5' 7\" (1.702 m), weight 168 lb 10.4 oz (76.5 kg), SpO2 99 %. Temp (24hrs), Av.2 °F (36.8 °C), Min:97.9 °F (36.6 °C), Max:98.5 °F (36.9 °C)      _____________________  Physical Exam:     Alert and Oriented, x3, in no acute distress. Cardiovascular: RRR, no peripheral edema  Abdomen: soft, NT, nl BS      Assessment:   Principal Problem:    Partial small bowel obstruction ([address] (2017)    Active Problems:    Gastroenteritis (2022)            Plan:     Adv diet to low fiber  Likely able to go home tomorrow if kidney function cont to improve.   OOB    Data Review:    Recent Labs     22  0610 22  0358   WBC 5.3 6.9   HGB 8.8* 10.1*   HCT 29.2* 34.3*   * 164     Recent Labs     22  0610 22  0358    145   K 4.7 4.9   * 118*   CO2 25 24   * 70   BUN 32* 45*   CREA 1.52* 1.72*   CA 9.2 9.8   MG 1.7 2.2   PHOS 3.4 4.2   ALB 2.5* 2.8*   ALT 14 16     Recent Labs     22  0358   LPSE 96           ______________________  Medications:    Current Facility-Administered Medications   Medication Dose Route Frequency    albuterol-ipratropium (DUO-NEB) 2.5 MG-0.5 MG/3 ML  3 mL Nebulization Q4H PRN    allopurinoL (ZYLOPRIM) tablet 100 mg  100 mg Oral DAILY    atorvastatin (LIPITOR) tablet 10 mg  10 mg Oral DAILY    calcium-vitamin D (OS-HUNTER +D3) 500 mg-200 unit per tablet 1 Tablet  1 Tablet Oral DAILY    cholecalciferol (VITAMIN D3) (400 Units /10 mcg) tablet 1,000 Units  1,000 Units Oral DAILY    dilTIAZem ER (CARDIZEM CD) capsule 240 mg  240 mg Oral DAILY    sodium chloride (NS) flush 5-40 mL  5-40 mL IntraVENous Q8H    sodium chloride (NS) flush 5-40 mL  5-40 mL IntraVENous PRN    ondansetron (ZOFRAN ODT) tablet 4 mg  4 mg Oral Q8H PRN    Or    ondansetron (ZOFRAN) injection 4 mg  4 mg IntraVENous Q6H PRN    acetaminophen (TYLENOL) tablet 650 mg  650 mg Oral Q4H PRN    morphine injection 2 mg  2 mg IntraVENous Q4H PRN    0.9% sodium chloride infusion  75 mL/hr IntraVENous CONTINUOUS    pantoprazole (PROTONIX) 40 mg in 0.9% sodium chloride 10 mL injection  40 mg IntraVENous DAILY       Rustam Sterling NP  1/28/2022        I have independently examined the patient and have reviewed the chart. I agree with the above plan.   Claudia Pink MD

## 2022-01-29 LAB
ANION GAP SERPL CALC-SCNC: 2 MMOL/L (ref 5–15)
BUN SERPL-MCNC: 28 MG/DL (ref 6–20)
BUN/CREAT SERPL: 20 (ref 12–20)
CALCIUM SERPL-MCNC: 9.1 MG/DL (ref 8.5–10.1)
CHLORIDE SERPL-SCNC: 116 MMOL/L (ref 97–108)
CO2 SERPL-SCNC: 24 MMOL/L (ref 21–32)
CREAT SERPL-MCNC: 1.39 MG/DL (ref 0.55–1.02)
GLUCOSE SERPL-MCNC: 103 MG/DL (ref 65–100)
POTASSIUM SERPL-SCNC: 4.6 MMOL/L (ref 3.5–5.1)
SODIUM SERPL-SCNC: 142 MMOL/L (ref 136–145)

## 2022-01-29 PROCEDURE — 74011000250 HC RX REV CODE- 250: Performed by: INTERNAL MEDICINE

## 2022-01-29 PROCEDURE — 77010033678 HC OXYGEN DAILY

## 2022-01-29 PROCEDURE — 74011250637 HC RX REV CODE- 250/637: Performed by: INTERNAL MEDICINE

## 2022-01-29 PROCEDURE — 80048 BASIC METABOLIC PNL TOTAL CA: CPT

## 2022-01-29 PROCEDURE — 74011250636 HC RX REV CODE- 250/636: Performed by: INTERNAL MEDICINE

## 2022-01-29 PROCEDURE — 65660000000 HC RM CCU STEPDOWN

## 2022-01-29 PROCEDURE — C9113 INJ PANTOPRAZOLE SODIUM, VIA: HCPCS | Performed by: INTERNAL MEDICINE

## 2022-01-29 PROCEDURE — 36415 COLL VENOUS BLD VENIPUNCTURE: CPT

## 2022-01-29 PROCEDURE — 94760 N-INVAS EAR/PLS OXIMETRY 1: CPT

## 2022-01-29 RX ADMIN — SODIUM CHLORIDE, PRESERVATIVE FREE 10 ML: 5 INJECTION INTRAVENOUS at 00:16

## 2022-01-29 RX ADMIN — SODIUM CHLORIDE, PRESERVATIVE FREE 10 ML: 5 INJECTION INTRAVENOUS at 20:50

## 2022-01-29 RX ADMIN — DILTIAZEM HYDROCHLORIDE 240 MG: 120 CAPSULE, COATED, EXTENDED RELEASE ORAL at 08:38

## 2022-01-29 RX ADMIN — SODIUM CHLORIDE, PRESERVATIVE FREE 10 ML: 5 INJECTION INTRAVENOUS at 07:27

## 2022-01-29 RX ADMIN — SODIUM CHLORIDE 75 ML/HR: 9 INJECTION, SOLUTION INTRAVENOUS at 07:27

## 2022-01-29 RX ADMIN — ALLOPURINOL 100 MG: 100 TABLET ORAL at 08:38

## 2022-01-29 RX ADMIN — CHOLECALCIFEROL (VITAMIN D3) 10 MCG (400 UNIT) TABLET 1000 UNITS: at 08:44

## 2022-01-29 RX ADMIN — SODIUM CHLORIDE, PRESERVATIVE FREE 40 MG: 5 INJECTION INTRAVENOUS at 08:37

## 2022-01-29 RX ADMIN — CALCIUM CARBONATE-VITAMIN D TAB 500 MG-200 UNIT 1 TABLET: 500-200 TAB at 08:38

## 2022-01-29 RX ADMIN — ATORVASTATIN CALCIUM 10 MG: 10 TABLET, FILM COATED ORAL at 08:38

## 2022-01-29 NOTE — PROGRESS NOTES
[facility]  Hospitalist Group                                                                                          Hospitalist Progress Note  Jorge Vega MD  Answering service: [phone] from in house phone        Date of Service:  2022  NAME:  Livia Hernández  :  1940  MRN:  [id number]      Admission Summary: This is an 49-year-old woman with a past medical history significant for hypertension, dyslipidemia, and thromboembolism, was in her usual state of health until the day of her presentation at the emergency room when the patient developed abdominal pain. The abdominal pain is diffuse in location, constant, dull ache. The pain started after eating lunch. No known aggravating or relieving factors. Associated with nausea and vomiting, 5/10 in severity. The patient was taken to the [facility] Emergency Room at [place]. When the patient arrived at the emergency room, CT scan of the abdomen and pelvis was obtained. The CT scan shows mildly dilated small bowel loops, which could be due to gastroenteritis or partial small bowel obstruction. The patient was discussed with the general surgeon on-call by the emergency room physician. The surgeon advised admission and hydration. The patient was subsequently referred to the hospitalist service for evaluation for admission. The patient was last admitted to the hospital from 2020 to 2020. The patient was admitted and treated for small bowel obstruction. The patient was treated conservatively. Interval history / Subjective:     She said she is feeling better, no abdominal pain, had bowel movement last night     Assessment & Plan:     Partial small bowel obstruction  -CT abdomen pelvis: Mildly dilated small bowel loops could indicate gastroenteritis or partial small bowel obstruction.   Nonobstructing small bowel containing left ventral hernia  -Pain control and antiemetics as needed  -Continue PPI and  IV fluids  -KUB 1/28 no plain film evidence of small bowel obstruction or other acute findings. -improved and tolerated her diet    -General surgeon on board    HTN  -BP normal, on diltiazem, monitor BP    Hyperlipidemia  -Continue home dose atorvastatin    Hx of thromboembolism  -Restart home dose Eliquis when cleared by surgery    JOSE likely prerenal   -Creatinine improving 1.39 , baseline~1  -cut back IV fluids  -Avoid nephrotoxins and renal dose medications  -monitor renal function    Hyperkalemia  -Resolved  -Monitor labs    Code status: None   DVT prophylaxis: Restart home dose Eliquis    Care Plan discussed with: Patient/Family and Nurse  Anticipated Disposition: Home w/Family  Anticipated Discharge: 24 hours to 48 hours, possible on 1/30    Discussed with patient and her  at bedside, questions answered     Hospital Problems  Date Reviewed: 1/26/2022          Codes Class Noted POA    Gastroenteritis ICD-10-CM: K52.9  ICD-9-CM: 558.9  1/25/2022 Unknown        * (Principal) Partial small bowel obstruction ([facility] [address] ICD-10-CM: K56.600  ICD-9-CM: 560.9  4/21/2017 Yes                Vital Signs:    Last 24hrs VS reviewed since prior progress note. Most recent are:  Visit Vitals  BP (!) 138/95   Pulse 62   Temp 98 °F (36.7 °C)   Resp 16   Ht 5' 7\" (1.702 m)   Wt 76.5 kg (168 lb 10.4 oz)   SpO2 94%   BMI 26.41 kg/m²       No intake or output data in the 24 hours ending 01/29/22 0909     Physical Examination:             Constitutional:  No acute distress, cooperative, pleasant    ENT:  Oral mucosa moist, oropharynx benign. Resp:  CTA bilaterally. No wheezing/rhonchi/rales. No accessory muscle use   CV:  Regular rhythm, normal rate, no murmurs, gallops, rubs    GI:  Soft, non distended, non tender. normoactive bowel sounds    Musculoskeletal:  No edema,      Neurologic:  Moves all extremities.   AAOx3, CN II-XII reviewed     Psych:  Not anxious or agitated       Data Review:    Review and/or order of clinical lab test  Review and/or order of tests in the medicine section of [facility]      Labs:     Recent Labs     01/28/22  0610   WBC 5.3   HGB 8.8*   HCT 29.2*   *     Recent Labs     01/29/22  0201 01/28/22  0610    143   K 4.6 4.7   * 116*   CO2 24 25   BUN 28* 32*   CREA 1.39* 1.52*   * 102*   CA 9.1 9.2   MG  --  1.7   PHOS  --  3.4     Recent Labs     01/28/22  0610   ALT 14   AP 48   TBILI 0.4   TP 5.5*   ALB 2.5*   GLOB 3.0     No results for input(s): INR, PTP, APTT, INREXT, INREXT in the last 72 hours. No results for input(s): FE, TIBC, PSAT, FERR in the last 72 hours. Lab Results   Component Value Date/Time    Folate 9.2 07/20/2014 11:37 AM      No results for input(s): PH, PCO2, PO2 in the last 72 hours. No results for input(s): CPK, CKNDX, TROIQ in the last 72 hours.     No lab exists for component: CPKMB  Lab Results   Component Value Date/Time    Cholesterol, total 161 01/26/2018 04:28 AM    HDL Cholesterol 69 01/26/2018 04:28 AM    LDL, calculated 75.2 01/26/2018 04:28 AM    Triglyceride 84 01/26/2018 04:28 AM    CHOL/HDL Ratio 2.3 01/26/2018 04:28 AM     Lab Results   Component Value Date/Time    Glucose (POC) 97 01/18/2020 05:53 AM    Glucose (POC) 123 (H) 01/14/2020 10:45 PM    Glucose (POC) 105 (H) 09/07/2015 03:49 PM    Glucose (POC) 125 (H) 09/07/2015 12:17 PM    Glucose (POC) 93 09/06/2015 09:53 PM     Lab Results   Component Value Date/Time    Color YELLOW/STRAW 01/24/2022 09:52 PM    Appearance HAZY (A) 01/24/2022 09:52 PM    Specific gravity 1.025 01/24/2022 09:52 PM    Specific gravity 1.017 02/04/2020 07:50 AM    pH (UA) 5.0 01/24/2022 09:52 PM    Protein 100 (A) 01/24/2022 09:52 PM    Glucose Negative 01/24/2022 09:52 PM    Ketone Negative 01/24/2022 09:52 PM    Bilirubin Negative 01/24/2022 09:52 PM    Urobilinogen 0.2 01/24/2022 09:52 PM    Nitrites Negative 01/24/2022 09:52 PM    Leukocyte Esterase TRACE (A) 01/24/2022 09:52 PM    Epithelial cells FEW 01/24/2022 09:52 PM    Bacteria Negative 01/24/2022 09:52 PM    WBC 5-10 01/24/2022 09:52 PM    RBC 0-5 01/24/2022 09:52 PM         Medications Reviewed:     Current Facility-Administered Medications   Medication Dose Route Frequency    albuterol-ipratropium (DUO-NEB) 2.5 MG-0.5 MG/3 ML  3 mL Nebulization Q4H PRN    allopurinoL (ZYLOPRIM) tablet 100 mg  100 mg Oral DAILY    atorvastatin (LIPITOR) tablet 10 mg  10 mg Oral DAILY    calcium-vitamin D (OS-HUNTER +D3) 500 mg-200 unit per tablet 1 Tablet  1 Tablet Oral DAILY    cholecalciferol (VITAMIN D3) (400 Units /10 mcg) tablet 1,000 Units  1,000 Units Oral DAILY    dilTIAZem ER (CARDIZEM CD) capsule 240 mg  240 mg Oral DAILY    sodium chloride (NS) flush 5-40 mL  5-40 mL IntraVENous Q8H    sodium chloride (NS) flush 5-40 mL  5-40 mL IntraVENous PRN    ondansetron (ZOFRAN ODT) tablet 4 mg  4 mg Oral Q8H PRN    Or    ondansetron (ZOFRAN) injection 4 mg  4 mg IntraVENous Q6H PRN    acetaminophen (TYLENOL) tablet 650 mg  650 mg Oral Q4H PRN    morphine injection 2 mg  2 mg IntraVENous Q4H PRN    0.9% sodium chloride infusion  50 mL/hr IntraVENous CONTINUOUS    pantoprazole (PROTONIX) 40 mg in 0.9% sodium chloride 10 mL injection  40 mg IntraVENous DAILY     ______________________________________________________________________  EXPECTED LENGTH OF STAY: 3d 4h  ACTUAL LENGTH OF STAY:          4                 Kiley Morales MD

## 2022-01-30 LAB
ANION GAP SERPL CALC-SCNC: 3 MMOL/L (ref 5–15)
BUN SERPL-MCNC: 22 MG/DL (ref 6–20)
BUN/CREAT SERPL: 16 (ref 12–20)
CALCIUM SERPL-MCNC: 9.7 MG/DL (ref 8.5–10.1)
CHLORIDE SERPL-SCNC: 114 MMOL/L (ref 97–108)
CO2 SERPL-SCNC: 25 MMOL/L (ref 21–32)
CREAT SERPL-MCNC: 1.37 MG/DL (ref 0.55–1.02)
GLUCOSE SERPL-MCNC: 100 MG/DL (ref 65–100)
POTASSIUM SERPL-SCNC: 4.5 MMOL/L (ref 3.5–5.1)
SODIUM SERPL-SCNC: 142 MMOL/L (ref 136–145)

## 2022-01-30 PROCEDURE — 65660000000 HC RM CCU STEPDOWN

## 2022-01-30 PROCEDURE — C9113 INJ PANTOPRAZOLE SODIUM, VIA: HCPCS | Performed by: INTERNAL MEDICINE

## 2022-01-30 PROCEDURE — 74011250637 HC RX REV CODE- 250/637: Performed by: INTERNAL MEDICINE

## 2022-01-30 PROCEDURE — 74011250636 HC RX REV CODE- 250/636: Performed by: HOSPITALIST

## 2022-01-30 PROCEDURE — 77010033678 HC OXYGEN DAILY

## 2022-01-30 PROCEDURE — 80048 BASIC METABOLIC PNL TOTAL CA: CPT

## 2022-01-30 PROCEDURE — 74011000250 HC RX REV CODE- 250: Performed by: INTERNAL MEDICINE

## 2022-01-30 PROCEDURE — 94760 N-INVAS EAR/PLS OXIMETRY 1: CPT

## 2022-01-30 PROCEDURE — 97164 PT RE-EVAL EST PLAN CARE: CPT

## 2022-01-30 PROCEDURE — 36415 COLL VENOUS BLD VENIPUNCTURE: CPT

## 2022-01-30 PROCEDURE — 77030038269 HC DRN EXT URIN PURWCK BARD -A

## 2022-01-30 PROCEDURE — 74011250636 HC RX REV CODE- 250/636: Performed by: INTERNAL MEDICINE

## 2022-01-30 PROCEDURE — 97116 GAIT TRAINING THERAPY: CPT

## 2022-01-30 RX ADMIN — SODIUM CHLORIDE, PRESERVATIVE FREE 40 MG: 5 INJECTION INTRAVENOUS at 08:35

## 2022-01-30 RX ADMIN — CALCIUM CARBONATE-VITAMIN D TAB 500 MG-200 UNIT 1 TABLET: 500-200 TAB at 08:35

## 2022-01-30 RX ADMIN — SODIUM CHLORIDE, PRESERVATIVE FREE 10 ML: 5 INJECTION INTRAVENOUS at 22:00

## 2022-01-30 RX ADMIN — ATORVASTATIN CALCIUM 10 MG: 10 TABLET, FILM COATED ORAL at 08:35

## 2022-01-30 RX ADMIN — ALLOPURINOL 100 MG: 100 TABLET ORAL at 08:35

## 2022-01-30 RX ADMIN — DILTIAZEM HYDROCHLORIDE 240 MG: 120 CAPSULE, COATED, EXTENDED RELEASE ORAL at 08:35

## 2022-01-30 RX ADMIN — SODIUM CHLORIDE 50 ML/HR: 9 INJECTION, SOLUTION INTRAVENOUS at 01:51

## 2022-01-30 RX ADMIN — CHOLECALCIFEROL (VITAMIN D3) 10 MCG (400 UNIT) TABLET 1000 UNITS: at 08:35

## 2022-01-30 NOTE — PROGRESS NOTES
PHYSICAL THERAPY REEVALUATION WITH DISCHARGE W/ O2 CHALLENGE RESULTS  Patient: Karli Saleh (4 y.o. female)  Date: 1/30/2022  Primary Diagnosis: Gastroenteritis [K52.9]       Precautions:          ASSESSMENT  Based on the objective data described below, the patient presents with good overall independence and safety with mobility but desaturation with exertion and potential need for home O2. Patient seen and discharged from therapy services 1/26 but patient reports no recollection of this. She was modified independent for bed mobility and sit to stand. Utilized a RW for gait training x120' completed modified independently. Noted DUVAL and desaturation to 86% during activity on RA. Mobilized in the room for an additional 40' on 2L O2 via NC with stable SpO2. The patient is mobilizing well and no additional acute PT services indicated. She would require home O2 based on saturations during re-evaluation. She may benefit from [address], especially if discharged home with new portable O2 to manage. Patient is safe to mobilize with nursing assist x1 using a RW present in the patient's room. Other factors to consider for discharge: home O2? PLAN :  Patient is discharged from skilled acute physical therapy at this time    Recommendation for discharge: (in order for the patient to meet his/her long term goals)  Physical therapy at least 2 days/week in the home         Equipment recommendations for successful discharge: portable oxygen? SUBJECTIVE:   Patient stated I feel a little short of breath.     OBJECTIVE DATA SUMMARY:   HISTORY:    Past Medical History:   Diagnosis Date    Abscess 3/22/2014    Lumbar area - MRSA    Arthritis     Common bile duct calculus 12/14/2019    Gout     Hypercholesterolemia     Hypertension     LBP (low back pain)     Other ill-defined conditions(799.89)     LEFT SHOULDER PAINFUL, NEEDS REPLACEMENT    Paroxysmal SVT (supraventricular tachycardia) ([address] 5/2/2017    Thromboembolus ([facility] [address] 12/26/13    left leg     Past Surgical History:   Procedure Laterality Date    ENDOSCOPY, COLON, DIAGNOSTIC      ostomy reversal    HX BREAST BIOPSY Right 1970    Benign    HX COLOSTOMY      HX GI      COLOSTOMY REVERSAL    HX HEMORRHOIDECTOMY      HX ORTHOPAEDIC      right knee  bilateral knee replacement    HX ORTHOPAEDIC      ORIF RIGHT ANKLE    HX ORTHOPAEDIC      KNEE ARTHROSCOPY    VA ABDOMEN SURGERY PROC UNLISTED      hernia repair    VA BREAST SURGERY PROCEDURE UNLISTED      EXCISION OF BREAST CYST     Hospital course since last seen and reason for reevaluation: reconsult after several days, patient has been OOB only for toileting    Personal factors and/or comorbidities impacting plan of care:     Home Situation  Home Environment: Apartment  One/Two Story Residence: One story  Living Alone: No  Support Systems: Spouse/Significant Other  Patient Expects to be Discharged to[de-identified] Other: (TBD)  Current DME Used/Available at Home: Walker, rolling    EXAMINATION/PRESENTATION/DECISION MAKING:   Critical Behavior:              Hearing: Auditory  Auditory Impairment: Hard of hearing, bilateral  Skin:    Edema:   Range Of Motion:                          Strength: Tone & Sensation:                                  Coordination:     Vision:      Functional Mobility:  Bed Mobility:              Transfers:  Sit to Stand: Modified independent  Stand to Sit: Modified independent                       Balance:   Sitting: Intact  Standing: With support; Intact  Standing - Static: Good  Standing - Dynamic : Good  Ambulation/Gait Training:  Distance (ft): 120 Feet (ft) (120 w/ seated rest, 2x20' additional)  Assistive Device: Gait belt;Walker, rolling  Ambulation - Level of Assistance: Modified independent                 Base of Support: Widened     Speed/Iveth: Pace decreased (<100 feet/min)        Activity Tolerance:   Good, desaturates with exertion and requires oxygen and observed SOB with activity   Documentation for home O2:     ROOM AIR    AT REST   O2 SATS  92 HR  78   ROOM AIR WITH ACTIVITY 02 SATS  84 HR  86   (2    ) LITERS OF O2 WITH ACTIVITY O2 SATS  94 HR  84   (2    )LITERS OF 02 PATIENT LEFT COMFORTABLY  SITTING/SUPINE 02 SATS  92 HR  76           After treatment patient left in no apparent distress:   Sitting in chair and Call bell within reach    COMMUNICATION/EDUCATION:   The patients plan of care was discussed with: Registered nurse. Fall prevention education was provided and the patient/caregiver indicated understanding., Patient/family have participated as able in goal setting and plan of care. and Patient/family agree to work toward stated goals and plan of care.     Thank you for this referral.  Chris Klein, PT, DPT   Time Calculation: 32 mins

## 2022-01-30 NOTE — PROGRESS NOTES
[facility] Adult  Hospitalist Group                                                                                          Hospitalist Progress Note  Tricia Mae MD  Answering service: [phone] from in house phone        Date of Service:  2022  NAME:  Jennifer Rosenthal  :  1940  MRN:  [id number]      Admission Summary: This is an 33-year-old woman with a past medical history significant for hypertension, dyslipidemia, and thromboembolism, was in her usual state of health until the day of her presentation at the emergency room when the patient developed abdominal pain. The abdominal pain is diffuse in location, constant, dull ache. The pain started after eating lunch. No known aggravating or relieving factors. Associated with nausea and vomiting, 5/10 in severity. The patient was taken to the [facility] Emergency Room at [place]. When the patient arrived at the emergency room, CT scan of the abdomen and pelvis was obtained. The CT scan shows mildly dilated small bowel loops, which could be due to gastroenteritis or partial small bowel obstruction. The patient was discussed with the general surgeon on-call by the emergency room physician. The surgeon advised admission and hydration. The patient was subsequently referred to the hospitalist service for evaluation for admission. The patient was last admitted to the hospital from 2020 to 2020. The patient was admitted and treated for small bowel obstruction. The patient was treated conservatively. Interval history / Subjective:     She said she is feeling better, no abdominal pain, had bowel movement this morning     Assessment & Plan:     Partial small bowel obstruction  -CT abdomen pelvis: Mildly dilated small bowel loops could indicate gastroenteritis or partial small bowel obstruction.   Nonobstructing small bowel containing left ventral hernia  -Pain control and antiemetics as needed  -Continue PPI and  IV fluids  -KUB 1/28 no plain film evidence of small bowel obstruction or other acute findings. -improved and tolerated her diet, had bowel movement   -General surgeon on board    HTN  -BP elavted this morning, on diltiazem, monitor BP    Hyperlipidemia  -Continue home dose atorvastatin    Hx of thromboembolism  -Restart home dose Eliquis when cleared by surgery    JOSE likely prerenal   -Creatinine improving 1.37 , baseline~1  -cut back IV fluids  -Avoid nephrotoxins and renal dose medications  -monitor renal function    Hyperkalemia  -Resolved  -Monitor labs    Code status: None   DVT prophylaxis: Restart home dose Eliquis    Care Plan discussed with: Patient/Family and Nurse  Anticipated Disposition: Home w/Family  Anticipated Discharge: 24 hours to 48 hours, possible on 1/30    Discussed with patient and her  at bedside, questions answered     Hospital Problems  Date Reviewed: 1/26/2022          Codes Class Noted POA    Gastroenteritis ICD-10-CM: K52.9  ICD-9-CM: 558.9  1/25/2022 Unknown        * (Principal) Partial small bowel obstruction ([facility] ICD-10-CM: K56.600  ICD-9-CM: 560.9  4/21/2017 Yes                Vital Signs:    Last 24hrs VS reviewed since prior progress note. Most recent are:  Visit Vitals  BP (!) 180/80   Pulse 73   Temp 98.4 °F (36.9 °C)   Resp 16   Ht 5' 7\" (1.702 m)   Wt 76.5 kg (168 lb 10.4 oz)   SpO2 91%   BMI 26.41 kg/m²       No intake or output data in the 24 hours ending 01/30/22 1159     Physical Examination:             Constitutional:  No acute distress, cooperative, pleasant    ENT:  Oral mucosa moist, oropharynx benign. Resp:  CTA bilaterally. No wheezing/rhonchi/rales. No accessory muscle use   CV:  Regular rhythm, normal rate, no murmurs, gallops, rubs    GI:  Soft, non distended, non tender. normoactive bowel sounds    Musculoskeletal:  No edema,      Neurologic:  Moves all extremities.   AAOx3, CN II-XII reviewed     Psych:  Not anxious or agitated       Data Review:    Review and/or order of clinical lab test  Review and/or order of tests in the medicine section of [facility]      Labs:     Recent Labs     01/28/22  0610   WBC 5.3   HGB 8.8*   HCT 29.2*   *     Recent Labs     01/30/22  0152 01/29/22  0201 01/28/22  0610    142 143   K 4.5 4.6 4.7   * 116* 116*   CO2 25 24 25   BUN 22* 28* 32*   CREA 1.37* 1.39* 1.52*    103* 102*   CA 9.7 9.1 9.2   MG  --   --  1.7   PHOS  --   --  3.4     Recent Labs     01/28/22  0610   ALT 14   AP 48   TBILI 0.4   TP 5.5*   ALB 2.5*   GLOB 3.0     No results for input(s): INR, PTP, APTT, INREXT, INREXT in the last 72 hours. No results for input(s): FE, TIBC, PSAT, FERR in the last 72 hours. Lab Results   Component Value Date/Time    Folate 9.2 07/20/2014 11:37 AM      No results for input(s): PH, PCO2, PO2 in the last 72 hours. No results for input(s): CPK, CKNDX, TROIQ in the last 72 hours.     No lab exists for component: CPKMB  Lab Results   Component Value Date/Time    Cholesterol, total 161 01/26/2018 04:28 AM    HDL Cholesterol 69 01/26/2018 04:28 AM    LDL, calculated 75.2 01/26/2018 04:28 AM    Triglyceride 84 01/26/2018 04:28 AM    CHOL/HDL Ratio 2.3 01/26/2018 04:28 AM     Lab Results   Component Value Date/Time    Glucose (POC) 97 01/18/2020 05:53 AM    Glucose (POC) 123 (H) 01/14/2020 10:45 PM    Glucose (POC) 105 (H) 09/07/2015 03:49 PM    Glucose (POC) 125 (H) 09/07/2015 12:17 PM    Glucose (POC) 93 09/06/2015 09:53 PM     Lab Results   Component Value Date/Time    Color YELLOW/STRAW 01/24/2022 09:52 PM    Appearance HAZY (A) 01/24/2022 09:52 PM    Specific gravity 1.025 01/24/2022 09:52 PM    Specific gravity 1.017 02/04/2020 07:50 AM    pH (UA) 5.0 01/24/2022 09:52 PM    Protein 100 (A) 01/24/2022 09:52 PM    Glucose Negative 01/24/2022 09:52 PM    Ketone Negative 01/24/2022 09:52 PM    Bilirubin Negative 01/24/2022 09:52 PM    Urobilinogen 0.2 01/24/2022 09:52 PM Nitrites Negative 01/24/2022 09:52 PM    Leukocyte Esterase TRACE (A) 01/24/2022 09:52 PM    Epithelial cells FEW 01/24/2022 09:52 PM    Bacteria Negative 01/24/2022 09:52 PM    WBC 5-10 01/24/2022 09:52 PM    RBC 0-5 01/24/2022 09:52 PM         Medications Reviewed:     Current Facility-Administered Medications   Medication Dose Route Frequency    albuterol-ipratropium (DUO-NEB) 2.5 MG-0.5 MG/3 ML  3 mL Nebulization Q4H PRN    allopurinoL (ZYLOPRIM) tablet 100 mg  100 mg Oral DAILY    atorvastatin (LIPITOR) tablet 10 mg  10 mg Oral DAILY    calcium-vitamin D (OS-HUNTER +D3) 500 mg-200 unit per tablet 1 Tablet  1 Tablet Oral DAILY    cholecalciferol (VITAMIN D3) (400 Units /10 mcg) tablet 1,000 Units  1,000 Units Oral DAILY    dilTIAZem ER (CARDIZEM CD) capsule 240 mg  240 mg Oral DAILY    sodium chloride (NS) flush 5-40 mL  5-40 mL IntraVENous Q8H    sodium chloride (NS) flush 5-40 mL  5-40 mL IntraVENous PRN    ondansetron (ZOFRAN ODT) tablet 4 mg  4 mg Oral Q8H PRN    Or    ondansetron (ZOFRAN) injection 4 mg  4 mg IntraVENous Q6H PRN    acetaminophen (TYLENOL) tablet 650 mg  650 mg Oral Q4H PRN    morphine injection 2 mg  2 mg IntraVENous Q4H PRN    0.9% sodium chloride infusion  75 mL/hr IntraVENous CONTINUOUS    pantoprazole (PROTONIX) 40 mg in 0.9% sodium chloride 10 mL injection  40 mg IntraVENous DAILY     ______________________________________________________________________  EXPECTED LENGTH OF STAY: 3d 4h  ACTUAL LENGTH OF STAY:          5                 Baltazar Smith MD

## 2022-01-31 VITALS
WEIGHT: 168.65 LBS | RESPIRATION RATE: 18 BRPM | OXYGEN SATURATION: 91 % | SYSTOLIC BLOOD PRESSURE: 165 MMHG | TEMPERATURE: 98.3 F | DIASTOLIC BLOOD PRESSURE: 100 MMHG | BODY MASS INDEX: 26.47 KG/M2 | HEART RATE: 81 BPM | HEIGHT: 67 IN

## 2022-01-31 LAB
ANION GAP SERPL CALC-SCNC: 2 MMOL/L (ref 5–15)
BUN SERPL-MCNC: 17 MG/DL (ref 6–20)
BUN/CREAT SERPL: 13 (ref 12–20)
CALCIUM SERPL-MCNC: 9.9 MG/DL (ref 8.5–10.1)
CHLORIDE SERPL-SCNC: 108 MMOL/L (ref 97–108)
CO2 SERPL-SCNC: 30 MMOL/L (ref 21–32)
CREAT SERPL-MCNC: 1.34 MG/DL (ref 0.55–1.02)
GLUCOSE SERPL-MCNC: 96 MG/DL (ref 65–100)
POTASSIUM SERPL-SCNC: 3.9 MMOL/L (ref 3.5–5.1)
SODIUM SERPL-SCNC: 140 MMOL/L (ref 136–145)

## 2022-01-31 PROCEDURE — 80048 BASIC METABOLIC PNL TOTAL CA: CPT

## 2022-01-31 PROCEDURE — 74011250636 HC RX REV CODE- 250/636: Performed by: INTERNAL MEDICINE

## 2022-01-31 PROCEDURE — 74011250637 HC RX REV CODE- 250/637: Performed by: INTERNAL MEDICINE

## 2022-01-31 PROCEDURE — C9113 INJ PANTOPRAZOLE SODIUM, VIA: HCPCS | Performed by: INTERNAL MEDICINE

## 2022-01-31 PROCEDURE — 74011000250 HC RX REV CODE- 250: Performed by: INTERNAL MEDICINE

## 2022-01-31 PROCEDURE — 94760 N-INVAS EAR/PLS OXIMETRY 1: CPT

## 2022-01-31 PROCEDURE — 97165 OT EVAL LOW COMPLEX 30 MIN: CPT

## 2022-01-31 PROCEDURE — 36415 COLL VENOUS BLD VENIPUNCTURE: CPT

## 2022-01-31 RX ORDER — POLYETHYLENE GLYCOL 3350 17 G/17G
17 POWDER, FOR SOLUTION ORAL
Qty: 30 EACH | Refills: 0 | Status: SHIPPED | OUTPATIENT
Start: 2022-01-31

## 2022-01-31 RX ORDER — PANTOPRAZOLE SODIUM 40 MG/1
40 TABLET, DELAYED RELEASE ORAL DAILY
Qty: 30 TABLET | Refills: 0 | Status: SHIPPED | OUTPATIENT
Start: 2022-01-31

## 2022-01-31 RX ADMIN — SODIUM CHLORIDE, PRESERVATIVE FREE 10 ML: 5 INJECTION INTRAVENOUS at 07:14

## 2022-01-31 RX ADMIN — ALLOPURINOL 100 MG: 100 TABLET ORAL at 09:20

## 2022-01-31 RX ADMIN — ATORVASTATIN CALCIUM 10 MG: 10 TABLET, FILM COATED ORAL at 09:20

## 2022-01-31 RX ADMIN — SODIUM CHLORIDE, PRESERVATIVE FREE 40 MG: 5 INJECTION INTRAVENOUS at 09:21

## 2022-01-31 RX ADMIN — CHOLECALCIFEROL (VITAMIN D3) 10 MCG (400 UNIT) TABLET 1000 UNITS: at 09:19

## 2022-01-31 RX ADMIN — CALCIUM CARBONATE-VITAMIN D TAB 500 MG-200 UNIT 1 TABLET: 500-200 TAB at 09:20

## 2022-01-31 RX ADMIN — DILTIAZEM HYDROCHLORIDE 240 MG: 120 CAPSULE, COATED, EXTENDED RELEASE ORAL at 09:20

## 2022-01-31 NOTE — PROGRESS NOTES
Transition of Care:  back to  [facility] for rehab (patient normally lives at [address] [place] [address] in independent living)     Transport Plan: [facility] transport wheelchair van-is scheduled for 1pm today 1/31/22     RUR: 12%     Main contact is - Germán Marr- [phone]     [address] - [phone] fax- [phone]     0900: this CM met with pleasant patient; she is agreeable to going to [address]; we called her  on phone; he verbalized understanding; this CM talked to Shanell at [address] [place] [address] about Eden Luly today she verbalized understanding    0930: spoke to Shanell at [address] [place] [address] again to set up transport; they will send over transport at 1pm today    1100: faxed dc instructions to [address] [place] [address] to [phone] and [phone]    Transition of Care Plan to SNF/Rehab    SNF/Rehab Transition:  Patient has been accepted to [facility] [address] and meets criteria for admission. Patient will transported by  and expected to leave at . Communication to Patient/Family:  Met with patient and talked on phone to  (identified care giver) and they are agreeable to the transition plan. Communication to SNF/Rehab:  Bedside RN, Michelle Muñiz, has been notified to update the transition plan to the facility and call report (phone number [phone]). Discharge information has been updated on the AVS.     Discharge instructions to be fax'd to facility at [facility] # [phone]). Nursing Please include all hard scripts for controlled substances, med rec and dc summary, and AVS in packet.      Reviewed and confirmed with facility, [address] [place] [address], can manage the patient care needs for the following:     SNF/Rehab Transition:  Patient to follow-up with Home Health:  PCP/Specialist:     Reviewed and confirmed with facility, [facility] they can manage the patient care needs for the following:     Bill Pierre with (X) only those applicable:    Medication:  [x]  Medications will be available at the facility  []  IV Antibiotics   []  Controlled Substance - hard copy to be sent with patient   []  Weekly Labs   Documents:  [] Hard RX  [x] MAR  [x] Kardex  [x] AVS  [x]Transfer Summary  [x]Discharge   Equipment:  []  CPAP/BiPAP  []  Wound Vacuum  []  Haskins or Urinary Device  []  PICC/Central Line  []  Nebulizer  []  Ventilator   Treatment:  []Isolation (for MRSA, VRE, etc.)  []Surgical Drain Management  []Tracheostomy Care  []Dressing Changes  []Dialysis with transportation and chair time . []PEG Care  []Oxygen  []Daily Weights for Heart Failure   Dietary:  []Any diet limitations  []Tube Feedings   []Total Parenteral Management (TPN)   Eligible for Medicaid Long Term Services and Supports  Yes:  [] Eligible for medical assistance or will become eligible within 180 days and UAI completed. [] Provider/Patient and/or support system has requested screening. [] UAI copy provided to patient or responsible party,   [] UAI unavailable at discharge will send once processed to SNF provider. [] UAI unavailable at discharged mailed to patient  No:   [x] Private pay and is not financially eligible for Medicaid within the next 180 days. [] Reside out-of-state.   [] A residents of a state owned/operated facility that is licensed  by [address] [employer] [facility] or [facility]  [] Enrollment in [facility] hospice services  [] [address] [place] [address]  [] Patient /Family declines to have screening completed or provide financial information for screening     Financial Resources:  Medicaid    [] Initiated and application pending   [] Full coverage     Advanced Care Plan:  []Surrogate Decision Maker of Care  []POA  []Communicated Code Status (DDNR\", \"Full\")    Other     Financial Resources:  Medicaid    [] Initiated and application pending   [] Full coverage     Advanced Care Plan:  []Surrogate Decision Maker of Care  []POA  []Communicated Code Status (DDNR\", \"Full\")    Other Medicare pt has received, reviewed, and signed 2nd IM letter informing them of their right to appeal the discharge.   Signed copy has been placed on pt bedside chart.     CM following  Haim Yang RN, CRM

## 2022-01-31 NOTE — DISCHARGE INSTRUCTIONS
Discharge SNF/Rehab Instructions/LTAC       PATIENT ID: Autumn Waters  MRN: [id number]   YOB: 1940    DATE OF ADMISSION: 1/24/2022  8:52 PM    DATE OF DISCHARGE: 1/31/2022    PRIMARY CARE PROVIDER: Silvestre Merchant MD       ATTENDING PHYSICIAN: Gretchen Claudio MD  DISCHARGING PROVIDER: Dayle Najjar, MD     To contact this individual call [phone] and ask the  to page. If unavailable ask to be transferred the Adult Hospitalist Department. CONSULTATIONS: IP CONSULT TO GENERAL SURGERY    PROCEDURES/SURGERIES: * No surgery found *    [id number] [facility] COURSE:     This is an 78-year-old woman with a past medical history significant for hypertension, dyslipidemia, and thromboembolism, was in her usual state of health until the day of her presentation at the emergency room when the patient developed abdominal pain.  The abdominal pain is diffuse in location, constant, dull ache. The pain started after eating lunch.  No known aggravating or relieving factors.  Associated with nausea and vomiting, 5/10 in severity.  The patient was taken to the [facility] Emergency Room at [address] [place] [address] the patient arrived at the emergency room, CT scan of the abdomen and pelvis was obtained.  The CT scan shows mildly dilated small bowel loops, which could be due to gastroenteritis or partial small bowel obstruction.  The patient was discussed with the general surgeon on-call by the emergency room physician. [facility] surgeon advised admission and hydration.  The patient was subsequently referred to the hospitalist service for evaluation for admission.  The patient was last admitted to the hospital from 06/05/2020 to 06/28/2020.  The patient was admitted and treated for small bowel obstruction.  The patient was treated conservatively.     Partial small bowel obstruction  -CT abdomen pelvis: Mildly dilated small bowel loops could indicate gastroenteritis or partial small bowel obstruction. Nonobstructing small bowel containing left ventral hernia  -Pain control and antiemetics as needed  -Continue PPI and  IV fluids  -KUB 1/28 no plain film evidence of small bowel obstruction or other acute findings.   -improved and tolerated her diet, had bowel movement   -General surgeon on board   HTN  -BP fairly controlled, on diltiazem, monitor BP  Hyperlipidemia   -Continue home dose atorvastatin  Hx of thromboembolism  -Restart home dose Eliquis when cleared by surgery   JOSE likely prerenal   -Creatinine improving 1.37 , baseline~1  -cut back IV fluids  -Avoid nephrotoxins and renal dose medications  -monitor renal function   Hyperkalemia  -Resolved  -Monitor labs    Code status: None   DVT prophylaxis: Restart home dose Eliquis    DISCHARGE DIAGNOSES / PLAN:      Partial small bowel obstruction  -improved and tolerated her diet, had bowel movement   -prn miralax  HTN  -continue diltiazem, monitor BP  Hyperlipidemia   -Continue home dose atorvastatin  Hx of thromboembolism  -Restart home dose Eliquis    JOSE likely prerenal   -improving  -encourage more fluid intake  Hyperkalemia  -Resolved  -Monitor labs  Hx of DVT  -continue eliquis     PENDING TEST RESULTS:   At the time of discharge the following test results are still pending: None    FOLLOW UP APPOINTMENTS:    Follow-up Information     Follow up With Specialties Details Why Contact Info    [facility] unit  Go to for rehab [id number] [facility], [address] [place] [address]  [phone]    Fernando Jackson MD Family Medicine In one week  [address] [place] [address] [phone]        ADDITIONAL CARE RECOMMENDATIONS:      DIET: Regular Diet     TUBE FEEDING INSTRUCTIONS: None    OXYGEN / BiPAP SETTINGS: None    ACTIVITY: Activity as tolerated    WOUND CARE: None    EQUIPMENT needed: None      DISCHARGE MEDICATIONS:   See Medication Reconciliation Form      NOTIFY YOUR PHYSICIAN FOR ANY OF THE FOLLOWING:   Fever over 101 degrees for 24 hours. Chest pain, shortness of breath, fever, chills, nausea, vomiting, diarrhea, change in mentation, falling, weakness, bleeding. Severe pain or pain not relieved by medications. Or, any other signs or symptoms that you may have questions about.     DISPOSITION:    Home With:   OT  PT  HH  RN      x SNF/Inpatient Rehab/LTAC    Independent/assisted living    Hospice    Other:       PATIENT CONDITION AT DISCHARGE:     Functional status    Poor    x Deconditioned     Independent      Cognition   x  Lucid     Forgetful     Dementia      Catheters/lines (plus indication)    Haskins     PICC     PEG    x None      Code status   x  Full code     DNR      PHYSICAL EXAMINATION AT DISCHARGE:   Refer to Progress Note       CHRONIC MEDICAL DIAGNOSES:  Problem List as of 1/31/2022 Date Reviewed: 1/26/2022          Codes Class Noted - Resolved    Gastroenteritis ICD-10-CM: K52.9  ICD-9-CM: 558.9  1/25/2022 - Present        Thrombocytopenia ([facility] ICD-10-CM: D69.6  ICD-9-CM: 287.5  6/7/2020 - Present        Arterial embolism and thrombosis of lower extremity (HCC) ICD-10-CM: I74.3  ICD-9-CM: 444.22  1/24/2020 - Present        Postprocedural hypotension ICD-10-CM: I95.81  ICD-9-CM: 458.29  1/24/2020 - Present        Stage 3 chronic kidney disease ([facility] ICD-10-CM: N18.30  ICD-9-CM: 585.3  1/18/2020 - Present        Hypoxia ICD-10-CM: R09.02  ICD-9-CM: 799.02  1/15/2020 - Present        Weakness of left lower extremity ICD-10-CM: R29.898  ICD-9-CM: 729.89  1/15/2020 - Present    Overview Signed 1/18/2020  8:31 AM by Luke Jackson MD     Probably embolic             Pulmonary embolism [facility]) ICD-10-CM: I26.99  ICD-9-CM: 415.19  1/15/2020 - Present    Overview Signed 1/18/2020  8:51 AM by Luke Jackson MD     High probability VQ lung scan - bilateral             DVT (deep venous thrombosis) ([facility] ICD-10-CM: I82.409  ICD-9-CM: 453.40  1/15/2020 - Present        Gallstone pancreatitis ICD-10-CM: K85.10  ICD-9-CM: 577.0, 574.20  12/27/2019 - Present        Acute cholecystitis ICD-10-CM: K81.0  ICD-9-CM: 575.0  12/21/2019 - Present        Gallstone ICD-10-CM: K80.20  ICD-9-CM: 574.20  12/14/2019 - Present        JOSE (acute kidney injury) ([facility] ICD-10-CM: N17.9  ICD-9-CM: 584.9  12/14/2019 - Present        Common bile duct calculus ICD-10-CM: K80.50  ICD-9-CM: 574.50  12/14/2019 - Present        Abdominal pain ICD-10-CM: R10.9  ICD-9-CM: 789.00  12/14/2019 - Present        Dehydration ICD-10-CM: E86.0  ICD-9-CM: 276.51  1/29/2018 - Present        Bronchitis ICD-10-CM: J40  ICD-9-CM: 373  1/29/2018 - Present        Paroxysmal SVT (supraventricular tachycardia) ([facility] ICD-10-CM: I47.1  ICD-9-CM: 427.0  5/2/2017 - Present        Obstruction of bowel ([facility] ICD-10-CM: I14.805  ICD-9-CM: 560.9  4/21/2017 - Present        * (Principal) Partial small bowel obstruction ([facility] ICD-10-CM: K56.600  ICD-9-CM: 560.9  4/21/2017 - Present        SBO (small bowel obstruction) ([facility] ICD-10-CM: L58.431  ICD-9-CM: 560.9  8/18/2016 - Present        Acute post-hemorrhagic anemia ICD-10-CM: D62  ICD-9-CM: 285.1  9/8/2015 - Present        Postoperative hypovolemic shock ICD-10-CM: T81.19XA  ICD-9-CM: 998.09  9/8/2015 - Present        Tachycardia ICD-10-CM: R00.0  ICD-9-CM: 785.0  9/3/2015 - Present        Blood loss anemia ICD-10-CM: D50.0  ICD-9-CM: 280.0  7/20/2014 - Present        GI bleed ICD-10-CM: K92.2  ICD-9-CM: 578.9  7/20/2014 - Present        Abscess ICD-10-CM: L02.91  ICD-9-CM: 682.9  3/22/2014 - Present    Overview Signed 3/22/2014  8:39 AM by Edward Allen MD     Lumbar area - MRSA             Cerebral thrombosis with cerebral infarction [facility]) ICD-10-CM: I63.30  ICD-9-CM: 434.01  3/18/2014 - Present    Overview Signed 3/22/2014  8:46 AM by Edward Allen MD     Small acute left frontal infarction on mri 3/17/2014             Wound dehiscence ICD-10-CM: T81.30XA  ICD-9-CM: 998.30  3/16/2014 - Present        Hypertension ICD-10-CM: I10  ICD-9-CM: 401.9  Unknown - Present        Hypercholesterolemia ICD-10-CM: E78.00  ICD-9-CM: 272.0  Unknown - Present        RESOLVED: Acute renal failure superimposed on stage 4 chronic kidney disease ([facility] ICD-10-CM: N17.9, N18.4  ICD-9-CM: 584.9, 585.4  1/25/2018 - 5/4/2020        RESOLVED: Weakness ICD-10-CM: R53.1  ICD-9-CM: 780.79  3/16/2014 - 3/22/2014                CDMP Checked:   Yes x     PROBLEM LIST Updated:  Yes x         Signed:   Sandi Harada, MD  1/31/2022  9:33 PM

## 2022-01-31 NOTE — PROGRESS NOTES
[facility] Adult  Hospitalist Group                                                                                          Hospitalist Progress Note  Jill Snyder MD  Answering service: [phone] from in house phone        Date of Service:  2022  NAME:  Jaylan Marie  :  1940  MRN:  [id number]      Admission Summary: This is an 78-year-old woman with a past medical history significant for hypertension, dyslipidemia, and thromboembolism, was in her usual state of health until the day of her presentation at the emergency room when the patient developed abdominal pain. The abdominal pain is diffuse in location, constant, dull ache. The pain started after eating lunch. No known aggravating or relieving factors. Associated with nausea and vomiting, 5/10 in severity. The patient was taken to the [facility] Emergency Room at [place]. When the patient arrived at the emergency room, CT scan of the abdomen and pelvis was obtained. The CT scan shows mildly dilated small bowel loops, which could be due to gastroenteritis or partial small bowel obstruction. The patient was discussed with the general surgeon on-call by the emergency room physician. The surgeon advised admission and hydration. The patient was subsequently referred to the hospitalist service for evaluation for admission. The patient was last admitted to the hospital from 2020 to 2020. The patient was admitted and treated for small bowel obstruction. The patient was treated conservatively. Interval history / Subjective:     She said she is feeling better, no abdominal pain, had bowel movement this morning     Assessment & Plan:     Partial small bowel obstruction  -CT abdomen pelvis: Mildly dilated small bowel loops could indicate gastroenteritis or partial small bowel obstruction.   Nonobstructing small bowel containing left ventral hernia  -on PPI, discontinue IV fluids  -KUB 1/28 no plain film evidence of small bowel obstruction or other acute findings. -improved and tolerated her diet, had bowel movement   -General surgeon on board    HTN  -BP fairly controlled, on diltiazem, monitor BP    Hyperlipidemia  -Continue home dose atorvastatin    Hx of thromboembolism  -Restart home dose Eliquis when cleared by surgery    JOSE likely prerenal   -Creatinine improving 1.34 , baseline~1  -cut back IV fluids  -Avoid nephrotoxins and renal dose medications  -monitor renal function    Hyperkalemia  -Resolved  -Monitor labs    Code status: None   DVT prophylaxis: Restart home dose Eliquis    Care Plan discussed with: Patient/Family and Nurse  Anticipated Disposition: Home w/Family  Anticipated Discharge: 24 hours      Discussed with patient and her  at bedside, questions answered 1/30     Hospital Problems  Date Reviewed: 1/26/2022          Codes Class Noted POA    Gastroenteritis ICD-10-CM: K52.9  ICD-9-CM: 558.9  1/25/2022 Unknown        * (Principal) Partial small bowel obstruction ([facility] ICD-10-CM: K56.600  ICD-9-CM: 560.9  4/21/2017 Yes                Vital Signs:    Last 24hrs VS reviewed since prior progress note. Most recent are:  Visit Vitals  BP (!) 165/100   Pulse 81   Temp 98.3 °F (36.8 °C)   Resp 18   Ht 5' 7\" (1.702 m)   Wt 76.5 kg (168 lb 10.4 oz)   SpO2 91%   BMI 26.41 kg/m²       No intake or output data in the 24 hours ending 01/31/22 0921     Physical Examination:             Constitutional:  No acute distress, cooperative, pleasant    ENT:  Oral mucosa moist, oropharynx benign. Resp:  CTA bilaterally. No wheezing/rhonchi/rales. No accessory muscle use   CV:  Regular rhythm, normal rate, no murmurs, gallops, rubs    GI:  Soft, non distended, non tender. normoactive bowel sounds    Musculoskeletal:  No edema,      Neurologic:  Moves all extremities.   AAOx3, CN II-XII reviewed     Psych:  Not anxious or agitated       Data Review:    Review and/or order of clinical lab test  Review and/or order of tests in the medicine section of [facility]      Labs:     No results for input(s): WBC, HGB, HCT, PLT, HGBEXT, HCTEXT, PLTEXT, HGBEXT, HCTEXT, PLTEXT in the last 72 hours. Recent Labs     01/31/22  0343 01/30/22  0152 01/29/22  0201    142 142   K 3.9 4.5 4.6    114* 116*   CO2 30 25 24   BUN 17 22* 28*   CREA 1.34* 1.37* 1.39*   GLU 96 100 103*   CA 9.9 9.7 9.1     No results for input(s): ALT, AP, TBIL, TBILI, TP, ALB, GLOB, GGT, AML, LPSE in the last 72 hours. No lab exists for component: SGOT, GPT, AMYP, HLPSE  No results for input(s): INR, PTP, APTT, INREXT, INREXT in the last 72 hours. No results for input(s): FE, TIBC, PSAT, FERR in the last 72 hours. Lab Results   Component Value Date/Time    Folate 9.2 07/20/2014 11:37 AM      No results for input(s): PH, PCO2, PO2 in the last 72 hours. No results for input(s): CPK, CKNDX, TROIQ in the last 72 hours.     No lab exists for component: CPKMB  Lab Results   Component Value Date/Time    Cholesterol, total 161 01/26/2018 04:28 AM    HDL Cholesterol 69 01/26/2018 04:28 AM    LDL, calculated 75.2 01/26/2018 04:28 AM    Triglyceride 84 01/26/2018 04:28 AM    CHOL/HDL Ratio 2.3 01/26/2018 04:28 AM     Lab Results   Component Value Date/Time    Glucose (POC) 97 01/18/2020 05:53 AM    Glucose (POC) 123 (H) 01/14/2020 10:45 PM    Glucose (POC) 105 (H) 09/07/2015 03:49 PM    Glucose (POC) 125 (H) 09/07/2015 12:17 PM    Glucose (POC) 93 09/06/2015 09:53 PM     Lab Results   Component Value Date/Time    Color YELLOW/STRAW 01/24/2022 09:52 PM    Appearance HAZY (A) 01/24/2022 09:52 PM    Specific gravity 1.025 01/24/2022 09:52 PM    Specific gravity 1.017 02/04/2020 07:50 AM    pH (UA) 5.0 01/24/2022 09:52 PM    Protein 100 (A) 01/24/2022 09:52 PM    Glucose Negative 01/24/2022 09:52 PM    Ketone Negative 01/24/2022 09:52 PM    Bilirubin Negative 01/24/2022 09:52 PM    Urobilinogen 0.2 01/24/2022 09:52 PM    Nitrites Negative 01/24/2022 09:52 PM    Leukocyte Esterase TRACE (A) 01/24/2022 09:52 PM    Epithelial cells FEW 01/24/2022 09:52 PM    Bacteria Negative 01/24/2022 09:52 PM    WBC 5-10 01/24/2022 09:52 PM    RBC 0-5 01/24/2022 09:52 PM         Medications Reviewed:     Current Facility-Administered Medications   Medication Dose Route Frequency    albuterol-ipratropium (DUO-NEB) 2.5 MG-0.5 MG/3 ML  3 mL Nebulization Q4H PRN    allopurinoL (ZYLOPRIM) tablet 100 mg  100 mg Oral DAILY    atorvastatin (LIPITOR) tablet 10 mg  10 mg Oral DAILY    calcium-vitamin D (OS-HUNTER +D3) 500 mg-200 unit per tablet 1 Tablet  1 Tablet Oral DAILY    cholecalciferol (VITAMIN D3) (400 Units /10 mcg) tablet 1,000 Units  1,000 Units Oral DAILY    dilTIAZem ER (CARDIZEM CD) capsule 240 mg  240 mg Oral DAILY    sodium chloride (NS) flush 5-40 mL  5-40 mL IntraVENous Q8H    sodium chloride (NS) flush 5-40 mL  5-40 mL IntraVENous PRN    ondansetron (ZOFRAN ODT) tablet 4 mg  4 mg Oral Q8H PRN    Or    ondansetron (ZOFRAN) injection 4 mg  4 mg IntraVENous Q6H PRN    acetaminophen (TYLENOL) tablet 650 mg  650 mg Oral Q4H PRN    morphine injection 2 mg  2 mg IntraVENous Q4H PRN    pantoprazole (PROTONIX) 40 mg in 0.9% sodium chloride 10 mL injection  40 mg IntraVENous DAILY     ______________________________________________________________________  EXPECTED LENGTH OF STAY: 3d 4h  ACTUAL LENGTH OF STAY:          6                 Greg Mckeon MD

## 2022-01-31 NOTE — DISCHARGE SUMMARY
Discharge Summary       PATIENT ID: Sahil Torres  MRN: [id number]   YOB: 1940    DATE OF ADMISSION: 1/24/2022  8:52 PM    DATE OF DISCHARGE: 1/31/2022   PRIMARY CARE PROVIDER: Teja Yepez MD     ATTENDING PHYSICIAN: Severiano Boyer MD   DISCHARGING PROVIDER: Kiley Morales MD    To contact this individual call [phone] and ask the  to page. If unavailable ask to be transferred the Adult Hospitalist Department. CONSULTATIONS: IP CONSULT TO GENERAL SURGERY    PROCEDURES/SURGERIES: * No surgery found *      ADMISSION SUMMARY AND HOSPITAL COURSE:     This is an 17-year-old woman with a past medical history significant for hypertension, dyslipidemia, and thromboembolism, was in her usual state of health until the day of her presentation at the emergency room when the patient developed abdominal pain.  The abdominal pain is diffuse in location, constant, dull ache. The pain started after eating lunch.  No known aggravating or relieving factors.  Associated with nausea and vomiting, 5/10 in severity.  The patient was taken to the [facility] Emergency Room at [address] [facility] the patient arrived at the emergency room, CT scan of the abdomen and pelvis was obtained.  The CT scan shows mildly dilated small bowel loops, which could be due to gastroenteritis or partial small bowel obstruction.  The patient was discussed with the general surgeon on-call by the emergency room physician. Justa Sushil surgeon advised admission and hydration.  The patient was subsequently referred to the hospitalist service for evaluation for admission.  The patient was last admitted to the hospital from 06/05/2020 to 06/28/2020.  The patient was admitted and treated for small bowel obstruction.  The patient was treated conservatively. Partial small bowel obstruction  -CT abdomen pelvis: Mildly dilated small bowel loops could indicate gastroenteritis or partial small bowel obstruction. Nonobstructing small bowel containing left ventral hernia  -Pain control and antiemetics as needed  -Continue PPI and  IV fluids  -KUB 1/28 no plain film evidence of small bowel obstruction or other acute findings. -improved and tolerated her diet, had bowel movement   -General surgeon on board   HTN  -BP fairly controlled, on diltiazem, monitor BP  Hyperlipidemia   -Continue home dose atorvastatin  Hx of thromboembolism  -Restart home dose Eliquis when cleared by surgery   JOSE likely prerenal   -Creatinine improving 1.37 , baseline~1  -cut back IV fluids  -Avoid nephrotoxins and renal dose medications  -monitor renal function   Hyperkalemia  -Resolved  -Monitor labs    Code status: None   DVT prophylaxis: Restart home dose Eliquis    DISCHARGE DIAGNOSES / PLAN:      Partial small bowel obstruction  -improved and tolerated her diet, had bowel movement   -prn miralax  HTN  -continue diltiazem, monitor BP  Hyperlipidemia   -Continue home dose atorvastatin  Hx of thromboembolism  -Restart home dose Eliquis    JOSE likely prerenal   -improving  -encourage more fluid intake  Hyperkalemia  -Resolved  -Monitor labs  Hx of DVT  -continue eliquis      NOTIFY YOUR PHYSICIAN FOR ANY OF THE FOLLOWING:   Fever over 101 degrees for 24 hours. Chest pain, shortness of breath, fever, chills, nausea, vomiting, diarrhea, change in mentation, falling, weakness, bleeding. Severe pain or pain not relieved by medications, as well as any other signs or symptoms that you may have questions about.     FOLLOW UP APPOINTMENTS:    Follow-up Information     Follow up With Specialties Details Why Contact Info    [facility] unit  Go to for rehab [id number] [address]    Deni Deshpande MD Family Medicine In one week   [address] [place] [address] [phone]          DIET: Regular Diet    ACTIVITY: Activity as tolerated    EQUIPMENT needed: None    DISCHARGE MEDICATIONS:  Current Discharge Medication List      START taking these medications    Details   pantoprazole (Protonix) 40 mg tablet Take 1 Tablet by mouth daily. Qty: 30 Tablet, Refills: 0  Start date: 1/31/2022      polyethylene glycol (Miralax) 17 gram packet Take 1 Packet by mouth daily as needed for Constipation. Qty: 30 Each, Refills: 0  Start date: 1/31/2022         CONTINUE these medications which have NOT CHANGED    Details   loratadine (CLARITIN) 10 mg tablet TAKE ONE TABLET BY MOUTH EVERY DAY AS NEEDED FOR ALLERGY SYMPTOMS  Qty: 90 Tablet, Refills: 1      apixaban (Eliquis) 5 mg tablet TAKE 1 TABLET BY MOUTH EVERY 12 HOURS  Qty: 60 Tablet, Refills: 3      atorvastatin (LIPITOR) 10 mg tablet TAKE ONE TABLET BY MOUTH EVERY DAY  Qty: 90 Tablet, Refills: 3      cyanocobalamin (VITAMIN B12) 1,000 mcg/mL injection inject 1 milliliter subcutaneously EVERY 2 MONTHS  Qty: 10 mL, Refills: 5      dilTIAZem ER (DILACOR XR) 240 mg capsule Take 1 Cap by mouth daily. Qty: 30 Cap, Refills: 3      allopurinoL (ZYLOPRIM) 100 mg tablet TAKE 1 TABLET BY MOUTH EVERY DAY  Qty: 90 Tab, Refills: 3      acetaminophen (TYLENOL EXTRA STRENGTH) 500 mg tablet Take 500 mg by mouth two (2) times daily as needed for Pain.      lovastatin (MEVACOR) 20 mg tablet TAKE 1 TABLET BY MOUTH AT BEDTIME  Qty: 90 Tab, Refills: 3      albuterol (PROVENTIL HFA, VENTOLIN HFA, PROAIR HFA) 90 mcg/actuation inhaler Take 2 Puffs by inhalation every four (4) hours as needed for Wheezing. Qty: 1 Inhaler, Refills: 2      calcium-vitamin D (OYSTER SHELL) 500 mg(1,250mg) -200 unit per tablet Take 1 Tab by mouth daily. multivit-min-iron-FA-lutein (CENTRUM SILVER WOMEN) 8 mg iron-400 mcg-300 mcg tab Take 1 Tab by mouth daily. cholecalciferol (VITAMIN D3) 1,000 unit tablet Take 1,000 Units by mouth daily.          STOP taking these medications       dilTIAZem ER (CARDIZEM CD) 240 mg capsule Comments:   Reason for Stopping:               DISPOSITION:    Home With:   OT  PT  [facility]  RN x Long term SNF/Inpatient Rehab    Independent/assisted living    Hospice    Other:       PATIENT CONDITION AT DISCHARGE:     Functional status    Poor    x Deconditioned     Independent      Cognition   x  Lucid     Forgetful     Dementia      Catheters/lines (plus indication)    Haskins     PICC     PEG    x None      Code status   x  Full code     DNR      PHYSICAL EXAMINATION AT DISCHARGE:  Patient Vitals for the past 24 hrs:   Temp Pulse Resp BP SpO2   01/31/22 0746 98.3 °F (36.8 °C) 81 18 (!) 165/100 91 %   01/30/22 2333 98 °F (36.7 °C) 77 18 (!) 173/94 94 %   01/30/22 2116 97.9 °F (36.6 °C) 65 18 (!) 180/82 95 %   01/30/22 1559 98 °F (36.7 °C) 70 18 (!) 172/92 94 %     General:          Alert, cooperative, no distress, appears stated age. HEENT:           Atraumatic, anicteric sclerae, pink conjunctivae                          No oral ulcers, mucosa moist, throat clear, dentition fair  Neck:               Supple, symmetrical  Lungs:             Clear to auscultation bilaterally. No Wheezing or Rhonchi. No rales. Chest wall:      No tenderness  No Accessory muscle use. Heart:              Regular  rhythm,  No  murmur   No edema  Abdomen:        Soft, non-tender. Not distended. Bowel sounds normal  Extremities:     No cyanosis. No clubbing,                            Skin turgor normal, Capillary refill normal  Skin:                Not pale. Not Jaundiced  No rashes   Psych:             Not anxious or agitated.   Neurologic:      Alert, moves all extremities, answers questions appropriately and responds to commands       Recent Results (from the past 24 hour(s))   METABOLIC PANEL, BASIC    Collection Time: 01/31/22  3:43 AM   Result Value Ref Range    Sodium 140 136 - 145 mmol/L    Potassium 3.9 3.5 - 5.1 mmol/L    Chloride 108 97 - 108 mmol/L    CO2 30 21 - 32 mmol/L    Anion gap 2 (L) 5 - 15 mmol/L    Glucose 96 65 - 100 mg/dL    BUN 17 6 - 20 MG/DL    Creatinine 1.34 (H) 0.55 - 1.02 MG/DL BUN/Creatinine ratio 13 12 - 20      GFR est AA 46 (L) >60 ml/min/1.73m2    GFR est non-AA 38 (L) >60 ml/min/1.73m2    Calcium 9.9 8.5 - 10.1 MG/DL     CHRONIC MEDICAL DIAGNOSES:  Problem List as of 1/31/2022 Date Reviewed: 1/26/2022          Codes Class Noted - Resolved    Gastroenteritis ICD-10-CM: K52.9  ICD-9-CM: 558.9  1/25/2022 - Present        Thrombocytopenia ([facility] ICD-10-CM: D69.6  ICD-9-CM: 287.5  6/7/2020 - Present        Arterial embolism and thrombosis of lower extremity (HCC) ICD-10-CM: I74.3  ICD-9-CM: 444.22  1/24/2020 - Present        Postprocedural hypotension ICD-10-CM: I95.81  ICD-9-CM: 458.29  1/24/2020 - Present        Stage 3 chronic kidney disease ([facility] ICD-10-CM: N18.30  ICD-9-CM: 585.3  1/18/2020 - Present        Hypoxia ICD-10-CM: R09.02  ICD-9-CM: 799.02  1/15/2020 - Present        Weakness of left lower extremity ICD-10-CM: R29.898  ICD-9-CM: 729.89  1/15/2020 - Present    Overview Signed 1/18/2020  8:31 AM by Luisito Price MD     Probably embolic             Pulmonary embolism ([facility] ICD-10-CM: I26.99  ICD-9-CM: 415.19  1/15/2020 - Present    Overview Signed 1/18/2020  8:51 AM by Luisito Price MD     High probability VQ lung scan - bilateral             DVT (deep venous thrombosis) ([facility] ICD-10-CM: I82.409  ICD-9-CM: 453.40  1/15/2020 - Present        Gallstone pancreatitis ICD-10-CM: K85.10  ICD-9-CM: 577.0, 574.20  12/27/2019 - Present        Acute cholecystitis ICD-10-CM: K81.0  ICD-9-CM: 575.0  12/21/2019 - Present        Gallstone ICD-10-CM: K80.20  ICD-9-CM: 574.20  12/14/2019 - Present        JOSE (acute kidney injury) ([facility] ICD-10-CM: N17.9  ICD-9-CM: 584.9  12/14/2019 - Present        Common bile duct calculus ICD-10-CM: K80.50  ICD-9-CM: 574.50  12/14/2019 - Present        Abdominal pain ICD-10-CM: R10.9  ICD-9-CM: 789.00  12/14/2019 - Present        Dehydration ICD-10-CM: E86.0  ICD-9-CM: 276.51  1/29/2018 - Present        Bronchitis ICD-10-CM: J40  ICD-9-CM: 530  1/29/2018 - Present        Paroxysmal SVT (supraventricular tachycardia) (HCC) ICD-10-CM: I47.1  ICD-9-CM: 427.0  5/2/2017 - Present        Obstruction of bowel ([facility] ICD-10-CM: K51.661  ICD-9-CM: 560.9  4/21/2017 - Present        * (Principal) Partial small bowel obstruction ([facility] ICD-10-CM: K56.600  ICD-9-CM: 560.9  4/21/2017 - Present        SBO (small bowel obstruction) ([facility] ICD-10-CM: P90.214  ICD-9-CM: 560.9  8/18/2016 - Present        Acute post-hemorrhagic anemia ICD-10-CM: D62  ICD-9-CM: 285.1  9/8/2015 - Present        Postoperative hypovolemic shock ICD-10-CM: T81.19XA  ICD-9-CM: 998.09  9/8/2015 - Present        Tachycardia ICD-10-CM: R00.0  ICD-9-CM: 785.0  9/3/2015 - Present        Blood loss anemia ICD-10-CM: D50.0  ICD-9-CM: 280.0  7/20/2014 - Present        GI bleed ICD-10-CM: K92.2  ICD-9-CM: 578.9  7/20/2014 - Present        Abscess ICD-10-CM: L02.91  ICD-9-CM: 682.9  3/22/2014 - Present    Overview Signed 3/22/2014  8:39 AM by Edward Allen MD     Lumbar area - MRSA             Cerebral thrombosis with cerebral infarction [facility]) ICD-10-CM: I63.30  ICD-9-CM: 434.01  3/18/2014 - Present    Overview Signed 3/22/2014  8:46 AM by Edwrad Allen MD     Small acute left frontal infarction on mri 3/17/2014             Wound dehiscence ICD-10-CM: T81.30XA  ICD-9-CM: 998.30  3/16/2014 - Present        Hypertension ICD-10-CM: I10  ICD-9-CM: 401.9  Unknown - Present        Hypercholesterolemia ICD-10-CM: E78.00  ICD-9-CM: 272.0  Unknown - Present        RESOLVED: Acute renal failure superimposed on stage 4 chronic kidney disease ([facility] ICD-10-CM: N17.9, N18.4  ICD-9-CM: 584.9, 585.4  1/25/2018 - 5/4/2020        RESOLVED: Weakness ICD-10-CM: R53.1  ICD-9-CM: 780.79  3/16/2014 - 3/22/2014              Greater than 45 minutes were spent with the patient on counseling and coordination of care    Signed:   Al Joshi MD  1/31/2022  3:20 PM

## 2022-01-31 NOTE — PROGRESS NOTES
Patient picked up by [facility] transport. Ensured she had discharge packet and all personal belongings.

## 2022-01-31 NOTE — PROGRESS NOTES
OCCUPATIONAL THERAPY EVALUATION/DISCHARGE  Patient: Johnson Prader (66 y.o. female)  Date: 1/31/2022  Primary Diagnosis: Gastroenteritis [K52.9]       Precautions: contact       ASSESSMENT  Based on the objective data described below, the patient presents as nearing ADL baseline s/p admission for gastroenteritis. Plan for pt to discharge back to [address] for continued rehab. Pt received in bathroom on room air this date, SBA for toilet transfer and clothing management. Pt performed standing ADLs at sink for 2 minutes with min verbal cues for RW management in small spaces. Post ADLs and once seated, pt's SpO2 91-93% on room air. With additional time sitting, O2 as high as 95%. Reviewed sequencing for PLB technique with pt demonstrating with min verbal cues. Pt would benefit from additional rehab prior to returning to her baseline living situation (ILF with ). Current Level of Function (ADLs/self-care): SBA to mod I, RW    Functional Outcome Measure: The patient scored 80 on the barthel outcome measure which is indicative of partially independent in ADLs. Other factors to consider for discharge: from [facility] :  Recommend with staff: OOB to chair meals, TID, ambulate to bathroom with RW A x 1, monitor O2    Recommendation for discharge: (in order for the patient to meet his/her long term goals)  Therapy up to 5 days/week in SNF setting    This discharge recommendation:  Has been made in collaboration with the attending provider and/or case management    IF patient discharges home will need the following DME: none- pt is not going home on home O2 at this time. Per RN, pt has been on RA all day and maintaining saturations       SUBJECTIVE:   Patient stated I would like to brush my teeth.     OBJECTIVE DATA SUMMARY:   HISTORY:   Past Medical History:   Diagnosis Date    Abscess 3/22/2014    Lumbar area - MRSA    Arthritis     Common bile duct calculus 12/14/2019    Gout  Hypercholesterolemia     Hypertension     LBP (low back pain)     Other ill-defined conditions(799.89)     LEFT SHOULDER PAINFUL, NEEDS REPLACEMENT    Paroxysmal [facility]supra[facility]ricular tachycardia[facility]) 5/2/2017    Thromboembolus ([address] 12/26/13    left leg     Past Surgical History:   Procedure Laterality Date    ENDOSCOPY, COLON, DIAGNOSTIC      ostomy reversal    HX BREAST BIOPSY Right 1970    Benign    HX COLOSTOMY      HX GI      COLOSTOMY REVERSAL    HX HEMORRHOIDECTOMY      HX ORTHOPAEDIC      right knee  bilateral knee replacement    HX ORTHOPAEDIC      ORIF RIGHT ANKLE    HX ORTHOPAEDIC      KNEE ARTHROSCOPY    MS ABDOMEN SURGERY PROC UNLISTED      hernia repair    MS BREAST SURGERY PROCEDURE UNLISTED      EXCISION OF BREAST CYST       Prior Level of Function/Environment/Context: pt was receiving rehab at [address] [place] [address] with ADLs at baseline,uses RW and has supportive   Expanded or extensive additional review of patient history:   Home Situation  Home Environment: Apartment  One/Two Story Residence: One story  Living Alone: No  Support Systems: Spouse/Significant Other  Patient Expects to be Discharged to[de-identified] Other: (TBD)  Current DME Used/Available at Home: Walker, rolling    Hand dominance: Right    EXAMINATION OF PERFORMANCE DEFICITS:  Cognitive/Behavioral Status:                      Skin: intact    Edema: none noted    Hearing: Auditory  Auditory Impairment: Hard of hearing, bilateral    Vision/Perceptual:                           Acuity: Within Defined Limits    Corrective Lenses: Glasses    Range of Motion:    AROM: Generally decreased, functional (B shoulders)                         Strength:    Strength: Generally decreased, functional                Coordination:  Coordination: Within functional limits  Fine Motor Skills-Upper: Left Intact; Right Intact    Gross Motor Skills-Upper: Left Intact; Right Intact    Tone & Sensation:    Tone: Normal  Sensation: Intact Balance:  Sitting: Intact  Standing: Intact; With support  Standing - Static: Good    Functional Mobility and Transfers for ADLs:  Bed Mobility:       Transfers: Toilet Transfer : Modified independent    ADL Assessment:  Feeding: Independent    Oral Facial Hygiene/Grooming: Supervision    Bathing: Stand-by assistance    Upper Body Dressing: Setup    Lower Body Dressing: Stand-by assistance    Toileting: Stand by assistance                ADL Intervention and task modifications:          Verbal cues for safety and RW management in bathroom, squaring off to surfaces at sink and use of RW in smaller spaces. Also verbally discussed PLB technique and sequencing              Functional Measure:    Barthel Index:  Bathin  Bladder: 10  Bowels: 10  Groomin  Dressing: 10  Feeding: 10  Mobility: 10  Stairs: 0  Toilet Use: 10  Transfer (Bed to Chair and Back): 15  Total: 80/100      The Barthel ADL Index: Guidelines  1. The index should be used as a record of what a patient does, not as a record of what a patient could do. 2. The main aim is to establish degree of independence from any help, physical or verbal, however minor and for whatever reason. 3. The need for supervision renders the patient not independent. 4. A patient's performance should be established using the best available evidence. Asking the patient, friends/relatives and nurses are the usual sources, but direct observation and common sense are also important. However direct testing is not needed. 5. Usually the patient's performance over the preceding 24-48 hours is important, but occasionally longer periods will be relevant. 6. Middle categories imply that the patient supplies over 50 per cent of the effort. 7. Use of aids to be independent is allowed.     Score Interpretation (from [address] [place] [address])    Independent   60-79 Minimally independent   40-59 Partially dependent   20-39 Very dependent   <20 Totally dependent     -Criselda F.l., Barthel, DAdaW. (1965). Functional evaluation: the Barthel Index. [address] ([address] (1997). The Barthel activities of daily living index: self-reporting versus actual performance in the old (> or = 75 years). Journal of [facility] 45(7), 14 [facility], TAB, Madeleine Segovia., Alfred Kurtz. (1999). Measuring the change in disability after inpatient rehabilitation; comparison of the responsiveness of the Barthel Index and Functional [place] Measure. Journal of Neurology, Neurosurgery, and Psychiatry, 66(4), 512-700. Donna Morley, N.J.A, IONA Dc, & Mary Mason MFAYE. (2004) Assessment of post-stroke quality of life in cost-effectiveness studies: The usefulness of the Barthel Index and the EuroQoL-5D. Quality of Life Research, 15, 436-96         Occupational Therapy Evaluation Charge Determination   History Examination Decision-Making   LOW Complexity : Brief history review  LOW Complexity : 1-3 performance deficits relating to physical, cognitive , or psychosocial skils that result in activity limitations and / or participation restrictions  LOW Complexity : No comorbidities that affect functional and no verbal or physical assistance needed to complete eval tasks       Based on the above components, the patient evaluation is determined to be of the following complexity level: LOW   Pain Rating:  none    Activity Tolerance:   Good and SpO2 stable on RA    After treatment patient left in no apparent distress:    Sitting in chair and Call bell within reach    COMMUNICATION/EDUCATION:   The patients plan of care was discussed with: Registered nurse and Case management.      Thank you for this referral.  Pattie Contreras OT  Time Calculation: 12 mins

## 2022-03-18 PROBLEM — N17.9 AKI (ACUTE KIDNEY INJURY) (HCC): Status: ACTIVE | Noted: 2019-12-14

## 2022-03-18 PROBLEM — K81.0 ACUTE CHOLECYSTITIS: Status: ACTIVE | Noted: 2019-12-21

## 2022-03-18 PROBLEM — K56.609 OBSTRUCTION OF BOWEL (HCC): Status: ACTIVE | Noted: 2017-04-21

## 2022-03-18 PROBLEM — I47.1 PAROXYSMAL SVT (SUPRAVENTRICULAR TACHYCARDIA) (HCC): Status: ACTIVE | Noted: 2017-05-02

## 2022-03-18 PROBLEM — K80.20 GALLSTONE: Status: ACTIVE | Noted: 2019-12-14

## 2022-03-19 PROBLEM — I95.81 POSTPROCEDURAL HYPOTENSION: Status: ACTIVE | Noted: 2020-01-24

## 2022-03-19 PROBLEM — D69.6 THROMBOCYTOPENIA (HCC): Status: ACTIVE | Noted: 2020-06-07

## 2022-03-19 PROBLEM — R10.9 ABDOMINAL PAIN: Status: ACTIVE | Noted: 2019-12-14

## 2022-03-19 PROBLEM — K80.50 COMMON BILE DUCT CALCULUS: Status: ACTIVE | Noted: 2019-12-14

## 2022-03-19 PROBLEM — I26.99 PULMONARY EMBOLISM (HCC): Status: ACTIVE | Noted: 2020-01-15

## 2022-03-19 PROBLEM — K85.10 GALLSTONE PANCREATITIS: Status: ACTIVE | Noted: 2019-12-27

## 2022-03-19 PROBLEM — N18.30 STAGE 3 CHRONIC KIDNEY DISEASE (HCC): Status: ACTIVE | Noted: 2020-01-18

## 2022-03-19 PROBLEM — K52.9 GASTROENTERITIS: Status: ACTIVE | Noted: 2022-01-25

## 2022-03-19 PROBLEM — R29.898 WEAKNESS OF LEFT LOWER EXTREMITY: Status: ACTIVE | Noted: 2020-01-15

## 2022-03-19 PROBLEM — K56.600 PARTIAL SMALL BOWEL OBSTRUCTION (HCC): Status: ACTIVE | Noted: 2017-04-21

## 2022-03-19 PROBLEM — E86.0 DEHYDRATION: Status: ACTIVE | Noted: 2018-01-29

## 2022-03-19 PROBLEM — I82.409 DVT (DEEP VENOUS THROMBOSIS) (HCC): Status: ACTIVE | Noted: 2020-01-15

## 2022-03-20 PROBLEM — J40 BRONCHITIS: Status: ACTIVE | Noted: 2018-01-29

## 2022-03-20 PROBLEM — R09.02 HYPOXIA: Status: ACTIVE | Noted: 2020-01-15

## 2022-03-20 PROBLEM — I74.3 ARTERIAL EMBOLISM AND THROMBOSIS OF LOWER EXTREMITY (HCC): Status: ACTIVE | Noted: 2020-01-24

## 2022-11-21 ENCOUNTER — TRANSCRIBE ORDER (OUTPATIENT)
Dept: SCHEDULING | Age: 82
End: 2022-11-21

## 2022-11-21 DIAGNOSIS — N18.4 CHRONIC KIDNEY DISEASE, STAGE 4 (SEVERE) (HCC): Primary | ICD-10-CM

## 2022-12-09 ENCOUNTER — HOSPITAL ENCOUNTER (OUTPATIENT)
Dept: ULTRASOUND IMAGING | Age: 82
End: 2022-12-09
Attending: INTERNAL MEDICINE
Payer: MEDICARE

## 2022-12-09 DIAGNOSIS — N18.4 CHRONIC KIDNEY DISEASE, STAGE 4 (SEVERE) (HCC): ICD-10-CM

## 2022-12-09 PROCEDURE — 76770 US EXAM ABDO BACK WALL COMP: CPT

## (undated) DEVICE — Device

## (undated) DEVICE — RESERVOIR,SUCTION,100CC,SILICONE: Brand: MEDLINE

## (undated) DEVICE — SYR 3ML LL TIP 1/10ML GRAD --

## (undated) DEVICE — REM POLYHESIVE ADULT PATIENT RETURN ELECTRODE: Brand: VALLEYLAB

## (undated) DEVICE — STRAP,POSITIONING,KNEE/BODY,FOAM,4X60": Brand: MEDLINE

## (undated) DEVICE — AMC/4 ARTERIAL NEEDLE – 18GA X 2.75” (7CM): Brand: ARTERIAL NEEDLE

## (undated) DEVICE — SYR 10ML LUER LOK 1/5ML GRAD --

## (undated) DEVICE — CANISTER, RIGID, 3000CC: Brand: MEDLINE INDUSTRIES, INC.

## (undated) DEVICE — GARMENT,MEDLINE,DVT,INT,CALF,LG, GEN2: Brand: MEDLINE

## (undated) DEVICE — RADIFOCUS GLIDEWIRE: Brand: GLIDEWIRE

## (undated) DEVICE — HANDLE LT SNAP ON ULT DURABLE LENS FOR TRUMPF ALC DISPOSABLE

## (undated) DEVICE — BAG ISOL TRNSPRT 18X18.5IN LF --

## (undated) DEVICE — STERILE POLYISOPRENE POWDER-FREE SURGICAL GLOVES WITH EMOLLIENT COATING: Brand: PROTEXIS

## (undated) DEVICE — NEEDLE HYPO 22GA L1.5IN BLK S STL HUB POLYPR SHLD REG BVL

## (undated) DEVICE — (D)FCPS GRSP 9MM 230CMLX2.4MM -- DISC BY MFR

## (undated) DEVICE — FILTER SMK EVAC FLO CLR MEGADYNE

## (undated) DEVICE — SUT SLK 3-0 30IN SH BLK --

## (undated) DEVICE — E-Z CLEAN, PTFE COATED, ELECTROSURGICAL LAPAROSCOPIC ELECTRODE, L-HOOK, 33 CM., SINGLE-USE, FOR USE WITH HAND CONTROL PENCIL: Brand: MEGADYNE

## (undated) DEVICE — SURGICAL PROCEDURE KIT GEN LAPAROSCOPY LF

## (undated) DEVICE — DRAPE,REIN 53X77,STERILE: Brand: MEDLINE

## (undated) DEVICE — FOGARTY ARTERIAL EMBOLECTOMY CATHETER 4F 80CM: Brand: FOGARTY

## (undated) DEVICE — LAPAROSCOPIC TROCAR SLEEVE/SINGLE USE: Brand: KII® OPTICAL ACCESS SYSTEM

## (undated) DEVICE — DEVICE INFL 20ML L13IN 30ATM CLR POLYCARB TB PRTBL DGT

## (undated) DEVICE — GARMENT,MEDLINE,DVT,INT,CALF,MED, GEN2: Brand: MEDLINE

## (undated) DEVICE — SUT PROL 6-0 18IN BV1 DA BLU --

## (undated) DEVICE — SUTURE MCRYL SZ 4-0 L27IN ABSRB UD L19MM PS-2 1/2 CIR PRIM Y426H

## (undated) DEVICE — TOTAL TRAY, 16FR 10ML SIL FOLEY, URN: Brand: MEDLINE

## (undated) DEVICE — GUIDEWIRE VASC L180CM TIP L5CM DIA0.014IN 15DEG ANG

## (undated) DEVICE — RETRIEVAL BALLOON CATHETER: Brand: EXTRACTOR™ PRO RX

## (undated) DEVICE — SOLUTION IV 1000ML 0.9% SOD CHL

## (undated) DEVICE — SET CHOLANGIOGRAPHY 4FR L60CM W/ ARW KARLAN BLLN CATH CRV

## (undated) DEVICE — TISSEEL VHSD 10 ML KT 1504516] BAXTER BIOSURGERY]

## (undated) DEVICE — DISSECTOR ENDOSCP DIA5MM CRV MPLR CAUT ENDOPATH

## (undated) DEVICE — TROCAR: Brand: KII® SLEEVE

## (undated) DEVICE — (D)PREP SKN CHLRAPRP APPL 26ML -- CONVERT TO ITEM 371833

## (undated) DEVICE — APPLICATOR TISS 20 GAX32 CM W/ SNAP LCK SS DUPLOTIP DISP

## (undated) DEVICE — SUTURE VCRL SZ 2-0 L36IN ABSRB UD L40MM CT 1/2 CIR J957H

## (undated) DEVICE — FOGARTY ARTERIAL EMBOLECTOMY CATHETER 3F 80CM: Brand: FOGARTY

## (undated) DEVICE — NEEDLE INSUF L150MM DIA2MM DISP FOR PNEUMOPERI ENDOPATH

## (undated) DEVICE — VASCULAR-RICHMOND-LF: Brand: MEDLINE INDUSTRIES, INC.

## (undated) DEVICE — APPLIER CLP M/L SHFT DIA5MM 15 LIG LIGAMAX 5

## (undated) DEVICE — TROCAR: Brand: KII® OPTICAL ACCESS SYSTEM

## (undated) DEVICE — PINNACLE INTRODUCER SHEATH: Brand: PINNACLE

## (undated) DEVICE — INFECTION CONTROL KIT SYS

## (undated) DEVICE — DRAPE XR C ARM 41X74IN LF --

## (undated) DEVICE — DERMABOND SKIN ADH 0.7ML -- DERMABOND ADVANCED 12/BX

## (undated) DEVICE — DEVICE LCK BILI RAP EXCHG OLPS --

## (undated) DEVICE — CANNULATING SPHINCTEROTOME: Brand: JAGTOME™ REVOLUTION RX

## (undated) DEVICE — SUTURE PDS II SZ 1 L27IN ABSRB VLT CT-1 L36MM 1/2 CIR Z341H

## (undated) DEVICE — TUBING INSUF 0.3UM FLTR W/ LUERLOCK CONN

## (undated) DEVICE — SEALANT FIBRIN 10 CC FRZN PRE FILLED SYR TISSEEL

## (undated) DEVICE — SUTURE SZ 0 27IN 5/8 CIR UR-6  TAPER PT VIOLET ABSRB VICRYL J603H

## (undated) DEVICE — BAG SPEC REM 224ML W4XL6IN DIA10MM 1 HND GYN DISP ENDOPCH

## (undated) DEVICE — SUTURE VCRL SZ 3-0 L27IN ABSRB UD L26MM SH 1/2 CIR J416H

## (undated) DEVICE — SOLUTION IV 500ML 0.9% SOD CHL FLX CONT

## (undated) DEVICE — DRAIN SURG 19FR 100% SIL RADPQ RND CHN FULL FLUT